# Patient Record
Sex: FEMALE | Race: WHITE | NOT HISPANIC OR LATINO | Employment: FULL TIME | URBAN - METROPOLITAN AREA
[De-identification: names, ages, dates, MRNs, and addresses within clinical notes are randomized per-mention and may not be internally consistent; named-entity substitution may affect disease eponyms.]

---

## 2019-08-01 ENCOUNTER — TELEPHONE (OUTPATIENT)
Dept: SURGICAL ONCOLOGY | Facility: CLINIC | Age: 59
End: 2019-08-01

## 2019-08-01 NOTE — TELEPHONE ENCOUNTER
New Patient Encounter      Bassem Wood  1960  [id number]      Calling Information:  Caller: self    Relationship to Patient: self    Is Caller Listed On Consent Form: yes     Diagnosis: pelvis cyst    When was the diagnosis?: 2019     Referring Provider: Sana Redding    Reason for Visit: New Diagnosis    Have you had any testing done?: yes    If yes, where was testing done: [facility]    Date of testin2019    Are records in [address] [place] [address]?: no    Was patient told to bring disk?: yes    Preferred [place]: [facility]    Requesting Specific Provider?: No    Are there any dates/time the patient cannot be seen?: No      Insurance:  Insurance checked: yes    RTE ran: yes    Insurance Accepted: yes    Miscellaneous:  NA

## 2019-08-14 ENCOUNTER — CONSULT (OUTPATIENT)
Dept: GYNECOLOGIC ONCOLOGY | Facility: HOSPITAL | Age: 59
End: 2019-08-14
Payer: COMMERCIAL

## 2019-08-14 VITALS
DIASTOLIC BLOOD PRESSURE: 72 MMHG | HEART RATE: 87 BPM | SYSTOLIC BLOOD PRESSURE: 128 MMHG | WEIGHT: 234 LBS | BODY MASS INDEX: 41.46 KG/M2 | HEIGHT: 63 IN | TEMPERATURE: 97.4 F

## 2019-08-14 DIAGNOSIS — N95.0 PMB (POSTMENOPAUSAL BLEEDING): Primary | ICD-10-CM

## 2019-08-14 DIAGNOSIS — R60.0 EDEMA OF LEFT LOWER EXTREMITY: ICD-10-CM

## 2019-08-14 DIAGNOSIS — E66.01 MORBID OBESITY WITH BMI OF 40.0-44.9, ADULT (HCC): ICD-10-CM

## 2019-08-14 DIAGNOSIS — N83.8 BILATERAL TUBO-OVARIAN MASS: ICD-10-CM

## 2019-08-14 PROCEDURE — 99245 OFF/OP CONSLTJ NEW/EST HI 55: CPT | Performed by: OBSTETRICS & GYNECOLOGY

## 2019-08-14 RX ORDER — ESCITALOPRAM OXALATE 20 MG/1
20 TABLET ORAL DAILY
Refills: 0 | COMMUNITY
Start: 2019-07-11

## 2019-08-14 RX ORDER — BUPROPION HYDROCHLORIDE 150 MG/1
TABLET, EXTENDED RELEASE ORAL EVERY EVENING
Refills: 0 | COMMUNITY
Start: 2019-05-23

## 2019-08-14 RX ORDER — SODIUM CHLORIDE, SODIUM LACTATE, POTASSIUM CHLORIDE, CALCIUM CHLORIDE 600; 310; 30; 20 MG/100ML; MG/100ML; MG/100ML; MG/100ML
125 INJECTION, SOLUTION INTRAVENOUS CONTINUOUS
Status: CANCELLED | OUTPATIENT
Start: 2019-08-14

## 2019-08-14 RX ORDER — HEPARIN SODIUM 5000 [USP'U]/ML
5000 INJECTION, SOLUTION INTRAVENOUS; SUBCUTANEOUS
Status: CANCELLED | OUTPATIENT
Start: 2019-08-15 | End: 2019-08-16

## 2019-08-14 RX ORDER — CEFAZOLIN SODIUM 2 G/50ML
2000 SOLUTION INTRAVENOUS ONCE
Status: CANCELLED | OUTPATIENT
Start: 2019-08-14 | End: 2019-08-14

## 2019-08-14 RX ORDER — AMLODIPINE BESYLATE 5 MG/1
5 TABLET ORAL DAILY
Refills: 4 | COMMUNITY
Start: 2019-07-12

## 2019-08-14 RX ORDER — VALSARTAN AND HYDROCHLOROTHIAZIDE 320; 25 MG/1; MG/1
TABLET, FILM COATED ORAL DAILY
Refills: 0 | COMMUNITY
Start: 2019-08-13

## 2019-08-14 RX ORDER — CHOLECALCIFEROL (VITAMIN D3) 25 MCG
CAPSULE ORAL DAILY
COMMUNITY

## 2019-08-14 RX ORDER — SIMVASTATIN 20 MG
TABLET ORAL
Refills: 5 | COMMUNITY
Start: 2019-07-11

## 2019-08-14 NOTE — PATIENT INSTRUCTIONS
1  Nothing to eat or drink after midnight prior to the operation  You are allowed clear liquids until 3 hours prior to the scheduled start time of surgery  No milk products or solid food for 8 hours prior to surgery  2   Please avoid ibuprofen, aspirin, fish oil for 7 days prior to surgery  3  Take your amlodipine the morning of surgery with a sip of water  Do not take any of your other medications the morning of surgery  4  Your last dose of metformin should be 24 hours prior to surgery

## 2019-08-14 NOTE — LETTER
August 14, 2019     [facility], [address] [place] [address] Drive  [address] [place] [id number]    Patient: David Marie   YOB: 1960   Date of Visit: 8/14/2019       Dear Dr Ricky Morales and Jerrod: Thank you for referring David Marie to me for evaluation  Below are the relevant portions of my H&P  If you have questions, please do not hesitate to call me  I look forward to following [facility] along with you  Sincerely,        Indy Mandel MD        CC: MD Indy Warner MD  8/14/2019  3:27 PM  Signed  Assessment/Plan:    Problem List Items Addressed This Visit        Other    PMB (postmenopausal bleeding) - Primary     55-year-old with morbid obesity, BMI 41 kilograms/meter squared, postmenopausal bleeding, complex bilateral adnexal masses measuring 17 cm on the right, 7 5 cm on the left, unable to perform office endometrial biopsy  Performance status is 0   1  I discussed the risks and benefits of diagnostic laparoscopy with conversion to exploratory laparotomy, total abdominal hysterectomy, bilateral salpingo-oophorectomy with all other indicated procedures including lymphadenectomy and staging as needed based on intraoperative pathologic assessment  She understands the risks and benefits of the operation agrees to proceed as outlined  Consent was obtained by me in the office  2   preoperatively  3  CT of abdomen pelvis, chest x-ray to assess for extra pelvic disease  Thank you for the courtesy of this consultation  All questions were answered by the end of the visit           Relevant Orders    CT abdomen pelvis w contrast        Case request operating room: HYSTERECTOMY TOTAL ABDOMINAL (TWAN), LAPAROSCOPY DIAGNOSTIC (Completed)    Protime-INR    Morbid obesity with BMI of 40 0-44 9, adult (HCC)    Bilateral tubo-ovarian mass    Relevant Orders    CT abdomen pelvis w contrast        Case request operating room: HYSTERECTOMY TOTAL ABDOMINAL (TWAN), LAPAROSCOPY DIAGNOSTIC (Completed)    Comprehensive metabolic panel    CBC and differential    Protime-INR    HEMOGLOBIN A1C W/ EAG ESTIMATION    Type and screen    EKG 12 lead    XR chest pa & lateral    Edema of left lower extremity    Relevant Orders    VAS lower limb venous duplex study, complete bilateral              CHIEF COMPLAINT:  Postmenopausal bleeding, complex bilateral adnexal masses        Patient ID: Haydee Jain is a 62 y o  female  49-year-old referred as a consultation from Dr Marco Lantigua due to imaging findings consistent with a 17 cm solid right adnexal mass and a 7 5 cm complex solid and cystic left ovarian mass  The patient has had postmenopausal bleeding for approximately 1 month  The endometrium appears thickened by ultrasound  I reviewed the images  She feels bloated, especially after eating  She does not have pelvic pain  Endometrial biopsy was attempted in the office but was not successful secondary to cervical stenosis  Review of Systems   Respiratory: Positive for apnea  Gastrointestinal: Positive for abdominal distention  Genitourinary: Positive for vaginal bleeding  Neurological: Positive for dizziness  Psychiatric/Behavioral: The patient is nervous/anxious  Current Outpatient Medications   Medication Sig Dispense Refill    Cholecalciferol (VITAMIN D-3) 1000 units CAPS Take by mouth      Omega-3 Fatty Acids (FISH OIL ADULT GUMMIES PO) Take by mouth      SUPER B COMPLEX/C PO Take by mouth      amLODIPine (NORVASC) 5 mg tablet TK 1 T PO QD  4    buPROPion (WELLBUTRIN SR) 150 mg 12 hr tablet TK 1 T PO QAM  0    escitalopram (LEXAPRO) 20 mg tablet TK 1 T PO QD  0    metFORMIN (GLUCOPHAGE) 500 mg tablet TK 1 T PO  QD WITH MEALS  0    simvastatin (ZOCOR) 20 mg tablet TK 1 T PO QD IN THE IKMBERLYN  5    valsartan-hydrochlorothiazide (DIOVAN-HCT) 320-25 MG per tablet   0     No current facility-administered medications for this visit  Allergies   Allergen Reactions    Sulfate Other (See Comments)     Redness and hotness       Past Medical History:   Diagnosis Date    Anxiety     Diabetes mellitus ([address] )     High cholesterol     Hypertension        Past Surgical History:   Procedure Laterality Date    CHOLECYSTECTOMY         OB History        1    Para   1    Term                AB        Living           SAB        TAB        Ectopic        Multiple        Live Births                     No family history on file  The following portions of the patient's history were reviewed and updated as appropriate: allergies, current medications, past family history, past medical history, past social history, past surgical history and problem list       Objective:    Blood pressure 128/72, pulse 87, temperature (!) 97 4 °F (36 3 °C), temperature source Oral, height 5' 3" (1 6 m), weight 106 kg (234 lb)  Body mass index is 41 45 kg/m²  Physical Exam   Constitutional: She is oriented to person, place, and time  She appears well-developed and well-nourished  No distress  HENT:   Head: Normocephalic and atraumatic  Neck: Normal range of motion  Neck supple  No thyromegaly present  Cardiovascular: Normal rate, regular rhythm and normal heart sounds  Pulmonary/Chest: Effort normal and breath sounds normal    Abdominal: Soft  She exhibits distension  She exhibits no mass  There is no tenderness  There is no rebound and no guarding  No hernia  Genitourinary:   Genitourinary Comments: The external female genitalia is normal  The bartholin's, uretheral and skenes glands are normal  The urethral meatus is normal (midline with no lesions)  Anus without fissure or lesion  Speculum exam reveals vagina without lesion or discharge  Cervix is normal appearing without visible lesions  There is a small amount of dark blood eminating from the cervical os  No significant cystocele or rectocele noted   Bimanual exam notes an enlarged, anteverted uterus  It is somewhat mobile  No tenderness  Adnexa with a ballotable mass  Bladder is without fullness, mass or tenderness  Musculoskeletal: She exhibits no edema  Lymphadenopathy:     She has no cervical adenopathy  Neurological: She is alert and oriented to person, place, and time  Skin: Skin is warm and dry  She is not diaphoretic  Psychiatric: She has a normal mood and affect   Her behavior is normal  Judgment and thought content normal

## 2019-08-14 NOTE — ASSESSMENT & PLAN NOTE
24-year-old with morbid obesity, BMI 41 kilograms/meter squared, postmenopausal bleeding, complex bilateral adnexal masses measuring 17 cm on the right, 7 5 cm on the left, unable to perform office endometrial biopsy  Performance status is 0   1  I discussed the risks and benefits of diagnostic laparoscopy with conversion to exploratory laparotomy, total abdominal hysterectomy, bilateral salpingo-oophorectomy with all other indicated procedures including lymphadenectomy and staging as needed based on intraoperative pathologic assessment  She understands the risks and benefits of the operation agrees to proceed as outlined  Consent was obtained by me in the office  2   preoperatively  3  CT of abdomen pelvis, chest x-ray to assess for extra pelvic disease  Thank you for the courtesy of this consultation  All questions were answered by the end of the visit

## 2019-08-14 NOTE — H&P
Assessment/Plan:    Problem List Items Addressed This Visit        Other    PMB (postmenopausal bleeding) - Primary     22-year-old with morbid obesity, BMI 41 kilograms/meter squared, postmenopausal bleeding, complex bilateral adnexal masses measuring 17 cm on the right, 7 5 cm on the left, unable to perform office endometrial biopsy  Performance status is 0   1  I discussed the risks and benefits of diagnostic laparoscopy with conversion to exploratory laparotomy, total abdominal hysterectomy, bilateral salpingo-oophorectomy with all other indicated procedures including lymphadenectomy and staging as needed based on intraoperative pathologic assessment  She understands the risks and benefits of the operation agrees to proceed as outlined  Consent was obtained by me in the office  2   preoperatively  3  CT of abdomen pelvis, chest x-ray to assess for extra pelvic disease  Thank you for the courtesy of this consultation  All questions were answered by the end of the visit           Relevant Orders    CT abdomen pelvis w contrast        Case request operating room: HYSTERECTOMY TOTAL ABDOMINAL (TWAN), LAPAROSCOPY DIAGNOSTIC (Completed)    Protime-INR    Morbid obesity with BMI of 40 0-44 9, adult (HCC)    Bilateral tubo-ovarian mass    Relevant Orders    CT abdomen pelvis w contrast        Case request operating room: HYSTERECTOMY TOTAL ABDOMINAL (TWAN), LAPAROSCOPY DIAGNOSTIC (Completed)    Comprehensive metabolic panel    CBC and differential    Protime-INR    HEMOGLOBIN A1C W/ EAG ESTIMATION    Type and screen    EKG 12 lead    XR chest pa & lateral    Edema of left lower extremity    Relevant Orders    VAS lower limb venous duplex study, complete bilateral              CHIEF COMPLAINT:  Postmenopausal bleeding, complex bilateral adnexal masses        Patient ID: Mae Redmond is a 62 y o  female  22-year-old referred as a consultation from Dr Dylon Lindsey due to imaging findings consistent with a 17 cm solid right adnexal mass and a 7 5 cm complex solid and cystic left ovarian mass  The patient has had postmenopausal bleeding for approximately 1 month  The endometrium appears thickened by ultrasound  I reviewed the images  She feels bloated, especially after eating  She does not have pelvic pain  Endometrial biopsy was attempted in the office but was not successful secondary to cervical stenosis  Review of Systems   Respiratory: Positive for apnea  Gastrointestinal: Positive for abdominal distention  Genitourinary: Positive for vaginal bleeding  Neurological: Positive for dizziness  Psychiatric/Behavioral: The patient is nervous/anxious  Current Outpatient Medications   Medication Sig Dispense Refill    Cholecalciferol (VITAMIN D-3) 1000 units CAPS Take by mouth      Omega-3 Fatty Acids (FISH OIL ADULT GUMMIES PO) Take by mouth      SUPER B COMPLEX/C PO Take by mouth      amLODIPine (NORVASC) 5 mg tablet TK 1 T PO QD  4    buPROPion (WELLBUTRIN SR) 150 mg 12 hr tablet TK 1 T PO QAM  0    escitalopram (LEXAPRO) 20 mg tablet TK 1 T PO QD  0    metFORMIN (GLUCOPHAGE) 500 mg tablet TK 1 T PO  QD WITH MEALS  0    simvastatin (ZOCOR) 20 mg tablet TK 1 T PO QD IN THE KIMBERLYN  5    valsartan-hydrochlorothiazide (DIOVAN-HCT) 320-25 MG per tablet   0     No current facility-administered medications for this visit  Allergies   Allergen Reactions    Sulfate Other (See Comments)     Redness and hotness       Past Medical History:   Diagnosis Date    Anxiety     Diabetes mellitus ([address] )     High cholesterol     Hypertension        Past Surgical History:   Procedure Laterality Date    CHOLECYSTECTOMY         OB History        1    Para   1    Term                AB        Living           SAB        TAB        Ectopic        Multiple        Live Births                     No family history on file      The following portions of the patient's history were reviewed and updated as appropriate: allergies, current medications, past family history, past medical history, past social history, past surgical history and problem list       Objective:    Blood pressure 128/72, pulse 87, temperature (!) 97 4 °F (36 3 °C), temperature source Oral, height 5' 3" (1 6 m), weight 106 kg (234 lb)  Body mass index is 41 45 kg/m²  Physical Exam   Constitutional: She is oriented to person, place, and time  She appears well-developed and well-nourished  No distress  HENT:   Head: Normocephalic and atraumatic  Neck: Normal range of motion  Neck supple  No thyromegaly present  Cardiovascular: Normal rate, regular rhythm and normal heart sounds  Pulmonary/Chest: Effort normal and breath sounds normal    Abdominal: Soft  She exhibits distension  She exhibits no mass  There is no tenderness  There is no rebound and no guarding  No hernia  Genitourinary:   Genitourinary Comments: The external female genitalia is normal  The bartholin's, uretheral and skenes glands are normal  The urethral meatus is normal (midline with no lesions)  Anus without fissure or lesion  Speculum exam reveals vagina without lesion or discharge  Cervix is normal appearing without visible lesions  There is a small amount of dark blood eminating from the cervical os  No significant cystocele or rectocele noted  Bimanual exam notes an enlarged, anteverted uterus  It is somewhat mobile  No tenderness  Adnexa with a ballotable mass  Bladder is without fullness, mass or tenderness  Musculoskeletal: She exhibits no edema  Lymphadenopathy:     She has no cervical adenopathy  Neurological: She is alert and oriented to person, place, and time  Skin: Skin is warm and dry  She is not diaphoretic  Psychiatric: She has a normal mood and affect   Her behavior is normal  Judgment and thought content normal

## 2019-08-14 NOTE — H&P (VIEW-ONLY)
Assessment/Plan:    Problem List Items Addressed This Visit        Other    PMB (postmenopausal bleeding) - Primary     47-year-old with morbid obesity, BMI 41 kilograms/meter squared, postmenopausal bleeding, complex bilateral adnexal masses measuring 17 cm on the right, 7 5 cm on the left, unable to perform office endometrial biopsy  Performance status is 0   1  I discussed the risks and benefits of diagnostic laparoscopy with conversion to exploratory laparotomy, total abdominal hysterectomy, bilateral salpingo-oophorectomy with all other indicated procedures including lymphadenectomy and staging as needed based on intraoperative pathologic assessment  She understands the risks and benefits of the operation agrees to proceed as outlined  Consent was obtained by me in the office  2   preoperatively  3  CT of abdomen pelvis, chest x-ray to assess for extra pelvic disease  Thank you for the courtesy of this consultation  All questions were answered by the end of the visit           Relevant Orders    CT abdomen pelvis w contrast        Case request operating room: HYSTERECTOMY TOTAL ABDOMINAL (TWAN), LAPAROSCOPY DIAGNOSTIC (Completed)    Protime-INR    Morbid obesity with BMI of 40 0-44 9, adult (HCC)    Bilateral tubo-ovarian mass    Relevant Orders    CT abdomen pelvis w contrast        Case request operating room: HYSTERECTOMY TOTAL ABDOMINAL (TWAN), LAPAROSCOPY DIAGNOSTIC (Completed)    Comprehensive metabolic panel    CBC and differential    Protime-INR    HEMOGLOBIN A1C W/ EAG ESTIMATION    Type and screen    EKG 12 lead    XR chest pa & lateral    Edema of left lower extremity    Relevant Orders    VAS lower limb venous duplex study, complete bilateral              CHIEF COMPLAINT:  Postmenopausal bleeding, complex bilateral adnexal masses        Patient ID: Mae Redmond is a 62 y o  female  47-year-old referred as a consultation from Dr Dylon Lindsey due to imaging findings consistent with a 17 cm solid right adnexal mass and a 7 5 cm complex solid and cystic left ovarian mass  The patient has had postmenopausal bleeding for approximately 1 month  The endometrium appears thickened by ultrasound  I reviewed the images  She feels bloated, especially after eating  She does not have pelvic pain  Endometrial biopsy was attempted in the office but was not successful secondary to cervical stenosis  Review of Systems   Respiratory: Positive for apnea  Gastrointestinal: Positive for abdominal distention  Genitourinary: Positive for vaginal bleeding  Neurological: Positive for dizziness  Psychiatric/Behavioral: The patient is nervous/anxious  Current Outpatient Medications   Medication Sig Dispense Refill    Cholecalciferol (VITAMIN D-3) 1000 units CAPS Take by mouth      Omega-3 Fatty Acids (FISH OIL ADULT GUMMIES PO) Take by mouth      SUPER B COMPLEX/C PO Take by mouth      amLODIPine (NORVASC) 5 mg tablet TK 1 T PO QD  4    buPROPion (WELLBUTRIN SR) 150 mg 12 hr tablet TK 1 T PO QAM  0    escitalopram (LEXAPRO) 20 mg tablet TK 1 T PO QD  0    metFORMIN (GLUCOPHAGE) 500 mg tablet TK 1 T PO  QD WITH MEALS  0    simvastatin (ZOCOR) 20 mg tablet TK 1 T PO QD IN THE KIMBERLYN  5    valsartan-hydrochlorothiazide (DIOVAN-HCT) 320-25 MG per tablet   0     No current facility-administered medications for this visit  Allergies   Allergen Reactions    Sulfate Other (See Comments)     Redness and hotness       Past Medical History:   Diagnosis Date    Anxiety     Diabetes mellitus ([address] )     High cholesterol     Hypertension        Past Surgical History:   Procedure Laterality Date    CHOLECYSTECTOMY         OB History        1    Para   1    Term                AB        Living           SAB        TAB        Ectopic        Multiple        Live Births                     No family history on file      The following portions of the patient's history were reviewed and updated as appropriate: allergies, current medications, past family history, past medical history, past social history, past surgical history and problem list       Objective:    Blood pressure 128/72, pulse 87, temperature (!) 97 4 °F (36 3 °C), temperature source Oral, height 5' 3" (1 6 m), weight 106 kg (234 lb)  Body mass index is 41 45 kg/m²  Physical Exam   Constitutional: She is oriented to person, place, and time  She appears well-developed and well-nourished  No distress  HENT:   Head: Normocephalic and atraumatic  Neck: Normal range of motion  Neck supple  No thyromegaly present  Cardiovascular: Normal rate, regular rhythm and normal heart sounds  Pulmonary/Chest: Effort normal and breath sounds normal    Abdominal: Soft  She exhibits distension  She exhibits no mass  There is no tenderness  There is no rebound and no guarding  No hernia  Genitourinary:   Genitourinary Comments: The external female genitalia is normal  The bartholin's, uretheral and skenes glands are normal  The urethral meatus is normal (midline with no lesions)  Anus without fissure or lesion  Speculum exam reveals vagina without lesion or discharge  Cervix is normal appearing without visible lesions  There is a small amount of dark blood eminating from the cervical os  No significant cystocele or rectocele noted  Bimanual exam notes an enlarged, anteverted uterus  It is somewhat mobile  No tenderness  Adnexa with a ballotable mass  Bladder is without fullness, mass or tenderness  Musculoskeletal: She exhibits no edema  Lymphadenopathy:     She has no cervical adenopathy  Neurological: She is alert and oriented to person, place, and time  Skin: Skin is warm and dry  She is not diaphoretic  Psychiatric: She has a normal mood and affect   Her behavior is normal  Judgment and thought content normal

## 2019-08-15 ENCOUNTER — TELEPHONE (OUTPATIENT)
Dept: GYNECOLOGIC ONCOLOGY | Facility: CLINIC | Age: 59
End: 2019-08-15

## 2019-08-15 NOTE — TELEPHONE ENCOUNTER
Called patient and left message in regards to scheduling her surgery, asked her to call me back at earliest convenience

## 2019-08-16 ENCOUNTER — TRANSCRIBE ORDERS (OUTPATIENT)
Dept: ADMINISTRATIVE | Facility: HOSPITAL | Age: 59
End: 2019-08-16

## 2019-08-16 ENCOUNTER — HOSPITAL ENCOUNTER (OUTPATIENT)
Dept: NON INVASIVE DIAGNOSTICS | Facility: HOSPITAL | Age: 59
Discharge: HOME | End: 2019-08-16
Attending: OBSTETRICS & GYNECOLOGY
Payer: COMMERCIAL

## 2019-08-16 ENCOUNTER — APPOINTMENT (OUTPATIENT)
Dept: LAB | Facility: HOSPITAL | Age: 59
End: 2019-08-16
Attending: OBSTETRICS & GYNECOLOGY
Payer: COMMERCIAL

## 2019-08-16 ENCOUNTER — HOSPITAL ENCOUNTER (OUTPATIENT)
Dept: RADIOLOGY | Facility: HOSPITAL | Age: 59
Discharge: HOME | End: 2019-08-16
Attending: OBSTETRICS & GYNECOLOGY
Payer: COMMERCIAL

## 2019-08-16 DIAGNOSIS — N83.8 BILATERAL TUBO-OVARIAN MASS: ICD-10-CM

## 2019-08-16 DIAGNOSIS — N95.0 PMB (POSTMENOPAUSAL BLEEDING): ICD-10-CM

## 2019-08-16 DIAGNOSIS — Z01.818 ENCOUNTER FOR PREADMISSION TESTING: Primary | ICD-10-CM

## 2019-08-16 DIAGNOSIS — Z01.818 ENCOUNTER FOR PREADMISSION TESTING: ICD-10-CM

## 2019-08-16 LAB
ABO GROUP BLD: NORMAL
ALBUMIN SERPL BCP-MCNC: 3.7 G/DL (ref 3.5–5)
ALP SERPL-CCNC: 69 U/L (ref 46–116)
ALT SERPL W P-5'-P-CCNC: 21 U/L (ref 12–78)
ANION GAP SERPL CALCULATED.3IONS-SCNC: 10 MMOL/L (ref 4–13)
AST SERPL W P-5'-P-CCNC: 17 U/L (ref 5–45)
ATRIAL RATE: 75 BPM
BASOPHILS # BLD AUTO: 0.08 THOUSANDS/ΜL (ref 0–0.1)
BASOPHILS NFR BLD AUTO: 1 % (ref 0–1)
BILIRUB SERPL-MCNC: 0.5 MG/DL (ref 0.2–1)
BLD GP AB SCN SERPL QL: NEGATIVE
BUN SERPL-MCNC: 14 MG/DL (ref 5–25)
CALCIUM SERPL-MCNC: 9.5 MG/DL (ref 8.3–10.1)
CANCER AG125 SERPL-ACNC: 42.7 U/ML (ref 0–30)
CHLORIDE SERPL-SCNC: 100 MMOL/L (ref 100–108)
CO2 SERPL-SCNC: 29 MMOL/L (ref 21–32)
CREAT SERPL-MCNC: 0.74 MG/DL (ref 0.6–1.3)
EOSINOPHIL # BLD AUTO: 0.12 THOUSAND/ΜL (ref 0–0.61)
EOSINOPHIL NFR BLD AUTO: 1 % (ref 0–6)
ERYTHROCYTE [DISTWIDTH] IN BLOOD BY AUTOMATED COUNT: 12.2 % (ref 11.6–15.1)
EST. AVERAGE GLUCOSE BLD GHB EST-MCNC: 140 MG/DL
GFR SERPL CREATININE-BSD FRML MDRD: 88 ML/MIN/1.73SQ M
GLUCOSE SERPL-MCNC: 155 MG/DL (ref 65–140)
HBA1C MFR BLD: 6.5 % (ref 4.2–6.3)
HCT VFR BLD AUTO: 39.4 % (ref 34.8–46.1)
HGB BLD-MCNC: 13.5 G/DL (ref 11.5–15.4)
IMM GRANULOCYTES # BLD AUTO: 0.04 THOUSAND/UL (ref 0–0.2)
IMM GRANULOCYTES NFR BLD AUTO: 1 % (ref 0–2)
INR PPP: 1.04 (ref 0.84–1.19)
LYMPHOCYTES # BLD AUTO: 2 THOUSANDS/ΜL (ref 0.6–4.47)
LYMPHOCYTES NFR BLD AUTO: 23 % (ref 14–44)
MCH RBC QN AUTO: 28.6 PG (ref 26.8–34.3)
MCHC RBC AUTO-ENTMCNC: 34.3 G/DL (ref 31.4–37.4)
MCV RBC AUTO: 84 FL (ref 82–98)
MONOCYTES # BLD AUTO: 0.55 THOUSAND/ΜL (ref 0.17–1.22)
MONOCYTES NFR BLD AUTO: 6 % (ref 4–12)
NEUTROPHILS # BLD AUTO: 5.77 THOUSANDS/ΜL (ref 1.85–7.62)
NEUTS SEG NFR BLD AUTO: 68 % (ref 43–75)
NRBC BLD AUTO-RTO: 0 /100 WBCS
P AXIS: 61 DEGREES
PLATELET # BLD AUTO: 347 THOUSANDS/UL (ref 149–390)
PMV BLD AUTO: 9.3 FL (ref 8.9–12.7)
POTASSIUM SERPL-SCNC: 3.4 MMOL/L (ref 3.5–5.3)
PR INTERVAL: 164 MS
PROT SERPL-MCNC: 7.1 G/DL (ref 6.4–8.2)
PROTHROMBIN TIME: 13.3 SECONDS (ref 11.6–14.5)
QRS AXIS: 28 DEGREES
QRSD INTERVAL: 78 MS
QT INTERVAL: 386 MS
QTC INTERVAL: 431 MS
RBC # BLD AUTO: 4.72 MILLION/UL (ref 3.81–5.12)
RH BLD: POSITIVE
SODIUM SERPL-SCNC: 139 MMOL/L (ref 136–145)
SPECIMEN EXPIRATION DATE: NORMAL
T WAVE AXIS: 31 DEGREES
VENTRICULAR RATE: 75 BPM
WBC # BLD AUTO: 8.56 THOUSAND/UL (ref 4.31–10.16)

## 2019-08-16 PROCEDURE — 36415 COLL VENOUS BLD VENIPUNCTURE: CPT

## 2019-08-16 PROCEDURE — 86850 RBC ANTIBODY SCREEN: CPT

## 2019-08-16 PROCEDURE — 85610 PROTHROMBIN TIME: CPT

## 2019-08-16 PROCEDURE — 80053 COMPREHEN METABOLIC PANEL: CPT

## 2019-08-16 PROCEDURE — 86304 IMMUNOASSAY TUMOR CA 125: CPT

## 2019-08-16 PROCEDURE — 85025 COMPLETE CBC W/AUTO DIFF WBC: CPT

## 2019-08-16 PROCEDURE — 86900 BLOOD TYPING SEROLOGIC ABO: CPT

## 2019-08-16 PROCEDURE — 93010 ELECTROCARDIOGRAM REPORT: CPT | Performed by: INTERNAL MEDICINE

## 2019-08-16 PROCEDURE — 93005 ELECTROCARDIOGRAM TRACING: CPT

## 2019-08-16 PROCEDURE — 71046 X-RAY EXAM CHEST 2 VIEWS: CPT

## 2019-08-16 PROCEDURE — 86901 BLOOD TYPING SEROLOGIC RH(D): CPT

## 2019-08-16 PROCEDURE — 83036 HEMOGLOBIN GLYCOSYLATED A1C: CPT

## 2019-08-21 RX ORDER — CALCIUM CARBONATE/VITAMIN D3 600 MG-10
TABLET ORAL DAILY
COMMUNITY

## 2019-08-21 NOTE — PRE-PROCEDURE INSTRUCTIONS
Pre-Surgery Instructions:   Medication Instructions    amLODIPine (NORVASC) 5 mg tablet Instructed patient per Anesthesia Guidelines   buPROPion (WELLBUTRIN SR) 150 mg 12 hr tablet Instructed patient per Anesthesia Guidelines   Cholecalciferol (VITAMIN D-3) 1000 units CAPS Instructed patient per Anesthesia Guidelines   escitalopram (LEXAPRO) 20 mg tablet Instructed patient per Anesthesia Guidelines   metFORMIN (GLUCOPHAGE) 500 mg tablet Instructed patient per Anesthesia Guidelines   Omega 3 1200 MG CAPS Instructed patient per Anesthesia Guidelines   simvastatin (ZOCOR) 20 mg tablet Instructed patient per Anesthesia Guidelines   SUPER B COMPLEX/C PO Instructed patient per Anesthesia Guidelines   valsartan-hydrochlorothiazide (DIOVAN-HCT) 320-25 MG per tablet Instructed patient per Anesthesia Guidelines  Continue this medication up to the evening before surgery/procedure, but do not take the morning of the day of surgery  Antidepressant Med Class     Continue to take this medication on your normal schedule  If this is an oral medication and you take it in the morning, then you may take this medicine with a sip of water  Calcium Channel Blocker Med Class     Continue to take this heart medication on your normal schedule  If this is an oral medication and you take it in the morning, then you may take this medicine with a sip of water  Insulin Med Class     Pre-Surgery/Procedure Instructions for Adult Patients who Take Medicine for Diabetes or to Control their Blood Sugar     Day Before Surgery/Procedure  Use the directions based on the type of medicine you take for your diabetes  1  If you are having a procedure that does not require a bowel prep:  ? Pre-Mixed Insulin (Intermediate Acting: Humalog 75/25, Humulin 70/30  Novolog 70/30, Regular Insulin)  § Take ½ your regular dose the evening before your procedure    ? Rapid/Fast Acting Insulin/Long Acting Insulin (Humalog U200, NovoLog, Apidra, Lantus, Levemir, Isaias Laity, Micanopy)  § Take your FULL regular dose the day before procedure  ? Oral Diabetic Medicines including Glipizide/Glimepiride/Glucotrol (sulfonylurea)  § Take your regular dose with dinner the evening before your procedure  2  If you are having a procedure (e g  Colonoscopy) that requires a bowel prep and you are allowed to have at least a clear liquid diet:  ? Pre-Mixed Insulin (Intermediate Acting: Humalog 75/25, Humulin 70/30, Novolog 70/30, Regular Insulin)  § Take ½ your regular dose the evening before your procedure  ? Rapid/Fast Acting Insulin (Humalog U200, NovoLog, Apidra, Fiasp)  § Take ½ your regular dose the evening before your procedure  ? Long Acting Insulin (Lantus, Levemir, Isaias Laity)  § Take your FULL regular dose the day before procedure  ? Oral Glipizide/Glimepiride/Glucotrol (sulfonylurea)  § Take ½ your regular dose the evening before your procedure  ? Oral Diabetic Medicines that are NOT Glipizide/Glimepiride/Glucotrol  § Take your regular dose with dinner in the evening before your procedure      Day of Surgery/Procedure  · Long Acting Insulin (Lantus, Levemir, Isaias Laity)  ? If you usually take your Long-Acting Insulin in the morning, take the full dose as scheduled  · With the exception of the morning Long-Acting Insulin noted above, DO NOT take ANY diabetic medicine on the day of your procedure unless you were instructed by the doctor who manages your diabetic medicines  · Continue to check your blood sugars  · If you have an insulin pump then consult with your Endocrinologist for instructions  · If you cannot see your Endocrinologist, on the day of the procedure set your insulin pump to your basal rate only   Please bring your insulin pump supplies to the hospital      This Educational material has been approved by the Patient Education Advisory Committee  Date prepared: 1/17/2018          Expiration date: 1/17/2019        Approval Number:                     Statin Med Class     Continue to take this medication on your normal schedule  If this is an oral medication and you take it in the morning, then you may take this medicine with a sip of water

## 2019-08-21 NOTE — PRE-PROCEDURE INSTRUCTIONS
Pre-Surgery Instructions:   Medication Instructions    amLODIPine (NORVASC) 5 mg tablet Instructed patient per Anesthesia Guidelines   buPROPion (WELLBUTRIN SR) 150 mg 12 hr tablet Instructed patient per Anesthesia Guidelines   Cholecalciferol (VITAMIN D-3) 1000 units CAPS Instructed patient per Anesthesia Guidelines   escitalopram (LEXAPRO) 20 mg tablet Instructed patient per Anesthesia Guidelines   metFORMIN (GLUCOPHAGE) 500 mg tablet Instructed patient per Anesthesia Guidelines   Omega 3 1200 MG CAPS Instructed patient per Anesthesia Guidelines   simvastatin (ZOCOR) 20 mg tablet Instructed patient per Anesthesia Guidelines   SUPER B COMPLEX/C PO Instructed patient per Anesthesia Guidelines   valsartan-hydrochlorothiazide (DIOVAN-HCT) 320-25 MG per tablet Instructed patient per Anesthesia Guidelines

## 2019-08-22 ENCOUNTER — HOSPITAL ENCOUNTER (OUTPATIENT)
Dept: CT IMAGING | Facility: HOSPITAL | Age: 59
Discharge: HOME | End: 2019-08-22
Attending: OBSTETRICS & GYNECOLOGY
Payer: COMMERCIAL

## 2019-08-22 ENCOUNTER — HOSPITAL ENCOUNTER (OUTPATIENT)
Dept: NON INVASIVE DIAGNOSTICS | Facility: HOSPITAL | Age: 59
Discharge: HOME | End: 2019-08-22
Attending: OBSTETRICS & GYNECOLOGY
Payer: COMMERCIAL

## 2019-08-22 DIAGNOSIS — R60.0 EDEMA OF LEFT LOWER EXTREMITY: ICD-10-CM

## 2019-08-22 DIAGNOSIS — N95.0 PMB (POSTMENOPAUSAL BLEEDING): ICD-10-CM

## 2019-08-22 DIAGNOSIS — N83.8 BILATERAL TUBO-OVARIAN MASS: ICD-10-CM

## 2019-08-22 PROCEDURE — 93970 EXTREMITY STUDY: CPT | Performed by: INTERNAL MEDICINE

## 2019-08-22 PROCEDURE — 74177 CT ABD & PELVIS W/CONTRAST: CPT

## 2019-08-22 PROCEDURE — 93970 EXTREMITY STUDY: CPT

## 2019-08-22 RX ADMIN — IOHEXOL 100 ML: 350 INJECTION, SOLUTION INTRAVENOUS at 11:06

## 2019-08-22 RX ADMIN — IOHEXOL 50 ML: 240 INJECTION, SOLUTION INTRATHECAL; INTRAVASCULAR; INTRAVENOUS; ORAL at 11:06

## 2019-08-25 ENCOUNTER — ANESTHESIA EVENT (OUTPATIENT)
Dept: PERIOP | Facility: HOSPITAL | Age: 59
DRG: 737 | End: 2019-08-25
Payer: COMMERCIAL

## 2019-08-26 ENCOUNTER — HOSPITAL ENCOUNTER (INPATIENT)
Facility: HOSPITAL | Age: 59
LOS: 4 days | Discharge: HOME HEALTH | DRG: 737 | End: 2019-08-30
Attending: OBSTETRICS & GYNECOLOGY | Admitting: OBSTETRICS & GYNECOLOGY
Payer: COMMERCIAL

## 2019-08-26 ENCOUNTER — ANESTHESIA (OUTPATIENT)
Dept: PERIOP | Facility: HOSPITAL | Age: 59
DRG: 737 | End: 2019-08-26
Payer: COMMERCIAL

## 2019-08-26 ENCOUNTER — APPOINTMENT (INPATIENT)
Dept: RADIOLOGY | Facility: HOSPITAL | Age: 59
DRG: 737 | End: 2019-08-26
Payer: COMMERCIAL

## 2019-08-26 ENCOUNTER — TELEPHONE (OUTPATIENT)
Dept: UROLOGY | Facility: CLINIC | Age: 59
End: 2019-08-26

## 2019-08-26 DIAGNOSIS — Z98.890 S/P BLADDER REPAIR: Primary | ICD-10-CM

## 2019-08-26 DIAGNOSIS — Z90.710 S/P ABDOMINAL HYSTERECTOMY: ICD-10-CM

## 2019-08-26 DIAGNOSIS — N83.8 BILATERAL TUBO-OVARIAN MASS: ICD-10-CM

## 2019-08-26 DIAGNOSIS — N95.0 PMB (POSTMENOPAUSAL BLEEDING): ICD-10-CM

## 2019-08-26 PROBLEM — D49.59 OVARIAN NEOPLASM: Status: ACTIVE | Noted: 2019-08-26

## 2019-08-26 LAB
BASE EXCESS BLDA CALC-SCNC: -1 MMOL/L (ref -2–3)
BASE EXCESS BLDA CALC-SCNC: -5 MMOL/L (ref -2–3)
CA-I BLD-SCNC: 1.15 MMOL/L (ref 1.12–1.32)
CA-I BLD-SCNC: 1.17 MMOL/L (ref 1.12–1.32)
GLUCOSE SERPL-MCNC: 170 MG/DL (ref 65–140)
GLUCOSE SERPL-MCNC: 188 MG/DL (ref 65–140)
GLUCOSE SERPL-MCNC: 221 MG/DL (ref 65–140)
GLUCOSE SERPL-MCNC: 221 MG/DL (ref 65–140)
GLUCOSE SERPL-MCNC: 275 MG/DL (ref 65–140)
HCO3 BLDA-SCNC: 22.6 MMOL/L (ref 24–30)
HCO3 BLDA-SCNC: 24.8 MMOL/L (ref 24–30)
HCT VFR BLD CALC: 25 % (ref 34.8–46.1)
HCT VFR BLD CALC: 31 % (ref 34.8–46.1)
HGB BLDA-MCNC: 10.5 G/DL (ref 11.5–15.4)
HGB BLDA-MCNC: 8.5 G/DL (ref 11.5–15.4)
PCO2 BLD: 24 MMOL/L (ref 21–32)
PCO2 BLD: 26 MMOL/L (ref 21–32)
PCO2 BLD: 45.7 MM HG (ref 42–50)
PCO2 BLD: 54.2 MM HG (ref 42–50)
PH BLD: 7.23 [PH] (ref 7.3–7.4)
PH BLD: 7.34 [PH] (ref 7.3–7.4)
PO2 BLD: 37 MM HG (ref 35–45)
PO2 BLD: 50 MM HG (ref 35–45)
POTASSIUM BLD-SCNC: 2.9 MMOL/L (ref 3.5–5.3)
POTASSIUM BLD-SCNC: 3.5 MMOL/L (ref 3.5–5.3)
SAO2 % BLD FROM PO2: 59 % (ref 95–98)
SAO2 % BLD FROM PO2: 82 % (ref 95–98)
SODIUM BLD-SCNC: 139 MMOL/L (ref 136–145)
SODIUM BLD-SCNC: 140 MMOL/L (ref 136–145)
SPECIMEN SOURCE: ABNORMAL
SPECIMEN SOURCE: ABNORMAL

## 2019-08-26 PROCEDURE — 0TQB0ZZ REPAIR BLADDER, OPEN APPROACH: ICD-10-PCS | Performed by: UROLOGY

## 2019-08-26 PROCEDURE — 88307 TISSUE EXAM BY PATHOLOGIST: CPT | Performed by: PATHOLOGY

## 2019-08-26 PROCEDURE — 88305 TISSUE EXAM BY PATHOLOGIST: CPT | Performed by: PATHOLOGY

## 2019-08-26 PROCEDURE — C1769 GUIDE WIRE: HCPCS | Performed by: OBSTETRICS & GYNECOLOGY

## 2019-08-26 PROCEDURE — C1758 CATHETER, URETERAL: HCPCS | Performed by: OBSTETRICS & GYNECOLOGY

## 2019-08-26 PROCEDURE — C2617 STENT, NON-COR, TEM W/O DEL: HCPCS | Performed by: OBSTETRICS & GYNECOLOGY

## 2019-08-26 PROCEDURE — 51860 REPAIR OF BLADDER WOUND: CPT | Performed by: UROLOGY

## 2019-08-26 PROCEDURE — 88112 CYTOPATH CELL ENHANCE TECH: CPT | Performed by: PATHOLOGY

## 2019-08-26 PROCEDURE — 0UT90ZZ RESECTION OF UTERUS, OPEN APPROACH: ICD-10-PCS | Performed by: OBSTETRICS & GYNECOLOGY

## 2019-08-26 PROCEDURE — 88342 IMHCHEM/IMCYTCHM 1ST ANTB: CPT | Performed by: PATHOLOGY

## 2019-08-26 PROCEDURE — 88304 TISSUE EXAM BY PATHOLOGIST: CPT | Performed by: PATHOLOGY

## 2019-08-26 PROCEDURE — 82947 ASSAY GLUCOSE BLOOD QUANT: CPT

## 2019-08-26 PROCEDURE — 74018 RADEX ABDOMEN 1 VIEW: CPT

## 2019-08-26 PROCEDURE — 0UBF0ZX EXCISION OF CUL-DE-SAC, OPEN APPROACH, DIAGNOSTIC: ICD-10-PCS | Performed by: OBSTETRICS & GYNECOLOGY

## 2019-08-26 PROCEDURE — 88331 PATH CONSLTJ SURG 1 BLK 1SPC: CPT | Performed by: OBSTETRICS & GYNECOLOGY

## 2019-08-26 PROCEDURE — 88302 TISSUE EXAM BY PATHOLOGIST: CPT | Performed by: PATHOLOGY

## 2019-08-26 PROCEDURE — 0DTJ0ZZ RESECTION OF APPENDIX, OPEN APPROACH: ICD-10-PCS | Performed by: OBSTETRICS & GYNECOLOGY

## 2019-08-26 PROCEDURE — 0TBB0ZX EXCISION OF BLADDER, OPEN APPROACH, DIAGNOSTIC: ICD-10-PCS | Performed by: OBSTETRICS & GYNECOLOGY

## 2019-08-26 PROCEDURE — 0UT70ZZ RESECTION OF BILATERAL FALLOPIAN TUBES, OPEN APPROACH: ICD-10-PCS | Performed by: OBSTETRICS & GYNECOLOGY

## 2019-08-26 PROCEDURE — 88342 IMHCHEM/IMCYTCHM 1ST ANTB: CPT

## 2019-08-26 PROCEDURE — 88341 IMHCHEM/IMCYTCHM EA ADD ANTB: CPT

## 2019-08-26 PROCEDURE — 58951 RESECT OVARIAN MALIGNANCY: CPT | Performed by: OBSTETRICS & GYNECOLOGY

## 2019-08-26 PROCEDURE — 30233N1 TRANSFUSION OF NONAUTOLOGOUS RED BLOOD CELLS INTO PERIPHERAL VEIN, PERCUTANEOUS APPROACH: ICD-10-PCS | Performed by: OBSTETRICS & GYNECOLOGY

## 2019-08-26 PROCEDURE — 82803 BLOOD GASES ANY COMBINATION: CPT

## 2019-08-26 PROCEDURE — 0TQB8ZZ REPAIR BLADDER, VIA NATURAL OR ARTIFICIAL OPENING ENDOSCOPIC: ICD-10-PCS | Performed by: OBSTETRICS & GYNECOLOGY

## 2019-08-26 PROCEDURE — 0DNW0ZZ RELEASE PERITONEUM, OPEN APPROACH: ICD-10-PCS | Performed by: OBSTETRICS & GYNECOLOGY

## 2019-08-26 PROCEDURE — 85014 HEMATOCRIT: CPT

## 2019-08-26 PROCEDURE — 07BC0ZX EXCISION OF PELVIS LYMPHATIC, OPEN APPROACH, DIAGNOSTIC: ICD-10-PCS | Performed by: OBSTETRICS & GYNECOLOGY

## 2019-08-26 PROCEDURE — 82948 REAGENT STRIP/BLOOD GLUCOSE: CPT

## 2019-08-26 PROCEDURE — 84295 ASSAY OF SERUM SODIUM: CPT

## 2019-08-26 PROCEDURE — 0T770DZ DILATION OF LEFT URETER WITH INTRALUMINAL DEVICE, OPEN APPROACH: ICD-10-PCS | Performed by: UROLOGY

## 2019-08-26 PROCEDURE — 0DTU0ZZ RESECTION OF OMENTUM, OPEN APPROACH: ICD-10-PCS | Performed by: OBSTETRICS & GYNECOLOGY

## 2019-08-26 PROCEDURE — 0TS70ZZ REPOSITION LEFT URETER, OPEN APPROACH: ICD-10-PCS | Performed by: UROLOGY

## 2019-08-26 PROCEDURE — 50780 REIMPLANT URETER IN BLADDER: CPT | Performed by: UROLOGY

## 2019-08-26 PROCEDURE — P9016 RBC LEUKOCYTES REDUCED: HCPCS

## 2019-08-26 PROCEDURE — 0WBH0ZX EXCISION OF RETROPERITONEUM, OPEN APPROACH, DIAGNOSTIC: ICD-10-PCS | Performed by: OBSTETRICS & GYNECOLOGY

## 2019-08-26 PROCEDURE — 86920 COMPATIBILITY TEST SPIN: CPT

## 2019-08-26 PROCEDURE — 88331 PATH CONSLTJ SURG 1 BLK 1SPC: CPT | Performed by: PATHOLOGY

## 2019-08-26 PROCEDURE — 0DBW0ZZ EXCISION OF PERITONEUM, OPEN APPROACH: ICD-10-PCS | Performed by: OBSTETRICS & GYNECOLOGY

## 2019-08-26 PROCEDURE — 82330 ASSAY OF CALCIUM: CPT

## 2019-08-26 PROCEDURE — 84132 ASSAY OF SERUM POTASSIUM: CPT

## 2019-08-26 PROCEDURE — 0UT20ZZ RESECTION OF BILATERAL OVARIES, OPEN APPROACH: ICD-10-PCS | Performed by: OBSTETRICS & GYNECOLOGY

## 2019-08-26 DEVICE — STENT URETERAL 6FR 24CML INLAY OPTIMA: Type: IMPLANTABLE DEVICE | Site: URETER | Status: NON-FUNCTIONAL

## 2019-08-26 RX ORDER — DEXTROSE, SODIUM CHLORIDE, AND POTASSIUM CHLORIDE 5; .45; .15 G/100ML; G/100ML; G/100ML
125 INJECTION INTRAVENOUS CONTINUOUS
Status: DISCONTINUED | OUTPATIENT
Start: 2019-08-26 | End: 2019-08-29

## 2019-08-26 RX ORDER — MIDAZOLAM HYDROCHLORIDE 1 MG/ML
INJECTION INTRAMUSCULAR; INTRAVENOUS AS NEEDED
Status: DISCONTINUED | OUTPATIENT
Start: 2019-08-26 | End: 2019-08-26 | Stop reason: SURG

## 2019-08-26 RX ORDER — ONDANSETRON 2 MG/ML
4 INJECTION INTRAMUSCULAR; INTRAVENOUS ONCE AS NEEDED
Status: DISCONTINUED | OUTPATIENT
Start: 2019-08-26 | End: 2019-08-26 | Stop reason: HOSPADM

## 2019-08-26 RX ORDER — GLYCOPYRROLATE 0.2 MG/ML
INJECTION INTRAMUSCULAR; INTRAVENOUS AS NEEDED
Status: DISCONTINUED | OUTPATIENT
Start: 2019-08-26 | End: 2019-08-26 | Stop reason: SURG

## 2019-08-26 RX ORDER — LIDOCAINE HYDROCHLORIDE 10 MG/ML
INJECTION, SOLUTION INFILTRATION; PERINEURAL AS NEEDED
Status: DISCONTINUED | OUTPATIENT
Start: 2019-08-26 | End: 2019-08-26 | Stop reason: SURG

## 2019-08-26 RX ORDER — POTASSIUM CHLORIDE 14.9 MG/ML
INJECTION INTRAVENOUS CONTINUOUS PRN
Status: DISCONTINUED | OUTPATIENT
Start: 2019-08-26 | End: 2019-08-26 | Stop reason: SURG

## 2019-08-26 RX ORDER — METOCLOPRAMIDE HYDROCHLORIDE 5 MG/ML
10 INJECTION INTRAMUSCULAR; INTRAVENOUS ONCE AS NEEDED
Status: DISCONTINUED | OUTPATIENT
Start: 2019-08-26 | End: 2019-08-26 | Stop reason: HOSPADM

## 2019-08-26 RX ORDER — HYDROMORPHONE HCL/PF 1 MG/ML
0.2 SYRINGE (ML) INJECTION
Status: DISCONTINUED | OUTPATIENT
Start: 2019-08-26 | End: 2019-08-26 | Stop reason: HOSPADM

## 2019-08-26 RX ORDER — PROPOFOL 10 MG/ML
INJECTION, EMULSION INTRAVENOUS CONTINUOUS PRN
Status: DISCONTINUED | OUTPATIENT
Start: 2019-08-26 | End: 2019-08-26 | Stop reason: SURG

## 2019-08-26 RX ORDER — MEPERIDINE HYDROCHLORIDE 25 MG/ML
12.5 INJECTION INTRAMUSCULAR; INTRAVENOUS; SUBCUTANEOUS
Status: DISCONTINUED | OUTPATIENT
Start: 2019-08-26 | End: 2019-08-26 | Stop reason: HOSPADM

## 2019-08-26 RX ORDER — ROCURONIUM BROMIDE 10 MG/ML
INJECTION, SOLUTION INTRAVENOUS AS NEEDED
Status: DISCONTINUED | OUTPATIENT
Start: 2019-08-26 | End: 2019-08-26 | Stop reason: SURG

## 2019-08-26 RX ORDER — CEFAZOLIN SODIUM 2 G/50ML
2000 SOLUTION INTRAVENOUS ONCE
Status: COMPLETED | OUTPATIENT
Start: 2019-08-26 | End: 2019-08-26

## 2019-08-26 RX ORDER — FENTANYL CITRATE 50 UG/ML
INJECTION, SOLUTION INTRAMUSCULAR; INTRAVENOUS AS NEEDED
Status: DISCONTINUED | OUTPATIENT
Start: 2019-08-26 | End: 2019-08-26 | Stop reason: SURG

## 2019-08-26 RX ORDER — ALBUMIN, HUMAN INJ 5% 5 %
12.5 SOLUTION INTRAVENOUS ONCE
Status: COMPLETED | OUTPATIENT
Start: 2019-08-26 | End: 2019-08-26

## 2019-08-26 RX ORDER — DIPHENHYDRAMINE HYDROCHLORIDE 50 MG/ML
12.5 INJECTION INTRAMUSCULAR; INTRAVENOUS ONCE AS NEEDED
Status: DISCONTINUED | OUTPATIENT
Start: 2019-08-26 | End: 2019-08-26 | Stop reason: HOSPADM

## 2019-08-26 RX ORDER — BUPROPION HYDROCHLORIDE 150 MG/1
150 TABLET, EXTENDED RELEASE ORAL 2 TIMES DAILY
Status: DISCONTINUED | OUTPATIENT
Start: 2019-08-26 | End: 2019-08-30 | Stop reason: HOSPADM

## 2019-08-26 RX ORDER — ROPIVACAINE HYDROCHLORIDE 2 MG/ML
INJECTION, SOLUTION EPIDURAL; INFILTRATION; PERINEURAL CONTINUOUS PRN
Status: DISCONTINUED | OUTPATIENT
Start: 2019-08-26 | End: 2019-08-26

## 2019-08-26 RX ORDER — DEXAMETHASONE SODIUM PHOSPHATE 10 MG/ML
INJECTION, SOLUTION INTRAMUSCULAR; INTRAVENOUS AS NEEDED
Status: DISCONTINUED | OUTPATIENT
Start: 2019-08-26 | End: 2019-08-26 | Stop reason: SURG

## 2019-08-26 RX ORDER — DEXAMETHASONE SODIUM PHOSPHATE 4 MG/ML
10 INJECTION, SOLUTION INTRA-ARTICULAR; INTRALESIONAL; INTRAMUSCULAR; INTRAVENOUS; SOFT TISSUE ONCE
Status: COMPLETED | OUTPATIENT
Start: 2019-08-26 | End: 2019-08-26

## 2019-08-26 RX ORDER — HYDROMORPHONE HCL/PF 1 MG/ML
1 SYRINGE (ML) INJECTION EVERY 6 HOURS PRN
Status: DISCONTINUED | OUTPATIENT
Start: 2019-08-26 | End: 2019-08-26 | Stop reason: SURG

## 2019-08-26 RX ORDER — PROPOFOL 10 MG/ML
INJECTION, EMULSION INTRAVENOUS AS NEEDED
Status: DISCONTINUED | OUTPATIENT
Start: 2019-08-26 | End: 2019-08-26 | Stop reason: SURG

## 2019-08-26 RX ORDER — NEOSTIGMINE METHYLSULFATE 1 MG/ML
INJECTION INTRAVENOUS AS NEEDED
Status: DISCONTINUED | OUTPATIENT
Start: 2019-08-26 | End: 2019-08-26 | Stop reason: SURG

## 2019-08-26 RX ORDER — FENTANYL CITRATE/PF 50 MCG/ML
50 SYRINGE (ML) INJECTION
Status: DISCONTINUED | OUTPATIENT
Start: 2019-08-26 | End: 2019-08-26 | Stop reason: HOSPADM

## 2019-08-26 RX ORDER — ROPIVACAINE HYDROCHLORIDE 2 MG/ML
INJECTION, SOLUTION EPIDURAL; INFILTRATION; PERINEURAL AS NEEDED
Status: DISCONTINUED | OUTPATIENT
Start: 2019-08-26 | End: 2019-08-26 | Stop reason: SURG

## 2019-08-26 RX ORDER — ONDANSETRON 2 MG/ML
INJECTION INTRAMUSCULAR; INTRAVENOUS AS NEEDED
Status: DISCONTINUED | OUTPATIENT
Start: 2019-08-26 | End: 2019-08-26 | Stop reason: SURG

## 2019-08-26 RX ORDER — LIDOCAINE HYDROCHLORIDE AND EPINEPHRINE 15; 5 MG/ML; UG/ML
INJECTION, SOLUTION EPIDURAL
Status: COMPLETED | OUTPATIENT
Start: 2019-08-26 | End: 2019-08-26

## 2019-08-26 RX ORDER — ALBUMIN, HUMAN INJ 5% 5 %
SOLUTION INTRAVENOUS CONTINUOUS PRN
Status: DISCONTINUED | OUTPATIENT
Start: 2019-08-26 | End: 2019-08-26 | Stop reason: SURG

## 2019-08-26 RX ORDER — GABAPENTIN 300 MG/1
600 CAPSULE ORAL ONCE
Status: COMPLETED | OUTPATIENT
Start: 2019-08-26 | End: 2019-08-26

## 2019-08-26 RX ORDER — SUCCINYLCHOLINE/SOD CL,ISO/PF 100 MG/5ML
SYRINGE (ML) INTRAVENOUS AS NEEDED
Status: DISCONTINUED | OUTPATIENT
Start: 2019-08-26 | End: 2019-08-26 | Stop reason: SURG

## 2019-08-26 RX ORDER — METOCLOPRAMIDE HYDROCHLORIDE 5 MG/ML
INJECTION INTRAMUSCULAR; INTRAVENOUS AS NEEDED
Status: DISCONTINUED | OUTPATIENT
Start: 2019-08-26 | End: 2019-08-26 | Stop reason: SURG

## 2019-08-26 RX ORDER — LIDOCAINE HYDROCHLORIDE 10 MG/ML
INJECTION, SOLUTION INFILTRATION; PERINEURAL
Status: COMPLETED | OUTPATIENT
Start: 2019-08-26 | End: 2019-08-26

## 2019-08-26 RX ORDER — HYDROMORPHONE HCL/PF 1 MG/ML
0.5 SYRINGE (ML) INJECTION EVERY 2 HOUR PRN
Status: DISCONTINUED | OUTPATIENT
Start: 2019-08-26 | End: 2019-08-29

## 2019-08-26 RX ORDER — ACETAMINOPHEN 325 MG/1
975 TABLET ORAL ONCE
Status: COMPLETED | OUTPATIENT
Start: 2019-08-26 | End: 2019-08-26

## 2019-08-26 RX ORDER — ACETAMINOPHEN 325 MG/1
650 TABLET ORAL EVERY 6 HOURS
Status: DISCONTINUED | OUTPATIENT
Start: 2019-08-26 | End: 2019-08-30 | Stop reason: HOSPADM

## 2019-08-26 RX ORDER — SODIUM CHLORIDE 9 MG/ML
INJECTION, SOLUTION INTRAVENOUS CONTINUOUS PRN
Status: DISCONTINUED | OUTPATIENT
Start: 2019-08-26 | End: 2019-08-26 | Stop reason: SURG

## 2019-08-26 RX ORDER — SODIUM CHLORIDE, SODIUM LACTATE, POTASSIUM CHLORIDE, CALCIUM CHLORIDE 600; 310; 30; 20 MG/100ML; MG/100ML; MG/100ML; MG/100ML
125 INJECTION, SOLUTION INTRAVENOUS CONTINUOUS
Status: DISCONTINUED | OUTPATIENT
Start: 2019-08-26 | End: 2019-08-29

## 2019-08-26 RX ORDER — HYDROMORPHONE HCL/PF 1 MG/ML
SYRINGE (ML) INJECTION AS NEEDED
Status: DISCONTINUED | OUTPATIENT
Start: 2019-08-26 | End: 2019-08-26 | Stop reason: SURG

## 2019-08-26 RX ORDER — MAGNESIUM HYDROXIDE 1200 MG/15ML
LIQUID ORAL AS NEEDED
Status: DISCONTINUED | OUTPATIENT
Start: 2019-08-26 | End: 2019-08-26 | Stop reason: HOSPADM

## 2019-08-26 RX ORDER — SCOLOPAMINE TRANSDERMAL SYSTEM 1 MG/1
1 PATCH, EXTENDED RELEASE TRANSDERMAL
Status: DISCONTINUED | OUTPATIENT
Start: 2019-08-26 | End: 2019-08-26

## 2019-08-26 RX ORDER — HYDROMORPHONE HCL/PF 1 MG/ML
0.5 SYRINGE (ML) INJECTION
Status: DISCONTINUED | OUTPATIENT
Start: 2019-08-26 | End: 2019-08-26 | Stop reason: HOSPADM

## 2019-08-26 RX ORDER — ONDANSETRON 2 MG/ML
4 INJECTION INTRAMUSCULAR; INTRAVENOUS EVERY 6 HOURS PRN
Status: DISCONTINUED | OUTPATIENT
Start: 2019-08-26 | End: 2019-08-30 | Stop reason: HOSPADM

## 2019-08-26 RX ORDER — HEPARIN SODIUM 5000 [USP'U]/ML
5000 INJECTION, SOLUTION INTRAVENOUS; SUBCUTANEOUS
Status: COMPLETED | OUTPATIENT
Start: 2019-08-26 | End: 2019-08-26

## 2019-08-26 RX ORDER — SCOLOPAMINE TRANSDERMAL SYSTEM 1 MG/1
1 PATCH, EXTENDED RELEASE TRANSDERMAL
Status: DISCONTINUED | OUTPATIENT
Start: 2019-08-26 | End: 2019-08-29 | Stop reason: SURG

## 2019-08-26 RX ADMIN — POTASSIUM CHLORIDE: 200 INJECTION, SOLUTION INTRAVENOUS at 09:52

## 2019-08-26 RX ADMIN — PHENYLEPHRINE HYDROCHLORIDE 200 MCG: 10 INJECTION INTRAVENOUS at 09:30

## 2019-08-26 RX ADMIN — GABAPENTIN 600 MG: 300 CAPSULE ORAL at 06:24

## 2019-08-26 RX ADMIN — PHENYLEPHRINE HYDROCHLORIDE 100 MCG: 10 INJECTION INTRAVENOUS at 08:02

## 2019-08-26 RX ADMIN — INSULIN HUMAN 5 UNITS: 100 INJECTION, SOLUTION PARENTERAL at 15:52

## 2019-08-26 RX ADMIN — ROCURONIUM BROMIDE 20 MG: 10 INJECTION INTRAVENOUS at 08:46

## 2019-08-26 RX ADMIN — PHENYLEPHRINE HYDROCHLORIDE 200 MCG: 10 INJECTION INTRAVENOUS at 15:02

## 2019-08-26 RX ADMIN — CEFAZOLIN SODIUM 2000 MG: 2 SOLUTION INTRAVENOUS at 11:58

## 2019-08-26 RX ADMIN — METOCLOPRAMIDE 10 MG: 5 INJECTION, SOLUTION INTRAMUSCULAR; INTRAVENOUS at 07:41

## 2019-08-26 RX ADMIN — SODIUM CHLORIDE, SODIUM LACTATE, POTASSIUM CHLORIDE, AND CALCIUM CHLORIDE: .6; .31; .03; .02 INJECTION, SOLUTION INTRAVENOUS at 11:41

## 2019-08-26 RX ADMIN — PHENYLEPHRINE HYDROCHLORIDE 100 MCG: 10 INJECTION INTRAVENOUS at 07:54

## 2019-08-26 RX ADMIN — ACETAMINOPHEN 975 MG: 325 TABLET ORAL at 06:24

## 2019-08-26 RX ADMIN — LIDOCAINE HYDROCHLORIDE AND EPINEPHRINE 3 ML: 15; 5 INJECTION, SOLUTION EPIDURAL at 08:11

## 2019-08-26 RX ADMIN — PHENYLEPHRINE HYDROCHLORIDE 200 MCG: 10 INJECTION INTRAVENOUS at 14:49

## 2019-08-26 RX ADMIN — ROPIVACAINE HYDROCHLORIDE 3 ML: 2 INJECTION, SOLUTION EPIDURAL; INFILTRATION at 13:20

## 2019-08-26 RX ADMIN — SCOPALAMINE 1 PATCH: 1 PATCH, EXTENDED RELEASE TRANSDERMAL at 07:19

## 2019-08-26 RX ADMIN — ALBUMIN (HUMAN): 12.5 SOLUTION INTRAVENOUS at 09:48

## 2019-08-26 RX ADMIN — DEXMEDETOMIDINE HYDROCHLORIDE 0.2 MCG/KG/HR: 100 INJECTION, SOLUTION INTRAVENOUS at 07:51

## 2019-08-26 RX ADMIN — MIDAZOLAM 2 MG: 1 INJECTION INTRAMUSCULAR; INTRAVENOUS at 08:11

## 2019-08-26 RX ADMIN — ROPIVACAINE HYDROCHLORIDE: 5 INJECTION, SOLUTION EPIDURAL; INFILTRATION; PERINEURAL at 16:40

## 2019-08-26 RX ADMIN — DEXAMETHASONE SODIUM PHOSPHATE 4 MG: 10 INJECTION, SOLUTION INTRAMUSCULAR; INTRAVENOUS at 08:18

## 2019-08-26 RX ADMIN — DEXTROSE, SODIUM CHLORIDE, AND POTASSIUM CHLORIDE 125 ML/HR: 5; .45; .15 INJECTION INTRAVENOUS at 17:34

## 2019-08-26 RX ADMIN — HEPARIN SODIUM 5000 UNITS: 5000 INJECTION INTRAVENOUS; SUBCUTANEOUS at 09:16

## 2019-08-26 RX ADMIN — PHENYLEPHRINE HYDROCHLORIDE 200 MCG: 10 INJECTION INTRAVENOUS at 14:47

## 2019-08-26 RX ADMIN — HYDROMORPHONE HYDROCHLORIDE 0.25 MG: 1 INJECTION, SOLUTION INTRAMUSCULAR; INTRAVENOUS; SUBCUTANEOUS at 11:41

## 2019-08-26 RX ADMIN — PHENYLEPHRINE HYDROCHLORIDE 200 MCG: 10 INJECTION INTRAVENOUS at 09:25

## 2019-08-26 RX ADMIN — PROPOFOL 30 MG: 10 INJECTION, EMULSION INTRAVENOUS at 08:12

## 2019-08-26 RX ADMIN — METHYLENE BLUE 10 ML: 5 INJECTION INTRAVENOUS at 12:02

## 2019-08-26 RX ADMIN — ALBUMIN (HUMAN): 12.5 SOLUTION INTRAVENOUS at 10:27

## 2019-08-26 RX ADMIN — PHENYLEPHRINE HYDROCHLORIDE 100 MCG: 10 INJECTION INTRAVENOUS at 08:33

## 2019-08-26 RX ADMIN — LIDOCAINE HYDROCHLORIDE 60 MG: 10 INJECTION, SOLUTION INFILTRATION; PERINEURAL at 07:48

## 2019-08-26 RX ADMIN — ALBUMIN (HUMAN) 12.5 G: 12.5 SOLUTION INTRAVENOUS at 16:40

## 2019-08-26 RX ADMIN — SODIUM CHLORIDE, SODIUM LACTATE, POTASSIUM CHLORIDE, AND CALCIUM CHLORIDE: .6; .31; .03; .02 INJECTION, SOLUTION INTRAVENOUS at 07:10

## 2019-08-26 RX ADMIN — GLYCOPYRROLATE 0.2 MG: 0.2 INJECTION, SOLUTION INTRAMUSCULAR; INTRAVENOUS at 14:15

## 2019-08-26 RX ADMIN — NEOSTIGMINE METHYLSULFATE 1 MG: 1 INJECTION, SOLUTION INTRAVENOUS at 14:28

## 2019-08-26 RX ADMIN — PHENYLEPHRINE HYDROCHLORIDE 200 MCG: 10 INJECTION INTRAVENOUS at 14:50

## 2019-08-26 RX ADMIN — FENTANYL CITRATE 50 MCG: 50 INJECTION, SOLUTION INTRAMUSCULAR; INTRAVENOUS at 07:15

## 2019-08-26 RX ADMIN — PHENYLEPHRINE HYDROCHLORIDE 200 MCG: 10 INJECTION INTRAVENOUS at 14:28

## 2019-08-26 RX ADMIN — PROPOFOL 120 MCG/KG/MIN: 10 INJECTION, EMULSION INTRAVENOUS at 07:51

## 2019-08-26 RX ADMIN — ROPIVACAINE HYDROCHLORIDE 3 ML: 2 INJECTION, SOLUTION EPIDURAL; INFILTRATION at 11:42

## 2019-08-26 RX ADMIN — GLYCOPYRROLATE 0.2 MG: 0.2 INJECTION, SOLUTION INTRAMUSCULAR; INTRAVENOUS at 09:16

## 2019-08-26 RX ADMIN — ALBUMIN (HUMAN): 12.5 SOLUTION INTRAVENOUS at 09:27

## 2019-08-26 RX ADMIN — PHENYLEPHRINE HYDROCHLORIDE 200 MCG: 10 INJECTION INTRAVENOUS at 09:50

## 2019-08-26 RX ADMIN — PHENYLEPHRINE HYDROCHLORIDE 100 MCG: 10 INJECTION INTRAVENOUS at 08:45

## 2019-08-26 RX ADMIN — ONDANSETRON 4 MG: 2 INJECTION INTRAMUSCULAR; INTRAVENOUS at 07:41

## 2019-08-26 RX ADMIN — PHENYLEPHRINE HYDROCHLORIDE 200 MCG: 10 INJECTION INTRAVENOUS at 09:47

## 2019-08-26 RX ADMIN — HYDROMORPHONE HYDROCHLORIDE 0.25 MG: 1 INJECTION, SOLUTION INTRAMUSCULAR; INTRAVENOUS; SUBCUTANEOUS at 11:39

## 2019-08-26 RX ADMIN — PHENYLEPHRINE HYDROCHLORIDE 50 MCG: 10 INJECTION INTRAVENOUS at 09:07

## 2019-08-26 RX ADMIN — PHENYLEPHRINE HYDROCHLORIDE 50 MCG: 10 INJECTION INTRAVENOUS at 08:55

## 2019-08-26 RX ADMIN — PHENYLEPHRINE HYDROCHLORIDE 50 MCG: 10 INJECTION INTRAVENOUS at 09:44

## 2019-08-26 RX ADMIN — NEOSTIGMINE METHYLSULFATE 2 MG: 1 INJECTION, SOLUTION INTRAVENOUS at 14:30

## 2019-08-26 RX ADMIN — ROCURONIUM BROMIDE 20 MG: 10 INJECTION INTRAVENOUS at 10:19

## 2019-08-26 RX ADMIN — PHENYLEPHRINE HYDROCHLORIDE 100 MCG: 10 INJECTION INTRAVENOUS at 08:40

## 2019-08-26 RX ADMIN — PHENYLEPHRINE HYDROCHLORIDE 100 MCG: 10 INJECTION INTRAVENOUS at 08:27

## 2019-08-26 RX ADMIN — PHENYLEPHRINE HYDROCHLORIDE 100 MCG: 10 INJECTION INTRAVENOUS at 07:56

## 2019-08-26 RX ADMIN — ALBUMIN (HUMAN): 12.5 SOLUTION INTRAVENOUS at 11:00

## 2019-08-26 RX ADMIN — ROPIVACAINE HYDROCHLORIDE: 5 INJECTION, SOLUTION EPIDURAL; INFILTRATION; PERINEURAL at 15:42

## 2019-08-26 RX ADMIN — ROCURONIUM BROMIDE 40 MG: 10 INJECTION INTRAVENOUS at 07:58

## 2019-08-26 RX ADMIN — DEXAMETHASONE SODIUM PHOSPHATE 10 MG: 4 INJECTION, SOLUTION INTRAMUSCULAR; INTRAVENOUS at 15:52

## 2019-08-26 RX ADMIN — PHENYLEPHRINE HYDROCHLORIDE 100 MCG: 10 INJECTION INTRAVENOUS at 14:06

## 2019-08-26 RX ADMIN — SODIUM CHLORIDE: 0.9 INJECTION, SOLUTION INTRAVENOUS at 08:40

## 2019-08-26 RX ADMIN — PHENYLEPHRINE HYDROCHLORIDE 200 MCG: 10 INJECTION INTRAVENOUS at 09:28

## 2019-08-26 RX ADMIN — MIDAZOLAM 2 MG: 1 INJECTION INTRAMUSCULAR; INTRAVENOUS at 07:15

## 2019-08-26 RX ADMIN — PHENYLEPHRINE HYDROCHLORIDE 50 MCG: 10 INJECTION INTRAVENOUS at 09:04

## 2019-08-26 RX ADMIN — ROCURONIUM BROMIDE 20 MG: 10 INJECTION INTRAVENOUS at 12:34

## 2019-08-26 RX ADMIN — ONDANSETRON 4 MG: 2 INJECTION INTRAMUSCULAR; INTRAVENOUS at 14:14

## 2019-08-26 RX ADMIN — ROPIVACAINE HYDROCHLORIDE 3 ML: 2 INJECTION, SOLUTION EPIDURAL; INFILTRATION at 11:51

## 2019-08-26 RX ADMIN — PHENYLEPHRINE HYDROCHLORIDE 100 MCG: 10 INJECTION INTRAVENOUS at 08:06

## 2019-08-26 RX ADMIN — ROCURONIUM BROMIDE 10 MG: 10 INJECTION INTRAVENOUS at 07:50

## 2019-08-26 RX ADMIN — Medication 100 MG: at 07:50

## 2019-08-26 RX ADMIN — LIDOCAINE HYDROCHLORIDE 5 ML: 10 INJECTION, SOLUTION INFILTRATION; PERINEURAL at 08:11

## 2019-08-26 RX ADMIN — PROPOFOL 170 MG: 10 INJECTION, EMULSION INTRAVENOUS at 07:48

## 2019-08-26 RX ADMIN — ROPIVACAINE HYDROCHLORIDE 3 ML: 2 INJECTION, SOLUTION EPIDURAL; INFILTRATION at 13:13

## 2019-08-26 RX ADMIN — SODIUM CHLORIDE: 0.9 INJECTION, SOLUTION INTRAVENOUS at 07:53

## 2019-08-26 RX ADMIN — CEFAZOLIN SODIUM 2000 MG: 2 SOLUTION INTRAVENOUS at 07:45

## 2019-08-26 RX ADMIN — ROPIVACAINE HYDROCHLORIDE 3 ML: 2 INJECTION, SOLUTION EPIDURAL; INFILTRATION at 12:21

## 2019-08-26 RX ADMIN — NEOSTIGMINE METHYLSULFATE 1 MG: 1 INJECTION, SOLUTION INTRAVENOUS at 14:15

## 2019-08-26 RX ADMIN — ROPIVACAINE HYDROCHLORIDE 3 ML: 2 INJECTION, SOLUTION EPIDURAL; INFILTRATION at 12:46

## 2019-08-26 RX ADMIN — METRONIDAZOLE 500 MG: 500 INJECTION, SOLUTION INTRAVENOUS at 13:36

## 2019-08-26 RX ADMIN — GLYCOPYRROLATE 0.2 MG: 0.2 INJECTION, SOLUTION INTRAMUSCULAR; INTRAVENOUS at 14:28

## 2019-08-26 RX ADMIN — GLYCOPYRROLATE 0.2 MG: 0.2 INJECTION, SOLUTION INTRAMUSCULAR; INTRAVENOUS at 14:23

## 2019-08-26 RX ADMIN — PROPOFOL 40 MG: 10 INJECTION, EMULSION INTRAVENOUS at 09:37

## 2019-08-26 RX ADMIN — METRONIDAZOLE 500 MG: 500 INJECTION, SOLUTION INTRAVENOUS at 07:41

## 2019-08-26 RX ADMIN — ROPIVACAINE HYDROCHLORIDE 2 ML: 2 INJECTION, SOLUTION EPIDURAL; INFILTRATION at 13:26

## 2019-08-26 RX ADMIN — PROPOFOL 40 MG: 10 INJECTION, EMULSION INTRAVENOUS at 11:20

## 2019-08-26 RX ADMIN — GLYCOPYRROLATE 0.4 MG: 0.2 INJECTION, SOLUTION INTRAMUSCULAR; INTRAVENOUS at 14:30

## 2019-08-26 RX ADMIN — ROCURONIUM BROMIDE 10 MG: 10 INJECTION INTRAVENOUS at 09:34

## 2019-08-26 RX ADMIN — PHENYLEPHRINE HYDROCHLORIDE 10 MCG/MIN: 10 INJECTION INTRAVENOUS at 09:40

## 2019-08-26 RX ADMIN — NEOSTIGMINE METHYLSULFATE 1 MG: 1 INJECTION, SOLUTION INTRAVENOUS at 14:23

## 2019-08-26 RX ADMIN — SODIUM CHLORIDE: 0.9 INJECTION, SOLUTION INTRAVENOUS at 10:23

## 2019-08-26 NOTE — OP NOTE
OPERATIVE REPORT  PATIENT NAME: Alexandro Freed    :  1960  MRN: [id number]  Pt Location: [facility] OR ROOM 03    SURGERY DATE: 2019    Surgeon(s) and Role:     * Paola Delgado MD - Primary     * Courtney Hoyt MD - Assisting     * Annabelle Hobson MD - Assisting     * Joie Dockery MD - Assisting     * Cain Shukla DO - Assisting    Preop Diagnosis:  PMB (postmenopausal bleeding) [N95 0]  Bilateral tubo-ovarian mass [N83 8]    Post-Op Diagnosis Codes:     * PMB (postmenopausal bleeding) [N95 0]     * Bilateral tubo-ovarian mass [N83 8]    Procedure(s) (LRB):  TOTAL ABDOMINAL HYSTERECTOMY, BILATERAL SALPINGO-OOPHORECTOMY, Radical omentectomy, pelvic lymph node sampling, staging biopsy, repair of cystotomy, appendectomy (N/A)  REPAIR BLADDER; uretero yared cystotomy (N/A)    Specimen(s):  ID Type Source Tests Collected by Time Destination   1 :  Washing Pelvic Washing NON-GYNECOLOGIC CYTOLOGY Paola Delgado MD 2019 0825    2 : Left ovary and left fallopian tube  Tissue Ovary, Left TISSUE EXAM Paola Delgado MD 2019 0845    3 : Contains uterus, cervix, right fallopian tube, and right ovary  Tissue Uterus TISSUE EXAM Paola Delgado MD 2019 2297    4 : left pelvic side wall  Tissue Pelvic TISSUE EXAM Paola Delgado MD 2019 0957    5 :  Tissue Omentum TISSUE EXAM Paola Delgado MD 2019 1042    6 : left pelvic lymph node  Tissue Lymph Node TISSUE EXAM Paola Delgado MD 2019 1007    7 : sigmoid colon adhesion  Tissue Large Intestine, Sigmoid Colon TISSUE EXAM Paola Delgado MD 2019 1008    8 : bladder peritoneum Tissue Urinary Bladder TISSUE EXAM Paola Delgado MD 2019 1010    9 : right pelvic side wall Tissue Pelvic TISSUE EXAM Paola Delgado MD 2019 1014    10 : Right pelvic lymph node  Tissue Lymph Node TISSUE EXAM Paola Delgado MD 2019 1020    11 :  Tissue Cul-de-sac TISSUE EXAM Paola Delgado MD 2019 1022    12 :  Tissue Appendix TISSUE EXAM Juan Newman MD 8/26/2019 1045    13 : Right gutter Tissue Abdominal TISSUE EXAM Juan Newman MD 8/26/2019 1052    14 : cell block Washing Pelvic Washing NON-GYNECOLOGIC CYTOLOGY Juan Newman MD 8/26/2019 1056    15 : Left gutter Tissue Abdominal TISSUE EXAM Juan Newman MD 8/26/2019 1114        Estimated Blood Loss:   1200 mL    Drains:  Closed/Suction Drain Left LLQ Bulb (Active)   Site Description Unable to view 8/26/2019  3:28 PM   Dressing Status Old drainage 8/26/2019  3:28 PM   Drainage Appearance Serosanguineous 8/26/2019  3:28 PM   Status To bulb suction 8/26/2019  3:28 PM   Output (mL) 120 mL 8/26/2019  4:15 PM   Number of days: 0       Urethral Catheter Non-latex 18 Fr  (Active)   Site Assessment Clean;Skin intact 8/26/2019  3:28 PM   Collection Container Standard drainage bag 8/26/2019  3:28 PM   Securement Method Securing device (Describe) 8/26/2019  3:28 PM   Number of days: 0       [REMOVED] NG/OG/Enteral Tube Orogastric 18 Fr Right mouth (Removed)   Number of days: 0       [REMOVED] Urethral Catheter Non-latex 16 Fr  (Removed)   Number of days: 0       Anesthesia Type:   General w/ Epidural    Operative Indications:  PMB (postmenopausal bleeding) [N95 0]  Bilateral tubo-ovarian mass [N83 8]      Operative Findings:  As CT scan findings indicated no evidence of carcinomatosis it was elected to proceed directly with exploratory laparotomy  When the abdomen was open approximately 100 cc of straw-colored ascites were noted  The omentum liver edge diaphragm upper abdomen were all unremarkable  Within the pelvis a large left adnexal mass measuring approximately 16 x 10 cm was densely adherent all pelvic structures including the sigmoid colon, left pelvic sidewall, and posterior uterus  The remainder of the uterus the right tube and ovary were all unremarkable  The remainder of the peritoneal surfaces were unremarkable      Frozen section diagnosis was performed indicating malignancy of uncertain subtype  It was elected to proceed with full staging procedure which was performed with random biopsies throughout  An appendectomy was performed in the possibility that this was a mucinous subtype  It should be noted that all malignancy was removed during the procedure however the mass was ruptured due to dissection of its dense adhesions within the pelvis  Upon completion of the procedure cystoscopy was performed indicating a 2 cm cystotomy  This had no suture material surrounding it or cautery  It was felt to be likely secondary to a retractor injury and was likely inherent to the procedure given the patient morbid obesity deep pelvis and dense adhesions  This was repaired in 2 layers however the left ureter was then unable to be found on cystoscopy  The right ureteral orifice was functioning normally  A Urology consultation was obtained  It was elected to remove the suture however still no ureteral function was identified  It was elected to repeat implant the left ureter and repair of the bladder  This was done by the Urology service without difficulty  Upon completion of the procedure the bladder was noted to be intact and both ureteral orifices were functioning  A left ureteral stent and Hitchcock catheter were were left in place  Complications:   Cystotomy and repair    Procedure and Technique:  After informed consent was obtained, the patient was taken to the operating room where general endotracheal anesthesia was then administered without incident  A full time out procedure was performed  The patient was prepped and draped in normal sterile fashion in the low dorsolithotomy position  A Hitchcock catheter was inserted  A midline vertical incision was created with a knife and carried through to the fascia with a Bovie electrocautery device  This was taken down to the underlying layer of fascia with cautery  The fascia was opened the midline   The fascial incision extended superiorly and anteriorly  The peritoneum was identified and entered  The peritoneal incision extended superiorly and inferiorly as well  A Bookwalter self retaining retractor was placed  Washings were taken from the pelvis  Extensive dissection of the large left adnexal mass was performed  This resulted in some rupture and bruising in the raw surfaces of the pelvis  The bowel was packed with moist lap pads  The cornua of the uterus was grasped with 2 Nargis clamps  The left pelvic sidewall was opened with Bovie electrocautery device  All dense adhesions to the mass were taken down with the Bovie electrocautery device  The left ureter and IP ligament were identified  A hole was placed in an avascular plane between the 2  The IP ligament was back clamped with a Nargis clamp clamped itself with to her supplement clamp Jack with the scissors free time with 0 Vicryl suture and suture ligated with 0 Vicryl suture  The mass was then removed from its adhesions to these spent story ligament of the uterus with the curve supplement clamp CT with heavy scissors and free tied with 0 Vicryl suture  In this way the mass was removed from the field and sent for frozen section  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 undetermined subtype  The retro-peritoneal space on the right side was opened using cautery  The round ligament was transected  The ureter was identified coursing normally within the medial leaf of the broad ligament  The infundibulum pelvic ligament on the right side was skeletonized, clamped with MD Yanez clamps transected and secured with a free ties of 0 Vicryl suture and transfixed with 0 Vicryl  Hemostasis was assured  TThe retro-peritoneal space on the left side was opened using cautery  The round ligament was suture ligated and  transected  The ureter was identified coursing normally within the medial leaf of the broad ligament     The vesicovaginal space was then opened using cautery  The bladder was taken down below the level of the external os of the cervix  The remainder of the dissection was challenging due to the patient's morbid obesity  The uterine vessels were skeletonized bilaterally  They were clamped with Zeppelin clamps transected and secured with 0 Vicryl suture  The cardinal ligaments were taken down in a  similar fashion clamped with Zeppelin clamps transected and secured with 0 Vicryl suture until the level of the external os of the cervix was reached  Right angle Zeppelin clamps were used to come across the upper portion of the vagina  The specimen was then removed  The vaginal apex was then secured using interrupted figure-of-eight 0 Vicryl suture with excellent hemostasis noted  The bulk wall per self retaining retractor was then used to open the left pelvic sidewall  All lymph nodes in the left external iliac artery and vein internal iliac artery and vein and obturator space were removed and sent as left pelvic lymph nodes  The same thing was performed on the patient's right-hand side  Random biopsies performed of the bilateral pelvic sidewalls bladder flap and cul-de-sac were performed  The pelvis was irrigated  The bowel was then packed  There is no evidence of bleeding noted  A radical omentectomy was performed by  the omentum from the transverse colon and the greater curvature of the stomach with the Enseal   The upper abdomen was evaluated in no further disease was noted  The bowel was run without difficulty no disease was noted  As the mass itself had no defining characteristics but there was some mucinous type tissue was elected to perform an appendectomy  The mesoappendix was taken down with the end seal   The base of the appendix was doubly tied with 0 silk suture and the appendix was removed with a knife  The appendiceal stump was cauterized with Bovie electrocautery device      As minimal urine was noted in the bladder was elected to do a cystoscopy  Is 30° cystoscope was inserted into the urethra and a 2 cm cystotomy was noted between the level of the trigone and the fundus of the bladder  This was doubly repaired with 2 0 Vicryl suture  The bladder was refilled and no leak was noted however the left ureteral jet was not identified  The right ureteral jet was unremarkable  Urology was consulted for ureteral yared cystotomy and bladder repair  This was performed without difficulty  Please see their note  The abdomen was then irrigated with copious quantities normal normal saline solution a 7 mm flat ROBE drain was brought to the left lower quadrant without difficulty and stitched into place with nylon suture  The closure tray was brought to the field  The staff was regloved and regowned  The fascia was closed using #1 looped PDS in a running Smead-Kim fashion  The subcutaneous tissue was closed with interruprted 2 0 plain suture  The skin was closed with stratafix and histocryl  A sterile dressing was applied  The patient was then awakened and transferred to the recovery room in stable condition  All instrument and instrument counts were correct X 2 for the procedure  No complications were noted  I was present for the entire procedure     I was present for the entire procedure    Patient Disposition:  PACU     SIGNATURE: Parisa Howard MD  DATE: August 26, 2019  TIME: 4:38 PM

## 2019-08-26 NOTE — OP NOTE
OPERATIVE REPORT  PATIENT NAME: Haydee Jain    :  1960  MRN: [id number]  Pt Location: [facility] 03    SURGERY DATE: 2019    Surgeon(s) and Role:     * Radha Hunt MD - Primary     * Jade Gomez MD - Assisting, primarily performed cystorrhaphy     * Isabela Landrum MD - Assisting     * Brooks Martinez MD - Assisting, primarily performed left ureteral reimplantation     * Tai Barton DO - Assisting    NOTE:  There were no qualified teaching residents to assist with this case    Preop Diagnosis:  PMB (postmenopausal bleeding) [N95 0]  Bilateral tubo-ovarian mass [N83 8]    Post-Op Diagnosis Codes:     * PMB (postmenopausal bleeding) [N95 0]     * Bilateral tubo-ovarian mass [N83 8]    Procedure(s) (LRB):  TOTAL ABDOMINAL HYSTERECTOMY, BILATERAL SALPINGO-OOPHORECTOMY, Radical omentectomy, pelvic lymph node sampling, staging biopsy, repair of cystotomy, appendectomy (N/A)  REPAIR BLADDER; uretero yared cystotomy (N/A)    Specimen(s):  ID Type Source Tests Collected by Time Destination   1 :  Washing Pelvic Washing NON-GYNECOLOGIC CYTOLOGY Radha Hunt MD 2019 0825    2 : Left ovary and left fallopian tube  Tissue Ovary, Left TISSUE EXAM Radha Hunt MD 2019 0845    3 : Contains uterus, cervix, right fallopian tube, and right ovary  Tissue Uterus TISSUE EXAM Radha Hunt MD 2019 9893    4 : left pelvic side wall  Tissue Pelvic TISSUE EXAM Radha Hunt MD 2019 0957    5 :  Tissue Omentum TISSUE EXAM Radha Hunt MD 2019 1042    6 : left pelvic lymph node  Tissue Lymph Node TISSUE EXAM Radha Hunt MD 2019 1007    7 : sigmoid colon adhesion  Tissue Large Intestine, Sigmoid Colon TISSUE EXAM Radha Hunt MD 2019 1008    8 : bladder peritoneum Tissue Urinary Bladder TISSUE EXAM Radha Hunt MD 2019 1010    9 : right pelvic side wall Tissue Pelvic TISSUE EXAM Radha Hunt MD 2019 1014    10 : Right pelvic lymph node  Tissue Lymph Node TISSUE EXAM Rekha Osorio MD 8/26/2019 1020    11 :  Tissue Cul-de-sac TISSUE EXAM Rekha Osorio MD 8/26/2019 1022    12 : Tissue Appendix TISSUE EXAM Rekha Osorio MD 8/26/2019 1045    13 : Right gutter Tissue Abdominal TISSUE EXAM Rekha Osorio MD 8/26/2019 1052    14 : cell block Washing Pelvic Washing NON-GYNECOLOGIC CYTOLOGY Rekha Osorio MD 8/26/2019 1056    15 : Left gutter Tissue Abdominal TISSUE EXAM Rekha Osorio MD 8/26/2019 1114        Estimated Blood Loss:   1200 mL    Drains:  Closed/Suction Drain Left LLQ Bulb (Active)   Site Description Unable to view 8/26/2019  3:28 PM   Dressing Status Old drainage 8/26/2019  3:28 PM   Drainage Appearance Serosanguineous 8/26/2019  3:28 PM   Status To bulb suction 8/26/2019  3:28 PM   Output (mL) 120 mL 8/26/2019  4:15 PM   Number of days: 0       Urethral Catheter Non-latex 18 Fr  (Active)   Site Assessment Clean;Skin intact 8/26/2019  3:28 PM   Collection Container Standard drainage bag 8/26/2019  3:28 PM   Securement Method Securing device (Describe) 8/26/2019  3:28 PM   Number of days: 0       [REMOVED] NG/OG/Enteral Tube Orogastric 18 Fr Right mouth (Removed)   Number of days: 0       [REMOVED] Urethral Catheter Non-latex 16 Fr  (Removed)   Number of days: 0       Anesthesia Type:   General w/ Epidural    Operative Indications:  PMB (postmenopausal bleeding) [N95 0]  Bilateral tubo-ovarian mass [N83 8]  Bladder laceration  Inability to identify left ureteral orifice    Operative Findings:  Low posterior/trigonal bladder laceration due to difficult hysterectomy  Inability to identify left ureteral orifice  Successful ureteral reimplantation and cystorrhaphy  Complications:   None    Procedure and Technique:  Urology was called to assist with evaluation and repair of bladder laceration and inability to identify left ureteral orifice    Dr Wilder Lockett and I were both available and came to assist   Cystoscopy was performed which reveals running suture repair of bladder laceration  This was at the location of the posterior portion of the trigone  The right ureteral orifice was identifiable with efflux of urine  The left orifice was not identifiable  After numerous maneuvers and attempts to catheterize the left side, decision was made to open the bladder anteriorly through the abdominal incision  This was performed first by mobilizing the bladder completely, placing Vicryl stay sutures, and opening the bladder vertically  Once again we attempted to identify the left ureteral orifice but this was unsuccessful  The sutures in the posterior part of the bladder were then removed  Once again, it was impossible to identify, indicating the likelihood that the left ureteral orifice was lacerated  Therefore we decided to perform left ureteral reimplantation  The left ureter was dissected from the retroperitoneum  At the level of the bladder hiatus, it was clamped and excised  Hydronephrotic drip was immediately identified  Silk tie was placed at the distal end level of the bladder  At this point, I closed the bladder posteriorly with a running Vicryl stitch  Great care was taken to avoid injury to the right ureteral orifice  Good efflux was noted throughout the procedure  Once this was completed, a separate stab incision was made on the left side of the bladder dome  The freed and of the left ureter was then passed through this incision and into the bladder  Ureter was speculated anteriorly  3-0 Monocryl suture was then used to place interrupted anastomotic stitches at the apex and laterally  A wire was then placed into the ureter up to the left kidney, followed by placement of a 24 cm x 6 Slovenian double-J stent without string  Anastomosis was completed with interrupted sutures  The anterior cystotomy was then closed with 2 layers of running 2 0 Vicryl suture    A new 18 Slovenian catheter was placed into the bladder and the bladder was filled to 240 cc without any leak  The patient was then turned over to the primary service for completion of surgery, including pelvic drain placement  The plan is to maintain Hitchcock catheter for 2 weeks and perform cystogram prior to removal   Ureteral stent will remain in place for 6 weeks and will be removed in the Urology Office        SIGNATURE: Chuy Sevilla MD  DATE: August 26, 2019  TIME: 4:20 PM

## 2019-08-26 NOTE — OR NURSING
Called out to waiting room and provided sister, Meredith Frederick, with an update on the progression of the procedure

## 2019-08-26 NOTE — ANESTHESIA POSTPROCEDURE EVALUATION
Post-Op Assessment Note    CV Status:  Stable  Pain Score: 0    Pain management: adequate     Mental Status:  Sleepy and somnolent   Hydration Status:  Euvolemic and stable   PONV Controlled:  None   Airway Patency:  Patent  Airway: intubated   Post Op Vitals Reviewed: Yes      Staff: CRNA   Comments: pt remains intubated d/t no airleak and sluggish to wake on pressure support    Post-op block assessment: no complications        BP 23/88 (08/26/19 1533)    Temp (!) 97 2 °F (36 2 °C) (08/26/19 1533)    Pulse 73 (08/26/19 1533)   Resp 19 (08/26/19 1533)    SpO2 97 % (08/26/19 1533)

## 2019-08-26 NOTE — ANESTHESIA PROCEDURE NOTES
Epidural Block    Patient location during procedure: pre-op  Start time: 8/26/2019 7:27 AM  Reason for block: procedure for pain and at surgeon's request  Staffing  Anesthesiologist: Jordyn Fontenot DO  Performed: anesthesiologist   Preanesthetic Checklist  Completed: patient identified, site marked, surgical consent, pre-op evaluation, timeout performed, IV checked, risks and benefits discussed and monitors and equipment checked  Epidural  Patient position: sitting  Prep: ChloraPrep  Patient monitoring: cardiac monitor and frequent blood pressure checks  Approach: midline  Location: thoracic (1-12) (T9-10)  Injection technique: YIFAN air  Needle  Needle type: Tuohy   Needle gauge: 18 G  Catheter type: side hole  Catheter size: 20 G  Test dose: negativelidocaine 1 5% with epinephrine 1:200,000 test dose, 3 mL  lidocaine (XYLOCAINE) 1 % infiltration, 5 mL  Assessment  Sensory level: [id number] aspiration for CSF, negative aspiration for heme and no paresthesia on injection  patient tolerated the procedure well with no immediate complications  Additional Notes  Procedure time [id number]

## 2019-08-26 NOTE — INTERVAL H&P NOTE
H&P reviewed  After examining the patient I find no changes in the patients condition since the H&P had been written      Vitals:    08/26/19 1615   BP: 95/53   Pulse: 68   Resp: 17   Temp:    SpO2: 96%

## 2019-08-26 NOTE — TELEPHONE ENCOUNTER
PATIENT HAS KANG CATHETER FOR 2 WEEKS  WILL HAVE CYSTOGRAM PRIOR TO REMOVAL  THIS WILL BE ARRANGED BY GYNECOLOGIC ONCOLOGY  SHE ALSO HAS A LEFT URETERAL STENT  WE WILL REMOVE THIS IN OUR OFFICE IN 6 WEEKS

## 2019-08-26 NOTE — DISCHARGE INSTRUCTIONS
Abdominal Hysterectomy   WHAT YOU SHOULD KNOW:   An abdominal hysterectomy (AH) is surgery to remove your uterus  Your uterus will be removed through an incision in your abdomen  You may need an AH if you have a tumor in your uterus or other reproductive organs  You may also need an AH if you have an infection, pain, or bleeding  AFTER YOU LEAVE:   Medicines:   · Pain medicine: You may be given medicine to take away or decrease pain  Do not wait until the pain is severe before you take your medicine  · Antinausea medicine: This medicine may be given to calm your stomach and prevent vomiting  · Blood thinners  help prevent blood clots  Blood thinners may be given before, during, and after a surgery or procedure  Blood thinners make it more likely for you to bleed or bruise  · Take your medicine as directed  Call your healthcare provider if you think your medicine is not helping or if you have side effects  Tell him if you are allergic to any medicine  Keep a list of the medicines, vitamins, and herbs you take  Include the amounts, and when and why you take them  Bring the list or the pill bottles to follow-up visits  Carry your medicine list with you in case of an emergency  Follow up with your primary healthcare provider or gynecologist as directed:  Ask how to care for your wound  You may need blood tests, x-rays, or ultrasounds at your follow-up visits  Write down your questions so you remember to ask them during your visits  Limit activity until you have fully recovered from surgery:   · Ask when it is safe for you to drive, walk up stairs, lift heavy objects, and have sex  · Ask when it is okay to exercise, and what types of exercise to do  Start slowly and do more as you get stronger  Contact your primary healthcare provider or gynecologist if:   · You have heavy vaginal bleeding that fills 1 or more sanitary pads in 1 hour  · Your wound opens      · You have a fever, and your wound is red and swollen  · You have yellow, green, or bad-smelling discharge coming from your vagina  · You feel new pain or fullness in your vagina  · You have questions or concerns about your surgery, medicine, or care  Seek care immediately or call 911 if:   · You have new or more blood from your vagina or your wound  · Your arm or leg feels warm, tender, and painful  It may look swollen and red  · You suddenly feel lightheaded and have trouble breathing  · You have chest pain  You may have more pain when you take a deep breath or cough  You may cough up blood  © 2014 [address] is for End User's use only and may not be sold, redistributed or otherwise used for commercial purposes  All illustrations and images included in CareNotes® are the copyrighted property of [employer]  or [employer]  The above information is an  only  It is not intended as medical advice for individual conditions or treatments  Talk to your doctor, nurse or pharmacist before following any medical regimen to see if it is safe and effective for you

## 2019-08-27 LAB
ABO GROUP BLD BPU: NORMAL
ABO GROUP BLD BPU: NORMAL
ABO GROUP BLD: NORMAL
ANION GAP SERPL CALCULATED.3IONS-SCNC: 6 MMOL/L (ref 4–13)
BASOPHILS # BLD AUTO: 0.01 THOUSANDS/ΜL (ref 0–0.1)
BASOPHILS # BLD AUTO: 0.02 THOUSANDS/ΜL (ref 0–0.1)
BASOPHILS NFR BLD AUTO: 0 % (ref 0–1)
BASOPHILS NFR BLD AUTO: 0 % (ref 0–1)
BLD GP AB SCN SERPL QL: NEGATIVE
BPU ID: NORMAL
BPU ID: NORMAL
BUN SERPL-MCNC: 11 MG/DL (ref 5–25)
CALCIUM SERPL-MCNC: 7.6 MG/DL (ref 8.3–10.1)
CHLORIDE SERPL-SCNC: 105 MMOL/L (ref 100–108)
CO2 SERPL-SCNC: 24 MMOL/L (ref 21–32)
CREAT SERPL-MCNC: 0.96 MG/DL (ref 0.6–1.3)
CROSSMATCH: NORMAL
CROSSMATCH: NORMAL
EOSINOPHIL # BLD AUTO: 0 THOUSAND/ΜL (ref 0–0.61)
EOSINOPHIL # BLD AUTO: 0 THOUSAND/ΜL (ref 0–0.61)
EOSINOPHIL NFR BLD AUTO: 0 % (ref 0–6)
EOSINOPHIL NFR BLD AUTO: 0 % (ref 0–6)
ERYTHROCYTE [DISTWIDTH] IN BLOOD BY AUTOMATED COUNT: 13.1 % (ref 11.6–15.1)
ERYTHROCYTE [DISTWIDTH] IN BLOOD BY AUTOMATED COUNT: 13.1 % (ref 11.6–15.1)
GFR SERPL CREATININE-BSD FRML MDRD: 64 ML/MIN/1.73SQ M
GLUCOSE SERPL-MCNC: 150 MG/DL (ref 65–140)
GLUCOSE SERPL-MCNC: 169 MG/DL (ref 65–140)
GLUCOSE SERPL-MCNC: 185 MG/DL (ref 65–140)
GLUCOSE SERPL-MCNC: 207 MG/DL (ref 65–140)
GLUCOSE SERPL-MCNC: 231 MG/DL (ref 65–140)
HCT VFR BLD AUTO: 32.5 % (ref 34.8–46.1)
HCT VFR BLD AUTO: 34.2 % (ref 34.8–46.1)
HGB BLD-MCNC: 11.3 G/DL (ref 11.5–15.4)
HGB BLD-MCNC: 11.6 G/DL (ref 11.5–15.4)
IMM GRANULOCYTES # BLD AUTO: 0.03 THOUSAND/UL (ref 0–0.2)
IMM GRANULOCYTES # BLD AUTO: 0.08 THOUSAND/UL (ref 0–0.2)
IMM GRANULOCYTES NFR BLD AUTO: 0 % (ref 0–2)
IMM GRANULOCYTES NFR BLD AUTO: 1 % (ref 0–2)
LYMPHOCYTES # BLD AUTO: 0.61 THOUSANDS/ΜL (ref 0.6–4.47)
LYMPHOCYTES # BLD AUTO: 0.76 THOUSANDS/ΜL (ref 0.6–4.47)
LYMPHOCYTES NFR BLD AUTO: 4 % (ref 14–44)
LYMPHOCYTES NFR BLD AUTO: 5 % (ref 14–44)
MCH RBC QN AUTO: 28.6 PG (ref 26.8–34.3)
MCH RBC QN AUTO: 29.4 PG (ref 26.8–34.3)
MCHC RBC AUTO-ENTMCNC: 33.9 G/DL (ref 31.4–37.4)
MCHC RBC AUTO-ENTMCNC: 34.8 G/DL (ref 31.4–37.4)
MCV RBC AUTO: 84 FL (ref 82–98)
MCV RBC AUTO: 85 FL (ref 82–98)
MONOCYTES # BLD AUTO: 0.55 THOUSAND/ΜL (ref 0.17–1.22)
MONOCYTES # BLD AUTO: 0.69 THOUSAND/ΜL (ref 0.17–1.22)
MONOCYTES NFR BLD AUTO: 4 % (ref 4–12)
MONOCYTES NFR BLD AUTO: 4 % (ref 4–12)
NEUTROPHILS # BLD AUTO: 13.03 THOUSANDS/ΜL (ref 1.85–7.62)
NEUTROPHILS # BLD AUTO: 14.12 THOUSANDS/ΜL (ref 1.85–7.62)
NEUTS SEG NFR BLD AUTO: 90 % (ref 43–75)
NEUTS SEG NFR BLD AUTO: 92 % (ref 43–75)
NRBC BLD AUTO-RTO: 0 /100 WBCS
NRBC BLD AUTO-RTO: 0 /100 WBCS
PLATELET # BLD AUTO: 252 THOUSANDS/UL (ref 149–390)
PLATELET # BLD AUTO: 282 THOUSANDS/UL (ref 149–390)
PMV BLD AUTO: 9.4 FL (ref 8.9–12.7)
PMV BLD AUTO: 9.9 FL (ref 8.9–12.7)
POTASSIUM SERPL-SCNC: 4.1 MMOL/L (ref 3.5–5.3)
RBC # BLD AUTO: 3.84 MILLION/UL (ref 3.81–5.12)
RBC # BLD AUTO: 4.05 MILLION/UL (ref 3.81–5.12)
RH BLD: POSITIVE
SODIUM SERPL-SCNC: 135 MMOL/L (ref 136–145)
SPECIMEN EXPIRATION DATE: NORMAL
UNIT DISPENSE STATUS: NORMAL
UNIT DISPENSE STATUS: NORMAL
UNIT PRODUCT CODE: NORMAL
UNIT PRODUCT CODE: NORMAL
UNIT RH: NORMAL
UNIT RH: NORMAL
WBC # BLD AUTO: 14.23 THOUSAND/UL (ref 4.31–10.16)
WBC # BLD AUTO: 15.67 THOUSAND/UL (ref 4.31–10.16)

## 2019-08-27 PROCEDURE — 86850 RBC ANTIBODY SCREEN: CPT | Performed by: OBSTETRICS & GYNECOLOGY

## 2019-08-27 PROCEDURE — 94760 N-INVAS EAR/PLS OXIMETRY 1: CPT

## 2019-08-27 PROCEDURE — NC001 PR NO CHARGE: Performed by: ANESTHESIOLOGY

## 2019-08-27 PROCEDURE — 85025 COMPLETE CBC W/AUTO DIFF WBC: CPT | Performed by: OBSTETRICS & GYNECOLOGY

## 2019-08-27 PROCEDURE — 80048 BASIC METABOLIC PNL TOTAL CA: CPT | Performed by: OBSTETRICS & GYNECOLOGY

## 2019-08-27 PROCEDURE — 82948 REAGENT STRIP/BLOOD GLUCOSE: CPT

## 2019-08-27 PROCEDURE — 86901 BLOOD TYPING SEROLOGIC RH(D): CPT | Performed by: OBSTETRICS & GYNECOLOGY

## 2019-08-27 PROCEDURE — 99024 POSTOP FOLLOW-UP VISIT: CPT | Performed by: OBSTETRICS & GYNECOLOGY

## 2019-08-27 PROCEDURE — 86900 BLOOD TYPING SEROLOGIC ABO: CPT | Performed by: OBSTETRICS & GYNECOLOGY

## 2019-08-27 RX ORDER — HEPARIN SODIUM 5000 [USP'U]/ML
5000 INJECTION, SOLUTION INTRAVENOUS; SUBCUTANEOUS EVERY 8 HOURS SCHEDULED
Status: DISCONTINUED | OUTPATIENT
Start: 2019-08-27 | End: 2019-08-28

## 2019-08-27 RX ORDER — FUROSEMIDE 10 MG/ML
20 INJECTION INTRAMUSCULAR; INTRAVENOUS ONCE
Status: COMPLETED | OUTPATIENT
Start: 2019-08-27 | End: 2019-08-27

## 2019-08-27 RX ADMIN — HEPARIN SODIUM 5000 UNITS: 5000 INJECTION INTRAVENOUS; SUBCUTANEOUS at 13:56

## 2019-08-27 RX ADMIN — ROPIVACAINE HYDROCHLORIDE: 5 INJECTION, SOLUTION EPIDURAL; INFILTRATION; PERINEURAL at 15:08

## 2019-08-27 RX ADMIN — BUPROPION HYDROCHLORIDE 150 MG: 150 TABLET, EXTENDED RELEASE ORAL at 08:44

## 2019-08-27 RX ADMIN — ACETAMINOPHEN 650 MG: 325 TABLET ORAL at 17:04

## 2019-08-27 RX ADMIN — HEPARIN SODIUM 5000 UNITS: 5000 INJECTION INTRAVENOUS; SUBCUTANEOUS at 07:14

## 2019-08-27 RX ADMIN — BUPROPION HYDROCHLORIDE 150 MG: 150 TABLET, EXTENDED RELEASE ORAL at 17:04

## 2019-08-27 RX ADMIN — HEPARIN SODIUM 5000 UNITS: 5000 INJECTION INTRAVENOUS; SUBCUTANEOUS at 23:00

## 2019-08-27 RX ADMIN — DEXTROSE, SODIUM CHLORIDE, AND POTASSIUM CHLORIDE 125 ML/HR: 5; .45; .15 INJECTION INTRAVENOUS at 08:45

## 2019-08-27 RX ADMIN — METFORMIN HYDROCHLORIDE 500 MG: 500 TABLET ORAL at 17:04

## 2019-08-27 RX ADMIN — DEXTROSE, SODIUM CHLORIDE, AND POTASSIUM CHLORIDE 125 ML/HR: 5; .45; .15 INJECTION INTRAVENOUS at 17:03

## 2019-08-27 RX ADMIN — INSULIN LISPRO 8 UNITS: 100 INJECTION, SOLUTION INTRAVENOUS; SUBCUTANEOUS at 08:45

## 2019-08-27 RX ADMIN — SODIUM CHLORIDE, SODIUM LACTATE, POTASSIUM CHLORIDE, AND CALCIUM CHLORIDE 1000 ML: .6; .31; .03; .02 INJECTION, SOLUTION INTRAVENOUS at 02:04

## 2019-08-27 RX ADMIN — INSULIN LISPRO 4 UNITS: 100 INJECTION, SOLUTION INTRAVENOUS; SUBCUTANEOUS at 18:51

## 2019-08-27 RX ADMIN — FUROSEMIDE 20 MG: 10 INJECTION, SOLUTION INTRAMUSCULAR; INTRAVENOUS at 03:15

## 2019-08-27 NOTE — PROGRESS NOTES
Called by nurse about patient soaking bed karen and possible clot in tate catheter  Pt had received 20mg IV Lasix x1 at 0315 and IVF bolus without any urinary output  Previously had 0 3cc/kg/hr over 8hrs of first notification  Upon evaluation at bedside, the blue karen is significantly saturated with methylene blue urine  Clotting noted in the tate catheter tubing  Nurse flushed catheter at bedside and bloody urine was noted to be draining  600cc drained from bag  Pt reported feeling much more relief afterwards  Most of her discomfort was in the suprapubic area  She denied any chest pain, SOB, abdominal or back pain  Pt has been very sleepy and per nursing, her epidural was decreased by anesthesia yesterday  She had also received albumin in PACU as her Bps were soft post-op  ROBE drain also putting out a significant amount  210cc of serosanguinous fluid out within 10 minutes while I was present in room  Vitals:    08/27/19 0255   BP: 119/72   Pulse: 92   Resp: 16   Temp: 99 °F (37 2 °C)   SpO2: 94%     Physical Exam   Constitutional: She is oriented to person, place, and time  She appears well-developed  No distress  HENT:   Head: Normocephalic and atraumatic  Pulmonary/Chest: Effort normal  No respiratory distress  Nasal canula in place, O2 sat 95%   Abdominal: Soft  She exhibits no distension  There is tenderness (mild tenderness to palpation in suprapubic area)  There is no rebound and no guarding  Incision: C/D/I with histoacryl  No signs of infection   ROBE drain on RLQ draining serosanguinous fluid, possible leaking around tubing   Musculoskeletal: Normal range of motion  She exhibits no tenderness or deformity  SCDs on and on   Neurological: She is alert and oriented to person, place, and time  Skin: She is not diaphoretic  Psychiatric: She has a normal mood and affect  Her behavior is normal  Judgment and thought content normal    Vitals reviewed      GYN ONC team aware and en route to [facility]    Dr Lamonte Chakraborty notified    Stefani Hernandez MD  OBGYN, PGY-3  8/27/2019 5:36 AM

## 2019-08-27 NOTE — TELEPHONE ENCOUNTER
Patient is still in house    OV in Epic for cysto/stent removal   Please confirm with patient when she is discharged

## 2019-08-27 NOTE — ANESTHESIA POSTPROCEDURE EVALUATION
Post-Op Assessment Note      Multiple reassessment in the PACU  Delayed emergence suspected due to case duration, intravenous anesthetic and multi Modal analgesia/postoperative nausea vomiting strategies  Patient was liberated from mechanical ventilation around 1700  Sleepy but satisfactory pain control  Epidural reduced due to hypotension  Late entry

## 2019-08-27 NOTE — UTILIZATION REVIEW
Initial Clinical Review    Elective   INPT  surgical procedure  Age/Sex: 62 y o  female  Surgery Date: 8/25    Procedure: total abdominal hysterectomy and bilateral  SALPINGO-OOPHORECTOMY, Radical omentectomy, pelvic lymph node sampling, staging biopsy, repair of cystotomy, appendectomy   Low posterior/trigonal bladder laceration due to difficult hysterectomy  Inability to identify left ureteral orifice  Successful ureteral reimplantation and cystorrhaphy    Anesthesia: general w/epidural   Operative Findings:   approximately 100 cc of straw-colored ascites noted  Within the pelvis a large left adnexal mass measuring approximately 16 x 10 cm was densely adherent all pelvic structures including the sigmoid colon, left pelvic sidewall, and posterior uterus  Frozen section diagnosis was performed indicating malignancy of uncertain subtype  It was elected to proceed with full staging procedure which was performed with random biopsies throughout  An appendectomy was performed in the possibility that this was a mucinous subtype  It should be noted that all malignancy was removed during the procedure however the mass was ruptured due to dissection of its dense adhesions within the pelvis      Upon completion of the procedure,  cystoscopy was performed indicating a 2 cm cystotomy  This had no suture material surrounding it or cautery  It was felt to be likely secondary to a retractor injury  This was repaired in 2 layers however the left ureter was then unable to be found on cystoscopy  The right ureteral orifice was functioning normally  A Urology consultation was obtained  It was elected to remove the suture however still no ureteral function was identified  It was elected to repeat implant the left ureter and repair of the bladder  This was done by the Urology service without difficulty  A left ureteral stent and Hitchcock catheter were were left in place    Admission Orders: Date/Time/Statement: Inpatient Admission Orders (From admission, onward)     Ordered        08/26/19 1442  Inpatient Admission  Once                   Orders Placed This Encounter   Procedures    Inpatient Admission     Standing Status:   Standing     Number of Occurrences:   1     Order Specific Question:   Admitting Physician     Answer:   Angela Nunez [[id number]]     Order Specific Question:   Level of Care     Answer:   Med Surg [[id number]]     Order Specific Question:   Estimated length of stay     Answer:   More than 2 Midnights     Order Specific Question:   Certification     Answer:   I certify that inpatient services are medically necessary for this patient for a duration of greater than two midnights  See H&P and MD Progress Notes for additional information about the patient's course of treatment  Vital Signs: /62   Pulse 73   Temp 98 1 °F (36 7 °C) (Oral)   Resp 18   SpO2 93%   Diet:  Advance as tolerated; Lo carb   Mobility: ambulate tid  DVT Prophylaxis: SCD's   Medications/Pain Control:   Continuous epidural infusion w/continuous pulse oximetry + tylenol around the clock     Current Facility-Administered Medications:  8/26  IV Lasix x1 today     acetaminophen 650 mg Oral Q6H   buPROPion 150 mg Oral BID   dextrose 5 % and sodium chloride 0 45 % with KCl 20 mEq/L 125 mL/hr Intravenous Continuous   heparin 5000 units in sodium chloride 0 9% 500 mL irrigation  Irrigation Once   heparin (porcine) 5,000 Units Subcutaneous Q8H [facility]   HYDROmorphone 0 5 mg Intravenous Q2H PRN   insulin lispro 4-20 Units Subcutaneous 4 times day   lactated ringers 125 mL/hr Intravenous Continuous   metFORMIN 500 mg Oral QPM   ondansetron 4 mg Intravenous Q6H PRN   ropivacaine 0 1% and fentaNYL 2 mcg/mL  Epidural Continuous   scopolamine 1 patch Transdermal Q72H       Network Utilization Review Department  Phone: [phone]; Fax [phone]  [email or web address]  ATTENTION: Please call with any questions or concerns to [phone]  and carefully listen to the prompts so that you are directed to the right person  Send all requests for admission clinical reviews, approved or denied determinations and any other requests to fax [phone]   All voicemails are confidential

## 2019-08-27 NOTE — PROGRESS NOTES
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, POD #1 s/p TWAN, BSO, Radical Omentectomy, Pelvic Lymph Node Staging, Staging Biopsies, Appendectomy, Cystotomy repair, L uretero yared cystotomy    PLAN:    1  Ovarian Malignancy: follow-up final pathology    2  S/p Cystotomy, L uretero yared cystotomy: saturated urine chucks pad throughout the evening ~ 400 cc,  urine output improved following IV lasix and catheter flushing, continue to monitor, consider replacing tate catheter in clots persist in current tate  Continue tate catheter x 2 weeks, stent 6-8 weeks with follow-up with urology for removal     3  Acute Blood Loss Anemia: preoperative Hgb 13 5-->EBL 1200 ccs-->postop istat hgb 8 5--> 2 units PRBCs-->11 3-->11 6 this AM    4  HTN: home meds held secondary to postoperative hypotension, consider restarting    5   T2DM: home metformin, SSI when tolerating regular diet, consider endocrinology consult    Dispo: inpatient    PPx: SCDs, consider lovenox today if epidural being continued  FEN: regular diet, replete lytes prn, D5 1/2NS + 20KCL  Pain mgmnt: epidural-plan per APS  Invasive lines: tate, ROBE drain-420 cc output overnight  Code status: full  ____________________    SUBJECTIVE  History of Present Illness:  Overnight: sleepy this morning, reports suprapubic discomfort much improved following tate being drained   Tolerating Oral Intake: taking some sips of water  Voiding: as noted above  Flatus: no  Bowel Movement: no  Ambulating: epidural in place  Vaginal Bleeding: no  Pelvic Pain: no  Chest Pain: no  Shortness of Breath: no  Leg Pain/Discomfort: no    OBJECTIVE  Vitals  Temp:  [97 °F (36 1 °C)-99 3 °F (37 4 °C)] 99 °F (37 2 °C)  HR:  [62-94] 92  Resp:  [13-22] 16  BP: ()/(47-73) 119/72       Intake/Output Summary (Last 24 hours) at 8/27/2019 0631  Last data filed at 8/27/2019 0546  Gross per 24 hour Intake 5900 ml   Output 3908 ml   Net 1992 ml       Physical Exam:   Physical Exam   Constitutional: She is oriented to person, place, and time  She appears well-developed and well-nourished  No distress  Cardiovascular: Normal rate, regular rhythm, normal heart sounds and intact distal pulses  Pulmonary/Chest: Effort normal and breath sounds normal  No stridor  No respiratory distress  Abdominal: Soft  Bowel sounds are normal  She exhibits no distension  There is no tenderness  Clark drain with continued serosanguinous drainage     Midline incision clean, dry, and intact    Neurological: She is alert and oriented to person, place, and time  Skin: Skin is warm and dry  She is not diaphoretic  Psychiatric: She has a normal mood and affect  Her behavior is normal          Labs:   Recent Results (from the past 72 hour(s))   Fingerstick Glucose (POCT)    Collection Time: 08/26/19  6:06 AM   Result Value Ref Range    POC Glucose 275 (H) 65 - 140 mg/dl   Tissue Exam    Collection Time: 08/26/19  8:45 AM   Result Value Ref Range    Case Report       Surgical Pathology Report                         Case: [id number]                                   Authorizing Provider:  Kennedi Estrella MD       Collected:           08/26/2019 0845              Ordering Location:     [address] [place] [address]      Received:            08/26/2019 [address] Operating Room                                                      Pathologist:           Huy Berumen MD                                                                Specimen:    Ovary, Left, Left ovary and left fallopian tube                                            Final Diagnosis                 Additional Information       All controls performed with the immunohistochemical stains reported above reacted appropriately    These tests were developed and their performance characteristics determined by [facility] or [facility]  They may not be cleared or approved by the [place]  Food and Drug Administration  The FDA has determined that such clearance or approval is not necessary  These tests are used for clinical purposes  They should not be regarded as investigational or for research  This laboratory has been approved by [place] 88, designated as a high-complexity laboratory and is qualified to perform these tests  Intraoperative Consultation       FSDxA (2 representative sections): Highly cellular neoplasm with basaloid features and mitotic figures, favor malignant      - Dr Yong Medrano spoke with Dr Paris Gutierrez at 9:25 am on 08/26/2019  Case review: LX       POCT Blood Gas (CG8+)    Collection Time: 08/26/19  9:47 AM   Result Value Ref Range    ph, Jorge ISTAT 7 343 7 300 - 7 400    pCO2, Jorge i-STAT 45 7 42 0 - 50 0 mm HG    pO2, Jorge i-STAT 50 0 (H) 35 0 - 45 0 mm HG    BE, i-STAT -1 -2 - 3 mmol/L    HCO3, Jorge i-STAT 24 8 24 0 - 30 0 mmol/L    CO2, i-STAT 26 21 - 32 mmol/L    O2 Sat, i-STAT 82 (L) 95 - 98 %    SODIUM, I-STAT 140 136 - 145 mmol/l    Potassium, i-STAT 2 9 (L) 3 5 - 5 3 mmol/L    Calcium, Ionized i-STAT 1 17 1 12 - 1 32 mmol/L    Hct, i-STAT 31 (L) 34 8 - 46 1 %    Hgb, i-STAT 10 5 (L) 11 5 - 15 4 g/dl    Glucose, i-STAT 170 (H) 65 - 140 mg/dl    Specimen Type VENOUS    Fingerstick Glucose (POCT)    Collection Time: 08/26/19  3:35 PM   Result Value Ref Range    POC Glucose 221 (H) 65 - 140 mg/dl   POCT Blood Gas (CG8+)    Collection Time: 08/26/19  5:02 PM   Result Value Ref Range    ph, Jorge ISTAT 7 228 (LL) 7 300 - 7 400    pCO2, Jorge i-STAT 54 2 (H) 42 0 - 50 0 mm HG    pO2, Jorge i-STAT 37 0 35 0 - 45 0 mm HG    BE, i-STAT -5 (L) -2 - 3 mmol/L    HCO3, Jorge i-STAT 22 6 (L) 24 0 - 30 0 mmol/L    CO2, i-STAT 24 21 - 32 mmol/L    O2 Sat, i-STAT 59 (L) 95 - 98 %    SODIUM, I-STAT 139 136 - 145 mmol/l    Potassium, i-STAT 3 5 3 5 - 5 3 mmol/L    Calcium, Ionized i-STAT 1 15 1 12 - 1 32 mmol/L    Hct, i-STAT 25 (L) 34 8 - 46 1 %    Hgb, i-STAT 8 5 (L) 11 5 - 15 4 g/dl    Glucose, i-STAT 188 (H) 65 - 140 mg/dl    Specimen Type VENOUS    Fingerstick Glucose (POCT)    Collection Time: 08/26/19 10:11 PM   Result Value Ref Range    POC Glucose 221 (H) 65 - 140 mg/dl   Type and screen    Collection Time: 08/27/19  2:24 AM   Result Value Ref Range    ABO Grouping A     Rh Factor Positive     Antibody Screen Negative     Specimen Expiration Date [id number]    CBC and differential    Collection Time: 08/27/19  2:24 AM   Result Value Ref Range    WBC 14 23 (H) 4 31 - 10 16 Thousand/uL    RBC 3 84 3 81 - 5 12 Million/uL    Hemoglobin 11 3 (L) 11 5 - 15 4 g/dL    Hematocrit 32 5 (L) 34 8 - 46 1 %    MCV 85 82 - 98 fL    MCH 29 4 26 8 - 34 3 pg    MCHC 34 8 31 4 - 37 4 g/dL    RDW 13 1 11 6 - 15 1 %    MPV 9 4 8 9 - 12 7 fL    Platelets 417 089 - 649 Thousands/uL    nRBC 0 /100 WBCs    Neutrophils Relative 92 (H) 43 - 75 %    Immat GRANS % 0 0 - 2 %    Lymphocytes Relative 4 (L) 14 - 44 %    Monocytes Relative 4 4 - 12 %    Eosinophils Relative 0 0 - 6 %    Basophils Relative 0 0 - 1 %    Neutrophils Absolute 13 03 (H) 1 85 - 7 62 Thousands/µL    Immature Grans Absolute 0 03 0 00 - 0 20 Thousand/uL    Lymphocytes Absolute 0 61 0 60 - 4 47 Thousands/µL    Monocytes Absolute 0 55 0 17 - 1 22 Thousand/µL    Eosinophils Absolute 0 00 0 00 - 0 61 Thousand/µL    Basophils Absolute 0 01 0 00 - 0 10 Thousands/µL   CBC and differential    Collection Time: 08/27/19  4:42 AM   Result Value Ref Range    WBC 15 67 (H) 4 31 - 10 16 Thousand/uL    RBC 4 05 3 81 - 5 12 Million/uL    Hemoglobin 11 6 11 5 - 15 4 g/dL    Hematocrit 34 2 (L) 34 8 - 46 1 %    MCV 84 82 - 98 fL    MCH 28 6 26 8 - 34 3 pg    MCHC 33 9 31 4 - 37 4 g/dL    RDW 13 1 11 6 - 15 1 %    MPV 9 9 8 9 - 12 7 fL    Platelets 241 462 - 803 Thousands/uL   Basic metabolic panel    Collection Time: 08/27/19  5:01 AM   Result Value Ref Range    Sodium 135 (L) 136 - 145 mmol/L    Potassium 4 1 3 5 - 5 3 mmol/L    Chloride 105 100 - 108 mmol/L    CO2 24 21 - 32 mmol/L    ANION GAP 6 4 - 13 mmol/L    BUN 11 5 - 25 mg/dL    Creatinine 0 96 0 60 - 1 30 mg/dL    Glucose 207 (H) 65 - 140 mg/dL    Calcium 7 6 (L) 8 3 - 10 1 mg/dL    eGFR 64 ml/min/1 73sq m   Prepare RBC:Has consent been obtained? Yes; Date of Surgery: 8/26/2019; Where is the Surgery Scheduled? [facility], 2 Units    Collection Time: 08/27/19  5:55 AM   Result Value Ref Range    Unit Product Code X4261G57     Unit Number [id number]     Unit ABO A     Unit [facility] POS     Crossmatch Compatible     Unit Dispense Status Presumed Trans     Unit Product Code L1827Q45     Unit Number [id number]     Unit ABO A     Unit RH POS     Crossmatch Compatible     Unit Dispense Status Presumed Trans        Meds:  Scheduled Meds:  Current Facility-Administered Medications:  acetaminophen 650 mg Oral Q6H Lester Tesoriero, DO    buPROPion 150 mg Oral BID [place] F Zabo, DO    dextrose 5 % and sodium chloride 0 45 % with KCl 20 mEq/L 125 mL/hr Intravenous Continuous Luis F Zabo, DO Last Rate: 125 mL/hr (08/26/19 1734)   heparin 5000 units in sodium chloride 0 9% 500 mL irrigation  Irrigation Once Ellyn Rodriguez MD    HYDROmorphone 0 5 mg Intravenous Q2H PRN Catie Kiang, DO    insulin lispro 4-20 Units Subcutaneous 4 times day [place] F Zabo, DO    lactated ringers 125 mL/hr Intravenous Continuous [place] F Zabo, DO Last Rate: Stopped (08/26/19 1734)   metFORMIN 500 mg Oral QPM [place] F Zabo, DO    ondansetron 4 mg Intravenous Q6H PRN [place] F Zabo, DO    ropivacaine 0 1% and fentaNYL 2 mcg/mL  Epidural Continuous Catie Kiang, DO    scopolamine 1 patch Transdermal Q72H [place] F Zabo, DO      Continuous Infusions:  dextrose 5 % and sodium chloride 0 45 % with KCl 20 mEq/L 125 mL/hr Last Rate: 125 mL/hr (08/26/19 1734)   lactated ringers 125 mL/hr Last Rate: Stopped (08/26/19 1734)   ropivacaine 0 1% and fentaNYL 2 mcg/mL       PRN Meds:  HYDROmorphone   ondansetron    Imaging Studies: I have personally reviewed pertinent reports  EKG, Pathology, Microbiology, and Other Studies: I have personally reviewed pertinent reports        __________________    Signature / Title: Lilly Romero DO, Ob/Gyn, PGY-3  Date: 8/27/2019  Time: 6:31 AM

## 2019-08-27 NOTE — PROGRESS NOTES
Called nurse to follow-up labs post-transfusion  Pt recently finished 2nd unit of PRBCs and therefore labs had not been drawn yet (originally ordered for midnight)  Of note, pt oliguric with 0 3cc/kg/hr the last 8hrs with bloody urine (pt had cystotomy repair and ureteral stenting intra-op)  Nursing made sure tate catheter is draining appropriately  Pt very sleepy still, no complaints  Will give 1L IVF bolus and await results of CBC  Vitals stable  Dr Rhina Guy updated      Duglas Bartlett MD  OBGYN, PGY-3  8/27/2019 2:02 AM

## 2019-08-27 NOTE — CONSULTS
Consultation - Inpatient Pain Management   Elizabeth Dalton 62 y o  female MRN: [id number]  Unit/Bed#: [facility] 916-01 Encounter: [id number]               Assessment/Plan     Assessment:     Principal Problem:    S/P abdominal hysterectomy  Active Problems:    PMB (postmenopausal bleeding)    Bilateral tubo-ovarian mass    Ovarian neoplasm      Plan/Recommendations:   Continue Epidural at current settings with R0 1 F2 4/5/10/4  Encouraged Demand use for movement/ambulation  Consider the addition of scheduled APAP  If sedation remains problematic, consider the removal of Transdermal Scopolamine  TID SQH Noted  History of Present Illness    Admit Date:  8/26/2019  Hospital Day:  1 day  Primary Service:  GYN Oncology  Attending Provider:  Boris Arvizu MD  Physician Requesting Consult: Boris Arvizu MD  Reason for Consult / Principal Problem: Acute post surgical abdominal pain  HPI: Elizabeth Dalton is a 62y o  year old female who presents with for surgical management of postmenopausal bleeding and tube ovarian mass  Patient underwent total abdominal hysterectomy and bilateral supping nephrectomy yesterday with radical omentectomy and repair of bladder laceration and reimplantation of left ureter  Patient reports satisfactory analgesia overnight  The patient feels groggy this morning  Review of Systems:  Negative except as described below       Pain History:  Pain History: Acute post surgical abdominal pain  Current pain location(s): Abdominal  Pain Scale:   0-3  Quality: Sharp  Aggravating and alleviating factors: PCA  Pain Relief Goal:  2    Historical Information   Past Medical History:   Diagnosis Date    Anxiety     Diabetes mellitus ([address] )     High cholesterol     Hypertension     Morbid obesity ([address] )     PONV (postoperative nausea and vomiting)     Post-menopausal bleeding     Sleep apnea      Past Surgical History:   Procedure Laterality Date    CHOLECYSTECTOMY       Social History   Social History     Substance and Sexual Activity   Alcohol Use Never    Frequency: Never     Social History     Substance and Sexual Activity   Drug Use Never     Social History     Tobacco Use   Smoking Status Former Smoker   Smokeless Tobacco Never Used   Tobacco Comment    quit in her 27's     Family History: non-contributory    Meds/Allergies   Prior to Admission Medications  Medications Prior to Admission   Medication    amLODIPine (NORVASC) 5 mg tablet    buPROPion (WELLBUTRIN SR) 150 mg 12 hr tablet    Cholecalciferol (VITAMIN D-3) 1000 units CAPS    escitalopram (LEXAPRO) 20 mg tablet    metFORMIN (GLUCOPHAGE) 500 mg tablet    Omega 3 1200 MG CAPS    simvastatin (ZOCOR) 20 mg tablet    SUPER B COMPLEX/C PO    valsartan-hydrochlorothiazide (DIOVAN-HCT) 320-25 MG per tablet     Hospital Medications  Current Facility-Administered Medications   Medication Dose Route Frequency    acetaminophen (TYLENOL) tablet 650 mg  650 mg Oral Q6H    buPROPion (WELLBUTRIN SR) 12 hr tablet 150 mg  150 mg Oral BID    dextrose 5 % and sodium chloride 0 45 % with KCl 20 mEq/L infusion  125 mL/hr Intravenous Continuous    heparin (porcine) 5,000 Units in sodium chloride 0 9 % 500 mL irrigation   Irrigation Once    heparin (porcine) subcutaneous injection 5,000 Units  5,000 Units Subcutaneous Q8H [facility]    HYDROmorphone (DILAUDID) injection 0 5 mg  0 5 mg Intravenous Q2H PRN    insulin lispro (HumaLOG) 100 units/mL subcutaneous injection 4-20 Units  4-20 Units Subcutaneous 4 times day    lactated ringers infusion  125 mL/hr Intravenous Continuous    metFORMIN (GLUCOPHAGE) tablet 500 mg  500 mg Oral QPM    ondansetron (ZOFRAN) injection 4 mg  4 mg Intravenous Q6H PRN    ropivacaine 0 1% and fentaNYL 2 mcg/mL PCEA   Epidural Continuous    scopolamine (TRANSDERM-SCOP) 1 5 mg/3 days TD 72 hr patch 1 patch  1 patch Transdermal Q72H       Allergies   Allergen Reactions    Sulfites Other (See Comments) Causes flushing       Objective   Temp:  [97 °F (36 1 °C)-99 3 °F (37 4 °C)] 99 °F (37 2 °C)  HR:  [62-94] 92  Resp:  [13-22] 16  BP: ()/(47-73) 119/72    Intake/Output Summary (Last 24 hours) at 8/27/2019 0905  Last data filed at 8/27/2019 0844  Gross per 24 hour   Intake 6859 53 ml   Output 4398 ml   Net 2461 53 ml       Physical Exam:  General Appearance:    Alert, cooperative, no distress, appears stated age   Neurological:   Oriented to person, place, and time, normal affect    Head:    Normocephalic, without obvious abnormality, atraumatic   Eyes:    EOM's intact   Back:     Symmetric, no curvature, ROM normal   Lungs:     Symmetric chest expansion, respirations unlabored   Chest Wall:    No tenderness or deformity  No tenderness over epidural site  Dressing CDI   Abdomen:        Soft, mild distention    Heart:    Regular rate and rhythm   Extremities:   Extremities normal, atraumatic, no cyanosis or edema     Normal strength, intact sensation to light touch   Pulses:   2+ and symmetric all extremities   Skin:   Skin color and texture normal, no rashes or lesions     Lab Results:   Results from last 7 days   Lab Units 08/27/19  0706   WBC Thousand/uL 15 67*   HEMOGLOBIN g/dL 11 6   HEMATOCRIT % 34 2*   PLATELETS Thousands/uL 252      Results from last 7 days   Lab Units 08/27/19  0501 08/26/19  1702   POTASSIUM mmol/L 4 1  --    CHLORIDE mmol/L 105  --    CO2 mmol/L 24  --    CO2, I-STAT mmol/L  --  24   BUN mg/dL 11  --    CREATININE mg/dL 0 96  --    CALCIUM mg/dL 7 6*  --    GLUCOSE, ISTAT mg/dl  --  188*       Imaging Studies: I have personally reviewed pertinent reports  EKG, Pathology, and Other Studies: I have personally reviewed pertinent reports  Counseling / Coordination of Care  Total floor / unit time spent today 15 minutes  Greater than 50% of total time was spent with the patient and / or family counseling and / or coordination of care   A description of the counseling / coordination of care: discussion of Epidural management with primary service and analgesic expectations with the patient

## 2019-08-28 LAB
ANION GAP SERPL CALCULATED.3IONS-SCNC: 6 MMOL/L (ref 4–13)
BASOPHILS # BLD AUTO: 0.09 THOUSANDS/ΜL (ref 0–0.1)
BASOPHILS NFR BLD AUTO: 1 % (ref 0–1)
BUN SERPL-MCNC: 7 MG/DL (ref 5–25)
CALCIUM SERPL-MCNC: 7.7 MG/DL (ref 8.3–10.1)
CHLORIDE SERPL-SCNC: 108 MMOL/L (ref 100–108)
CO2 SERPL-SCNC: 28 MMOL/L (ref 21–32)
CREAT SERPL-MCNC: 0.55 MG/DL (ref 0.6–1.3)
EOSINOPHIL # BLD AUTO: 0.14 THOUSAND/ΜL (ref 0–0.61)
EOSINOPHIL NFR BLD AUTO: 1 % (ref 0–6)
ERYTHROCYTE [DISTWIDTH] IN BLOOD BY AUTOMATED COUNT: 13.4 % (ref 11.6–15.1)
GFR SERPL CREATININE-BSD FRML MDRD: 102 ML/MIN/1.73SQ M
GLUCOSE SERPL-MCNC: 157 MG/DL (ref 65–140)
GLUCOSE SERPL-MCNC: 164 MG/DL (ref 65–140)
GLUCOSE SERPL-MCNC: 170 MG/DL (ref 65–140)
GLUCOSE SERPL-MCNC: 176 MG/DL (ref 65–140)
GLUCOSE SERPL-MCNC: 205 MG/DL (ref 65–140)
HCT VFR BLD AUTO: 30.8 % (ref 34.8–46.1)
HGB BLD-MCNC: 10.4 G/DL (ref 11.5–15.4)
IMM GRANULOCYTES # BLD AUTO: 0.08 THOUSAND/UL (ref 0–0.2)
IMM GRANULOCYTES NFR BLD AUTO: 1 % (ref 0–2)
LYMPHOCYTES # BLD AUTO: 2.22 THOUSANDS/ΜL (ref 0.6–4.47)
LYMPHOCYTES NFR BLD AUTO: 20 % (ref 14–44)
MCH RBC QN AUTO: 29.5 PG (ref 26.8–34.3)
MCHC RBC AUTO-ENTMCNC: 33.8 G/DL (ref 31.4–37.4)
MCV RBC AUTO: 87 FL (ref 82–98)
MONOCYTES # BLD AUTO: 0.84 THOUSAND/ΜL (ref 0.17–1.22)
MONOCYTES NFR BLD AUTO: 8 % (ref 4–12)
NEUTROPHILS # BLD AUTO: 7.8 THOUSANDS/ΜL (ref 1.85–7.62)
NEUTS SEG NFR BLD AUTO: 69 % (ref 43–75)
NRBC BLD AUTO-RTO: 0 /100 WBCS
PLATELET # BLD AUTO: 245 THOUSANDS/UL (ref 149–390)
PMV BLD AUTO: 9.7 FL (ref 8.9–12.7)
POTASSIUM SERPL-SCNC: 3.4 MMOL/L (ref 3.5–5.3)
RBC # BLD AUTO: 3.53 MILLION/UL (ref 3.81–5.12)
SODIUM SERPL-SCNC: 142 MMOL/L (ref 136–145)
WBC # BLD AUTO: 11.17 THOUSAND/UL (ref 4.31–10.16)

## 2019-08-28 PROCEDURE — 99024 POSTOP FOLLOW-UP VISIT: CPT | Performed by: OBSTETRICS & GYNECOLOGY

## 2019-08-28 PROCEDURE — 82948 REAGENT STRIP/BLOOD GLUCOSE: CPT

## 2019-08-28 PROCEDURE — 85025 COMPLETE CBC W/AUTO DIFF WBC: CPT | Performed by: OBSTETRICS & GYNECOLOGY

## 2019-08-28 PROCEDURE — 94760 N-INVAS EAR/PLS OXIMETRY 1: CPT

## 2019-08-28 PROCEDURE — 80048 BASIC METABOLIC PNL TOTAL CA: CPT | Performed by: OBSTETRICS & GYNECOLOGY

## 2019-08-28 RX ORDER — DOCUSATE SODIUM 100 MG/1
100 CAPSULE, LIQUID FILLED ORAL 2 TIMES DAILY
Status: DISCONTINUED | OUTPATIENT
Start: 2019-08-28 | End: 2019-08-30 | Stop reason: HOSPADM

## 2019-08-28 RX ORDER — SENNOSIDES 8.6 MG
1 TABLET ORAL
Status: DISCONTINUED | OUTPATIENT
Start: 2019-08-28 | End: 2019-08-29 | Stop reason: SDUPTHER

## 2019-08-28 RX ADMIN — DOCUSATE SODIUM 100 MG: 100 CAPSULE, LIQUID FILLED ORAL at 17:12

## 2019-08-28 RX ADMIN — DEXTROSE, SODIUM CHLORIDE, AND POTASSIUM CHLORIDE 125 ML/HR: 5; .45; .15 INJECTION INTRAVENOUS at 17:12

## 2019-08-28 RX ADMIN — ROPIVACAINE HYDROCHLORIDE: 5 INJECTION, SOLUTION EPIDURAL; INFILTRATION; PERINEURAL at 15:40

## 2019-08-28 RX ADMIN — BUPROPION HYDROCHLORIDE 150 MG: 150 TABLET, EXTENDED RELEASE ORAL at 17:13

## 2019-08-28 RX ADMIN — DOCUSATE SODIUM 100 MG: 100 CAPSULE, LIQUID FILLED ORAL at 08:50

## 2019-08-28 RX ADMIN — SENNOSIDES 8.6 MG: 8.6 TABLET, FILM COATED ORAL at 21:21

## 2019-08-28 RX ADMIN — INSULIN LISPRO 4 UNITS: 100 INJECTION, SOLUTION INTRAVENOUS; SUBCUTANEOUS at 11:56

## 2019-08-28 RX ADMIN — DEXTROSE, SODIUM CHLORIDE, AND POTASSIUM CHLORIDE 125 ML/HR: 5; .45; .15 INJECTION INTRAVENOUS at 09:13

## 2019-08-28 RX ADMIN — ONDANSETRON 4 MG: 2 INJECTION INTRAMUSCULAR; INTRAVENOUS at 09:36

## 2019-08-28 RX ADMIN — ACETAMINOPHEN 650 MG: 325 TABLET ORAL at 14:30

## 2019-08-28 RX ADMIN — DEXTROSE, SODIUM CHLORIDE, AND POTASSIUM CHLORIDE 125 ML/HR: 5; .45; .15 INJECTION INTRAVENOUS at 01:25

## 2019-08-28 RX ADMIN — BUPROPION HYDROCHLORIDE 150 MG: 150 TABLET, EXTENDED RELEASE ORAL at 08:50

## 2019-08-28 RX ADMIN — ACETAMINOPHEN 650 MG: 325 TABLET ORAL at 21:21

## 2019-08-28 NOTE — RESTORATIVE TECHNICIAN NOTE
Restorative Specialist Mobility Note       Activity: Ambulate in will, Ambulate in room, Bathroom privileges, Chair, Dangle, Stand at bedside(Educated/encouraged pt to ambulate with assistance 3-4 x's/day   Pt callbell, PCA button, phone/tray within reach )          Maggi TABARES, Restorative Technician, [employer]

## 2019-08-28 NOTE — TELEPHONE ENCOUNTER
Patient is still in house  Cysto/stent removal is scheduled for 10/07/2019 at 10:00 in [place] with Dr Norma Mendoza  Please confirm with patient when she is discharged  Thank you!

## 2019-08-28 NOTE — PROGRESS NOTES
Noted no urine output in tate bag  Pt incont  Large amt of urine  Called, spoke to Dr Steve Hernandez, she eval pt and tate irrigated with 50cc NSS with immediate return of 50cc dark red/blue urine, tate drained out 600cc  ROBE drain noted to be leaking at the site and draining large amt of serosanguinous fluid, Dr Steve Hernandez noted

## 2019-08-28 NOTE — PROGRESS NOTES
GYN-ONC Progress Note  Sherron Leary  [id number]  [facility] 928/[facility] 928-01 8/28/2019  6:15 AM    ASSESS: 62year old with left ovarian malignancy, POD #2 s/p TWAN, BSO, Radical Omentectomy, Pelvic Lymph Node Dissection, Staging Biopsies, Appendectomy, Cystotomy repair, L uretero yared cystotomy    PLAN:    1  Ovarian Malignancy: follow-up final pathology    2  S/P cystotomy, L uretero yared cystotomy: 0 85 cc/kg/hr overnight though urine remains bloody and more concentrated, 135 cc output overnight-serosang, continue tate catheter, urology follow-up outpatient    3  ABLA: preoperative Hgb 13 5-->EBL 1200 ccs-->postop istat hgb 8 5--> 2 units PRBCs-->11 3-->11 6-->follow-up AM labs    4  HTN: BP q 24h 100-130/60-70s, hold home emds    5  T2DM: home metformin, SSI, PP 150s-180s    Dispo: inpatient    PPx: SCDs, holding herpain for possible epidural removal  FEN: regular diet, replete lytes prn, D5 1/2NS + 20KCL  Pain mgmnt: continue epidural, consider discontinuing when tolerating PO   Invasive lines: tate, ROBE drain  Code status: full  ____________________    SUBJECTIVE  History of Present Illness:  Overnight: no acute events   Pain: 2/10 discomfort at incision site, also endorsing lower abdominal pressure  Tolerating Oral Intake: yes   Voiding: tate output as stated above, more bloody and concentrated  Flatus: no  Bowel Movement: no  Ambulating: moving out of bed to chair  Vaginal Bleeding: no  Pelvic Pain: no  Chest Pain: no  Shortness of Breath: no-wearing home CPAP at night  Leg Pain/Discomfort: no    OBJECTIVE  Vitals  Temp:  [98 1 °F (36 7 °C)-99 1 °F (37 3 °C)] 98 1 °F (36 7 °C)  HR:  [73-84] 81  Resp:  [18] 18  BP: (104-138)/(62-75) 119/66       Intake/Output Summary (Last 24 hours) at 8/28/2019 0615  Last data filed at 8/28/2019 0301  Gross per 24 hour   Intake 3119 13 ml   Output 3185 ml   Net -65 87 ml       Physical Exam:   Physical Exam   Constitutional: She is oriented to person, place, and time   Vital signs are normal  She is active  Cardiovascular: Normal rate, regular rhythm, normal heart sounds and intact distal pulses  Pulmonary/Chest: Effort normal and breath sounds normal  No stridor  No respiratory distress  Abdominal: Soft  Bowel sounds are normal  She exhibits no distension  There is tenderness  Mild tenderness at incision site-clean, dry, and intact  ROBE drain with serosang output    Neurological: She is alert and oriented to person, place, and time  Skin: Skin is warm and dry  Psychiatric: She has a normal mood and affect  Her behavior is normal          Labs:   Recent Results (from the past 72 hour(s))   Fingerstick Glucose (POCT)    Collection Time: 08/26/19  6:06 AM   Result Value Ref Range    POC Glucose 275 (H) 65 - 140 mg/dl   Tissue Exam    Collection Time: 08/26/19  8:45 AM   Result Value Ref Range    Case Report       Surgical Pathology Report                         Case: [id number]                                   Authorizing Provider:  Deedee Campos MD       Collected:           08/26/2019 0845              Ordering Location:     [address] [place] [address]      Received:            08/26/2019 [facility] Operating Room                                                      Pathologist:           Oj Sosa MD                                                                Specimen:    Ovary, Left, Left ovary and left fallopian tube                                            Final Diagnosis                 Additional Information       All controls performed with the immunohistochemical stains reported above reacted appropriately  These tests were developed and their performance characteristics determined by [facility] or [facility]  They may not be cleared or approved by the [place]  Food and Drug Administration  The FDA has determined that such clearance or approval is not necessary   These tests are used for clinical purposes  They should not be regarded as investigational or for research  This laboratory has been approved by CLIA 88, designated as a high-complexity laboratory and is qualified to perform these tests  Intraoperative Consultation       FSDxA (2 representative sections): Highly cellular neoplasm with basaloid features and mitotic figures, favor malignant      - Dr Agata Oliver spoke with Dr Diana Cowan at 9:25 am on 08/26/2019  Case review: LX       POCT Blood Gas (CG8+)    Collection Time: 08/26/19  9:47 AM   Result Value Ref Range    ph, Jorge ISTAT 7 343 7 300 - 7 400    pCO2, Jorge i-STAT 45 7 42 0 - 50 0 mm HG    pO2, Jorge i-STAT 50 0 (H) 35 0 - 45 0 mm HG    BE, i-STAT -1 -2 - 3 mmol/L    HCO3, Jorge i-STAT 24 8 24 0 - 30 0 mmol/L    CO2, i-STAT 26 21 - 32 mmol/L    O2 Sat, i-STAT 82 (L) 95 - 98 %    SODIUM, I-STAT 140 136 - 145 mmol/l    Potassium, i-STAT 2 9 (L) 3 5 - 5 3 mmol/L    Calcium, Ionized i-STAT 1 17 1 12 - 1 32 mmol/L    Hct, i-STAT 31 (L) 34 8 - 46 1 %    Hgb, i-STAT 10 5 (L) 11 5 - 15 4 g/dl    Glucose, i-STAT 170 (H) 65 - 140 mg/dl    Specimen Type VENOUS    Fingerstick Glucose (POCT)    Collection Time: 08/26/19  3:35 PM   Result Value Ref Range    POC Glucose 221 (H) 65 - 140 mg/dl   POCT Blood Gas (CG8+)    Collection Time: 08/26/19  5:02 PM   Result Value Ref Range    ph, Jorge ISTAT 7 228 (LL) 7 300 - 7 400    pCO2, Jorge i-STAT 54 2 (H) 42 0 - 50 0 mm HG    pO2, Jorge i-STAT 37 0 35 0 - 45 0 mm HG    BE, i-STAT -5 (L) -2 - 3 mmol/L    HCO3, Jorge i-STAT 22 6 (L) 24 0 - 30 0 mmol/L    CO2, i-STAT 24 21 - 32 mmol/L    O2 Sat, i-STAT 59 (L) 95 - 98 %    SODIUM, I-STAT 139 136 - 145 mmol/l    Potassium, i-STAT 3 5 3 5 - 5 3 mmol/L    Calcium, Ionized i-STAT 1 15 1 12 - 1 32 mmol/L    Hct, i-STAT 25 (L) 34 8 - 46 1 %    Hgb, i-STAT 8 5 (L) 11 5 - 15 4 g/dl    Glucose, i-STAT 188 (H) 65 - 140 mg/dl    Specimen Type VENOUS    Fingerstick Glucose (POCT)    Collection Time: 08/26/19 10:11 PM   Result Value Ref Range    POC Glucose 221 (H) 65 - 140 mg/dl   Type and screen    Collection Time: 08/27/19  2:24 AM   Result Value Ref Range    ABO Grouping A     Rh Factor Positive     Antibody Screen Negative     Specimen Expiration Date [id number]    CBC and differential    Collection Time: 08/27/19  2:24 AM   Result Value Ref Range    WBC 14 23 (H) 4 31 - 10 16 Thousand/uL    RBC 3 84 3 81 - 5 12 Million/uL    Hemoglobin 11 3 (L) 11 5 - 15 4 g/dL    Hematocrit 32 5 (L) 34 8 - 46 1 %    MCV 85 82 - 98 fL    MCH 29 4 26 8 - 34 3 pg    MCHC 34 8 31 4 - 37 4 g/dL    RDW 13 1 11 6 - 15 1 %    MPV 9 4 8 9 - 12 7 fL    Platelets 213 091 - 479 Thousands/uL    nRBC 0 /100 WBCs    Neutrophils Relative 92 (H) 43 - 75 %    Immat GRANS % 0 0 - 2 %    Lymphocytes Relative 4 (L) 14 - 44 %    Monocytes Relative 4 4 - 12 %    Eosinophils Relative 0 0 - 6 %    Basophils Relative 0 0 - 1 %    Neutrophils Absolute 13 03 (H) 1 85 - 7 62 Thousands/µL    Immature Grans Absolute 0 03 0 00 - 0 20 Thousand/uL    Lymphocytes Absolute 0 61 0 60 - 4 47 Thousands/µL    Monocytes Absolute 0 55 0 17 - 1 22 Thousand/µL    Eosinophils Absolute 0 00 0 00 - 0 61 Thousand/µL    Basophils Absolute 0 01 0 00 - 0 10 Thousands/µL   Basic metabolic panel    Collection Time: 08/27/19  5:01 AM   Result Value Ref Range    Sodium 135 (L) 136 - 145 mmol/L    Potassium 4 1 3 5 - 5 3 mmol/L    Chloride 105 100 - 108 mmol/L    CO2 24 21 - 32 mmol/L    ANION GAP 6 4 - 13 mmol/L    BUN 11 5 - 25 mg/dL    Creatinine 0 96 0 60 - 1 30 mg/dL    Glucose 207 (H) 65 - 140 mg/dL    Calcium 7 6 (L) 8 3 - 10 1 mg/dL    eGFR 64 ml/min/1 73sq m   Prepare RBC:Has consent been obtained? Yes; Date of Surgery: 8/26/2019; Where is the Surgery Scheduled?  [facility], 2 Units    Collection Time: 08/27/19  5:55 AM   Result Value Ref Range    Unit Product Code Q7442V65     Unit Number [id number]     Unit ABO A     Unit [facility] POS     Crossmatch Compatible     Unit Dispense Status Presumed Trans     Unit Product Code S6425B07     Unit Number [id number]     Unit ABO A     Unit [facility] POS     Crossmatch Compatible     Unit Dispense Status Presumed Trans    CBC and differential    Collection Time: 08/27/19  7:06 AM   Result Value Ref Range    WBC 15 67 (H) 4 31 - 10 16 Thousand/uL    RBC 4 05 3 81 - 5 12 Million/uL    Hemoglobin 11 6 11 5 - 15 4 g/dL    Hematocrit 34 2 (L) 34 8 - 46 1 %    MCV 84 82 - 98 fL    MCH 28 6 26 8 - 34 3 pg    MCHC 33 9 31 4 - 37 4 g/dL    RDW 13 1 11 6 - 15 1 %    MPV 9 9 8 9 - 12 7 fL    Platelets 032 449 - 628 Thousands/uL    nRBC 0 /100 WBCs    Neutrophils Relative 90 (H) 43 - 75 %    Immat GRANS % 1 0 - 2 %    Lymphocytes Relative 5 (L) 14 - 44 %    Monocytes Relative 4 4 - 12 %    Eosinophils Relative 0 0 - 6 %    Basophils Relative 0 0 - 1 %    Neutrophils Absolute 14 12 (H) 1 85 - 7 62 Thousands/µL    Immature Grans Absolute 0 08 0 00 - 0 20 Thousand/uL    Lymphocytes Absolute 0 76 0 60 - 4 47 Thousands/µL    Monocytes Absolute 0 69 0 17 - 1 22 Thousand/µL    Eosinophils Absolute 0 00 0 00 - 0 61 Thousand/µL    Basophils Absolute 0 02 0 00 - 0 10 Thousands/µL   Fingerstick Glucose (POCT)    Collection Time: 08/27/19  8:04 AM   Result Value Ref Range    POC Glucose 231 (H) 65 - 140 mg/dl   Fingerstick Glucose (POCT)    Collection Time: 08/27/19  1:05 PM   Result Value Ref Range    POC Glucose 150 (H) 65 - 140 mg/dl   Fingerstick Glucose (POCT)    Collection Time: 08/27/19  6:48 PM   Result Value Ref Range    POC Glucose 185 (H) 65 - 140 mg/dl   Fingerstick Glucose (POCT)    Collection Time: 08/27/19  8:59 PM   Result Value Ref Range    POC Glucose 169 (H) 65 - 140 mg/dl       Meds:  Scheduled Meds:  Current Facility-Administered Medications:  acetaminophen 650 mg Oral Q6H Lester Tesoriero, DO    buPROPion 150 mg Oral BID [place] F Zabo, DO    dextrose 5 % and sodium chloride 0 45 % with KCl 20 mEq/L 125 mL/hr Intravenous Continuous [place] F Zabo, DO Last Rate: 125 mL/hr (08/28/19 0125)   heparin 5000 units in sodium chloride 0 9% 500 mL irrigation  Irrigation Once Archie Carlos MD    HYDROmorphone 0 5 mg Intravenous Q2H PRN Elizabeth Van,     insulin lispro 4-20 Units Subcutaneous 4 times day [place] F Kaylenebo, DO    lactated ringers 125 mL/hr Intravenous Continuous [facility], DO Last Rate: Stopped (08/26/19 1734)   metFORMIN 500 mg Oral QPM [place] F Kalpana, DO    ondansetron 4 mg Intravenous Q6H PRN [facility], DO    ropivacaine 0 1% and fentaNYL 2 mcg/mL  Epidural Continuous Elizabeth Van, DO    scopolamine 1 patch Transdermal Q72H [place]  Kaylenebo, DO      Continuous Infusions:  dextrose 5 % and sodium chloride 0 45 % with KCl 20 mEq/L 125 mL/hr Last Rate: 125 mL/hr (08/28/19 0125)   lactated ringers 125 mL/hr Last Rate: Stopped (08/26/19 1734)   ropivacaine 0 1% and fentaNYL 2 mcg/mL       PRN Meds:  HYDROmorphone    ondansetron    Imaging Studies: I have personally reviewed pertinent reports  EKG, Pathology, Microbiology, and Other Studies: I have personally reviewed pertinent reports        __________________    Signature / Title: Yeimy Meek DO, Ob/Gyn, PGY-3  Date: 8/28/2019  Time: 6:15 AM

## 2019-08-28 NOTE — PROGRESS NOTES
Anesthesia Progress Note - [facility] Pain Management    Hamzah Perry MRN: [id number]  Unit/Bed#: [place] 928-01 Encounter: [id number]        Epidural is runnin 1% Ropivacaine plus 2 ug/cc fentanyl at 4cc/hr, bolus of 5 cc every 10 minutes prn  Patient happy with her pain control  Plan:   Continue same      D/C Epidural when taking PO rupa per surgical service      Assessment:   62 y o  female status post TWAN/BSO POD# 2  Epidural Site:C/D/I  Neurological assessment : Patient neurologically intact    Subjective:  Current pain location(s): abdomen  Pain Scale:   2-5/10      Meds/Allergies   current meds:   Current Facility-Administered Medications   Medication Dose Route Frequency    acetaminophen (TYLENOL) tablet 650 mg  650 mg Oral Q6H    buPROPion (WELLBUTRIN SR) 12 hr tablet 150 mg  150 mg Oral BID    dextrose 5 % and sodium chloride 0 45 % with KCl 20 mEq/L infusion  125 mL/hr Intravenous Continuous    docusate sodium (COLACE) capsule 100 mg  100 mg Oral BID    heparin (porcine) 5,000 Units in sodium chloride 0 9 % 500 mL irrigation   Irrigation Once    HYDROmorphone (DILAUDID) injection 0 5 mg  0 5 mg Intravenous Q2H PRN    insulin lispro (HumaLOG) 100 units/mL subcutaneous injection 4-20 Units  4-20 Units Subcutaneous 4 times day    lactated ringers infusion  125 mL/hr Intravenous Continuous    metFORMIN (GLUCOPHAGE) tablet 500 mg  500 mg Oral QPM    ondansetron (ZOFRAN) injection 4 mg  4 mg Intravenous Q6H PRN    ropivacaine 0 1% and fentaNYL 2 mcg/mL PCEA   Epidural Continuous    scopolamine (TRANSDERM-SCOP) 1 5 mg/3 days TD 72 hr patch 1 patch  1 patch Transdermal Q72H    senna (SENOKOT) tablet 8 6 mg  1 tablet Oral HS       Allergies   Allergen Reactions    Sulfites Other (See Comments)     Causes flushing       Objective     Temp:  [98 1 °F (36 7 °C)-98 8 °F (37 1 °C)] 98 8 °F (37 1 °C)  HR:  [73-83] 81  Resp:  [16-18] 18  BP: (119-138)/(62-75) 125/62    SIGNATURE: Pinkey Counter, MD  DATE: August 28, 2019  TIME: 1:23 PM    Please call  ( [facility] [phone]) with any questions

## 2019-08-29 LAB
ANION GAP SERPL CALCULATED.3IONS-SCNC: 6 MMOL/L (ref 4–13)
BASOPHILS # BLD AUTO: 0.05 THOUSANDS/ΜL (ref 0–0.1)
BASOPHILS NFR BLD AUTO: 1 % (ref 0–1)
BUN SERPL-MCNC: 8 MG/DL (ref 5–25)
CALCIUM SERPL-MCNC: 8.2 MG/DL (ref 8.3–10.1)
CHLORIDE SERPL-SCNC: 110 MMOL/L (ref 100–108)
CO2 SERPL-SCNC: 26 MMOL/L (ref 21–32)
CREAT FLD-MCNC: 0.57 MG/DL
CREAT SERPL-MCNC: 0.56 MG/DL (ref 0.6–1.3)
EOSINOPHIL # BLD AUTO: 0.31 THOUSAND/ΜL (ref 0–0.61)
EOSINOPHIL NFR BLD AUTO: 4 % (ref 0–6)
ERYTHROCYTE [DISTWIDTH] IN BLOOD BY AUTOMATED COUNT: 13.5 % (ref 11.6–15.1)
GFR SERPL CREATININE-BSD FRML MDRD: 101 ML/MIN/1.73SQ M
GLUCOSE SERPL-MCNC: 146 MG/DL (ref 65–140)
GLUCOSE SERPL-MCNC: 166 MG/DL (ref 65–140)
GLUCOSE SERPL-MCNC: 180 MG/DL (ref 65–140)
GLUCOSE SERPL-MCNC: 192 MG/DL (ref 65–140)
GLUCOSE SERPL-MCNC: 205 MG/DL (ref 65–140)
HCT VFR BLD AUTO: 30.9 % (ref 34.8–46.1)
HGB BLD-MCNC: 10.2 G/DL (ref 11.5–15.4)
IMM GRANULOCYTES # BLD AUTO: 0.07 THOUSAND/UL (ref 0–0.2)
IMM GRANULOCYTES NFR BLD AUTO: 1 % (ref 0–2)
LYMPHOCYTES # BLD AUTO: 1.89 THOUSANDS/ΜL (ref 0.6–4.47)
LYMPHOCYTES NFR BLD AUTO: 23 % (ref 14–44)
MCH RBC QN AUTO: 29.1 PG (ref 26.8–34.3)
MCHC RBC AUTO-ENTMCNC: 33 G/DL (ref 31.4–37.4)
MCV RBC AUTO: 88 FL (ref 82–98)
MONOCYTES # BLD AUTO: 0.58 THOUSAND/ΜL (ref 0.17–1.22)
MONOCYTES NFR BLD AUTO: 7 % (ref 4–12)
NEUTROPHILS # BLD AUTO: 5.51 THOUSANDS/ΜL (ref 1.85–7.62)
NEUTS SEG NFR BLD AUTO: 64 % (ref 43–75)
NRBC BLD AUTO-RTO: 0 /100 WBCS
PLATELET # BLD AUTO: 249 THOUSANDS/UL (ref 149–390)
PMV BLD AUTO: 9.5 FL (ref 8.9–12.7)
POTASSIUM SERPL-SCNC: 3.9 MMOL/L (ref 3.5–5.3)
RBC # BLD AUTO: 3.5 MILLION/UL (ref 3.81–5.12)
SODIUM SERPL-SCNC: 142 MMOL/L (ref 136–145)
WBC # BLD AUTO: 8.41 THOUSAND/UL (ref 4.31–10.16)

## 2019-08-29 PROCEDURE — 80048 BASIC METABOLIC PNL TOTAL CA: CPT | Performed by: OBSTETRICS & GYNECOLOGY

## 2019-08-29 PROCEDURE — NC001 PR NO CHARGE: Performed by: ANESTHESIOLOGY

## 2019-08-29 PROCEDURE — 82948 REAGENT STRIP/BLOOD GLUCOSE: CPT

## 2019-08-29 PROCEDURE — 85025 COMPLETE CBC W/AUTO DIFF WBC: CPT | Performed by: OBSTETRICS & GYNECOLOGY

## 2019-08-29 PROCEDURE — 82570 ASSAY OF URINE CREATININE: CPT | Performed by: OBSTETRICS & GYNECOLOGY

## 2019-08-29 PROCEDURE — 99024 POSTOP FOLLOW-UP VISIT: CPT | Performed by: OBSTETRICS & GYNECOLOGY

## 2019-08-29 RX ORDER — OXYCODONE HYDROCHLORIDE 10 MG/1
10 TABLET ORAL EVERY 4 HOURS PRN
Status: DISCONTINUED | OUTPATIENT
Start: 2019-08-29 | End: 2019-08-30 | Stop reason: HOSPADM

## 2019-08-29 RX ORDER — AMOXICILLIN 250 MG
1 CAPSULE ORAL
Status: DISCONTINUED | OUTPATIENT
Start: 2019-08-29 | End: 2019-08-30 | Stop reason: HOSPADM

## 2019-08-29 RX ORDER — SODIUM CHLORIDE 9 MG/ML
125 INJECTION, SOLUTION INTRAVENOUS CONTINUOUS
Status: DISCONTINUED | OUTPATIENT
Start: 2019-08-29 | End: 2019-08-30 | Stop reason: HOSPADM

## 2019-08-29 RX ORDER — OXYCODONE HYDROCHLORIDE 5 MG/1
5 TABLET ORAL EVERY 4 HOURS PRN
Qty: 20 TABLET | Refills: 0 | Status: SHIPPED | OUTPATIENT
Start: 2019-08-29 | End: 2019-09-08

## 2019-08-29 RX ORDER — HYDROMORPHONE HCL/PF 1 MG/ML
0.5 SYRINGE (ML) INJECTION EVERY 4 HOURS PRN
Status: DISCONTINUED | OUTPATIENT
Start: 2019-08-29 | End: 2019-08-30 | Stop reason: HOSPADM

## 2019-08-29 RX ORDER — OXYCODONE HYDROCHLORIDE 5 MG/1
5 TABLET ORAL EVERY 4 HOURS PRN
Status: DISCONTINUED | OUTPATIENT
Start: 2019-08-29 | End: 2019-08-30 | Stop reason: HOSPADM

## 2019-08-29 RX ADMIN — METFORMIN HYDROCHLORIDE 500 MG: 500 TABLET ORAL at 18:15

## 2019-08-29 RX ADMIN — OXYCODONE HYDROCHLORIDE 10 MG: 10 TABLET ORAL at 18:14

## 2019-08-29 RX ADMIN — BUPROPION HYDROCHLORIDE 150 MG: 150 TABLET, EXTENDED RELEASE ORAL at 18:17

## 2019-08-29 RX ADMIN — ACETAMINOPHEN 650 MG: 325 TABLET ORAL at 06:50

## 2019-08-29 RX ADMIN — SODIUM CHLORIDE 125 ML/HR: 0.9 INJECTION, SOLUTION INTRAVENOUS at 17:26

## 2019-08-29 RX ADMIN — DOCUSATE SODIUM 100 MG: 100 CAPSULE, LIQUID FILLED ORAL at 18:15

## 2019-08-29 RX ADMIN — SCOPALAMINE 1 PATCH: 1 PATCH, EXTENDED RELEASE TRANSDERMAL at 08:42

## 2019-08-29 RX ADMIN — DEXTROSE, SODIUM CHLORIDE, AND POTASSIUM CHLORIDE 125 ML/HR: 5; .45; .15 INJECTION INTRAVENOUS at 09:22

## 2019-08-29 RX ADMIN — ACETAMINOPHEN 650 MG: 325 TABLET ORAL at 18:15

## 2019-08-29 RX ADMIN — BUPROPION HYDROCHLORIDE 150 MG: 150 TABLET, EXTENDED RELEASE ORAL at 08:44

## 2019-08-29 RX ADMIN — ACETAMINOPHEN 650 MG: 325 TABLET ORAL at 13:04

## 2019-08-29 RX ADMIN — SENNOSIDES AND DOCUSATE SODIUM 1 TABLET: 8.6; 5 TABLET ORAL at 22:39

## 2019-08-29 RX ADMIN — DEXTROSE, SODIUM CHLORIDE, AND POTASSIUM CHLORIDE 125 ML/HR: 5; .45; .15 INJECTION INTRAVENOUS at 01:18

## 2019-08-29 RX ADMIN — DOCUSATE SODIUM 100 MG: 100 CAPSULE, LIQUID FILLED ORAL at 08:44

## 2019-08-29 NOTE — DISCHARGE SUMMARY
Discharge Summary   Hamzah Perry MRN: [id number]  Unit/Bed#: [facility] 928-01 Encounter: [id number]      Admission Date: 8/26/2019     Discharge Date: 08/30/19    Attending: Ma Collet, MD    Principal Diagnosis: Postmenopausal bleeding, ovarian neoplasm    Procedures: TWAN, BSO, radical omentectomy, pelvic lymph node dissection, staging biopsies, appendectomy, cystotomy repair, L uretero yared cystotomy    Hospital course: Patient presented for total abdominal hysterectomy and bilateral salpingoophorectomy, and all other indicated procedures in the setting of ovarian neoplasm and post menopausal bleeding  In the setting of bladder injury during surgery, she underwent cystotomy repair and left uretero yared cystotomy  She tolerated both procedures well  She underwent routine postoperative care    She was discharged on POD#4 with plan for outpatient follow-up in 2 weeks for voiding cystourethrogram and tate catheter removal, followed by 6 week follow-up with urology for stent removal      Lab Results:   Lab Results   Component Value Date    WBC 8 41 08/29/2019    HGB 10 2 (L) 08/29/2019    HCT 30 9 (L) 08/29/2019    MCV 88 08/29/2019     08/29/2019     Lab Results   Component Value Date    GLUCOSE 188 (H) 08/26/2019    CALCIUM 8 2 (L) 08/29/2019    K 3 9 08/29/2019    CO2 26 08/29/2019     (H) 08/29/2019    BUN 8 08/29/2019    CREATININE 0 56 (L) 08/29/2019     Lab Results   Component Value Date/Time    POCGLU 180 (H) 08/29/2019 04:36 PM    POCGLU 205 (H) 08/29/2019 11:40 AM    POCGLU 192 (H) 08/29/2019 07:07 AM    POCGLU 157 (H) 08/28/2019 08:57 PM    POCGLU 164 (H) 08/28/2019 04:52 PM     No results found for: PTT  Lab Results   Component Value Date    INR 1 04 08/16/2019    PROTIME 13 3 24/24/9658       Complications: none apparent    Condition at discharge: stable     Discharge instructions/Information to patient and family:   See After Visit Summary for information provided to patient and family  Provisions for Follow-Up Care:  See After Visit Summary for information related to follow-up care and any pertinent home health orders  Disposition: See After Visit Summary for discharge disposition information  Planned Readmission: No    Discharge Medications: For a complete list of the patient's medications, please refer to her med rec      Yvonne Miller MD  OB/GYN PGY-1  8/30/2019  10:35 AM

## 2019-08-29 NOTE — PROGRESS NOTES
GYN-ONC Progress Note  Sally Mei  [id number]  [facility] 928/[facility] 928-01 8/29/2019  6:45 AM    ASSESS: 62year old with left ovarian malignancy, POD #3 s/p TWAN, BSO, radical omentectomy, pelvic lymph node dissection, staging biopsies, appendectomy, cystotomy repair, L uretero yared cystotomy    PLAN:    1  Ovarian malignancy: follow-up final pathology    2  S/P cystotomy and left uretero yared cystotomy: 1 07 cc/kg/hr output overnight, 275 cc output in drain [id number], will cordinate cystourethrogram in 2 weeks prior to tate removal, urology follow-up outpatient for stent removal    3  ABLA: preoperative Hgb 13 5-->EBL 1200 ccs-->postop istat hgb 8 5--> 2 units PRBCs-->11 3-->11 6-->10 4-->10 2    4  HTN: Bp q 24 h 120-140s/60-70s, holding home meds    5  T2DM: home metformin     Dispo: inpatient    PPx: SCDs, initiate lovenox 4 hours following epidural removal  FEN: regular diet, replete lytes prn, D5 1/2NS + 20KCL  Pain mgmnt: plan for epidural removal today-->transition to toradol ATC and oral PRNs  Invasive lines: tate, drain  Code status: full  ____________________    SUBJECTIVE  History of Present Illness:  Overnight: no acute events  Pain: no  Tolerating Oral Intake: yes-able to tolerate intake for all 3 meals yesterday  Voiding: as mentioned above  Flatus: no  Bowel Movement: no  Ambulating: from bed to chair  Vaginal Bleeding: no  Pelvic Pain: no  Chest Pain: no  Shortness of Breath: no  Leg Pain/Discomfort: no    OBJECTIVE  Vitals  Temp:  [98 2 °F (36 8 °C)-100 1 °F (37 8 °C)] 98 2 °F (36 8 °C)  HR:  [73-88] 73  Resp:  [14-18] 14  BP: (121-141)/(62-73) 122/69       Intake/Output Summary (Last 24 hours) at 8/29/2019 0645  Last data filed at 8/29/2019 0324  Gross per 24 hour   Intake 5165 69 ml   Output 3610 ml   Net 1555 69 ml       Physical Exam:   Physical Exam   Constitutional: She is oriented to person, place, and time  She appears well-developed and well-nourished  No distress     Cardiovascular: Normal rate, regular rhythm, normal heart sounds and intact distal pulses  Pulmonary/Chest: Breath sounds normal  No stridor  No respiratory distress  Abdominal: Soft  Bowel sounds are normal  She exhibits no distension  There is no tenderness  Incision clean dry and intact    Drain site clean dry and intact    Neurological: She is alert and oriented to person, place, and time  Skin: Skin is warm and dry  She is not diaphoretic  Psychiatric: She has a normal mood and affect  Her behavior is normal          Labs:   Recent Results (from the past 72 hour(s))   Tissue Exam    Collection Time: 08/26/19  8:45 AM   Result Value Ref Range    Case Report       Surgical Pathology Report                         Case: [id number]                                   Authorizing Provider:  Codie Mckeon MD       Collected:           08/26/2019 0845              Ordering Location:     [address] [place] [address]      Received:            08/26/2019 [place] [address] Operating Room                                                      Pathologist:           Kamar Nicolas MD                                                                Specimen:    Ovary, Left, Left ovary and left fallopian tube                                            Final Diagnosis                 Additional Information       All controls performed with the immunohistochemical stains reported above reacted appropriately  These tests were developed and their performance characteristics determined by [facility] or [facility]  They may not be cleared or approved by the [place]  Food and Drug Administration  The FDA has determined that such clearance or approval is not necessary  These tests are used for clinical purposes  They should not be regarded as investigational or for research   This laboratory has been approved by CLIA 88, designated as a high-complexity laboratory and is qualified to perform these tests  Intraoperative Consultation       FSDxA (2 representative sections): Highly cellular neoplasm with basaloid features and mitotic figures, favor malignant      - Dr Chase Argue spoke with Dr Rollene Bloch at 9:25 am on 08/26/2019  Case review: LX       POCT Blood Gas (CG8+)    Collection Time: 08/26/19  9:47 AM   Result Value Ref Range    ph, Jorge ISTAT 7 343 7 300 - 7 400    pCO2, Jorge i-STAT 45 7 42 0 - 50 0 mm HG    pO2, Jorge i-STAT 50 0 (H) 35 0 - 45 0 mm HG    BE, i-STAT -1 -2 - 3 mmol/L    HCO3, Jorge i-STAT 24 8 24 0 - 30 0 mmol/L    CO2, i-STAT 26 21 - 32 mmol/L    O2 Sat, i-STAT 82 (L) 95 - 98 %    SODIUM, I-STAT 140 136 - 145 mmol/l    Potassium, i-STAT 2 9 (L) 3 5 - 5 3 mmol/L    Calcium, Ionized i-STAT 1 17 1 12 - 1 32 mmol/L    Hct, i-STAT 31 (L) 34 8 - 46 1 %    Hgb, i-STAT 10 5 (L) 11 5 - 15 4 g/dl    Glucose, i-STAT 170 (H) 65 - 140 mg/dl    Specimen Type VENOUS    Fingerstick Glucose (POCT)    Collection Time: 08/26/19  3:35 PM   Result Value Ref Range    POC Glucose 221 (H) 65 - 140 mg/dl   POCT Blood Gas (CG8+)    Collection Time: 08/26/19  5:02 PM   Result Value Ref Range    ph, Jorge ISTAT 7 228 (LL) 7 300 - 7 400    pCO2, Jorge i-STAT 54 2 (H) 42 0 - 50 0 mm HG    pO2, Jorge i-STAT 37 0 35 0 - 45 0 mm HG    BE, i-STAT -5 (L) -2 - 3 mmol/L    HCO3, Jorge i-STAT 22 6 (L) 24 0 - 30 0 mmol/L    CO2, i-STAT 24 21 - 32 mmol/L    O2 Sat, i-STAT 59 (L) 95 - 98 %    SODIUM, I-STAT 139 136 - 145 mmol/l    Potassium, i-STAT 3 5 3 5 - 5 3 mmol/L    Calcium, Ionized i-STAT 1 15 1 12 - 1 32 mmol/L    Hct, i-STAT 25 (L) 34 8 - 46 1 %    Hgb, i-STAT 8 5 (L) 11 5 - 15 4 g/dl    Glucose, i-STAT 188 (H) 65 - 140 mg/dl    Specimen Type VENOUS    Fingerstick Glucose (POCT)    Collection Time: 08/26/19 10:11 PM   Result Value Ref Range    POC Glucose 221 (H) 65 - 140 mg/dl   Type and screen    Collection Time: 08/27/19  2:24 AM   Result Value Ref Range    ABO Grouping A     Rh Factor Positive     Antibody Screen Negative Specimen Expiration Date [id number]    CBC and differential    Collection Time: 08/27/19  2:24 AM   Result Value Ref Range    WBC 14 23 (H) 4 31 - 10 16 Thousand/uL    RBC 3 84 3 81 - 5 12 Million/uL    Hemoglobin 11 3 (L) 11 5 - 15 4 g/dL    Hematocrit 32 5 (L) 34 8 - 46 1 %    MCV 85 82 - 98 fL    MCH 29 4 26 8 - 34 3 pg    MCHC 34 8 31 4 - 37 4 g/dL    RDW 13 1 11 6 - 15 1 %    MPV 9 4 8 9 - 12 7 fL    Platelets 908 380 - 370 Thousands/uL    nRBC 0 /100 WBCs    Neutrophils Relative 92 (H) 43 - 75 %    Immat GRANS % 0 0 - 2 %    Lymphocytes Relative 4 (L) 14 - 44 %    Monocytes Relative 4 4 - 12 %    Eosinophils Relative 0 0 - 6 %    Basophils Relative 0 0 - 1 %    Neutrophils Absolute 13 03 (H) 1 85 - 7 62 Thousands/µL    Immature Grans Absolute 0 03 0 00 - 0 20 Thousand/uL    Lymphocytes Absolute 0 61 0 60 - 4 47 Thousands/µL    Monocytes Absolute 0 55 0 17 - 1 22 Thousand/µL    Eosinophils Absolute 0 00 0 00 - 0 61 Thousand/µL    Basophils Absolute 0 01 0 00 - 0 10 Thousands/µL   Basic metabolic panel    Collection Time: 08/27/19  5:01 AM   Result Value Ref Range    Sodium 135 (L) 136 - 145 mmol/L    Potassium 4 1 3 5 - 5 3 mmol/L    Chloride 105 100 - 108 mmol/L    CO2 24 21 - 32 mmol/L    ANION GAP 6 4 - 13 mmol/L    BUN 11 5 - 25 mg/dL    Creatinine 0 96 0 60 - 1 30 mg/dL    Glucose 207 (H) 65 - 140 mg/dL    Calcium 7 6 (L) 8 3 - 10 1 mg/dL    eGFR 64 ml/min/1 73sq m   Prepare RBC:Has consent been obtained? Yes; Date of Surgery: 8/26/2019; Where is the Surgery Scheduled?  [facility], 2 Units    Collection Time: 08/27/19  5:55 AM   Result Value Ref Range    Unit Product Code K6153U92     Unit Number [id number]     Unit ABO A     Unit [facility] POS     Crossmatch Compatible     Unit Dispense Status Presumed Trans     Unit Product Code B7642X92     Unit Number [id number]     Unit ABO A     Unit RH POS     Crossmatch Compatible     Unit Dispense Status Presumed Trans    CBC and differential    Collection Time: 08/27/19  7:06 AM   Result Value Ref Range    WBC 15 67 (H) 4 31 - 10 16 Thousand/uL    RBC 4 05 3 81 - 5 12 Million/uL    Hemoglobin 11 6 11 5 - 15 4 g/dL    Hematocrit 34 2 (L) 34 8 - 46 1 %    MCV 84 82 - 98 fL    MCH 28 6 26 8 - 34 3 pg    MCHC 33 9 31 4 - 37 4 g/dL    RDW 13 1 11 6 - 15 1 %    MPV 9 9 8 9 - 12 7 fL    Platelets 361 730 - 403 Thousands/uL    nRBC 0 /100 WBCs    Neutrophils Relative 90 (H) 43 - 75 %    Immat GRANS % 1 0 - 2 %    Lymphocytes Relative 5 (L) 14 - 44 %    Monocytes Relative 4 4 - 12 %    Eosinophils Relative 0 0 - 6 %    Basophils Relative 0 0 - 1 %    Neutrophils Absolute 14 12 (H) 1 85 - 7 62 Thousands/µL    Immature Grans Absolute 0 08 0 00 - 0 20 Thousand/uL    Lymphocytes Absolute 0 76 0 60 - 4 47 Thousands/µL    Monocytes Absolute 0 69 0 17 - 1 22 Thousand/µL    Eosinophils Absolute 0 00 0 00 - 0 61 Thousand/µL    Basophils Absolute 0 02 0 00 - 0 10 Thousands/µL   Fingerstick Glucose (POCT)    Collection Time: 08/27/19  8:04 AM   Result Value Ref Range    POC Glucose 231 (H) 65 - 140 mg/dl   Fingerstick Glucose (POCT)    Collection Time: 08/27/19  1:05 PM   Result Value Ref Range    POC Glucose 150 (H) 65 - 140 mg/dl   Fingerstick Glucose (POCT)    Collection Time: 08/27/19  6:48 PM   Result Value Ref Range    POC Glucose 185 (H) 65 - 140 mg/dl   Fingerstick Glucose (POCT)    Collection Time: 08/27/19  8:59 PM   Result Value Ref Range    POC Glucose 169 (H) 65 - 140 mg/dl   CBC and differential    Collection Time: 08/28/19  5:15 AM   Result Value Ref Range    WBC 11 17 (H) 4 31 - 10 16 Thousand/uL    RBC 3 53 (L) 3 81 - 5 12 Million/uL    Hemoglobin 10 4 (L) 11 5 - 15 4 g/dL    Hematocrit 30 8 (L) 34 8 - 46 1 %    MCV 87 82 - 98 fL    MCH 29 5 26 8 - 34 3 pg    MCHC 33 8 31 4 - 37 4 g/dL    RDW 13 4 11 6 - 15 1 %    MPV 9 7 8 9 - 12 7 fL    Platelets 291 842 - 219 Thousands/uL    nRBC 0 /100 WBCs    Neutrophils Relative 69 43 - 75 %    Immat GRANS % 1 0 - 2 % Lymphocytes Relative 20 14 - 44 %    Monocytes Relative 8 4 - 12 %    Eosinophils Relative 1 0 - 6 %    Basophils Relative 1 0 - 1 %    Neutrophils Absolute 7 80 (H) 1 85 - 7 62 Thousands/µL    Immature Grans Absolute 0 08 0 00 - 0 20 Thousand/uL    Lymphocytes Absolute 2 22 0 60 - 4 47 Thousands/µL    Monocytes Absolute 0 84 0 17 - 1 22 Thousand/µL    Eosinophils Absolute 0 14 0 00 - 0 61 Thousand/µL    Basophils Absolute 0 09 0 00 - 0 10 Thousands/µL   Basic metabolic panel    Collection Time: 08/28/19  5:15 AM   Result Value Ref Range    Sodium 142 136 - 145 mmol/L    Potassium 3 4 (L) 3 5 - 5 3 mmol/L    Chloride 108 100 - 108 mmol/L    CO2 28 21 - 32 mmol/L    ANION GAP 6 4 - 13 mmol/L    BUN 7 5 - 25 mg/dL    Creatinine 0 55 (L) 0 60 - 1 30 mg/dL    Glucose 170 (H) 65 - 140 mg/dL    Calcium 7 7 (L) 8 3 - 10 1 mg/dL    eGFR 102 ml/min/1 73sq m   Fingerstick Glucose (POCT)    Collection Time: 08/28/19  6:32 AM   Result Value Ref Range    POC Glucose 176 (H) 65 - 140 mg/dl   Fingerstick Glucose (POCT)    Collection Time: 08/28/19 10:48 AM   Result Value Ref Range    POC Glucose 205 (H) 65 - 140 mg/dl   Fingerstick Glucose (POCT)    Collection Time: 08/28/19  4:52 PM   Result Value Ref Range    POC Glucose 164 (H) 65 - 140 mg/dl   Fingerstick Glucose (POCT)    Collection Time: 08/28/19  8:57 PM   Result Value Ref Range    POC Glucose 157 (H) 65 - 140 mg/dl   CBC and differential    Collection Time: 08/29/19  6:24 AM   Result Value Ref Range    WBC 8 41 4 31 - 10 16 Thousand/uL    RBC 3 50 (L) 3 81 - 5 12 Million/uL    Hemoglobin 10 2 (L) 11 5 - 15 4 g/dL    Hematocrit 30 9 (L) 34 8 - 46 1 %    MCV 88 82 - 98 fL    MCH 29 1 26 8 - 34 3 pg    MCHC 33 0 31 4 - 37 4 g/dL    RDW 13 5 11 6 - 15 1 %    MPV 9 5 8 9 - 12 7 fL    Platelets 539 856 - 044 Thousands/uL    nRBC 0 /100 WBCs    Neutrophils Relative 64 43 - 75 %    Immat GRANS % 1 0 - 2 %    Lymphocytes Relative 23 14 - 44 %    Monocytes Relative 7 4 - 12 % Eosinophils Relative 4 0 - 6 %    Basophils Relative 1 0 - 1 %    Neutrophils Absolute 5 51 1 85 - 7 62 Thousands/µL    Immature Grans Absolute 0 07 0 00 - 0 20 Thousand/uL    Lymphocytes Absolute 1 89 0 60 - 4 47 Thousands/µL    Monocytes Absolute 0 58 0 17 - 1 22 Thousand/µL    Eosinophils Absolute 0 31 0 00 - 0 61 Thousand/µL    Basophils Absolute 0 05 0 00 - 0 10 Thousands/µL       Meds:  Scheduled Meds:  Current Facility-Administered Medications:  acetaminophen 650 mg Oral Q6H Lester Tesoriero, DO    buPROPion 150 mg Oral BID [facility] [place] F Zabo, DO    dextrose 5 % and sodium chloride 0 45 % with KCl 20 mEq/L 125 mL/hr Intravenous Continuous [facility] [place] F Zabo, DO Last Rate: 125 mL/hr (08/29/19 0501)   docusate sodium 100 mg Oral BID [facility] [place] F Zabo, DO    heparin 5000 units in sodium chloride 0 9% 500 mL irrigation  Irrigation Once Rosalinda Boucher MD    HYDROmorphone 0 5 mg Intravenous Q2H PRN Ceil Luria, DO    lactated ringers 125 mL/hr Intravenous Continuous [facility] [place] F Zabo, DO Last Rate: Stopped (08/26/19 1734)   metFORMIN 500 mg Oral QPM [facility] F Zabo, DO    ondansetron 4 mg Intravenous Q6H PRN [facility] [place] F Zabo, DO    ropivacaine 0 1% and fentaNYL 2 mcg/mL  Epidural Continuous Ceil Luria, DO    scopolamine 1 patch Transdermal Q72H [place] F Zabo, DO    senna 1 tablet Oral HS [place] F Zabo, DO      Continuous Infusions:  dextrose 5 % and sodium chloride 0 45 % with KCl 20 mEq/L 125 mL/hr Last Rate: 125 mL/hr (08/29/19 0501)   lactated ringers 125 mL/hr Last Rate: Stopped (08/26/19 1734)   ropivacaine 0 1% and fentaNYL 2 mcg/mL       PRN Meds:  HYDROmorphone    ondansetron    Imaging Studies: I have personally reviewed pertinent reports  EKG, Pathology, Microbiology, and Other Studies: I have personally reviewed pertinent reports        __________________    Signature / Title: Rojelio Godinez DO, Ob/Gyn, PGY-3  Date: 8/29/2019  Time: 6:45 AM

## 2019-08-29 NOTE — PLAN OF CARE
Problem: INFECTION - ADULT  Goal: Absence or prevention of progression during hospitalization  Description  INTERVENTIONS:  - Assess and monitor for signs and symptoms of infection  - Monitor lab/diagnostic results  - Monitor all insertion sites, i e  indwelling lines, tubes, and drains  - Monitor endotracheal if appropriate and nasal secretions for changes in amount and color  - Cut Off appropriate cooling/warming therapies per order  - Administer medications as ordered  - Instruct and encourage patient and family to use good hand hygiene technique  - Identify and instruct in appropriate isolation precautions for identified infection/condition  Outcome: Progressing     Problem: SAFETY ADULT  Goal: Patient will remain free of falls  Description  INTERVENTIONS:  - Assess patient frequently for physical needs  -  Identify cognitive and physical deficits and behaviors that affect risk of falls    -  Cut Off fall precautions as indicated by assessment   - Educate patient/family on patient safety including physical limitations  - Instruct patient to call for assistance with activity based on assessment  - Modify environment to reduce risk of injury  - Consider OT/PT consult to assist with strengthening/mobility  Outcome: Progressing     Problem: DISCHARGE PLANNING  Goal: Discharge to home or other facility with appropriate resources  Description  INTERVENTIONS:  - Identify barriers to discharge w/patient and caregiver  - Arrange for needed discharge resources and transportation as appropriate  - Identify discharge learning needs (meds, wound care, etc )  - Arrange for interpretive services to assist at discharge as needed  - Refer to Case Management Department for coordinating discharge planning if the patient needs post-hospital services based on physician/advanced practitioner order or complex needs related to functional status, cognitive ability, or social support system  Outcome: Progressing     Problem: Knowledge Deficit  Goal: Patient/family/caregiver demonstrates understanding of disease process, treatment plan, medications, and discharge instructions  Description  Complete learning assessment and assess knowledge base  Interventions:  - Provide teaching at level of understanding  - Provide teaching via preferred learning methods  Outcome: Progressing     Problem: METABOLIC, FLUID AND ELECTROLYTES - ADULT  Goal: Fluid balance maintained  Description  INTERVENTIONS:  - Monitor labs   - Monitor I/O and WT  - Instruct patient on fluid and nutrition as appropriate  - Assess for signs & symptoms of volume excess or deficit  Outcome: Progressing     Problem: HEMATOLOGIC - ADULT  Goal: Maintains hematologic stability  Description  INTERVENTIONS  - Assess for signs and symptoms of bleeding or hemorrhage  - Monitor labs  - Administer supportive blood products/factors as ordered and appropriate  Outcome: Progressing     Problem: Prexisting or High Potential for Compromised Skin Integrity  Goal: Skin integrity is maintained or improved  Description  INTERVENTIONS:  - Identify patients at risk for skin breakdown  - Assess and monitor skin integrity  - Assess and monitor nutrition and hydration status  - Monitor labs   - Assess for incontinence   - Turn and reposition patient  - Assist with mobility/ambulation  - Relieve pressure over bony prominences  - Avoid friction and shearing  - Provide appropriate hygiene as needed including keeping skin clean and dry  - Evaluate need for skin moisturizer/barrier cream  - Collaborate with interdisciplinary team   - Patient/family teaching  - Consider wound care consult   Outcome: Progressing     Problem: Potential for Falls  Goal: Patient will remain free of falls  Description  INTERVENTIONS:  - Assess patient frequently for physical needs  -  Identify cognitive and physical deficits and behaviors that affect risk of falls  -  Hasty fall precautions as indicated by assessment    - Educate patient/family on patient safety including physical limitations  - Instruct patient to call for assistance with activity based on assessment  - Modify environment to reduce risk of injury  - Consider OT/PT consult to assist with strengthening/mobility  Outcome: Progressing     Problem: Nutrition/Hydration-ADULT  Goal: Nutrient/Hydration intake appropriate for improving, restoring or maintaining nutritional needs  Description  Monitor and assess patient's nutrition/hydration status for malnutrition  Collaborate with interdisciplinary team and initiate plan and interventions as ordered  Monitor patient's weight and dietary intake as ordered or per policy  Utilize nutrition screening tool and intervene as necessary  Determine patient's food preferences and provide high-protein, high-caloric foods as appropriate       INTERVENTIONS:  - Monitor oral intake, urinary output, labs, and treatment plans  - Assess nutrition and hydration status and recommend course of action  - Evaluate amount of meals eaten  - Assist patient with eating if necessary   - Allow adequate time for meals  - Recommend/ encourage appropriate diets, oral nutritional supplements, and vitamin/mineral supplements  - Order, calculate, and assess calorie counts as needed  - Recommend, monitor, and adjust tube feedings and TPN/PPN based on assessed needs  - Assess need for intravenous fluids  - Provide specific nutrition/hydration education as appropriate  - Include patient/family/caregiver in decisions related to nutrition  Outcome: Progressing

## 2019-08-29 NOTE — PLAN OF CARE
Problem: PAIN - ADULT  Goal: Verbalizes/displays adequate comfort level or baseline comfort level  Description  Interventions:  - Encourage patient to monitor pain and request assistance  - Assess pain using appropriate pain scale  - Administer analgesics based on type and severity of pain and evaluate response  - Implement non-pharmacological measures as appropriate and evaluate response  - Consider cultural and social influences on pain and pain management  - Notify physician/advanced practitioner if interventions unsuccessful or patient reports new pain  Outcome: Progressing     Problem: INFECTION - ADULT  Goal: Absence or prevention of progression during hospitalization  Description  INTERVENTIONS:  - Assess and monitor for signs and symptoms of infection  - Monitor lab/diagnostic results  - Monitor all insertion sites, i e  indwelling lines, tubes, and drains  - Monitor endotracheal if appropriate and nasal secretions for changes in amount and color  - [place] appropriate cooling/warming therapies per order  - Administer medications as ordered  - Instruct and encourage patient and family to use good hand hygiene technique  - Identify and instruct in appropriate isolation precautions for identified infection/condition  Outcome: Progressing     Problem: SAFETY ADULT  Goal: Patient will remain free of falls  Description  INTERVENTIONS:  - Assess patient frequently for physical needs  -  Identify cognitive and physical deficits and behaviors that affect risk of falls    -  [place] fall precautions as indicated by assessment   - Educate patient/family on patient safety including physical limitations  - Instruct patient to call for assistance with activity based on assessment  - Modify environment to reduce risk of injury  - Consider OT/PT consult to assist with strengthening/mobility  Outcome: Progressing     Problem: METABOLIC, FLUID AND ELECTROLYTES - ADULT  Goal: Fluid balance maintained  Description  INTERVENTIONS:  - Monitor labs   - Monitor I/O and WT  - Instruct patient on fluid and nutrition as appropriate  - Assess for signs & symptoms of volume excess or deficit  Outcome: Progressing     Problem: HEMATOLOGIC - ADULT  Goal: Maintains hematologic stability  Description  INTERVENTIONS  - Assess for signs and symptoms of bleeding or hemorrhage  - Monitor labs  - Administer supportive blood products/factors as ordered and appropriate  Outcome: Progressing     Problem: Prexisting or High Potential for Compromised Skin Integrity  Goal: Skin integrity is maintained or improved  Description  INTERVENTIONS:  - Identify patients at risk for skin breakdown  - Assess and monitor skin integrity  - Assess and monitor nutrition and hydration status  - Monitor labs   - Assess for incontinence   - Turn and reposition patient  - Assist with mobility/ambulation  - Relieve pressure over bony prominences  - Avoid friction and shearing  - Provide appropriate hygiene as needed including keeping skin clean and dry  - Evaluate need for skin moisturizer/barrier cream  - Collaborate with interdisciplinary team   - Patient/family teaching  - Consider wound care consult   Outcome: Progressing     Problem: Potential for Falls  Goal: Patient will remain free of falls  Description  INTERVENTIONS:  - Assess patient frequently for physical needs  -  Identify cognitive and physical deficits and behaviors that affect risk of falls    -  [place] fall precautions as indicated by assessment   - Educate patient/family on patient safety including physical limitations  - Instruct patient to call for assistance with activity based on assessment  - Modify environment to reduce risk of injury  - Consider OT/PT consult to assist with strengthening/mobility  Outcome: Progressing     Problem: Nutrition/Hydration-ADULT  Goal: Nutrient/Hydration intake appropriate for improving, restoring or maintaining nutritional needs  Description  Monitor and assess patient's nutrition/hydration status for malnutrition  Collaborate with interdisciplinary team and initiate plan and interventions as ordered  Monitor patient's weight and dietary intake as ordered or per policy  Utilize nutrition screening tool and intervene as necessary  Determine patient's food preferences and provide high-protein, high-caloric foods as appropriate       INTERVENTIONS:  - Monitor oral intake, urinary output, labs, and treatment plans  - Assess nutrition and hydration status and recommend course of action  - Evaluate amount of meals eaten  - Assist patient with eating if necessary   - Allow adequate time for meals  - Recommend/ encourage appropriate diets, oral nutritional supplements, and vitamin/mineral supplements  - Order, calculate, and assess calorie counts as needed  - Recommend, monitor, and adjust tube feedings and TPN/PPN based on assessed needs  - Assess need for intravenous fluids  - Provide specific nutrition/hydration education as appropriate  - Include patient/family/caregiver in decisions related to nutrition  Outcome: Progressing

## 2019-08-29 NOTE — RESTORATIVE TECHNICIAN NOTE
Restorative Specialist Mobility Note       Activity: Ambulate in will, Ambulate in room, Bathroom privileges, Chair, Dangle, Stand at bedside(Educated/encouraged pt to ambulate with assistance 3-4 x's/day   Pt callbell, phone/tray within reach )     Assistive Device: Front wheel walker       Ervin TABARES, Restorative Technician, [employer] [facility]

## 2019-08-29 NOTE — PROGRESS NOTES
Progress Note - Inpatient Pain Management    Stacey Barba 62 y o  female MRN: [id number]  Unit/Bed#: [facility] 928-01 Encounter: [id number]      Assessment:   Principal Problem:    S/P abdominal hysterectomy  Active Problems:    PMB (postmenopausal bleeding)    Bilateral tubo-ovarian mass    Ovarian neoplasm      Plan/Recommendations:   Discontinue epidural on POD #3 Procedure(s):  TOTAL ABDOMINAL HYSTERECTOMY, BILATERAL SALPINGO-OOPHORECTOMY, Radical omentectomy, pelvic lymph node sampling, staging biopsy, repair of cystotomy, appendectomy  REPAIR BLADDER; uretero yared cystotomy    Continue scheduled APAP, PO PRN Oxy 5-10  As needed IV hydromorphone  Start bowel regimen  APS to sign off  Epidural removed with tip intact at 1055 AM   May start Enoxaparin 4 hours after removal       Pain History  Current pain location(s): abdomen   Pain Scale:  2-4  Quality: achy  24 hour history: Tolerating diet  Satisfactory analgesia  No complaints  Meds/Allergies   all current active meds have been reviewed    Allergies   Allergen Reactions    Sulfites Other (See Comments)     Causes flushing       Objective     Temp:  [98 2 °F (36 8 °C)-100 1 °F (37 8 °C)] 98 3 °F (36 8 °C)  HR:  [72-88] 72  Resp:  [14-18] 16  BP: (121-141)/(68-71) 122/70      Intake/Output Summary (Last 24 hours) at 8/29/2019 1109  Last data filed at 8/29/2019 8067  Gross per 24 hour   Intake 3510 63 ml   Output 3275 ml   Net 235 63 ml       Physical Exam: /70   Pulse 72   Temp 98 3 °F (36 8 °C)   Resp 16   Ht 5' 3" (1 6 m)   Wt 107 kg (235 lb 10 8 oz) Comment: per 8/28 flowsheet   SpO2 96%   BMI 41 75 kg/m²     General Appearance:    Alert, cooperative, no distress, appears stated age   Neurological:   Oriented to person, place, and time, normal affect    Head:    Normocephalic, without obvious abnormality, atraumatic   Eyes:    EOM's intact   Back:     Symmetric, no curvature, ROM normal  Epidural dressing C/D/I   Nontender and no erythema  Lungs:     Clear to auscultation bilaterally, respirations unlabored   Chest Wall:    No tenderness or deformity   Abdomen:        Soft, no distention or tenderness, bowel sounds present    Heart:    Regular rate and rhythm, S1 and S2 normal   Extremities:   Extremities normal, atraumatic, no cyanosis or edema     Normal strength, intact sensation to light touch   Pulses:   2+ and symmetric all extremities   Skin:   Skin color and texture normal, no rashes or lesions     Lab Results:   Results from last 7 days   Lab Units 08/29/19  0624   WBC Thousand/uL 8 41   HEMOGLOBIN g/dL 10 2*   HEMATOCRIT % 30 9*   PLATELETS Thousands/uL 249      Results from last 7 days   Lab Units 08/29/19  0624  08/26/19  1702   POTASSIUM mmol/L 3 9   < >  --    CHLORIDE mmol/L 110*   < >  --    CO2 mmol/L 26   < >  --    CO2, I-STAT mmol/L  --   --  24   BUN mg/dL 8   < >  --    CREATININE mg/dL 0 56*   < >  --    CALCIUM mg/dL 8 2*   < >  --    GLUCOSE, ISTAT mg/dl  --   --  188*    < > = values in this interval not displayed  Counseling / Coordination of Care  Total floor / unit time spent today 15 minutes  Greater than 50% of total time was spent with the patient and / or family counseling and / or coordination of care   A description of the counseling / coordination of care: Epidural analgesia

## 2019-08-30 VITALS
SYSTOLIC BLOOD PRESSURE: 116 MMHG | BODY MASS INDEX: 41.76 KG/M2 | HEART RATE: 71 BPM | HEIGHT: 63 IN | DIASTOLIC BLOOD PRESSURE: 67 MMHG | WEIGHT: 235.67 LBS | TEMPERATURE: 98.1 F | OXYGEN SATURATION: 96 % | RESPIRATION RATE: 18 BRPM

## 2019-08-30 LAB
GLUCOSE SERPL-MCNC: 147 MG/DL (ref 65–140)
GLUCOSE SERPL-MCNC: 177 MG/DL (ref 65–140)

## 2019-08-30 PROCEDURE — 99024 POSTOP FOLLOW-UP VISIT: CPT | Performed by: OBSTETRICS & GYNECOLOGY

## 2019-08-30 PROCEDURE — 82948 REAGENT STRIP/BLOOD GLUCOSE: CPT

## 2019-08-30 RX ORDER — SCOLOPAMINE TRANSDERMAL SYSTEM 1 MG/1
1 PATCH, EXTENDED RELEASE TRANSDERMAL
Status: DISCONTINUED | OUTPATIENT
Start: 2019-08-30 | End: 2019-08-30 | Stop reason: HOSPADM

## 2019-08-30 RX ORDER — SCOLOPAMINE TRANSDERMAL SYSTEM 1 MG/1
1 PATCH, EXTENDED RELEASE TRANSDERMAL
Refills: 0 | Status: SHIPPED | OUTPATIENT
Start: 2019-08-30 | End: 2019-09-04 | Stop reason: SDUPTHER

## 2019-08-30 RX ORDER — FUROSEMIDE 20 MG/1
20 TABLET ORAL ONCE
Status: COMPLETED | OUTPATIENT
Start: 2019-08-30 | End: 2019-08-30

## 2019-08-30 RX ADMIN — SCOPALAMINE 1 PATCH: 1 PATCH, EXTENDED RELEASE TRANSDERMAL at 12:22

## 2019-08-30 RX ADMIN — SODIUM CHLORIDE 125 ML/HR: 0.9 INJECTION, SOLUTION INTRAVENOUS at 01:05

## 2019-08-30 RX ADMIN — BUPROPION HYDROCHLORIDE 150 MG: 150 TABLET, EXTENDED RELEASE ORAL at 08:04

## 2019-08-30 RX ADMIN — FUROSEMIDE 20 MG: 20 TABLET ORAL at 10:19

## 2019-08-30 RX ADMIN — ACETAMINOPHEN 650 MG: 325 TABLET ORAL at 06:31

## 2019-08-30 RX ADMIN — ENOXAPARIN SODIUM 40 MG: 40 INJECTION SUBCUTANEOUS at 08:04

## 2019-08-30 RX ADMIN — ACETAMINOPHEN 650 MG: 325 TABLET ORAL at 12:29

## 2019-08-30 RX ADMIN — ACETAMINOPHEN 650 MG: 325 TABLET ORAL at 00:43

## 2019-08-30 RX ADMIN — DOCUSATE SODIUM 100 MG: 100 CAPSULE, LIQUID FILLED ORAL at 08:04

## 2019-08-30 NOTE — QUICK NOTE
ROBE drain removed at 0845  100 cc of serosanguinous fluid in drain at time of removal  Steri strips placed at drain site  Counseled patient on wound care for drain site upon discharge       Cayetano Shultz MD    PGY-1 OB-GYN

## 2019-08-30 NOTE — PROGRESS NOTES
Tate teaching, including how to cap the tate for showers, as well as switching between large drainage tate bag and small leg drainage bag were completed with the patient and her 2 sisters who were present at bedside  A few extra supplies were provided for home, as well as alcohol wipes to sterilize the line as needed, 2 small leg bags, and 1 additional 2500mL drainage bag  All questions by patient and family answered, discharge instructions also reviewed

## 2019-08-30 NOTE — PROGRESS NOTES
GYN-ONC Progress Note  Janessa Tinoco  [id number]  [facility] 928/[facility] 928-01 8/30/2019  6:29 AM    ASSESS: 62year old with left ovarian malignancy, POD #4 s/p TWAN, BSO, radical omentectomy, pelvic lymph node dissection, staging biopsies, appendectomy, cystotomy repair, L uretero yared cystotomy    PLAN:    1  Ovarian malignancy: follow-up final pathology    2  Bladder repair, ureteral reimplantation:  Urine output adequate, Cr from drain same as serum, consider drain removal today, continue tate x 2 weeks, coordinate voiding cystourethrogram     3  ABLA: preoperative Hgb 13 5-->EBL 1200 ccs-->postop istat hgb 8 5--> 2 units PRBCs-->11 3-->11 6-->10 4-->10 2    4  HTN: BP q24h 130s/70s, home meds held    5  T2DM: home metformin    Dispo: possible discharge later today    PPx: SCDs, lovenox  FEN: regular diet  Pain mgmnt: motrin, oxycodone, dilaudid  Invasive lines: tate, LLQ drain  Code status: full  ____________________    SUBJECTIVE  History of Present Illness:  Overnight: no acute events, reports some left arm swelling at site of IV  Pain: mild incisional discomfort-taking tylenol and oxycodone  Tolerating Oral Intake: yes   Voiding: adequate output in tate  Flatus: yes  Bowel Movement: no  Ambulating: yes  Vaginal Bleeding: no  Pelvic Pain: no  Chest Pain: no  Shortness of Breath: no  Leg Pain/Discomfort: no    OBJECTIVE  Vitals  Temp:  [98 °F (36 7 °C)-99 5 °F (37 5 °C)] 98 °F (36 7 °C)  HR:  [72-82] 76  Resp:  [16-20] 19  BP: (122-135)/(70-78) 131/78       Intake/Output Summary (Last 24 hours) at 8/30/2019 0629  Last data filed at 8/30/2019 3093  Gross per 24 hour   Intake 4666 25 ml   Output 3720 ml   Net 946 25 ml       Physical Exam:   Physical Exam   Constitutional: She is oriented to person, place, and time  She appears well-developed and well-nourished  No distress  Cardiovascular: Normal rate, regular rhythm, normal heart sounds and intact distal pulses     Pulmonary/Chest: Effort normal and breath sounds normal  No stridor  No respiratory distress  Abdominal: Soft  Bowel sounds are normal  She exhibits no distension  There is no tenderness  Drain site CDI  Incision site CDI   Neurological: She is alert and oriented to person, place, and time  Skin: Skin is warm and dry  She is not diaphoretic  Psychiatric: She has a normal mood and affect   Her behavior is normal          Labs:   Recent Results (from the past 72 hour(s))   CBC and differential    Collection Time: 08/27/19  7:06 AM   Result Value Ref Range    WBC 15 67 (H) 4 31 - 10 16 Thousand/uL    RBC 4 05 3 81 - 5 12 Million/uL    Hemoglobin 11 6 11 5 - 15 4 g/dL    Hematocrit 34 2 (L) 34 8 - 46 1 %    MCV 84 82 - 98 fL    MCH 28 6 26 8 - 34 3 pg    MCHC 33 9 31 4 - 37 4 g/dL    RDW 13 1 11 6 - 15 1 %    MPV 9 9 8 9 - 12 7 fL    Platelets 540 192 - 034 Thousands/uL    nRBC 0 /100 WBCs    Neutrophils Relative 90 (H) 43 - 75 %    Immat GRANS % 1 0 - 2 %    Lymphocytes Relative 5 (L) 14 - 44 %    Monocytes Relative 4 4 - 12 %    Eosinophils Relative 0 0 - 6 %    Basophils Relative 0 0 - 1 %    Neutrophils Absolute 14 12 (H) 1 85 - 7 62 Thousands/µL    Immature Grans Absolute 0 08 0 00 - 0 20 Thousand/uL    Lymphocytes Absolute 0 76 0 60 - 4 47 Thousands/µL    Monocytes Absolute 0 69 0 17 - 1 22 Thousand/µL    Eosinophils Absolute 0 00 0 00 - 0 61 Thousand/µL    Basophils Absolute 0 02 0 00 - 0 10 Thousands/µL   Fingerstick Glucose (POCT)    Collection Time: 08/27/19  8:04 AM   Result Value Ref Range    POC Glucose 231 (H) 65 - 140 mg/dl   Fingerstick Glucose (POCT)    Collection Time: 08/27/19  1:05 PM   Result Value Ref Range    POC Glucose 150 (H) 65 - 140 mg/dl   Fingerstick Glucose (POCT)    Collection Time: 08/27/19  6:48 PM   Result Value Ref Range    POC Glucose 185 (H) 65 - 140 mg/dl   Fingerstick Glucose (POCT)    Collection Time: 08/27/19  8:59 PM   Result Value Ref Range    POC Glucose 169 (H) 65 - 140 mg/dl   CBC and differential Collection Time: 08/28/19  5:15 AM   Result Value Ref Range    WBC 11 17 (H) 4 31 - 10 16 Thousand/uL    RBC 3 53 (L) 3 81 - 5 12 Million/uL    Hemoglobin 10 4 (L) 11 5 - 15 4 g/dL    Hematocrit 30 8 (L) 34 8 - 46 1 %    MCV 87 82 - 98 fL    MCH 29 5 26 8 - 34 3 pg    MCHC 33 8 31 4 - 37 4 g/dL    RDW 13 4 11 6 - 15 1 %    MPV 9 7 8 9 - 12 7 fL    Platelets 897 029 - 966 Thousands/uL    nRBC 0 /100 WBCs    Neutrophils Relative 69 43 - 75 %    Immat GRANS % 1 0 - 2 %    Lymphocytes Relative 20 14 - 44 %    Monocytes Relative 8 4 - 12 %    Eosinophils Relative 1 0 - 6 %    Basophils Relative 1 0 - 1 %    Neutrophils Absolute 7 80 (H) 1 85 - 7 62 Thousands/µL    Immature Grans Absolute 0 08 0 00 - 0 20 Thousand/uL    Lymphocytes Absolute 2 22 0 60 - 4 47 Thousands/µL    Monocytes Absolute 0 84 0 17 - 1 22 Thousand/µL    Eosinophils Absolute 0 14 0 00 - 0 61 Thousand/µL    Basophils Absolute 0 09 0 00 - 0 10 Thousands/µL   Basic metabolic panel    Collection Time: 08/28/19  5:15 AM   Result Value Ref Range    Sodium 142 136 - 145 mmol/L    Potassium 3 4 (L) 3 5 - 5 3 mmol/L    Chloride 108 100 - 108 mmol/L    CO2 28 21 - 32 mmol/L    ANION GAP 6 4 - 13 mmol/L    BUN 7 5 - 25 mg/dL    Creatinine 0 55 (L) 0 60 - 1 30 mg/dL    Glucose 170 (H) 65 - 140 mg/dL    Calcium 7 7 (L) 8 3 - 10 1 mg/dL    eGFR 102 ml/min/1 73sq m   Fingerstick Glucose (POCT)    Collection Time: 08/28/19  6:32 AM   Result Value Ref Range    POC Glucose 176 (H) 65 - 140 mg/dl   Fingerstick Glucose (POCT)    Collection Time: 08/28/19 10:48 AM   Result Value Ref Range    POC Glucose 205 (H) 65 - 140 mg/dl   Fingerstick Glucose (POCT)    Collection Time: 08/28/19  4:52 PM   Result Value Ref Range    POC Glucose 164 (H) 65 - 140 mg/dl   Fingerstick Glucose (POCT)    Collection Time: 08/28/19  8:57 PM   Result Value Ref Range    POC Glucose 157 (H) 65 - 140 mg/dl   CBC and differential    Collection Time: 08/29/19  6:24 AM   Result Value Ref Range    WBC 8  41 4 31 - 10 16 Thousand/uL    RBC 3 50 (L) 3 81 - 5 12 Million/uL    Hemoglobin 10 2 (L) 11 5 - 15 4 g/dL    Hematocrit 30 9 (L) 34 8 - 46 1 %    MCV 88 82 - 98 fL    MCH 29 1 26 8 - 34 3 pg    MCHC 33 0 31 4 - 37 4 g/dL    RDW 13 5 11 6 - 15 1 %    MPV 9 5 8 9 - 12 7 fL    Platelets 554 106 - 691 Thousands/uL    nRBC 0 /100 WBCs    Neutrophils Relative 64 43 - 75 %    Immat GRANS % 1 0 - 2 %    Lymphocytes Relative 23 14 - 44 %    Monocytes Relative 7 4 - 12 %    Eosinophils Relative 4 0 - 6 %    Basophils Relative 1 0 - 1 %    Neutrophils Absolute 5 51 1 85 - 7 62 Thousands/µL    Immature Grans Absolute 0 07 0 00 - 0 20 Thousand/uL    Lymphocytes Absolute 1 89 0 60 - 4 47 Thousands/µL    Monocytes Absolute 0 58 0 17 - 1 22 Thousand/µL    Eosinophils Absolute 0 31 0 00 - 0 61 Thousand/µL    Basophils Absolute 0 05 0 00 - 0 10 Thousands/µL   Basic metabolic panel    Collection Time: 08/29/19  6:24 AM   Result Value Ref Range    Sodium 142 136 - 145 mmol/L    Potassium 3 9 3 5 - 5 3 mmol/L    Chloride 110 (H) 100 - 108 mmol/L    CO2 26 21 - 32 mmol/L    ANION GAP 6 4 - 13 mmol/L    BUN 8 5 - 25 mg/dL    Creatinine 0 56 (L) 0 60 - 1 30 mg/dL    Glucose 166 (H) 65 - 140 mg/dL    Calcium 8 2 (L) 8 3 - 10 1 mg/dL    eGFR 101 ml/min/1 73sq m   Fingerstick Glucose (POCT)    Collection Time: 08/29/19  7:07 AM   Result Value Ref Range    POC Glucose 192 (H) 65 - 140 mg/dl   Fingerstick Glucose (POCT)    Collection Time: 08/29/19 11:40 AM   Result Value Ref Range    POC Glucose 205 (H) 65 - 140 mg/dl   Creatinine, body fluid    Collection Time: 08/29/19 11:48 AM   Result Value Ref Range    Creat, Fluid 0 57 mg/dL   Fingerstick Glucose (POCT)    Collection Time: 08/29/19  4:36 PM   Result Value Ref Range    POC Glucose 180 (H) 65 - 140 mg/dl   Fingerstick Glucose (POCT)    Collection Time: 08/29/19  8:43 PM   Result Value Ref Range    POC Glucose 146 (H) 65 - 140 mg/dl       Meds:  Scheduled Meds:  Current Facility-Administered Medications:  acetaminophen 650 mg Oral Q6H Lester Tesoriero, DO    buPROPion 150 mg Oral BID [place] F Zabo, DO    docusate sodium 100 [facility]    enoxaparin 40 mg Subcutaneous Q24H [address] [place] F Zabo, DO    heparin 5000 units in sodium chloride 0 9% 500 mL irrigation  Irrigation Once Stanley Mccain MD    HYDROmorphone 0 5 mg Intravenous Q4H PRN Ирина Desanctis, DO    metFORMIN 500 mg Oral QPM [facility], [facility]    ondansetron 4 mg Intravenous Q6H PRN [facility], [facility]    oxyCODONE 5 mg Oral Q4H PRN Ирина Cervantestis, DO    Or        oxyCODONE 10 mg Oral Q4H PRN [place] Desanctis, DO    senna-docusate sodium 1 tablet Oral HS [place] F Zabo, DO    sodium chloride 125 mL/hr Intravenous Continuous [facility], [facility] Last Rate: 125 mL/hr (08/30/19 0105)     Continuous Infusions:  sodium chloride 125 mL/hr Last Rate: 125 mL/hr (08/30/19 0105)     PRN Meds:  HYDROmorphone    ondansetron    oxyCODONE **OR** oxyCODONE    Imaging Studies: I have personally reviewed pertinent reports  EKG, Pathology, Microbiology, and Other Studies: I have personally reviewed pertinent reports        __________________    Signature / Title: Pam Deng DO, Ob/Gyn, PGY-3  Date: 8/30/2019  Time: 6:29 AM

## 2019-08-30 NOTE — RESTORATIVE TECHNICIAN NOTE
Restorative Specialist Mobility Note       Activity: Other (Comment)(Educated/encouraged pt to ambulate with assistance 3-4 x's/day, pt refused 2* currently being discharged   Pt callbell, phone/tray within reach )     Assistive Device: Front wheel walker       [employer] BS, Restorative Technician, [employer]

## 2019-08-30 NOTE — SOCIAL WORK
CM informed pt will need Lovenox upon d/c  Pt agreeable with script sent to  [facility] for cost-assessment  Script sent to same  CM offered VNA services for tate care for pt  Pt declines list of [facility] agencies and is requesting referral to [facility]  Referral made via [facility] [place] 
Initial intake attempt:     CM attempted dc needs assessment at 1025 hrs patient requested to defer interview for one hour, 1130 hrs patient was sleeping and 1400 hrs patient was sound asleep  Per RN Donovan Pfeiffer, pt is still on epidural for pain management  MSW CHARISSE Diggs advised of need to open when more awake 
Lovenox will be no charge for pt 
Met with pt and discussed role of CM  Pt lives alone in a 2-story home with 2 steps at entrance  Pt is independent in ADLs  No DME or prior HHC  Preference for pharmacy is [facility] in [place], [place]  Pt has hx depression--no inpt psych admissions  No D&A tx hx  No POA  Main contact: Marylou Avendano ([phone])  CM reviewed d/c planning process including the following: identifying help at home, patient preference for d/c planning needs, Discharge Lounge, [facility] Meds to Bed program, availability of treatment team to discuss questions or concerns patient and/or family may have regarding understanding medications and recognizing signs and symptoms once discharged  CM also encouraged patient to follow up with all recommended appointments after discharge  Patient advised of importance for patient and family to participate in managing patients medical well being  Patient/caregiver received discharge checklist  Content reviewed  Patient/caregiver encouraged to participate in discharge plan of care prior to discharge home 
[facility] does not service pt's area  Pt agreeable with referrals to [facility] via [facility] [address] 
Pt to be d/c to home today with [facility]  Lovenox to be delivered to pt's room prior to d/c 
no

## 2019-08-30 NOTE — NURSING NOTE
Patients left hand swollen near IV site  Fluids were discontinued in that site on the previous shift  Fluids now running in patients right IV site  Patient states she is having some discomfort when she closes her hand, no pitting noted  Patient also has tate with bloody output  Treatment team notified regarding both problems  Team stated the tate will remain in place for 4 weeks and the morning team will assess the patient in the morning for third spacing  Treatment plan will follow accordingly to findings

## 2019-09-01 NOTE — UTILIZATION REVIEW
Notification of Discharge  This is a Notification of Discharge from our facility [address]  Please be advised that this patient has been discharge from our facility  Below you will find the admission and discharge date and time including the patients disposition  PRESENTATION DATE: 8/26/2019  5:19 AM  OBS ADMISSION DATE:   IP ADMISSION DATE: 8/26/19 1442   DISCHARGE DATE: 8/30/2019  3:15 PM  DISPOSITION: Home with [facility] with [facility]   Admission Orders listed below:  Admission Orders (From admission, onward)     Ordered        08/26/19 1442  Inpatient Admission  Once                   Please contact the UR Department if additional information is required to close this patient's authorization/case  [address]  Utilization Review Department  Phone: [phone]; Fax [phone]  [email or web address]  ATTENTION: Please call with any questions or concerns to [phone]  and carefully listen to the prompts so that you are directed to the right person  Send all requests for admission clinical reviews, approved or denied determinations and any other requests to fax [phone]   All voicemails are confidential

## 2019-09-03 ENCOUNTER — TELEPHONE (OUTPATIENT)
Dept: GYNECOLOGIC ONCOLOGY | Facility: CLINIC | Age: 59
End: 2019-09-03

## 2019-09-03 NOTE — TELEPHONE ENCOUNTER
Per Dr Inga Haji, he can see patient in [facility]    We need to find a procedure slot

## 2019-09-03 NOTE — TELEPHONE ENCOUNTER
I called patient to inform her of ov and she stated that she get motion sickness and lives closer to [facility]    I informed her that I would discuss this with Dr Anel Roberts

## 2019-09-04 DIAGNOSIS — Z98.890 S/P BLADDER REPAIR: ICD-10-CM

## 2019-09-04 RX ORDER — SCOLOPAMINE TRANSDERMAL SYSTEM 1 MG/1
1 PATCH, EXTENDED RELEASE TRANSDERMAL
Qty: 2 PATCH | Refills: 1 | Status: SHIPPED | OUTPATIENT
Start: 2019-09-04 | End: 2019-09-11 | Stop reason: SDUPTHER

## 2019-09-09 ENCOUNTER — HOSPITAL ENCOUNTER (OUTPATIENT)
Dept: RADIOLOGY | Facility: HOSPITAL | Age: 59
Discharge: HOME | End: 2019-09-09
Payer: COMMERCIAL

## 2019-09-09 DIAGNOSIS — Z98.890 S/P BLADDER REPAIR: ICD-10-CM

## 2019-09-09 PROCEDURE — 74450 X-RAY URETHRA/BLADDER: CPT

## 2019-09-09 RX ADMIN — IOTHALAMATE MEGLUMINE 200 ML: 172 INJECTION URETERAL at 11:25

## 2019-09-11 ENCOUNTER — OFFICE VISIT (OUTPATIENT)
Dept: GYNECOLOGIC ONCOLOGY | Facility: HOSPITAL | Age: 59
End: 2019-09-11
Payer: COMMERCIAL

## 2019-09-11 VITALS
HEIGHT: 63 IN | WEIGHT: 225 LBS | TEMPERATURE: 97.5 F | BODY MASS INDEX: 39.87 KG/M2 | DIASTOLIC BLOOD PRESSURE: 58 MMHG | SYSTOLIC BLOOD PRESSURE: 114 MMHG | HEART RATE: 84 BPM

## 2019-09-11 DIAGNOSIS — T75.3XXS MOTION SICKNESS, SEQUELA: ICD-10-CM

## 2019-09-11 DIAGNOSIS — E27.8 ADRENAL MASS, LEFT (HCC): ICD-10-CM

## 2019-09-11 DIAGNOSIS — C56.2 MALIGNANT NEOPLASM OF LEFT OVARY (HCC): Primary | ICD-10-CM

## 2019-09-11 DIAGNOSIS — Z98.890 S/P BLADDER REPAIR: ICD-10-CM

## 2019-09-11 PROBLEM — D49.59 OVARIAN NEOPLASM: Status: RESOLVED | Noted: 2019-08-26 | Resolved: 2019-09-11

## 2019-09-11 PROBLEM — T75.3XXA MOTION SICKNESS: Status: ACTIVE | Noted: 2019-09-11

## 2019-09-11 PROBLEM — N83.8 BILATERAL TUBO-OVARIAN MASS: Status: RESOLVED | Noted: 2019-08-14 | Resolved: 2019-09-11

## 2019-09-11 PROCEDURE — 99215 OFFICE O/P EST HI 40 MIN: CPT | Performed by: OBSTETRICS & GYNECOLOGY

## 2019-09-11 RX ORDER — SCOLOPAMINE TRANSDERMAL SYSTEM 1 MG/1
1 PATCH, EXTENDED RELEASE TRANSDERMAL
Qty: 2 PATCH | Refills: 1 | Status: SHIPPED | OUTPATIENT
Start: 2019-09-11

## 2019-09-11 NOTE — LETTER
September 11, 2019     Tamela Pedroza, [address] [place] [address] Drive  [address] [facility] [address]    Patient: Rakesh Mike   YOB: 1960   Date of Visit: 9/11/2019       Dear Dr Giordano Se: Thank you for referring Rakesh Mike to me for evaluation  Below are my notes for this consultation  If you have questions, please do not hesitate to call me  I look forward to following your patient along with you  Sincerely,        Elicia Ng MD        CC: MD Elicia Jane MD  9/11/2019 12:52 PM  Sign at close encounter  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  She is recovering well from total abdominal hysterectomy, bilateral salpingo-oophorectomy, pelvic lymph node dissection, peritoneal and omental biopsies, left ureteroneocystostomy  Performance status is 0   1  Hitchcock catheter was removed today as cystogram was normal   2  I discussed the risks and benefits of chemotherapy with carboplatin at AUC 6 and Taxol 175 milligrams/meter squared to be given every 21 days for at least 4 cycles with a goal of achieving 6 cycles of treatment  I discussed the alternative which is treatment with bleomycin, etoposide, and cisplatin  I discussed the difference between the 2 regimens  I discussed that there both category 2 B as per NCCN guidelines  She understands the risks and benefits of both chemotherapy regimens and will proceed with carboplatin and Taxol  She was given written information regarding side effects  Consent was obtained by me in the office  3  CT of chest  4  Inhibin B   I spent 40 minutes with the patient and her friends    More than half the time was spent in counseling and coordination of care regarding treatment of her ovarian cancer  Only a brief time was spent on the postoperative history and physical examination  Relevant Orders    Inhibin B    CT chest wo contrast    Infusion Calculated Appointment Request    Infusion Calculated Appointment Request    Infusion Calculated Appointment Request    Infusion Calculated Appointment Request    Infusion Calculated Appointment Request    Infusion Calculated Appointment Request    IR port Placement       Other    Motion sickness     Scopolamine patches reordered         Adrenal mass, left (HCC)     Left adrenal mass identified on preoperative CT imaging  Plan for adrenal protocol CT scan of the abdomen         Relevant Orders    CT abdomen w wo contrast      Other Visit Diagnoses     S/P bladder repair        Relevant Medications    scopolamine (TRANSDERM-SCOP) 1 5 mg/3 days TD 72 hr patch            CHIEF COMPLAINT:  Treatment discussion, postoperative evaluation       Problem:  Cancer Staging  Malignant neoplasm of left ovary (HCC)  Staging form: Ovary, Fallopian Tube, Primary Peritoneal, AJCC 8th Edition  - Clinical stage from 8/26/2019: FIGO Stage IC1, calculated as Stage IA (cT1a, cN0, cM0) - Signed by Guilherme Howard MD on 9/11/2019        Previous therapy:     Malignant neoplasm of left ovary ([facility] [address] )    8/26/2019 Initial Diagnosis     Malignant neoplasm of left ovary ([facility] [address] )      8/26/2019 -  Cancer Staged     Staging form: Ovary, Fallopian Tube, Primary Peritoneal, AJCC 8th Edition  - Clinical stage from 8/26/2019: FIGO Stage IC1, calculated as Stage IA (cT1a, cN0, cM0) - Signed by Guilherme Howard MD on 9/11/2019 9/17/2019 -  Chemotherapy     CARBOplatin (PARAPLATIN) in sodium chloride 0 9 % 250 mL IVPB, , Intravenous, Once, 0 of 6 cycles  PACLitaxel (TAXOL) 355 2 mg in sodium chloride 0 9 % 500 mL chemo IVPB, 175 mg/m2, Intravenous, Once, 0 of 6 cycles           Patient ID: Antionette Ford is a 62 y o  female  24-year-old returns for treatment discussion    She is recovering well from her staging surgery  She underwent a total abdominal hysterectomy, bilateral salpingo-oophorectomy, left ureteroneocystostomy, omentectomy, peritoneal biopsies, appendectomy  Final pathology was consistent with a mixed stromal some tumor of the ovary with poorly differentiated sertoli-lydig cell components  The remainder of the pathology was benign  She has been recovering well from surgery  She recently underwent a cystogram on 9/9/2019 which did not reveal any evidence of bladder leak  Her bowels are moving  Her pain is controlled without narcotic therapy  She has been afebrile  No other interval change in her medications or medical history since her last visit        The following portions of the patient's history were reviewed and updated as appropriate: allergies, current medications, past family history, past medical history, past social history, past surgical history and problem list     Review of Systems      Current Outpatient Medications:     amLODIPine (NORVASC) 5 mg tablet, Take 5 mg by mouth daily , Disp: , Rfl: 4    buPROPion (WELLBUTRIN SR) 150 mg 12 hr tablet, every evening , Disp: , Rfl: 0    Cholecalciferol (VITAMIN D-3) 1000 units CAPS, Take by mouth daily , Disp: , Rfl:     enoxaparin (LOVENOX) 40 mg/0 4 mL, Inject 0 4 mL (40 mg total) under the skin daily for 42 days, Disp: 16 8 mL, Rfl: 0    escitalopram (LEXAPRO) 20 mg tablet, TK 1 T PO QD, Disp: , Rfl: 0    metFORMIN (GLUCOPHAGE) 500 mg tablet, every evening , Disp: , Rfl: 0    Omega 3 1200 MG CAPS, Take by mouth daily, Disp: , Rfl:     scopolamine (TRANSDERM-SCOP) 1 5 mg/3 days TD 72 hr patch, Place 1 patch on the skin every third day, Disp: 2 patch, Rfl: 1    simvastatin (ZOCOR) 20 mg tablet, TK 1 T PO QD IN THE KIMBERLYN, Disp: , Rfl: 5    SUPER B COMPLEX/C PO, Take by mouth daily , Disp: , Rfl:     valsartan-hydrochlorothiazide (DIOVAN-HCT) 320-25 MG per tablet, daily , Disp: , Rfl: 0    Objective:    Blood pressure 114/58, pulse 84, temperature 97 5 °F (36 4 °C), temperature source Oral, height 5' 3" (1 6 m), weight 102 kg (225 lb)  Body mass index is 39 86 kg/m²  Body surface area is 2 03 meters squared  Physical Exam   Constitutional: She appears well-developed and well-nourished  Abdominal: Soft  She exhibits no distension  There is no tenderness  There is no rebound and no guarding     Genitourinary:   Genitourinary Comments: Hitchcock catheter removed   Skin:   Incisions are clean, dry, and intact

## 2019-09-11 NOTE — PROGRESS NOTES
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  She is recovering well from total abdominal hysterectomy, bilateral salpingo-oophorectomy, pelvic lymph node dissection, peritoneal and omental biopsies, left ureteroneocystostomy  Performance status is 0   1  Hitchcock catheter was removed today as cystogram was normal   2  I discussed the risks and benefits of chemotherapy with carboplatin at AUC 6 and Taxol 175 milligrams/meter squared to be given every 21 days for at least 4 cycles with a goal of achieving 6 cycles of treatment  I discussed the alternative which is treatment with bleomycin, etoposide, and cisplatin  I discussed the difference between the 2 regimens  I discussed that there both category 2 B as per NCCN guidelines  She understands the risks and benefits of both chemotherapy regimens and will proceed with carboplatin and Taxol  She was given written information regarding side effects  Consent was obtained by me in the office  3  CT of chest  4  Inhibin B   I spent 40 minutes with the patient and her friends  More than half the time was spent in counseling and coordination of care regarding treatment of her ovarian cancer  Only a brief time was spent on the postoperative history and physical examination           Relevant Orders    Inhibin B    CT chest wo contrast    Infusion Calculated Appointment Request    Infusion Calculated Appointment Request    Infusion Calculated Appointment Request    Infusion Calculated Appointment Request    Infusion Calculated Appointment Request    Infusion Calculated Appointment Request    IR port Placement       Other    Motion sickness     Scopolamine patches reordered         Adrenal mass, left (HCC)     Left adrenal mass identified on preoperative CT imaging  Plan for adrenal protocol CT scan of the abdomen         Relevant Orders    CT abdomen w wo contrast      Other Visit Diagnoses     S/P bladder repair        Relevant Medications    scopolamine (TRANSDERM-SCOP) 1 5 mg/3 days TD 72 hr patch            CHIEF COMPLAINT:  Treatment discussion, postoperative evaluation       Problem:  Cancer Staging  Malignant neoplasm of left ovary (HCC)  Staging form: Ovary, Fallopian Tube, Primary Peritoneal, AJCC 8th Edition  - Clinical stage from 8/26/2019: FIGO Stage IC1, calculated as Stage IA (cT1a, cN0, cM0) - Signed by Rebecca Bro MD on 9/11/2019        Previous therapy:     Malignant neoplasm of left ovary ([facility] [address] )    8/26/2019 Initial Diagnosis     Malignant neoplasm of left ovary ([facility] [address] )      8/26/2019 -  Cancer Staged     Staging form: Ovary, Fallopian Tube, Primary Peritoneal, AJCC 8th Edition  - Clinical stage from 8/26/2019: FIGO Stage IC1, calculated as Stage IA (cT1a, cN0, cM0) - Signed by Rebecca Bro MD on 9/11/2019 9/17/2019 -  Chemotherapy     CARBOplatin (PARAPLATIN) in sodium chloride 0 9 % 250 mL IVPB, , Intravenous, Once, 0 of 6 cycles  PACLitaxel (TAXOL) 355 2 mg in sodium chloride 0 9 % 500 mL chemo IVPB, 175 mg/m2, Intravenous, Once, 0 of 6 cycles           Patient ID: Artem Smiley is a 62 y o  female  40-year-old returns for treatment discussion  She is recovering well from her staging surgery  She underwent a total abdominal hysterectomy, bilateral salpingo-oophorectomy, left ureteroneocystostomy, omentectomy, peritoneal biopsies, appendectomy  Final pathology was consistent with a mixed stromal some tumor of the ovary with poorly differentiated sertoli-lydig cell components  The remainder of the pathology was benign  She has been recovering well from surgery  She recently underwent a cystogram on 9/9/2019 which did not reveal any evidence of bladder leak  Her bowels are moving    Her pain is controlled without narcotic therapy  She has been afebrile  No other interval change in her medications or medical history since her last visit  The following portions of the patient's history were reviewed and updated as appropriate: allergies, current medications, past family history, past medical history, past social history, past surgical history and problem list     Review of Systems      Current Outpatient Medications:     amLODIPine (NORVASC) 5 mg tablet, Take 5 mg by mouth daily , Disp: , Rfl: 4    buPROPion (WELLBUTRIN SR) 150 mg 12 hr tablet, every evening , Disp: , Rfl: 0    Cholecalciferol (VITAMIN D-3) 1000 units CAPS, Take by mouth daily , Disp: , Rfl:     enoxaparin (LOVENOX) 40 mg/0 4 mL, Inject 0 4 mL (40 mg total) under the skin daily for 42 days, Disp: 16 8 mL, Rfl: 0    escitalopram (LEXAPRO) 20 mg tablet, TK 1 T PO QD, Disp: , Rfl: 0    metFORMIN (GLUCOPHAGE) 500 mg tablet, every evening , Disp: , Rfl: 0    Omega 3 1200 MG CAPS, Take by mouth daily, Disp: , Rfl:     scopolamine (TRANSDERM-SCOP) 1 5 mg/3 days TD 72 hr patch, Place 1 patch on the skin every third day, Disp: 2 patch, Rfl: 1    simvastatin (ZOCOR) 20 mg tablet, TK 1 T PO QD IN THE KIMBERLYN, Disp: , Rfl: 5    SUPER B COMPLEX/C PO, Take by mouth daily , Disp: , Rfl:     valsartan-hydrochlorothiazide (DIOVAN-HCT) 320-25 MG per tablet, daily , Disp: , Rfl: 0    Objective:    Blood pressure 114/58, pulse 84, temperature 97 5 °F (36 4 °C), temperature source Oral, height 5' 3" (1 6 m), weight 102 kg (225 lb)  Body mass index is 39 86 kg/m²  Body surface area is 2 03 meters squared  Physical Exam   Constitutional: She appears well-developed and well-nourished  Abdominal: Soft  She exhibits no distension  There is no tenderness  There is no rebound and no guarding     Genitourinary:   Genitourinary Comments: Hitchcock catheter removed   Skin:   Incisions are clean, dry, and intact

## 2019-09-13 DIAGNOSIS — C56.2 MALIGNANT NEOPLASM OF LEFT OVARY (HCC): Primary | ICD-10-CM

## 2019-09-13 RX ORDER — LORAZEPAM 1 MG/1
1 TABLET ORAL EVERY 8 HOURS PRN
Qty: 20 TABLET | Refills: 1 | Status: SHIPPED | OUTPATIENT
Start: 2019-09-13 | End: 2021-06-07

## 2019-09-13 RX ORDER — ONDANSETRON HYDROCHLORIDE 8 MG/1
8 TABLET, FILM COATED ORAL EVERY 8 HOURS PRN
Qty: 20 TABLET | Refills: 1 | Status: SHIPPED | OUTPATIENT
Start: 2019-09-13 | End: 2021-06-07

## 2019-09-17 ENCOUNTER — HOSPITAL ENCOUNTER (OUTPATIENT)
Dept: CT IMAGING | Facility: HOSPITAL | Age: 59
Discharge: HOME | End: 2019-09-17
Attending: OBSTETRICS & GYNECOLOGY
Payer: COMMERCIAL

## 2019-09-17 DIAGNOSIS — E27.8 ADRENAL MASS, LEFT (HCC): ICD-10-CM

## 2019-09-17 DIAGNOSIS — C56.2 MALIGNANT NEOPLASM OF LEFT OVARY (HCC): ICD-10-CM

## 2019-09-17 PROCEDURE — 74170 CT ABD WO CNTRST FLWD CNTRST: CPT

## 2019-09-17 PROCEDURE — 71250 CT THORAX DX C-: CPT

## 2019-09-17 RX ADMIN — IOHEXOL 50 ML: 240 INJECTION, SOLUTION INTRATHECAL; INTRAVASCULAR; INTRAVENOUS; ORAL at 16:54

## 2019-09-17 RX ADMIN — IOHEXOL 100 ML: 350 INJECTION, SOLUTION INTRAVENOUS at 16:52

## 2019-09-18 ENCOUNTER — TELEPHONE (OUTPATIENT)
Dept: RADIOLOGY | Facility: HOSPITAL | Age: 59
End: 2019-09-18

## 2019-09-18 RX ORDER — SODIUM CHLORIDE 9 MG/ML
75 INJECTION, SOLUTION INTRAVENOUS CONTINUOUS
Status: CANCELLED | OUTPATIENT
Start: 2019-09-18

## 2019-09-20 DIAGNOSIS — Z90.710 S/P ABDOMINAL HYSTERECTOMY: Primary | ICD-10-CM

## 2019-09-26 DIAGNOSIS — B37.2 CANDIDAL INTERTRIGO: Primary | ICD-10-CM

## 2019-09-26 RX ORDER — NYSTATIN 100000 [USP'U]/G
POWDER TOPICAL 4 TIMES DAILY
Qty: 45 G | Refills: 0 | Status: SHIPPED | OUTPATIENT
Start: 2019-09-26 | End: 2021-06-14

## 2019-09-27 ENCOUNTER — HOSPITAL ENCOUNTER (OUTPATIENT)
Dept: RADIOLOGY | Facility: HOSPITAL | Age: 59
Discharge: HOME | End: 2019-09-27
Attending: OBSTETRICS & GYNECOLOGY | Admitting: OBSTETRICS & GYNECOLOGY
Payer: COMMERCIAL

## 2019-09-27 ENCOUNTER — APPOINTMENT (OUTPATIENT)
Dept: RADIOLOGY | Facility: HOSPITAL | Age: 59
End: 2019-09-27
Attending: OBSTETRICS & GYNECOLOGY
Payer: COMMERCIAL

## 2019-09-27 VITALS — OXYGEN SATURATION: 96 % | SYSTOLIC BLOOD PRESSURE: 126 MMHG | HEART RATE: 84 BPM | DIASTOLIC BLOOD PRESSURE: 63 MMHG

## 2019-09-27 DIAGNOSIS — C56.2 MALIGNANT NEOPLASM OF LEFT OVARY (HCC): ICD-10-CM

## 2019-09-27 DIAGNOSIS — Z90.710 S/P ABDOMINAL HYSTERECTOMY: ICD-10-CM

## 2019-09-27 LAB
CREAT FLD-MCNC: 0.68 MG/DL
LYMPHOCYTES NFR BLD AUTO: 96 %
MONOCYTES NFR BLD AUTO: 4 %
TOTAL CELLS COUNTED SPEC: 100
WBC # FLD MANUAL: 3489 /UL

## 2019-09-27 PROCEDURE — 10160 PNXR ASPIR ABSC HMTMA BULLA: CPT | Performed by: RADIOLOGY

## 2019-09-27 PROCEDURE — 99152 MOD SED SAME PHYS/QHP 5/>YRS: CPT

## 2019-09-27 PROCEDURE — 36561 INSERT TUNNELED CV CATH: CPT | Performed by: RADIOLOGY

## 2019-09-27 PROCEDURE — NC001 PR NO CHARGE: Performed by: RADIOLOGY

## 2019-09-27 PROCEDURE — 82570 ASSAY OF URINE CREATININE: CPT | Performed by: OBSTETRICS & GYNECOLOGY

## 2019-09-27 PROCEDURE — 99153 MOD SED SAME PHYS/QHP EA: CPT

## 2019-09-27 PROCEDURE — 89051 BODY FLUID CELL COUNT: CPT | Performed by: OBSTETRICS & GYNECOLOGY

## 2019-09-27 PROCEDURE — 87205 SMEAR GRAM STAIN: CPT | Performed by: OBSTETRICS & GYNECOLOGY

## 2019-09-27 PROCEDURE — 77001 FLUOROGUIDE FOR VEIN DEVICE: CPT

## 2019-09-27 PROCEDURE — 99152 MOD SED SAME PHYS/QHP 5/>YRS: CPT | Performed by: RADIOLOGY

## 2019-09-27 PROCEDURE — 99024 POSTOP FOLLOW-UP VISIT: CPT | Performed by: RADIOLOGY

## 2019-09-27 PROCEDURE — C1788 PORT, INDWELLING, IMP: HCPCS

## 2019-09-27 PROCEDURE — 87070 CULTURE OTHR SPECIMN AEROBIC: CPT | Performed by: OBSTETRICS & GYNECOLOGY

## 2019-09-27 PROCEDURE — 76942 ECHO GUIDE FOR BIOPSY: CPT | Performed by: RADIOLOGY

## 2019-09-27 PROCEDURE — 77001 FLUOROGUIDE FOR VEIN DEVICE: CPT | Performed by: RADIOLOGY

## 2019-09-27 PROCEDURE — 76937 US GUIDE VASCULAR ACCESS: CPT

## 2019-09-27 PROCEDURE — C1769 GUIDE WIRE: HCPCS

## 2019-09-27 PROCEDURE — 76937 US GUIDE VASCULAR ACCESS: CPT | Performed by: RADIOLOGY

## 2019-09-27 PROCEDURE — 36561 INSERT TUNNELED CV CATH: CPT

## 2019-09-27 PROCEDURE — 10030 IMG GID FLU COLL DRG SFT TIS: CPT

## 2019-09-27 RX ORDER — DIPHENHYDRAMINE HYDROCHLORIDE 50 MG/ML
INJECTION INTRAMUSCULAR; INTRAVENOUS CODE/TRAUMA/SEDATION MEDICATION
Status: COMPLETED | OUTPATIENT
Start: 2019-09-27 | End: 2019-09-27

## 2019-09-27 RX ORDER — MIDAZOLAM HYDROCHLORIDE 1 MG/ML
INJECTION INTRAMUSCULAR; INTRAVENOUS CODE/TRAUMA/SEDATION MEDICATION
Status: COMPLETED | OUTPATIENT
Start: 2019-09-27 | End: 2019-09-27

## 2019-09-27 RX ORDER — CEFAZOLIN SODIUM 2 G/50ML
SOLUTION INTRAVENOUS
Status: COMPLETED | OUTPATIENT
Start: 2019-09-27 | End: 2019-09-27

## 2019-09-27 RX ORDER — FENTANYL CITRATE 50 UG/ML
INJECTION, SOLUTION INTRAMUSCULAR; INTRAVENOUS CODE/TRAUMA/SEDATION MEDICATION
Status: COMPLETED | OUTPATIENT
Start: 2019-09-27 | End: 2019-09-27

## 2019-09-27 RX ORDER — SODIUM CHLORIDE 9 MG/ML
75 INJECTION, SOLUTION INTRAVENOUS CONTINUOUS
Status: DISCONTINUED | OUTPATIENT
Start: 2019-09-27 | End: 2019-09-27 | Stop reason: HOSPADM

## 2019-09-27 RX ADMIN — MIDAZOLAM 1 MG: 1 INJECTION INTRAMUSCULAR; INTRAVENOUS at 14:37

## 2019-09-27 RX ADMIN — MIDAZOLAM 1 MG: 1 INJECTION INTRAMUSCULAR; INTRAVENOUS at 14:44

## 2019-09-27 RX ADMIN — FENTANYL CITRATE 50 MCG: 50 INJECTION, SOLUTION INTRAMUSCULAR; INTRAVENOUS at 14:37

## 2019-09-27 RX ADMIN — CEFAZOLIN SODIUM 2000 MG: 2 SOLUTION INTRAVENOUS at 15:26

## 2019-09-27 RX ADMIN — MIDAZOLAM 1 MG: 1 INJECTION INTRAMUSCULAR; INTRAVENOUS at 15:05

## 2019-09-27 RX ADMIN — DIPHENHYDRAMINE HYDROCHLORIDE 25 MG: 50 INJECTION, SOLUTION INTRAMUSCULAR; INTRAVENOUS at 15:32

## 2019-09-27 RX ADMIN — FENTANYL CITRATE 50 MCG: 50 INJECTION, SOLUTION INTRAMUSCULAR; INTRAVENOUS at 14:44

## 2019-09-27 RX ADMIN — FENTANYL CITRATE 50 MCG: 50 INJECTION, SOLUTION INTRAMUSCULAR; INTRAVENOUS at 15:05

## 2019-09-27 RX ADMIN — FENTANYL CITRATE 50 MCG: 50 INJECTION, SOLUTION INTRAMUSCULAR; INTRAVENOUS at 15:21

## 2019-09-27 RX ADMIN — MIDAZOLAM 1 MG: 1 INJECTION INTRAMUSCULAR; INTRAVENOUS at 15:21

## 2019-09-27 NOTE — PROGRESS NOTES
78-year-old female with ovarian cancer, status post hysterectomy  Patient has a left flank fluid collection which is suspected to represent a urinoma since she had a bladder injury during her operation  There are no signs of infection at this point  Patient is referred for aspiration of the fluid collection with laboratory testing of the fluid, and placement of a port for chemotherapy

## 2019-09-27 NOTE — BRIEF OP NOTE (RAD/CATH)
INTERVENTIONAL RADIOLOGY PROCEDURE NOTE    Date: 9/27/2019    Preoperative diagnosis:  Left flank collection, possible urinoma    Postoperative diagnosis: Same    Procedure:  Ultrasound-guided aspiration of left flank collection    Surgeon: Sonu Ring MD     Assistant: None  No qualified resident was available  Blood loss:  Trace    Specimens:  Sent to lab as requested    Findings:  Under ultrasound, the collection is smaller than previously seen  Approximately 100 mL of serosanguineous fluid was aspirated  Labs were sent as requested      Complications:  None immediate    Anesthesia: local

## 2019-09-27 NOTE — H&P
The history and physical were reviewed, along with progress notes, and the patient was examined  There are no changes since it was written      Vitals:    09/27/19 1503   BP: 113/64   Pulse: 77   SpO2: 98%

## 2019-09-27 NOTE — DISCHARGE INSTRUCTIONS
Implanted Venous Access Port     WHAT YOU NEED TO KNOW:   An implanted venous access port is a device used to give treatments and take blood  It may also be called a central venous access device (CVAD)  The port is a small container that is placed under your skin, usually in your upper chest  The port is attached to a catheter that enters a large vein  DISCHARGE INSTRUCTIONS:   Resume your normal diet  Small sips of flat soda will help with mild nausea  Prevent an infection:   · Wash your hands often  Use soap and water  Clean your hands before and after you care for your port  Remind everyone who cares for your port to wash their hands  · Check your skin for infection every day  Look for redness, swelling, or fluid oozing from the port site  Care for your port:   1  You may shower beginning 48 hours after procedure  2  Change dressing if it becomes wet  3  Remove dressing after 24 hours  Leave glue in place  4  It is normal for some bruising to occur  5  Use Tylenol for pain  6  Limit use of arm on the side that your port was placed  Lift nothing heavier than 5 pounds for 1 week, and then gradually increase activity as tolerated  7  DO NOT apply ointment, lotion or cream to port site until incision is healed  Allow glue to fall off  DO NOT attempt to peel glue from skin even it it begins to flake  8, After the port incision is healed you may swim, bathe  Notify the Interventional Radiologist if you have any of the followin  Fever above 101 F    2  Increased redness or swelling after 1st day  3  Increased pain after 1st day  4  Any sign of infection (drainage from port site, skin separation, hot to touch)  5  Persistent nausea or vomiting  Contact Interventional Radiology at [phone] [facility] PATIENTS: Contact Interventional Radiology at [phone]) ([facility]: Contact Interventional Radiology at [phone])

## 2019-09-27 NOTE — BRIEF OP NOTE (RAD/CATH)
INTERVENTIONAL RADIOLOGY PROCEDURE NOTE    Date: 9/27/2019    Preoperative diagnosis:  Ovarian cancer    Postoperative diagnosis: Same    Procedure:  Chest port placement    Surgeon: Floresita Mitchell MD     Assistant: None  No qualified resident was available  Blood loss:  Four mL    Specimens:  None    Findings:  Successful right chest port placement  Tip in RA, okay to use      Complications:  None immediate    Anesthesia: concious sedation

## 2019-09-30 LAB
BACTERIA SPEC BFLD CULT: NO GROWTH
GRAM STN SPEC: NORMAL
GRAM STN SPEC: NORMAL

## 2019-10-01 RX ORDER — SODIUM CHLORIDE 9 MG/ML
20 INJECTION, SOLUTION INTRAVENOUS ONCE
Status: CANCELLED | OUTPATIENT
Start: 2019-10-02

## 2019-10-01 RX ORDER — PALONOSETRON 0.05 MG/ML
0.25 INJECTION, SOLUTION INTRAVENOUS ONCE
Status: CANCELLED | OUTPATIENT
Start: 2019-10-02

## 2019-10-02 ENCOUNTER — HOSPITAL ENCOUNTER (OUTPATIENT)
Dept: INFUSION CENTER | Facility: HOSPITAL | Age: 59
Discharge: HOME | End: 2019-10-02
Attending: OBSTETRICS & GYNECOLOGY
Payer: COMMERCIAL

## 2019-10-02 VITALS
WEIGHT: 219.36 LBS | SYSTOLIC BLOOD PRESSURE: 120 MMHG | HEIGHT: 63 IN | OXYGEN SATURATION: 97 % | TEMPERATURE: 97.5 F | HEART RATE: 81 BPM | RESPIRATION RATE: 16 BRPM | BODY MASS INDEX: 38.87 KG/M2 | DIASTOLIC BLOOD PRESSURE: 69 MMHG

## 2019-10-02 DIAGNOSIS — C56.2 MALIGNANT NEOPLASM OF LEFT OVARY (HCC): Primary | ICD-10-CM

## 2019-10-02 PROCEDURE — 96415 CHEMO IV INFUSION ADDL HR: CPT

## 2019-10-02 PROCEDURE — 96417 CHEMO IV INFUS EACH ADDL SEQ: CPT

## 2019-10-02 PROCEDURE — 96375 TX/PRO/DX INJ NEW DRUG ADDON: CPT

## 2019-10-02 PROCEDURE — 96367 TX/PROPH/DG ADDL SEQ IV INF: CPT

## 2019-10-02 PROCEDURE — 96413 CHEMO IV INFUSION 1 HR: CPT

## 2019-10-02 RX ORDER — SODIUM CHLORIDE 9 MG/ML
20 INJECTION, SOLUTION INTRAVENOUS ONCE
Status: COMPLETED | OUTPATIENT
Start: 2019-10-02 | End: 2019-10-02

## 2019-10-02 RX ORDER — PALONOSETRON 0.05 MG/ML
0.25 INJECTION, SOLUTION INTRAVENOUS ONCE
Status: COMPLETED | OUTPATIENT
Start: 2019-10-02 | End: 2019-10-02

## 2019-10-02 RX ADMIN — DEXAMETHASONE SODIUM PHOSPHATE 20 MG: 10 INJECTION, SOLUTION INTRAMUSCULAR; INTRAVENOUS at 09:39

## 2019-10-02 RX ADMIN — CARBOPLATIN 727.2 MG: 10 INJECTION, SOLUTION INTRAVENOUS at 14:45

## 2019-10-02 RX ADMIN — SODIUM CHLORIDE 20 ML/HR: 0.9 INJECTION, SOLUTION INTRAVENOUS at 09:40

## 2019-10-02 RX ADMIN — FAMOTIDINE 20 MG: 10 INJECTION INTRAVENOUS at 10:21

## 2019-10-02 RX ADMIN — PACLITAXEL 355.2 MG: 6 INJECTION, SOLUTION INTRAVENOUS at 11:23

## 2019-10-02 RX ADMIN — PALONOSETRON HYDROCHLORIDE 0.25 MG: 0.25 INJECTION, SOLUTION INTRAVENOUS at 10:45

## 2019-10-02 RX ADMIN — DIPHENHYDRAMINE HYDROCHLORIDE 25 MG: 50 INJECTION, SOLUTION INTRAMUSCULAR; INTRAVENOUS at 10:02

## 2019-10-02 RX ADMIN — SODIUM CHLORIDE 150 MG: 0.9 INJECTION, SOLUTION INTRAVENOUS at 10:48

## 2019-10-02 NOTE — PROGRESS NOTES
Rec'd pt today in good spirits but " feeling a little anxious about my first treatment today"   VSS, labs within parameters  Port accessed per protocol w/o problems  First Taxol infusion over 3 hours tolerated well, no adverse reactions noted  Carboplatin now infusing

## 2019-10-02 NOTE — SOCIAL WORK
MSW received pt's distress thermometer, scoring 4/10  No practical, family or spiritual problems reported  Pt selected sleep concerns, nervousness and worry  MSW met with pt during treatment in the infusion center  Pt reported that her eyelids were starting to feel heavy as she was feeling tired from the medicine  Pt was very interested in the support group as it is close to her home  MSW provided contact info & support group info to pt and will be following up as needed

## 2019-10-02 NOTE — PROGRESS NOTES
Carboplatin infused w/o adverse reaction  Pt appeared comfortable throughout entire treatment today  Port deaccessed per protocol  Pt then d/c'd to home with steady gait accompanied by sister

## 2019-10-03 LAB
ALBUMIN SERPL-MCNC: 4.5 G/DL (ref 3.6–5.1)
ALBUMIN/GLOB SERPL: 1.9 (CALC) (ref 1–2.5)
ALP SERPL-CCNC: 63 U/L (ref 33–130)
ALT SERPL-CCNC: 12 U/L (ref 6–29)
AST SERPL-CCNC: 14 U/L (ref 10–35)
BASOPHILS # BLD AUTO: 62 CELLS/UL (ref 0–200)
BASOPHILS NFR BLD AUTO: 0.8 %
BILIRUB SERPL-MCNC: 0.4 MG/DL (ref 0.2–1.2)
BUN SERPL-MCNC: 11 MG/DL (ref 7–25)
BUN/CREAT SERPL: ABNORMAL (CALC) (ref 6–22)
CALCIUM SERPL-MCNC: 10 MG/DL (ref 8.6–10.4)
CHLORIDE SERPL-SCNC: 101 MMOL/L (ref 98–110)
CO2 SERPL-SCNC: 34 MMOL/L (ref 20–32)
CREAT SERPL-MCNC: 0.66 MG/DL (ref 0.5–0.99)
EOSINOPHIL # BLD AUTO: 398 CELLS/UL (ref 15–500)
EOSINOPHIL NFR BLD AUTO: 5.1 %
ERYTHROCYTE [DISTWIDTH] IN BLOOD BY AUTOMATED COUNT: 12.2 % (ref 11–15)
GLOBULIN SER CALC-MCNC: 2.4 G/DL (CALC) (ref 1.9–3.7)
GLUCOSE SERPL-MCNC: 122 MG/DL (ref 65–139)
HCT VFR BLD AUTO: 37.7 % (ref 35–45)
HGB BLD-MCNC: 12.7 G/DL (ref 11.7–15.5)
INHIBIN B SERPL-MCNC: <10 PG/ML
LYMPHOCYTES # BLD AUTO: 1693 CELLS/UL (ref 850–3900)
LYMPHOCYTES NFR BLD AUTO: 21.7 %
MAGNESIUM SERPL-MCNC: 2 MG/DL (ref 1.5–2.5)
MCH RBC QN AUTO: 28.7 PG (ref 27–33)
MCHC RBC AUTO-ENTMCNC: 33.7 G/DL (ref 32–36)
MCV RBC AUTO: 85.3 FL (ref 80–100)
MONOCYTES # BLD AUTO: 491 CELLS/UL (ref 200–950)
MONOCYTES NFR BLD AUTO: 6.3 %
NEUTROPHILS # BLD AUTO: 5156 CELLS/UL (ref 1500–7800)
NEUTROPHILS NFR BLD AUTO: 66.1 %
PLATELET # BLD AUTO: 345 THOUSAND/UL (ref 140–400)
PMV BLD REES-ECKER: 9.4 FL (ref 7.5–12.5)
POTASSIUM SERPL-SCNC: 4 MMOL/L (ref 3.5–5.3)
PROT SERPL-MCNC: 6.9 G/DL (ref 6.1–8.1)
RBC # BLD AUTO: 4.42 MILLION/UL (ref 3.8–5.1)
SL AMB EGFR AFRICAN AMERICAN: 110 ML/MIN/1.73M2
SL AMB EGFR NON AFRICAN AMERICAN: 95 ML/MIN/1.73M2
SODIUM SERPL-SCNC: 142 MMOL/L (ref 135–146)
WBC # BLD AUTO: 7.8 THOUSAND/UL (ref 3.8–10.8)

## 2019-10-07 NOTE — PROGRESS NOTES
Patient called back regarding follow up treatment phone call  Patient states that her only side effect from the treatment was constipation  She called the on call   On Sunday and also spoke to [employer] today    Patient states she is beginning to feel better and her bowls have started moving after taking their reccomendations  Pt with no other concerns at this time and knows to contact the MD office with concerns

## 2019-10-16 ENCOUNTER — OFFICE VISIT (OUTPATIENT)
Dept: GYNECOLOGIC ONCOLOGY | Facility: HOSPITAL | Age: 59
End: 2019-10-16
Payer: COMMERCIAL

## 2019-10-16 VITALS
SYSTOLIC BLOOD PRESSURE: 124 MMHG | HEART RATE: 109 BPM | HEIGHT: 63 IN | TEMPERATURE: 97.8 F | WEIGHT: 219.8 LBS | BODY MASS INDEX: 38.95 KG/M2 | DIASTOLIC BLOOD PRESSURE: 76 MMHG

## 2019-10-16 DIAGNOSIS — Z90.710 S/P ABDOMINAL HYSTERECTOMY: ICD-10-CM

## 2019-10-16 DIAGNOSIS — C56.2 MALIGNANT NEOPLASM OF LEFT OVARY (HCC): Primary | ICD-10-CM

## 2019-10-16 PROCEDURE — 99214 OFFICE O/P EST MOD 30 MIN: CPT | Performed by: PHYSICIAN ASSISTANT

## 2019-10-16 NOTE — H&P (VIEW-ONLY)
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  She is s/p exploratory laparotomy, total abdominal hysterectomy, bilateral salpingo-oophorectomy, pelvic lymph node dissection, peritoneal and omental biopsies as well as left ureteroneocystostomy  She has recovered well from surgery  Her vaginal cuff is well healed  She is currently receiving adjuvant taxol 175 mg/m2 and carboplatin AUC 6 every 21 days  She tolerated cycle 1 of treatment well, with the exception of constipation  Continue with cycle 2 of treatment as long as her metabolic and hematologic parameters are adequate  We discussed initiation of stool softener TID and miralax BID starting the day of treatment  Return to the office as per her chemotherapy calendar  Other    S/P abdominal hysterectomy      Greater than 50% of the visit time was spent in counseling and coordination of care regarding chemotherapy treatment  Less than half the visit time was spent in the post-operative course  CHIEF COMPLAINT:   Pre-chemotherapy and post-operative evaluation       Problem:  Cancer Staging  Malignant neoplasm of left ovary (HCC)  Staging form: Ovary, Fallopian Tube, Primary Peritoneal, AJCC 8th Edition  - Clinical stage from 8/26/2019: FIGO Stage IC1, calculated as Stage IA (cT1a, cN0, cM0) - Signed by Benita Salamanca MD on 9/11/2019        Previous therapy:     Malignant neoplasm of left ovary ([facility] [address] )    8/26/2019 Initial Diagnosis     [facility]      8/26/2019 -  Cancer Staged     Staging form: Ovary, Fallopian Tube, Primary Peritoneal, AJCC 8th Edition  - Clinical stage from 8/26/2019: FIGO Stage IC1, calculated as Stage IA (cT1a, cN0, cM0) - Signed by Benita Salamanca MD on 9/11/2019         Chemotherapy     Taxol 175 mg/m2 and carboplatin AUC 6 every 21 days  She received 1 cycle and is scheduled for cycle 2  Patient ID: Amanda Rose is a 62 y o  female  who presents to the office for pre-chemotherapy and post-operative evaluation  She is recovering well from surgery  The patient has been afebrile  Overall, she tolerated her first cycle of treatment well, with the exception of constipation  The patient denies n/v  She has occasional left lower quadrant discomfort, which is likely related to her ureteral stent  The patient notes intermittent numbness of her toes, which does not affect her ADLs  The following portions of the patient's history were reviewed and updated as appropriate: allergies, current medications, past medical history, past surgical history and problem list     Review of Systems   Constitutional: Negative  HENT: Negative  Eyes: Negative  Respiratory: Negative  Cardiovascular: Negative  Gastrointestinal: Negative  Genitourinary: Negative  Musculoskeletal: Negative  Skin: Negative  Neurological: Negative  Psychiatric/Behavioral: Negative          Current Outpatient Medications   Medication Sig Dispense Refill    amLODIPine (NORVASC) 5 mg tablet Take 5 mg by mouth daily   4    buPROPion (WELLBUTRIN SR) 150 mg 12 hr tablet every evening   0    Cholecalciferol (VITAMIN D-3) 1000 units CAPS Take by mouth daily       enoxaparin (LOVENOX) 40 mg/0 4 mL Inject 0 4 mL (40 mg total) under the skin daily for 42 days 16 8 mL 0    escitalopram (LEXAPRO) 20 mg tablet TK 1 T PO QD  0    LORazepam (ATIVAN) 1 mg tablet Take 1 tablet (1 mg total) by mouth every 8 (eight) hours as needed (nausea or anxiety) 20 tablet 1    metFORMIN (GLUCOPHAGE) 500 mg tablet every evening   0    nystatin (MYCOSTATIN) powder Apply topically 4 (four) times a day 45 g 0    Omega 3 1200 MG CAPS Take by mouth daily      ondansetron (ZOFRAN) 8 mg tablet Take 1 tablet (8 mg total) by mouth every 8 (eight) hours as needed for nausea or vomiting 20 tablet 1    scopolamine (TRANSDERM-SCOP) 1 5 mg/3 days TD 72 hr patch Place 1 patch on the skin every third day 2 patch 1    simvastatin (ZOCOR) 20 mg tablet TK 1 T PO QD IN THE KIMBERLYN  5    SUPER B COMPLEX/C PO Take by mouth daily       valsartan-hydrochlorothiazide (DIOVAN-HCT) 320-25 MG per tablet daily   0     No current facility-administered medications for this visit  Objective:    Blood pressure 124/76, pulse (!) 109, temperature 97 8 °F (36 6 °C), temperature source Oral, height 5' 2 84" (1 596 m), weight 99 7 kg (219 lb 12 8 oz)  Body mass index is 39 13 kg/m²  Body surface area is 2 01 meters squared  Physical Exam   Constitutional: She is oriented to person, place, and time  She appears well-developed and well-nourished  Pulmonary/Chest: Effort normal    Abdominal: Soft  She exhibits no mass  There is no tenderness  Genitourinary:   Genitourinary Comments: The external female genitalia is normal  The bartholin's, uretheral and skenes glands are normal  The urethral meatus is normal (midline with no lesions)  Anus without fissure or lesion  Speculum exam reveals a grossly normal vagina  Vagina is intact  No masses, lesions, discharge or bleeding  No significant cystocele or rectocele noted  Bimanual exam notes a surgical absent cervix, uterus and adnexal structures  No masses or fullness  Bladder is without fullness, mass or tenderness  Neurological: She is alert and oriented to person, place, and time  Skin: Skin is warm and dry  No rash noted  Surgical trocar sites are well healed  Psychiatric: She has a normal mood and affect  Her behavior is normal  Thought content normal    Performance status is zero      Lab Results   Component Value Date    K 4 0 10/14/2019     10/14/2019    CO2 32 10/14/2019    BUN 12 10/14/2019    CREATININE 0 64 10/14/2019    GLUCOSE 188 (H) 08/26/2019    CALCIUM 9 4 10/14/2019    AST 14 10/14/2019    ALT 15 10/14/2019    ALKPHOS 67 10/14/2019    EGFR 101 08/29/2019     Lab Results   Component Value Date    WBC 5 1 10/14/2019    HGB 11 4 (L) 10/14/2019    HCT 34 9 (L) 10/14/2019    MCV 84 9 10/14/2019     10/14/2019     Lab Results   Component Value Date    Dalila Zia 2,525 10/14/2019

## 2019-10-16 NOTE — PROGRESS NOTES
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  She is s/p exploratory laparotomy, total abdominal hysterectomy, bilateral salpingo-oophorectomy, pelvic lymph node dissection, peritoneal and omental biopsies as well as left ureteroneocystostomy  She has recovered well from surgery  Her vaginal cuff is well healed  She is currently receiving adjuvant taxol 175 mg/m2 and carboplatin AUC 6 every 21 days  She tolerated cycle 1 of treatment well, with the exception of constipation  Continue with cycle 2 of treatment as long as her metabolic and hematologic parameters are adequate  We discussed initiation of stool softener TID and miralax BID starting the day of treatment  Return to the office as per her chemotherapy calendar  Other    S/P abdominal hysterectomy      Greater than 50% of the visit time was spent in counseling and coordination of care regarding chemotherapy treatment  Less than half the visit time was spent in the post-operative course  CHIEF COMPLAINT:   Pre-chemotherapy and post-operative evaluation       Problem:  Cancer Staging  Malignant neoplasm of left ovary (HCC)  Staging form: Ovary, Fallopian Tube, Primary Peritoneal, AJCC 8th Edition  - Clinical stage from 8/26/2019: FIGO Stage IC1, calculated as Stage IA (cT1a, cN0, cM0) - Signed by Xochilt Gambino MD on 9/11/2019        Previous therapy:     Malignant neoplasm of left ovary ([facility] [address] )    8/26/2019 Initial Diagnosis     Malignant neoplasm of left ovary ([facility] [address] )      8/26/2019 -  Cancer Staged     Staging form: Ovary, Fallopian Tube, Primary Peritoneal, AJCC 8th Edition  - Clinical stage from 8/26/2019: FIGO Stage IC1, calculated as Stage IA (cT1a, cN0, cM0) - Signed by Xochilt Gambino MD on 9/11/2019         Chemotherapy     Taxol 175 mg/m2 and carboplatin AUC 6 every 21 days  She received 1 cycle and is scheduled for cycle 2  Patient ID: Christy Villegas is a 62 y o  female  who presents to the office for pre-chemotherapy and post-operative evaluation  She is recovering well from surgery  The patient has been afebrile  Overall, she tolerated her first cycle of treatment well, with the exception of constipation  The patient denies n/v  She has occasional left lower quadrant discomfort, which is likely related to her ureteral stent  The patient notes intermittent numbness of her toes, which does not affect her ADLs  The following portions of the patient's history were reviewed and updated as appropriate: allergies, current medications, past medical history, past surgical history and problem list     Review of Systems   Constitutional: Negative  HENT: Negative  Eyes: Negative  Respiratory: Negative  Cardiovascular: Negative  Gastrointestinal: Negative  Genitourinary: Negative  Musculoskeletal: Negative  Skin: Negative  Neurological: Negative  Psychiatric/Behavioral: Negative          Current Outpatient Medications   Medication Sig Dispense Refill    amLODIPine (NORVASC) 5 mg tablet Take 5 mg by mouth daily   4    buPROPion (WELLBUTRIN SR) 150 mg 12 hr tablet every evening   0    Cholecalciferol (VITAMIN D-3) 1000 units CAPS Take by mouth daily       enoxaparin (LOVENOX) 40 mg/0 4 mL Inject 0 4 mL (40 mg total) under the skin daily for 42 days 16 8 mL 0    escitalopram (LEXAPRO) 20 mg tablet TK 1 T PO QD  0    LORazepam (ATIVAN) 1 mg tablet Take 1 tablet (1 mg total) by mouth every 8 (eight) hours as needed (nausea or anxiety) 20 tablet 1    metFORMIN (GLUCOPHAGE) 500 mg tablet every evening   0    nystatin (MYCOSTATIN) powder Apply topically 4 (four) times a day 45 g 0    Omega 3 1200 MG CAPS Take by mouth daily      ondansetron (ZOFRAN) 8 mg tablet Take 1 tablet (8 mg total) by mouth every 8 (eight) hours as needed for nausea or vomiting 20 tablet 1    scopolamine (TRANSDERM-SCOP) 1 5 mg/3 days TD 72 hr patch Place 1 patch on the skin every third day 2 patch 1    simvastatin (ZOCOR) 20 mg tablet TK 1 T PO QD IN THE KIMBERLYN  5    SUPER B COMPLEX/C PO Take by mouth daily       valsartan-hydrochlorothiazide (DIOVAN-HCT) 320-25 MG per tablet daily   0     No current facility-administered medications for this visit  Objective:    Blood pressure 124/76, pulse (!) 109, temperature 97 8 °F (36 6 °C), temperature source Oral, height 5' 2 84" (1 596 m), weight 99 7 kg (219 lb 12 8 oz)  Body mass index is 39 13 kg/m²  Body surface area is 2 01 meters squared  Physical Exam   Constitutional: She is oriented to person, place, and time  She appears well-developed and well-nourished  Pulmonary/Chest: Effort normal    Abdominal: Soft  She exhibits no mass  There is no tenderness  Genitourinary:   Genitourinary Comments: The external female genitalia is normal  The bartholin's, uretheral and skenes glands are normal  The urethral meatus is normal (midline with no lesions)  Anus without fissure or lesion  Speculum exam reveals a grossly normal vagina  Vagina is intact  No masses, lesions, discharge or bleeding  No significant cystocele or rectocele noted  Bimanual exam notes a surgical absent cervix, uterus and adnexal structures  No masses or fullness  Bladder is without fullness, mass or tenderness  Neurological: She is alert and oriented to person, place, and time  Skin: Skin is warm and dry  No rash noted  Surgical trocar sites are well healed  Psychiatric: She has a normal mood and affect  Her behavior is normal  Thought content normal    Performance status is zero      Lab Results   Component Value Date    K 4 0 10/14/2019     10/14/2019    CO2 32 10/14/2019    BUN 12 10/14/2019    CREATININE 0 64 10/14/2019    GLUCOSE 188 (H) 08/26/2019    CALCIUM 9 4 10/14/2019    AST 14 10/14/2019    ALT 15 10/14/2019    ALKPHOS 67 10/14/2019    EGFR 101 08/29/2019     Lab Results   Component Value Date    WBC 5 1 10/14/2019    HGB 11 4 (L) 10/14/2019    HCT 34 9 (L) 10/14/2019    MCV 84 9 10/14/2019     10/14/2019     Lab Results   Component Value Date    Ethel Beard 2,525 10/14/2019

## 2019-10-16 NOTE — ASSESSMENT & PLAN NOTE
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  She is s/p exploratory laparotomy, total abdominal hysterectomy, bilateral salpingo-oophorectomy, pelvic lymph node dissection, peritoneal and omental biopsies as well as left ureteroneocystostomy  She has recovered well from surgery  Her vaginal cuff is well healed  She is currently receiving adjuvant taxol 175 mg/m2 and carboplatin AUC 6 every 21 days  She tolerated cycle 1 of treatment well, with the exception of constipation  Continue with cycle 2 of treatment as long as her metabolic and hematologic parameters are adequate  We discussed initiation of stool softener TID and miralax BID starting the day of treatment  Return to the office as per her chemotherapy calendar

## 2019-10-22 RX ORDER — SODIUM CHLORIDE 9 MG/ML
20 INJECTION, SOLUTION INTRAVENOUS ONCE
Status: CANCELLED | OUTPATIENT
Start: 2019-10-30

## 2019-10-22 RX ORDER — PALONOSETRON 0.05 MG/ML
0.25 INJECTION, SOLUTION INTRAVENOUS ONCE
Status: CANCELLED | OUTPATIENT
Start: 2019-10-30

## 2019-10-23 ENCOUNTER — HOSPITAL ENCOUNTER (OUTPATIENT)
Dept: RADIOLOGY | Facility: HOSPITAL | Age: 59
Discharge: HOME | End: 2019-10-23

## 2019-10-23 ENCOUNTER — HOSPITAL ENCOUNTER (OUTPATIENT)
Dept: INFUSION CENTER | Facility: HOSPITAL | Age: 59
Discharge: HOME | End: 2019-10-23
Attending: OBSTETRICS & GYNECOLOGY

## 2019-10-23 VITALS
TEMPERATURE: 97.7 F | HEART RATE: 89 BPM | SYSTOLIC BLOOD PRESSURE: 133 MMHG | HEIGHT: 63 IN | WEIGHT: 218.48 LBS | DIASTOLIC BLOOD PRESSURE: 69 MMHG | OXYGEN SATURATION: 97 % | RESPIRATION RATE: 16 BRPM | BODY MASS INDEX: 38.71 KG/M2

## 2019-10-23 DIAGNOSIS — C56.2 MALIGNANT NEOPLASM OF LEFT OVARY (HCC): Primary | ICD-10-CM

## 2019-10-23 DIAGNOSIS — C56.2 MALIGNANT NEOPLASM OF LEFT OVARY (HCC): ICD-10-CM

## 2019-10-23 DIAGNOSIS — L08.9: Primary | ICD-10-CM

## 2019-10-23 RX ORDER — CEPHALEXIN 500 MG/1
500 CAPSULE ORAL EVERY 12 HOURS SCHEDULED
Qty: 20 CAPSULE | Refills: 0 | Status: SHIPPED | OUTPATIENT
Start: 2019-10-23 | End: 2019-11-02

## 2019-10-23 NOTE — SEDATION DOCUMENTATION
Per Dr Pineda Public, patient to follow up Monday for a repeat site check  Patient is also going to get antibiotics sent to her pharmacy to take

## 2019-10-23 NOTE — PLAN OF CARE
Problem: Potential for Falls  Goal: Patient will remain free of falls  Description  INTERVENTIONS:  - Assess patient frequently for physical needs  -  Identify cognitive and physical deficits and behaviors that affect risk of falls    -  [place] fall precautions as indicated by assessment   - Educate patient/family on patient safety including physical limitations  - Instruct patient to call for assistance with activity based on assessment  - Modify environment to reduce risk of injury  - Consider OT/PT consult to assist with strengthening/mobility  Outcome: Progressing

## 2019-10-23 NOTE — PROGRESS NOTES
Patient presented today for evaluation of port site  Patient went for infusion of chemotherapy, and there was concern about redness and possible discharge of the neck incision  Upon evaluation, there is some erythema around the neck incision  There is a very tiny amount of purulent appearing discharge in the incision  An attempt to express additional fluid did not yield significant drainage  The area was cleaned with alcohol, and dressed with a gauze  The patient was given a prescription for Keflex, and instructed to return either Friday or Monday for re-evaluation of response  Patient was made aware that the port may need to be removed if we cannot clear the infection

## 2019-10-23 NOTE — PROGRESS NOTES
Pt arrived on unit today for Chemotherapy  Pt states that her incision site where the wire was placed is red and had a very small amount of drainage, "just like a small pimple" but it has not had any thing since  The site is warm to touch and is red  Notified Ro Hurst text provided as per Luisana's request of the area  As per Balta Mccarthy, pt's treatment will be held today and she will need to have IR evaluate  Pt's apt was rescheduled for tomorrow  Pt notified of apt  Pt then left unit ambulatory with a steady gait

## 2019-10-24 ENCOUNTER — HOSPITAL ENCOUNTER (OUTPATIENT)
Dept: INFUSION CENTER | Facility: HOSPITAL | Age: 59
Discharge: HOME | End: 2019-10-24
Attending: OBSTETRICS & GYNECOLOGY

## 2019-10-24 DIAGNOSIS — C56.2 MALIGNANT NEOPLASM OF LEFT OVARY (HCC): ICD-10-CM

## 2019-10-25 ENCOUNTER — APPOINTMENT (OUTPATIENT)
Dept: LAB | Facility: HOSPITAL | Age: 59
End: 2019-10-25
Payer: COMMERCIAL

## 2019-10-25 ENCOUNTER — TRANSCRIBE ORDERS (OUTPATIENT)
Dept: ADMINISTRATIVE | Facility: HOSPITAL | Age: 59
End: 2019-10-25

## 2019-10-25 ENCOUNTER — PROCEDURE VISIT (OUTPATIENT)
Dept: UROLOGY | Facility: HOSPITAL | Age: 59
End: 2019-10-25
Payer: COMMERCIAL

## 2019-10-25 VITALS
WEIGHT: 221 LBS | BODY MASS INDEX: 40.67 KG/M2 | DIASTOLIC BLOOD PRESSURE: 70 MMHG | HEART RATE: 60 BPM | HEIGHT: 62 IN | SYSTOLIC BLOOD PRESSURE: 130 MMHG

## 2019-10-25 DIAGNOSIS — S37.10XD URETER INJURY, SUBSEQUENT ENCOUNTER: Primary | ICD-10-CM

## 2019-10-25 DIAGNOSIS — C56.2 MALIGNANT NEOPLASM OF LEFT OVARY (HCC): Primary | ICD-10-CM

## 2019-10-25 DIAGNOSIS — C56.2 MALIGNANT NEOPLASM OF LEFT OVARY (HCC): ICD-10-CM

## 2019-10-25 DIAGNOSIS — C56.9 MALIGNANT NEOPLASM OF OVARY, UNSPECIFIED LATERALITY (HCC): ICD-10-CM

## 2019-10-25 LAB
ALBUMIN SERPL BCP-MCNC: 3.4 G/DL (ref 3.5–5)
ALP SERPL-CCNC: 72 U/L (ref 46–116)
ALT SERPL W P-5'-P-CCNC: 20 U/L (ref 12–78)
ANION GAP SERPL CALCULATED.3IONS-SCNC: 10 MMOL/L (ref 4–13)
AST SERPL W P-5'-P-CCNC: 15 U/L (ref 5–45)
BASOPHILS # BLD AUTO: 0.08 THOUSANDS/ΜL (ref 0–0.1)
BASOPHILS NFR BLD AUTO: 1 % (ref 0–1)
BILIRUB SERPL-MCNC: 0.3 MG/DL (ref 0.2–1)
BUN SERPL-MCNC: 15 MG/DL (ref 5–25)
CALCIUM SERPL-MCNC: 9 MG/DL (ref 8.3–10.1)
CHLORIDE SERPL-SCNC: 102 MMOL/L (ref 100–108)
CO2 SERPL-SCNC: 28 MMOL/L (ref 21–32)
CREAT SERPL-MCNC: 0.79 MG/DL (ref 0.6–1.3)
EOSINOPHIL # BLD AUTO: 0.13 THOUSAND/ΜL (ref 0–0.61)
EOSINOPHIL NFR BLD AUTO: 2 % (ref 0–6)
ERYTHROCYTE [DISTWIDTH] IN BLOOD BY AUTOMATED COUNT: 13.1 % (ref 11.6–15.1)
GFR SERPL CREATININE-BSD FRML MDRD: 81 ML/MIN/1.73SQ M
GLUCOSE SERPL-MCNC: 132 MG/DL (ref 65–140)
HCT VFR BLD AUTO: 33.8 % (ref 34.8–46.1)
HGB BLD-MCNC: 11.4 G/DL (ref 11.5–15.4)
IMM GRANULOCYTES # BLD AUTO: 0.08 THOUSAND/UL (ref 0–0.2)
IMM GRANULOCYTES NFR BLD AUTO: 1 % (ref 0–2)
LYMPHOCYTES # BLD AUTO: 1.54 THOUSANDS/ΜL (ref 0.6–4.47)
LYMPHOCYTES NFR BLD AUTO: 25 % (ref 14–44)
MAGNESIUM SERPL-MCNC: 1.7 MG/DL (ref 1.6–2.6)
MCH RBC QN AUTO: 28.6 PG (ref 26.8–34.3)
MCHC RBC AUTO-ENTMCNC: 33.7 G/DL (ref 31.4–37.4)
MCV RBC AUTO: 85 FL (ref 82–98)
MONOCYTES # BLD AUTO: 0.56 THOUSAND/ΜL (ref 0.17–1.22)
MONOCYTES NFR BLD AUTO: 9 % (ref 4–12)
NEUTROPHILS # BLD AUTO: 3.79 THOUSANDS/ΜL (ref 1.85–7.62)
NEUTS SEG NFR BLD AUTO: 62 % (ref 43–75)
NRBC BLD AUTO-RTO: 0 /100 WBCS
PLATELET # BLD AUTO: 270 THOUSANDS/UL (ref 149–390)
PMV BLD AUTO: 8.9 FL (ref 8.9–12.7)
POTASSIUM SERPL-SCNC: 3.6 MMOL/L (ref 3.5–5.3)
PROT SERPL-MCNC: 7.1 G/DL (ref 6.4–8.2)
RBC # BLD AUTO: 3.99 MILLION/UL (ref 3.81–5.12)
SL AMB  POCT GLUCOSE, UA: NORMAL
SL AMB LEUKOCYTE ESTERASE,UA: NORMAL
SL AMB POCT BILIRUBIN,UA: NORMAL
SL AMB POCT BLOOD,UA: NORMAL
SL AMB POCT CLARITY,UA: CLEAR
SL AMB POCT COLOR,UA: YELLOW
SL AMB POCT KETONES,UA: NORMAL
SL AMB POCT NITRITE,UA: NORMAL
SL AMB POCT PH,UA: 6
SL AMB POCT SPECIFIC GRAVITY,UA: 1.02
SL AMB POCT URINE PROTEIN: NORMAL
SL AMB POCT UROBILINOGEN: 0.2
SODIUM SERPL-SCNC: 140 MMOL/L (ref 136–145)
WBC # BLD AUTO: 6.18 THOUSAND/UL (ref 4.31–10.16)

## 2019-10-25 PROCEDURE — 80053 COMPREHEN METABOLIC PANEL: CPT

## 2019-10-25 PROCEDURE — 36415 COLL VENOUS BLD VENIPUNCTURE: CPT

## 2019-10-25 PROCEDURE — 85025 COMPLETE CBC W/AUTO DIFF WBC: CPT

## 2019-10-25 PROCEDURE — 81002 URINALYSIS NONAUTO W/O SCOPE: CPT | Performed by: UROLOGY

## 2019-10-25 PROCEDURE — 83520 IMMUNOASSAY QUANT NOS NONAB: CPT

## 2019-10-25 PROCEDURE — 52310 CYSTOSCOPY AND TREATMENT: CPT | Performed by: UROLOGY

## 2019-10-25 PROCEDURE — 83735 ASSAY OF MAGNESIUM: CPT

## 2019-10-25 NOTE — PROGRESS NOTES
Cystoscopy  Date/Time: 10/25/2019 10:25 AM  Performed by: Trevin Lee MD  Authorized by: Trevin Lee MD     Procedure details: simple removal of a foreign body, stone, or stent          Written Consent Obtained  Indications for Procedure:  History of left ureteral reimplantation during hysterectomy bilateral salpingo-oophorectomy  Physical Exam    Constitutional   General appearance: No acute distress, well appearing and well nourished  Pulmonary   Respiratory effort: No increased work of breathing or signs of respiratory distress  Cardiovascular   Examination of extremities for edema and/or varicosities: Normal     Abdomen   Abdomen: Non-tender, no masses  Liver and spleen: No hepatomegaly or splenomegaly  Genitourinary   External genitalia and vagina: Normal, no lesions appreciated  Urethra: Normal, no discharge  Bladder: Not distended, no tenderness  Musculoskeletal   Gait and station: Normal     Skin   Skin and subcutaneous tissue: Normal without rashes or lesions  Lymphatic   Palpation of lymph nodes in groin: No lymphadenopathy  Additional Exam: Neuro exam nonfocal           The flexible cystoscope was introduced into the urethra and advanced into the bladder  The left ureteral stent was seen emanating from the dome of the bladder  It was grasped with a grasper and removed  It was found to be completely intact  Patient tolerated the procedure well  Plan:  Left ureteral stent removed without difficulty  She will follow up in 6 weeks with a renal ultrasound to make sure the kidney is draining well

## 2019-10-28 ENCOUNTER — TELEPHONE (OUTPATIENT)
Dept: RADIOLOGY | Facility: HOSPITAL | Age: 59
End: 2019-10-28

## 2019-10-28 ENCOUNTER — TELEPHONE (OUTPATIENT)
Dept: INPATIENT UNIT | Facility: HOSPITAL | Age: 59
End: 2019-10-28

## 2019-10-28 ENCOUNTER — HOSPITAL ENCOUNTER (OUTPATIENT)
Dept: RADIOLOGY | Facility: HOSPITAL | Age: 59
Discharge: HOME | End: 2019-10-28
Payer: COMMERCIAL

## 2019-10-28 DIAGNOSIS — C56.2 MALIGNANT NEOPLASM OF LEFT OVARY (HCC): ICD-10-CM

## 2019-10-28 PROCEDURE — 87186 SC STD MICRODIL/AGAR DIL: CPT | Performed by: RADIOLOGY

## 2019-10-28 PROCEDURE — 87147 CULTURE TYPE IMMUNOLOGIC: CPT | Performed by: RADIOLOGY

## 2019-10-28 PROCEDURE — 87205 SMEAR GRAM STAIN: CPT | Performed by: RADIOLOGY

## 2019-10-28 PROCEDURE — 87070 CULTURE OTHR SPECIMN AEROBIC: CPT | Performed by: RADIOLOGY

## 2019-10-28 RX ORDER — SODIUM CHLORIDE 9 MG/ML
75 INJECTION, SOLUTION INTRAVENOUS CONTINUOUS
Status: CANCELLED | OUTPATIENT
Start: 2019-10-28

## 2019-10-28 NOTE — DISCHARGE INSTRUCTIONS
Implanted Venous Access Port Removal    WHAT YOU NEED TO KNOW:   An implanted venous access port is a device used to give treatments and take blood  It may also be called a central venous access device (CVAD)  The port is a small container that is placed under your skin, usually in your upper chest  A port can also be placed in your arm or abdomen  The port is attached to a catheter that enters a large vein  DISCHARGE INSTRUCTIONS:   Resume your normal diet  Small sips of flat soda will help with mild nausea  Prevent an infection:     Wash your hands often  Use soap and water  Clean your hands before and  after you care for your incision  Check your skin for infection every day  Look for redness, swelling, or fluid oozing from the incision site  Dressing may come off in 24 hours  Medical glue will peel off on its own in 5 to 10 days  You may shower 24 hours after procedure  Follow up with your healthcare provider as directed  Write down your questions so you remember to ask them during your visits  Activity:  You may return to your daily activities when the area heals  Contact Interventional Radiology at [phone] [facility] PATIENTS: Contact Interventional Radiology at [phone]) Christine Flank PATIENTS: Contact Interventional Radiology at [phone]) if:     You have a fever  You have persistent nausea  Your inciscion site is red, swollen, or draining pus  You have questions or concerns about your condition or care  Seek care immediately or call 911 if:  Blood soaks through your bandage  The skin over or around your incision breaks open  Your heart is jumping or fluttering  You have a headache, blurred vision, and feel confused  You have pain in your arm, neck, shoulder, or chest     You have trouble breathing that is getting worse over time

## 2019-10-28 NOTE — SEDATION DOCUMENTATION
Here for right chest and right ij vein port site check  The right ij vein puncture site is not approximated x  05 cm  Surrounding tissue is erythemic and draining scant amount purulent drainage  Culture sent as per Dr Erika Martinez  Arranged for port removal tomorrow 10/29/19 at 1300 for right chest Port removal   Plan is to place new port in approximately one week after removal  Patient taking Keflex oral dose as per Dr Erika Martinez

## 2019-10-28 NOTE — TELEPHONE ENCOUNTER
Pre-instructions given to patient today 10/28/19 at bedside  Here for right chest/right ij vein port site check due to infection at right ij vein puncture site  Will remove right chest port as per Dr Stacie Da Silva  Appointment arranged for Tuesday 10/29/19 at 1300  NPO after midnight, will need  home, arrival time to Pre-surgery area will be about 1130 am   Pre surgery area will call tonight with arrival time and location  Hold metformin in am  Marlyn Luis to take other meds with sip of water  Verbalizes understanding of pre-instructions

## 2019-10-29 ENCOUNTER — HOSPITAL ENCOUNTER (OUTPATIENT)
Dept: RADIOLOGY | Facility: HOSPITAL | Age: 59
Discharge: HOME | End: 2019-10-29
Admitting: PHYSICIAN ASSISTANT
Payer: COMMERCIAL

## 2019-10-29 VITALS
BODY MASS INDEX: 37.92 KG/M2 | TEMPERATURE: 98.1 F | DIASTOLIC BLOOD PRESSURE: 80 MMHG | RESPIRATION RATE: 16 BRPM | HEIGHT: 63 IN | OXYGEN SATURATION: 96 % | HEART RATE: 82 BPM | SYSTOLIC BLOOD PRESSURE: 135 MMHG | WEIGHT: 214 LBS

## 2019-10-29 DIAGNOSIS — C56.2 MALIGNANT NEOPLASM OF LEFT OVARY (HCC): ICD-10-CM

## 2019-10-29 LAB
GLUCOSE SERPL-MCNC: 127 MG/DL (ref 65–140)
INHIBIN B SERPL-MCNC: <7 PG/ML (ref 0–16.9)

## 2019-10-29 PROCEDURE — 82948 REAGENT STRIP/BLOOD GLUCOSE: CPT

## 2019-10-29 PROCEDURE — 36590 REMOVAL TUNNELED CV CATH: CPT | Performed by: RADIOLOGY

## 2019-10-29 PROCEDURE — 99152 MOD SED SAME PHYS/QHP 5/>YRS: CPT | Performed by: RADIOLOGY

## 2019-10-29 PROCEDURE — 87070 CULTURE OTHR SPECIMN AEROBIC: CPT | Performed by: RADIOLOGY

## 2019-10-29 PROCEDURE — 99152 MOD SED SAME PHYS/QHP 5/>YRS: CPT

## 2019-10-29 PROCEDURE — 36590 REMOVAL TUNNELED CV CATH: CPT

## 2019-10-29 RX ORDER — FENTANYL CITRATE 50 UG/ML
INJECTION, SOLUTION INTRAMUSCULAR; INTRAVENOUS CODE/TRAUMA/SEDATION MEDICATION
Status: COMPLETED | OUTPATIENT
Start: 2019-10-29 | End: 2019-10-29

## 2019-10-29 RX ORDER — MIDAZOLAM HYDROCHLORIDE 1 MG/ML
INJECTION INTRAMUSCULAR; INTRAVENOUS CODE/TRAUMA/SEDATION MEDICATION
Status: COMPLETED | OUTPATIENT
Start: 2019-10-29 | End: 2019-10-29

## 2019-10-29 RX ADMIN — FENTANYL CITRATE 50 MCG: 50 INJECTION, SOLUTION INTRAMUSCULAR; INTRAVENOUS at 13:38

## 2019-10-29 RX ADMIN — MIDAZOLAM 1 MG: 1 INJECTION INTRAMUSCULAR; INTRAVENOUS at 13:38

## 2019-10-29 RX ADMIN — MIDAZOLAM 1 MG: 1 INJECTION INTRAMUSCULAR; INTRAVENOUS at 13:41

## 2019-10-29 RX ADMIN — FENTANYL CITRATE 50 MCG: 50 INJECTION, SOLUTION INTRAMUSCULAR; INTRAVENOUS at 13:41

## 2019-10-29 NOTE — INTERVAL H&P NOTE
The history and physical were reviewed, along with progress notes, and the patient was examined  The patient has developed evidence of infection at the neck counter incision for her right chest port    The patient presents today for port removal     /84   Pulse 77   Temp 97 7 °F (36 5 °C) (Tympanic)   Resp 18   Ht 5' 3" (1 6 m)   Wt 97 1 kg (214 lb)   SpO2 97%   BMI 37 91 kg/m²        Vaibhav Anand MD/October 29, 2019/1:31 PM

## 2019-10-29 NOTE — DISCHARGE INSTRUCTIONS
Implanted Venous Access Port Removal    WHAT YOU NEED TO KNOW:   An implanted venous access port is a device used to give treatments and take blood  It may also be called a central venous access device (CVAD)  The port is a small container that is placed under your skin, usually in your upper chest  A port can also be placed in your arm or abdomen  The port is attached to a catheter that enters a large vein  DISCHARGE INSTRUCTIONS:   Resume your normal diet  Small sips of flat soda will help with mild nausea  Prevent an infection:     Wash your hands often  Use soap and water  Clean your hands before and  after you care for your incision  Check your skin for infection every day  Look for redness, swelling, or fluid oozing from the incision site  Dressing may come off in 24 hours  Medical glue will peel off on its own in 5 to 10 days  You may shower 24 hours after procedure  Follow up with your healthcare provider as directed  Write down your questions so you remember to ask them during your visits  Activity:  You may return to your daily activities when the area heals  Contact Interventional Radiology at [phone] [facility] PATIENTS: Contact Interventional Radiology at [phone]) Madelaine Amador PATIENTS: Contact Interventional Radiology at [phone]) if:     You have a fever  You have persistent nausea  Your inciscion site is red, swollen, or draining pus  You have questions or concerns about your condition or care  Seek care immediately or call 911 if:  Blood soaks through your bandage  The skin over or around your incision breaks open  Your heart is jumping or fluttering  You have a headache, blurred vision, and feel confused  You have pain in your arm, neck, shoulder, or chest     You have trouble breathing that is getting worse over time  Implanted Venous Access Port Removal    WHAT YOU NEED TO KNOW:   An implanted venous access port is a device used to give treatments and take blood  It may also be called a central venous access device (CVAD)  The port is a small container that is placed under your skin, usually in your upper chest  A port can also be placed in your arm or abdomen  The port is attached to a catheter that enters a large vein  DISCHARGE INSTRUCTIONS:   Resume your normal diet  Small sips of flat soda will help with mild nausea  Prevent an infection:     Wash your hands often  Use soap and water  Clean your hands before and  after you care for your incision  Check your skin for infection every day  Look for redness, swelling, or fluid oozing from the incision site  Dressing may come off in 24 hours  Medical glue will peel off on its own in 5 to 10 days  You may shower 24 hours after procedure  Follow up with your healthcare provider as directed  Write down your questions so you remember to ask them during your visits  Activity:  You may return to your daily activities when the area heals  Contact Interventional Radiology at [phone] [facility] PATIENTS: Contact Interventional Radiology at [phone] Chen PATIENTS: Contact Interventional Radiology at [phone]) if:     You have a fever  You have persistent nausea  Your inciscion site is red, swollen, or draining pus  You have questions or concerns about your condition or care  Seek care immediately or call 911 if:  Blood soaks through your bandage  The skin over or around your incision breaks open  Your heart is jumping or fluttering  You have a headache, blurred vision, and feel confused  You have pain in your arm, neck, shoulder, or chest     You have trouble breathing that is getting worse over time

## 2019-10-29 NOTE — SEDATION DOCUMENTATION
Pt tolerated port removal without complication  Tip sent for culture and report to Rn in [address]

## 2019-10-29 NOTE — BRIEF OP NOTE (RAD/CATH)
INTERVENTIONAL RADIOLOGY PROCEDURE NOTE    Date: 10/29/2019    Procedure:  Port removal    Preoperative diagnosis:  Port infection    Postoperative diagnosis: Same    Surgeon: Scott Vitale MD     Assistant: None  No qualified resident was available  Blood loss:  Minimal    Specimens:  Port sent for culture    Findings:  Port successfully removed  No gross infection present  Despite previous fluid at the neck incision, today, it was dry  Suture material was close to the skin, and this was removed      Complications: None Immediate    Anesthesia: concious sedation

## 2019-10-30 ENCOUNTER — HOSPITAL ENCOUNTER (OUTPATIENT)
Dept: INFUSION CENTER | Facility: HOSPITAL | Age: 59
Discharge: HOME | End: 2019-10-30
Attending: OBSTETRICS & GYNECOLOGY
Payer: COMMERCIAL

## 2019-10-30 VITALS
SYSTOLIC BLOOD PRESSURE: 139 MMHG | HEART RATE: 85 BPM | DIASTOLIC BLOOD PRESSURE: 86 MMHG | HEIGHT: 63 IN | WEIGHT: 221.34 LBS | BODY MASS INDEX: 39.22 KG/M2 | RESPIRATION RATE: 16 BRPM | TEMPERATURE: 97.3 F | OXYGEN SATURATION: 97 %

## 2019-10-30 DIAGNOSIS — C56.2 MALIGNANT NEOPLASM OF LEFT OVARY (HCC): Primary | ICD-10-CM

## 2019-10-30 LAB
BACTERIA WND AEROBE CULT: ABNORMAL
GRAM STN SPEC: ABNORMAL

## 2019-10-30 PROCEDURE — 96367 TX/PROPH/DG ADDL SEQ IV INF: CPT

## 2019-10-30 PROCEDURE — 96375 TX/PRO/DX INJ NEW DRUG ADDON: CPT

## 2019-10-30 PROCEDURE — 96417 CHEMO IV INFUS EACH ADDL SEQ: CPT

## 2019-10-30 PROCEDURE — 96415 CHEMO IV INFUSION ADDL HR: CPT

## 2019-10-30 PROCEDURE — 96413 CHEMO IV INFUSION 1 HR: CPT

## 2019-10-30 RX ORDER — PALONOSETRON 0.05 MG/ML
0.25 INJECTION, SOLUTION INTRAVENOUS ONCE
Status: COMPLETED | OUTPATIENT
Start: 2019-10-30 | End: 2019-10-30

## 2019-10-30 RX ORDER — SODIUM CHLORIDE 9 MG/ML
20 INJECTION, SOLUTION INTRAVENOUS ONCE
Status: COMPLETED | OUTPATIENT
Start: 2019-10-30 | End: 2019-10-30

## 2019-10-30 RX ADMIN — DEXAMETHASONE SODIUM PHOSPHATE 20 MG: 10 INJECTION, SOLUTION INTRAMUSCULAR; INTRAVENOUS at 09:37

## 2019-10-30 RX ADMIN — SODIUM CHLORIDE 150 MG: 0.9 INJECTION, SOLUTION INTRAVENOUS at 11:08

## 2019-10-30 RX ADMIN — CARBOPLATIN 653.4 MG: 10 INJECTION, SOLUTION INTRAVENOUS at 15:02

## 2019-10-30 RX ADMIN — SODIUM CHLORIDE 20 ML/HR: 0.9 INJECTION, SOLUTION INTRAVENOUS at 09:36

## 2019-10-30 RX ADMIN — PACLITAXEL 351.6 MG: 6 INJECTION, SOLUTION INTRAVENOUS at 11:53

## 2019-10-30 RX ADMIN — PALONOSETRON HYDROCHLORIDE 0.25 MG: 0.25 INJECTION, SOLUTION INTRAVENOUS at 11:45

## 2019-10-30 RX ADMIN — FAMOTIDINE 20 MG: 10 INJECTION INTRAVENOUS at 10:38

## 2019-10-30 RX ADMIN — DIPHENHYDRAMINE HYDROCHLORIDE 25 MG: 50 INJECTION, SOLUTION INTRAMUSCULAR; INTRAVENOUS at 10:06

## 2019-10-30 NOTE — PROGRESS NOTES
Pt coretta infusions w/o AR thru PIV Lt forearm  Removed intact  Dsd applied  Disch amb to home, steady gait

## 2019-10-30 NOTE — PROGRESS NOTES
Pt had port removed yesterday and sent for culture  Verified with Salome Reynaga PA-C, we are okay to treat via peripheral site

## 2019-10-31 ENCOUNTER — DOCUMENTATION (OUTPATIENT)
Dept: INFUSION CENTER | Facility: HOSPITAL | Age: 59
End: 2019-10-31

## 2019-10-31 NOTE — SOCIAL WORK
MSW met with pt during treatment in the infusion center today  Pt was resting and listening to an audiobook, which she shared is available through her [employer]  She stated that is has been helpful to be able to relax and listen while spending time in the infusion center for her treatments  Pt is coping and tolerating well at this time  MSW provided the patient with the upcoming support group flyer and encouraged the patient to reach out at any time

## 2019-11-01 LAB
BACTERIA CATH TIP CULT: NO GROWTH
BACTERIA CATH TIP CULT: NORMAL

## 2019-11-04 ENCOUNTER — TELEPHONE (OUTPATIENT)
Dept: RADIOLOGY | Facility: HOSPITAL | Age: 59
End: 2019-11-04

## 2019-11-04 RX ORDER — SODIUM CHLORIDE 9 MG/ML
75 INJECTION, SOLUTION INTRAVENOUS CONTINUOUS
Status: CANCELLED | OUTPATIENT
Start: 2019-11-04

## 2019-11-06 ENCOUNTER — TELEPHONE (OUTPATIENT)
Dept: SURGERY | Facility: HOSPITAL | Age: 59
End: 2019-11-06

## 2019-11-07 ENCOUNTER — HOSPITAL ENCOUNTER (OUTPATIENT)
Dept: RADIOLOGY | Facility: HOSPITAL | Age: 59
Discharge: HOME | End: 2019-11-07
Admitting: RADIOLOGY
Payer: COMMERCIAL

## 2019-11-07 VITALS
WEIGHT: 221 LBS | HEART RATE: 80 BPM | BODY MASS INDEX: 39.16 KG/M2 | TEMPERATURE: 96.6 F | HEIGHT: 63 IN | DIASTOLIC BLOOD PRESSURE: 68 MMHG | OXYGEN SATURATION: 92 % | RESPIRATION RATE: 16 BRPM | SYSTOLIC BLOOD PRESSURE: 108 MMHG

## 2019-11-07 DIAGNOSIS — C56.2 MALIGNANT NEOPLASM OF LEFT OVARY (HCC): ICD-10-CM

## 2019-11-07 LAB — GLUCOSE SERPL-MCNC: 176 MG/DL (ref 65–140)

## 2019-11-07 PROCEDURE — 76937 US GUIDE VASCULAR ACCESS: CPT | Performed by: RADIOLOGY

## 2019-11-07 PROCEDURE — 77001 FLUOROGUIDE FOR VEIN DEVICE: CPT

## 2019-11-07 PROCEDURE — 99153 MOD SED SAME PHYS/QHP EA: CPT

## 2019-11-07 PROCEDURE — 99152 MOD SED SAME PHYS/QHP 5/>YRS: CPT

## 2019-11-07 PROCEDURE — 77001 FLUOROGUIDE FOR VEIN DEVICE: CPT | Performed by: RADIOLOGY

## 2019-11-07 PROCEDURE — C1894 INTRO/SHEATH, NON-LASER: HCPCS

## 2019-11-07 PROCEDURE — 82948 REAGENT STRIP/BLOOD GLUCOSE: CPT

## 2019-11-07 PROCEDURE — 36561 INSERT TUNNELED CV CATH: CPT

## 2019-11-07 PROCEDURE — 36561 INSERT TUNNELED CV CATH: CPT | Performed by: RADIOLOGY

## 2019-11-07 PROCEDURE — C1788 PORT, INDWELLING, IMP: HCPCS

## 2019-11-07 PROCEDURE — 99152 MOD SED SAME PHYS/QHP 5/>YRS: CPT | Performed by: RADIOLOGY

## 2019-11-07 PROCEDURE — 76937 US GUIDE VASCULAR ACCESS: CPT

## 2019-11-07 RX ORDER — SODIUM CHLORIDE 9 MG/ML
75 INJECTION, SOLUTION INTRAVENOUS CONTINUOUS
Status: DISCONTINUED | OUTPATIENT
Start: 2019-11-07 | End: 2019-11-08 | Stop reason: HOSPADM

## 2019-11-07 RX ORDER — FENTANYL CITRATE 50 UG/ML
INJECTION, SOLUTION INTRAMUSCULAR; INTRAVENOUS CODE/TRAUMA/SEDATION MEDICATION
Status: COMPLETED | OUTPATIENT
Start: 2019-11-07 | End: 2019-11-07

## 2019-11-07 RX ORDER — DIPHENHYDRAMINE HYDROCHLORIDE 50 MG/ML
INJECTION INTRAMUSCULAR; INTRAVENOUS CODE/TRAUMA/SEDATION MEDICATION
Status: COMPLETED | OUTPATIENT
Start: 2019-11-07 | End: 2019-11-07

## 2019-11-07 RX ORDER — ONDANSETRON 2 MG/ML
INJECTION INTRAMUSCULAR; INTRAVENOUS CODE/TRAUMA/SEDATION MEDICATION
Status: COMPLETED | OUTPATIENT
Start: 2019-11-07 | End: 2019-11-07

## 2019-11-07 RX ORDER — CEFAZOLIN SODIUM 2 G/50ML
SOLUTION INTRAVENOUS
Status: COMPLETED | OUTPATIENT
Start: 2019-11-07 | End: 2019-11-07

## 2019-11-07 RX ORDER — MIDAZOLAM HYDROCHLORIDE 2 MG/2ML
INJECTION, SOLUTION INTRAMUSCULAR; INTRAVENOUS CODE/TRAUMA/SEDATION MEDICATION
Status: COMPLETED | OUTPATIENT
Start: 2019-11-07 | End: 2019-11-07

## 2019-11-07 RX ORDER — LIDOCAINE HYDROCHLORIDE 10 MG/ML
0.5 INJECTION, SOLUTION EPIDURAL; INFILTRATION; INTRACAUDAL; PERINEURAL ONCE AS NEEDED
Status: COMPLETED | OUTPATIENT
Start: 2019-11-07 | End: 2019-11-07

## 2019-11-07 RX ORDER — METOCLOPRAMIDE HYDROCHLORIDE 5 MG/ML
10 INJECTION INTRAMUSCULAR; INTRAVENOUS ONCE
Status: COMPLETED | OUTPATIENT
Start: 2019-11-07 | End: 2019-11-07

## 2019-11-07 RX ADMIN — LIDOCAINE HYDROCHLORIDE 0.5 ML: 10 INJECTION, SOLUTION EPIDURAL; INFILTRATION; INTRACAUDAL; PERINEURAL at 09:05

## 2019-11-07 RX ADMIN — METOCLOPRAMIDE 10 MG: 5 INJECTION, SOLUTION INTRAMUSCULAR; INTRAVENOUS at 12:38

## 2019-11-07 RX ADMIN — FENTANYL CITRATE 50 MCG: 50 INJECTION, SOLUTION INTRAMUSCULAR; INTRAVENOUS at 11:57

## 2019-11-07 RX ADMIN — MIDAZOLAM 1 MG: 1 INJECTION INTRAMUSCULAR; INTRAVENOUS at 11:55

## 2019-11-07 RX ADMIN — MIDAZOLAM 1 MG: 1 INJECTION INTRAMUSCULAR; INTRAVENOUS at 11:27

## 2019-11-07 RX ADMIN — CEFAZOLIN SODIUM 2000 MG: 2 SOLUTION INTRAVENOUS at 11:18

## 2019-11-07 RX ADMIN — DIPHENHYDRAMINE HYDROCHLORIDE 25 MG: 50 INJECTION, SOLUTION INTRAMUSCULAR; INTRAVENOUS at 11:40

## 2019-11-07 RX ADMIN — ONDANSETRON 4 MG: 2 INJECTION INTRAMUSCULAR; INTRAVENOUS at 11:32

## 2019-11-07 RX ADMIN — FENTANYL CITRATE 50 MCG: 50 INJECTION, SOLUTION INTRAMUSCULAR; INTRAVENOUS at 11:27

## 2019-11-07 RX ADMIN — MIDAZOLAM 0.5 MG: 1 INJECTION INTRAMUSCULAR; INTRAVENOUS at 11:58

## 2019-11-07 RX ADMIN — SODIUM CHLORIDE 75 ML/HR: 0.9 INJECTION, SOLUTION INTRAVENOUS at 09:05

## 2019-11-07 NOTE — H&P
Interventional Radiology  History and Physical 11/7/2019     History of Present Illness:  62year old female with ovarian cancer will be undergoing chemotherapy and presents for port placement      Past Medical History:   Diagnosis Date    Anxiety     Diabetes mellitus ([facility] [address] )     High cholesterol     Hypertension     Morbid obesity ([facility] [address] )     Ovarian neoplasm 8/26/2019    PONV (postoperative nausea and vomiting)     Post-menopausal bleeding     Sleep apnea         Past Surgical History:   Procedure Laterality Date    BLADDER REPAIR N/A 8/26/2019    Procedure: REPAIR BLADDER; uretero yared cystotomy;  Surgeon: Braulio Rodriguez MD;  Location: [facility];  Service: Gynecology Oncology    CHOLECYSTECTOMY      IR IMAGE GUIDED ASPIRATION / DRAINAGE  9/27/2019    IR PORT PLACEMENT  9/27/2019    IR PORT REMOVAL  10/29/2019    [place] TOTAL ABDOM HYSTERECTOMY N/A 8/26/2019    Procedure: TOTAL ABDOMINAL HYSTERECTOMY, BILATERAL SALPINGO-OOPHORECTOMY, Radical omentectomy, pelvic lymph node sampling, staging biopsy, repair of cystotomy, appendectomy;  Surgeon: Braulio Rodriguez MD;  Location: [facility];  Service: Gynecology Oncology        Social History     Tobacco Use   Smoking Status Former Smoker   Smokeless Tobacco Never Used   Tobacco Comment    quit in her 27's        Social History     Substance and Sexual Activity   Alcohol Use Not Currently    Frequency: Never        Social History     Substance and Sexual Activity   Drug Use Never        Allergies   Allergen Reactions    Sulfites Other (See Comments)     Causes flushing     Objective:   Physical Exam    /80   Pulse 83   Temp 99 °F (37 2 °C) (Tympanic)   Resp 20   Ht 5' 3" (1 6 m)   Wt 100 kg (221 lb)   SpO2 95%   BMI 39 15 kg/m²   Gen: NAD    Lab Results   Component Value Date    WBC 9 0 11/04/2019    HGB 13 4 11/04/2019    HCT 39 5 11/04/2019    MCV 83 9 11/04/2019     11/04/2019     Lab Results   Component Value Date    INR 1 04 08/16/2019    PROTIME 13 3 08/16/2019     I have personally reviewed pertinent imaging and laboratory results  Assessment/Plan:  - Port placement    This procedure has been fully reviewed with the patient and written informed consent has been obtained

## 2019-11-07 NOTE — DISCHARGE INSTRUCTIONS
Implanted Venous Access Port     WHAT YOU NEED TO KNOW:   An implanted venous access port is a device used to give treatments and take blood  It may also be called a central venous access device (CVAD)  The port is a small container that is placed under your skin, usually in your upper chest  The port is attached to a catheter that enters a large vein  DISCHARGE INSTRUCTIONS:   Resume your normal diet  Small sips of flat soda will help with mild nausea  Prevent an infection:   · Wash your hands often  Use soap and water  Clean your hands before and after you care for your port  Remind everyone who cares for your port to wash their hands  · Check your skin for infection every day  Look for redness, swelling, or fluid oozing from the port site  Care for your port:   1  You may shower beginning 48 hours after procedure  2  Change dressing if it becomes wet  3  Remove dressing after 24 hours  Leave glue in place  4  It is normal for some bruising to occur  5  Use Tylenol for pain  6  Limit use of arm on the side that your port was placed  Lift nothing heavier than 5 pounds for 1 week, and then gradually increase activity as tolerated  7  DO NOT apply ointment, lotion or cream to port site until incision is healed  Allow glue to fall off  DO NOT attempt to peel glue from skin even it it begins to flake  8, After the port incision is healed you may swim, bathe  Notify the Interventional Radiologist if you have any of the followin  Fever above 101 F    2  Increased redness or swelling after 1st day  3  Increased pain after 1st day  4  Any sign of infection (drainage from port site, skin separation, hot to touch)  5  Persistent nausea or vomiting  Contact Interventional Radiology at [phone] [facility] PATIENTS: Contact Interventional Radiology at [phone]) ([address]: Contact Interventional Radiology at [phone])

## 2019-11-07 NOTE — BRIEF OP NOTE (RAD/CATH)
IR PORT PLACEMENT  Procedure Note    PATIENT NAME: Samm Ogden  : 1960  MRN: [id number]     Pre-op Diagnosis:   1  Malignant neoplasm of left ovary (HCC)      Post-op Diagnosis:   1  Malignant neoplasm of left ovary [facility])        Surgeon:   Vicky Meraz MD    Estimated Blood Loss: 3 mL    Findings: Successful left chest port placement      Specimens: None    Complications:  None    Anesthesia: Conscious sedation and Local    Vicky Meraz MD     Date: 2019  Time: 12:53 PM

## 2019-11-07 NOTE — PROGRESS NOTES
Assumed care of  patient at 1234 hours  Is S/P IR Port Placement  Report received from Benja Leigh and Nir Davidson from [employer]  Patient in supine position with HOB elevated to 60 degrees  Noted  patient dry heaving upon ASC Post Op arrival   Medicated with Reglan 10 mg slow IVP at 1238 hours At present resting quietly  with eyes closed  In NAD

## 2019-11-13 ENCOUNTER — OFFICE VISIT (OUTPATIENT)
Dept: GYNECOLOGIC ONCOLOGY | Facility: HOSPITAL | Age: 59
End: 2019-11-13
Payer: COMMERCIAL

## 2019-11-13 VITALS
SYSTOLIC BLOOD PRESSURE: 130 MMHG | TEMPERATURE: 97.7 F | WEIGHT: 215 LBS | HEART RATE: 85 BPM | HEIGHT: 63 IN | BODY MASS INDEX: 38.09 KG/M2 | DIASTOLIC BLOOD PRESSURE: 80 MMHG

## 2019-11-13 DIAGNOSIS — C56.2 MALIGNANT NEOPLASM OF LEFT OVARY (HCC): Primary | ICD-10-CM

## 2019-11-13 PROCEDURE — 99214 OFFICE O/P EST MOD 30 MIN: CPT | Performed by: PHYSICIAN ASSISTANT

## 2019-11-13 NOTE — ASSESSMENT & PLAN NOTE
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

## 2019-11-13 NOTE — PROGRESS NOTES
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  She is currently receiving adjuvant taxol 175 mg/m2 and carboplatin AUC 6 every 21 days  She is tolerating treatment well       Continue with cycle 3 of treatment as long as her metabolic and hematologic parameters are adequate     Return to the office as per her chemotherapy calendar  CHIEF COMPLAINT:   Pre-chemotherapy evaluation    Problem:  Cancer Staging  Malignant neoplasm of left ovary (HCC)  Staging form: Ovary, Fallopian Tube, Primary Peritoneal, AJCC 8th Edition  - Clinical stage from 8/26/2019: FIGO Stage IC1, calculated as Stage IA (cT1a, cN0, cM0) - Signed by Bipin Ingram MD on 9/11/2019        Previous therapy:     Malign[facility] )    8/26/2019 Initial Diagnosis     Malignant neoplasm of left ovary ([facility] )      8/26/2019 -  Cancer Staged     Staging form: Ovary, Fallopian Tube, Primary Peritoneal, AJCC 8th Edition  - Clinical stage from 8/26/2019: FIGO Stage IC1, calculated as Stage IA (cT1a, cN0, cM0) - Signed by Bipin Ingram MD on 9/11/2019         Chemotherapy     Taxol 175 mg/m2 and carboplatin AUC 6 every 21 days  She received 2 cycles and is scheduled for cycle 3  Patient ID: Rhoda Marina is a 62 y o  female  who presents to the office for pre-chemotherapy evaluation  Overall, the patient is tolerating treatment well  She has been afebrile  In the interim, the patient underwent mediport removal for infection and replacement  She notes normal bowel/bladder function  No vomiting  Nausea is well controlled  Appetite is appropriate  Patient notes intermittent numbness in her toes, which is barely noticeable   She notes intermittent dizziness, which has improved since taking a decongestant and using a nasal spray       The following portions of the patient's history were reviewed and updated as appropriate: allergies, current medications, past medical history and problem list     Review of Systems   Constitutional: Negative  HENT: Positive for sinus pressure  Eyes: Negative  Respiratory: Negative  Cardiovascular: Negative  Gastrointestinal: Negative  Genitourinary: Negative  Musculoskeletal: Negative  Skin: Negative  Neurological: Positive for dizziness (intermittent, related to sinus pressure)  Psychiatric/Behavioral: Negative  Current Outpatient Medications   Medication Sig Dispense Refill    amLODIPine (NORVASC) 5 mg tablet Take 5 mg by mouth daily   4    buPROPion (WELLBUTRIN SR) 150 mg 12 hr tablet every evening   0    Cholecalciferol (VITAMIN D-3) 1000 units CAPS Take by mouth daily       escitalopram (LEXAPRO) 20 mg tablet 20 mg daily   0    LORazepam (ATIVAN) 1 mg tablet Take 1 tablet (1 mg total) by mouth every 8 (eight) hours as needed (nausea or anxiety) 20 tablet 1    metFORMIN (GLUCOPHAGE) 500 mg tablet 500 mg 2 (two) times a day with meals   0    nystatin (MYCOSTATIN) powder Apply topically 4 (four) times a day 45 g 0    Omega 3 1200 MG CAPS Take by mouth daily      ondansetron (ZOFRAN) 8 mg tablet Take 1 tablet (8 mg total) by mouth every 8 (eight) hours as needed for nausea or vomiting 20 tablet 1    scopolamine (TRANSDERM-SCOP) 1 5 mg/3 days TD 72 hr patch Place 1 patch on the skin every third day (Patient taking differently: Place 1 patch on the skin as needed ) 2 patch 1    simvastatin (ZOCOR) 20 mg tablet TK 1 T PO QD IN THE KIMBERLYN  5    SUPER B COMPLEX/C PO Take by mouth daily       valsartan-hydrochlorothiazide (DIOVAN-HCT) 320-25 MG per tablet daily   0     No current facility-administered medications for this visit              Objective:    Blood pressure 130/80, pulse 85, temperature 97 7 °F (36 5 °C), temperature source Tympanic, height 5' 3" (1 6 m), weight 97 5 kg (215 lb)  Body mass index is 38 09 kg/m²  Body surface area is 1 99 meters squared  Physical Exam   Constitutional: She is oriented to person, place, and time  She appears well-developed and well-nourished  Pulmonary/Chest: Effort normal    Neurological: She is alert and oriented to person, place, and time  Skin: Skin is warm and dry  No rash noted  Psychiatric: She has a normal mood and affect  Her behavior is normal    Performance status is zero      Lab Results   Component Value Date    K 3 6 11/12/2019    CL 99 11/12/2019    CO2 28 11/12/2019    BUN 13 11/12/2019    CREATININE 0 74 11/12/2019    GLUCOSE 188 (H) 08/26/2019    CALCIUM 9 9 11/12/2019    AST 16 11/12/2019    ALT 19 11/12/2019    ALKPHOS 86 11/12/2019    EGFR 81 10/25/2019     Lab Results   Component Value Date    WBC 5 4 11/12/2019    HGB 13 6 11/12/2019    HCT 39 2 11/12/2019    MCV 83 1 11/12/2019     11/12/2019     Lab Results   Component Value Date    NEUTROABS 3,559 11/12/2019

## 2019-11-19 RX ORDER — PALONOSETRON 0.05 MG/ML
0.25 INJECTION, SOLUTION INTRAVENOUS ONCE
Status: CANCELLED | OUTPATIENT
Start: 2019-11-20

## 2019-11-19 RX ORDER — SODIUM CHLORIDE 9 MG/ML
20 INJECTION, SOLUTION INTRAVENOUS ONCE
Status: CANCELLED | OUTPATIENT
Start: 2019-11-20

## 2019-11-20 ENCOUNTER — HOSPITAL ENCOUNTER (OUTPATIENT)
Dept: INFUSION CENTER | Facility: HOSPITAL | Age: 59
Discharge: HOME | End: 2019-11-20
Attending: OBSTETRICS & GYNECOLOGY
Payer: COMMERCIAL

## 2019-11-20 VITALS
OXYGEN SATURATION: 96 % | HEIGHT: 63 IN | BODY MASS INDEX: 38.98 KG/M2 | SYSTOLIC BLOOD PRESSURE: 137 MMHG | TEMPERATURE: 98 F | HEART RATE: 93 BPM | RESPIRATION RATE: 16 BRPM | DIASTOLIC BLOOD PRESSURE: 77 MMHG | WEIGHT: 220.02 LBS

## 2019-11-20 DIAGNOSIS — C56.2 MALIGNANT NEOPLASM OF LEFT OVARY (HCC): Primary | ICD-10-CM

## 2019-11-20 PROCEDURE — 96415 CHEMO IV INFUSION ADDL HR: CPT

## 2019-11-20 PROCEDURE — 96375 TX/PRO/DX INJ NEW DRUG ADDON: CPT

## 2019-11-20 PROCEDURE — 96413 CHEMO IV INFUSION 1 HR: CPT

## 2019-11-20 PROCEDURE — 96417 CHEMO IV INFUS EACH ADDL SEQ: CPT

## 2019-11-20 PROCEDURE — 96367 TX/PROPH/DG ADDL SEQ IV INF: CPT

## 2019-11-20 RX ORDER — SODIUM CHLORIDE 9 MG/ML
20 INJECTION, SOLUTION INTRAVENOUS ONCE
Status: COMPLETED | OUTPATIENT
Start: 2019-11-20 | End: 2019-11-20

## 2019-11-20 RX ORDER — PALONOSETRON 0.05 MG/ML
0.25 INJECTION, SOLUTION INTRAVENOUS ONCE
Status: COMPLETED | OUTPATIENT
Start: 2019-11-20 | End: 2019-11-20

## 2019-11-20 RX ADMIN — PALONOSETRON HYDROCHLORIDE 0.25 MG: 0.25 INJECTION, SOLUTION INTRAVENOUS at 09:53

## 2019-11-20 RX ADMIN — FAMOTIDINE 20 MG: 10 INJECTION INTRAVENOUS at 10:23

## 2019-11-20 RX ADMIN — SODIUM CHLORIDE 20 ML/HR: 0.9 INJECTION, SOLUTION INTRAVENOUS at 09:27

## 2019-11-20 RX ADMIN — PACLITAXEL 348 MG: 6 INJECTION, SOLUTION INTRAVENOUS at 11:27

## 2019-11-20 RX ADMIN — CARBOPLATIN 705 MG: 10 INJECTION, SOLUTION INTRAVENOUS at 14:40

## 2019-11-20 RX ADMIN — DEXAMETHASONE SODIUM PHOSPHATE 20 MG: 10 INJECTION, SOLUTION INTRAMUSCULAR; INTRAVENOUS at 09:26

## 2019-11-20 RX ADMIN — SODIUM CHLORIDE 150 MG: 0.9 INJECTION, SOLUTION INTRAVENOUS at 10:41

## 2019-11-20 RX ADMIN — DIPHENHYDRAMINE HYDROCHLORIDE 25 MG: 50 INJECTION, SOLUTION INTRAMUSCULAR; INTRAVENOUS at 09:48

## 2019-11-20 NOTE — PROGRESS NOTES
Patient in infusion center today for chemotherapy infusion  VSS  Labs within range   Pt offers no complaints at this time

## 2019-12-04 ENCOUNTER — OFFICE VISIT (OUTPATIENT)
Dept: GYNECOLOGIC ONCOLOGY | Facility: HOSPITAL | Age: 59
End: 2019-12-04
Payer: COMMERCIAL

## 2019-12-04 VITALS
BODY MASS INDEX: 39.16 KG/M2 | RESPIRATION RATE: 16 BRPM | HEART RATE: 103 BPM | WEIGHT: 221 LBS | DIASTOLIC BLOOD PRESSURE: 80 MMHG | SYSTOLIC BLOOD PRESSURE: 138 MMHG | TEMPERATURE: 98.6 F | HEIGHT: 63 IN

## 2019-12-04 DIAGNOSIS — C56.2 MALIGNANT NEOPLASM OF LEFT OVARY (HCC): Primary | ICD-10-CM

## 2019-12-04 PROCEDURE — 99214 OFFICE O/P EST MOD 30 MIN: CPT | Performed by: PHYSICIAN ASSISTANT

## 2019-12-04 NOTE — ASSESSMENT & PLAN NOTE
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

## 2019-12-04 NOTE — PROGRESS NOTES
Assessment/Plan:    Problem List Items Addressed This Visit        Endocrine    [facility] ) - Primary     Stage IC1 mixed stromal cell tumor of the ovary with poorly differentiated sertoli-lydig cell component  She is currently receiving adjuvant taxol 175 mg/m2 and carboplatin AUC 6 every 21 days  She is tolerating treatment well       Continue with cycle 4 of treatment as long as her metabolic and hematologic parameters are adequate       Return to the office as per her chemotherapy calendar                   CHIEF COMPLAINT:   Pre-chemotherapy evaluation    Problem:  Cancer Staging  Malignant neoplasm of left ovary ([facility] )  Staging form: Ovary, Fallopian Tube, Primary Peritoneal, AJCC 8th Edition  - Clinical stage from 8/26/2019: FIGO Stage IC1, calculated as Stage IA (cT1a, cN0, cM0) - Signed by Moose Tariq MD on 9/11/2019        Previous therapy:     Malignant neoplasm of left [facility] ([facility] )    8/26/2019 Initial Diagnosis     Malignant neoplasm of left ovary ([place] [address] )      8/26/2019 -  Cancer Staged     Staging form: Ovary, Fallopian Tube, Primary Peritoneal, AJCC 8th Edition  - Clinical stage from 8/26/2019: FIGO Stage IC1, calculated as Stage IA (cT1a, cN0, cM0) - Signed by Moose Tariq MD on 9/11/2019         Chemotherapy     Taxol 175 mg/m2 and carboplatin AUC 6 every 21 days  She received 3 cycles and is scheduled for cycle 4  Patient ID: Jordy Prado is a 62 y o  female  who presents to the office for pre-chemotherapy evaluation  Overall, she is tolerating treatment well  The patient has been afebrile  She notes minimal fatigue  She denies n/v/abdominal pain  Appetite is appropriate  Normal bladder function  Occasional constipation  The patient denies chemotherapy-induced neuropathy  Her quality of life is good         The following portions of the patient's history were reviewed and updated as appropriate: allergies, current medications, past medical history and problem list     Review of Systems   Constitutional: Positive for fatigue  Negative for fever  HENT: Negative  Eyes: Negative  Respiratory: Negative  Cardiovascular: Negative  Gastrointestinal: Negative  Genitourinary: Negative  Musculoskeletal: Negative  Skin: Negative  Neurological: Negative  Negative for numbness  Psychiatric/Behavioral: Negative  Current Outpatient Medications   Medication Sig Dispense Refill    amLODIPine (NORVASC) 5 mg tablet Take 5 mg by mouth daily   4    buPROPion (WELLBUTRIN SR) 150 mg 12 hr tablet every evening   0    Cholecalciferol (VITAMIN D-3) 1000 units CAPS Take by mouth daily       escitalopram (LEXAPRO) 20 mg tablet 20 mg daily   0    LORazepam (ATIVAN) 1 mg tablet Take 1 tablet (1 mg total) by mouth every 8 (eight) hours as needed (nausea or anxiety) 20 tablet 1    metFORMIN (GLUCOPHAGE) 500 mg tablet 500 mg 2 (two) times a day with meals   0    nystatin (MYCOSTATIN) powder Apply topically 4 (four) times a day 45 g 0    Omega 3 1200 MG CAPS Take by mouth daily      ondansetron (ZOFRAN) 8 mg tablet Take 1 tablet (8 mg total) by mouth every 8 (eight) hours as needed for nausea or vomiting 20 tablet 1    scopolamine (TRANSDERM-SCOP) 1 5 mg/3 days TD 72 hr patch Place 1 patch on the skin every third day (Patient taking differently: Place 1 patch on the skin as needed ) 2 patch 1    simvastatin (ZOCOR) 20 mg tablet TK 1 T PO QD IN THE KIMBERLYN  5    SUPER B COMPLEX/C PO Take by mouth daily       valsartan-hydrochlorothiazide (DIOVAN-HCT) 320-25 MG per tablet daily   0     No current facility-administered medications for this visit  Objective:    Blood pressure 138/80, pulse 103, temperature 98 6 °F (37 °C), temperature source Oral, resp  rate 16, height 5' 2 8" (1 595 m), weight 100 kg (221 lb)  Body mass index is 39 4 kg/m²  Body surface area is 2 01 meters squared      Physical Exam   Constitutional: She is oriented to person, place, and time  She appears well-developed and well-nourished  Pulmonary/Chest: Effort normal    Neurological: She is alert and oriented to person, place, and time  Skin: Skin is warm and dry  No rash noted  Psychiatric: She has a normal mood and affect  Her behavior is normal     Performance status is zero         Lab Results   Component Value Date    K 4 0 12/02/2019     12/02/2019    CO2 31 12/02/2019    BUN 18 12/02/2019    CREATININE 0 72 12/02/2019    GLUCOSE 188 (H) 08/26/2019    CALCIUM 9 9 12/02/2019    AST 13 12/02/2019    ALT 16 12/02/2019    ALKPHOS 79 12/02/2019    EGFR 81 10/25/2019     Lab Results   Component Value Date    WBC 4 6 12/02/2019    HGB 12 2 12/02/2019    HCT 34 7 (L) 12/02/2019    MCV 84 8 12/02/2019     12/02/2019     Lab Results   Component Value Date    Shahrzad Pruitt 2,769 12/02/2019

## 2019-12-07 ENCOUNTER — HOSPITAL ENCOUNTER (OUTPATIENT)
Dept: ULTRASOUND IMAGING | Facility: HOSPITAL | Age: 59
Discharge: HOME | End: 2019-12-07
Attending: UROLOGY
Payer: COMMERCIAL

## 2019-12-07 DIAGNOSIS — S37.10XD URETER INJURY, SUBSEQUENT ENCOUNTER: ICD-10-CM

## 2019-12-07 PROCEDURE — 76770 US EXAM ABDO BACK WALL COMP: CPT

## 2019-12-10 RX ORDER — PALONOSETRON 0.05 MG/ML
0.25 INJECTION, SOLUTION INTRAVENOUS ONCE
Status: CANCELLED | OUTPATIENT
Start: 2019-12-11

## 2019-12-10 RX ORDER — SODIUM CHLORIDE 9 MG/ML
20 INJECTION, SOLUTION INTRAVENOUS ONCE
Status: CANCELLED | OUTPATIENT
Start: 2019-12-11

## 2019-12-11 ENCOUNTER — HOSPITAL ENCOUNTER (OUTPATIENT)
Dept: INFUSION CENTER | Facility: HOSPITAL | Age: 59
Discharge: HOME | End: 2019-12-11
Attending: OBSTETRICS & GYNECOLOGY
Payer: COMMERCIAL

## 2019-12-11 VITALS
TEMPERATURE: 97.1 F | SYSTOLIC BLOOD PRESSURE: 138 MMHG | DIASTOLIC BLOOD PRESSURE: 73 MMHG | RESPIRATION RATE: 16 BRPM | WEIGHT: 220.9 LBS | OXYGEN SATURATION: 97 % | HEART RATE: 95 BPM | BODY MASS INDEX: 39.14 KG/M2 | HEIGHT: 63 IN

## 2019-12-11 DIAGNOSIS — C56.2 MALIGNANT NEOPLASM OF LEFT OVARY (HCC): Primary | ICD-10-CM

## 2019-12-11 PROCEDURE — 96415 CHEMO IV INFUSION ADDL HR: CPT

## 2019-12-11 PROCEDURE — 96413 CHEMO IV INFUSION 1 HR: CPT

## 2019-12-11 PROCEDURE — 96367 TX/PROPH/DG ADDL SEQ IV INF: CPT

## 2019-12-11 PROCEDURE — 96417 CHEMO IV INFUS EACH ADDL SEQ: CPT

## 2019-12-11 PROCEDURE — 96375 TX/PRO/DX INJ NEW DRUG ADDON: CPT

## 2019-12-11 RX ORDER — SODIUM CHLORIDE 9 MG/ML
20 INJECTION, SOLUTION INTRAVENOUS ONCE
Status: COMPLETED | OUTPATIENT
Start: 2019-12-11 | End: 2019-12-11

## 2019-12-11 RX ORDER — PALONOSETRON 0.05 MG/ML
0.25 INJECTION, SOLUTION INTRAVENOUS ONCE
Status: COMPLETED | OUTPATIENT
Start: 2019-12-11 | End: 2019-12-11

## 2019-12-11 RX ADMIN — PACLITAXEL 351.6 MG: 6 INJECTION, SOLUTION INTRAVENOUS at 11:40

## 2019-12-11 RX ADMIN — DEXAMETHASONE SODIUM PHOSPHATE 20 MG: 10 INJECTION, SOLUTION INTRAMUSCULAR; INTRAVENOUS at 09:40

## 2019-12-11 RX ADMIN — DIPHENHYDRAMINE HYDROCHLORIDE 25 MG: 50 INJECTION, SOLUTION INTRAMUSCULAR; INTRAVENOUS at 10:06

## 2019-12-11 RX ADMIN — PALONOSETRON HYDROCHLORIDE 0.25 MG: 0.25 INJECTION, SOLUTION INTRAVENOUS at 09:34

## 2019-12-11 RX ADMIN — CARBOPLATIN 718.2 MG: 10 INJECTION, SOLUTION INTRAVENOUS at 14:50

## 2019-12-11 RX ADMIN — FAMOTIDINE 20 MG: 10 INJECTION INTRAVENOUS at 10:30

## 2019-12-11 RX ADMIN — SODIUM CHLORIDE 20 ML/HR: 0.9 INJECTION, SOLUTION INTRAVENOUS at 09:29

## 2019-12-11 RX ADMIN — SODIUM CHLORIDE 150 MG: 0.9 INJECTION, SOLUTION INTRAVENOUS at 10:57

## 2019-12-12 ENCOUNTER — TELEPHONE (OUTPATIENT)
Dept: UROLOGY | Facility: MEDICAL CENTER | Age: 59
End: 2019-12-12

## 2019-12-12 NOTE — TELEPHONE ENCOUNTER
Patient of [facility]  History of left ureteral reimplantation during hysterectomy bilateral salpingo-oophorectomy  Scheduled for f/u on 12/18 with Jayesh Baker   Will route to provider for review

## 2019-12-16 NOTE — PROGRESS NOTES
12/18/2019    Glenna Be  1960  [id number]        Assessment  -Bladder injury s/p repair (8/26/19)    Discussion/Plan  Diana Latif is a 62 y o  female being managed by Dr Suzanne Canales       -We reviewed results of recent renal ultrasound which shows resolution of left sided hydronephrosis, mild residual pelviectasis noted  There was also left lower quadrant cystic lesion measuring 6 2 cm  This was noted on previous CT scan  Interpretation notes possible urinoma/seroma  Based on findings and radiology recommendation, orders for CT renal protocol involving IV contrast will be placed for reassessment  In regards to her spraying stream, we discussed scheduling cystoscopy for further evaluation of stricture  Patient wishes to continue monitoring symptoms, and call our office if it persists        -She will return in 3 months with CT scan  Will possibly schedule cystoscopy at that time, should she report continued issues with urinary stream    -All questions answered, patients agree with plan     History of Present Illness  62 y o  female with a history of bladder injury s/p repair (8/26/19) presents today for follow up  Patient initially underwent total hysterectomy for history of ovarian cancer  During surgery, patient found to have laceration low posterior/trigonal bladder  She underwent ureteral reimplantation and cystorrhaphy by Dr Lazaro Rachel and Dr Nicolasa Baker  Ureteral stent was removed via cystoscopy in office on 10/25/19 by Dr Nicolasa Baker  Patient states that since tate catheter was removed, 2 weeks after surgery, she noticed urinary stream spraying to the left  She states this occurs with each void  Patient denies any additional lower urinary tract symptoms, gross hematuria, or dysuria  Only mild urinary stress incontinence, which was present prior to surgery  She continues to closely follow under gyn/onc, and is receiving chemotherapy            Review of Systems  Review of Systems   Constitutional: Negative  HENT: Negative  Respiratory: Negative  Cardiovascular: Negative  Gastrointestinal: Negative  Genitourinary: Negative for decreased urine volume, difficulty urinating, dysuria, flank pain, frequency, hematuria and urgency  Musculoskeletal: Negative  Skin: Negative  Neurological: Negative  Psychiatric/Behavioral: Negative  Past Medical History  Past Medical History:   Diagnosis Date    Anxiety     [email or web address] )     ovarian    Diabetes mellitus ([place] [address] )     High cholesterol     Hypertension     Morbid obesity ([place] [address] )     Ovarian neoplasm 8/26/2019    PONV (postoperative nausea and vomiting)     Post-menopausal bleeding     Sleep apnea        Past Social History  Past Surgical History:   Procedure Laterality Date    BLADDER REPAIR N/A 8/26/2019    Procedure: REPAIR BLADDER; uretero yared cystotomy;  Surgeon: Stephanie Abbott MD;  Location: [facility];  Service: Gynecology Oncology    CHOLECYSTECTOMY      IR IMAGE GUIDED ASPIRATION / DRAINAGE  9/27/2019    IR PORT PLACEMENT  9/27/2019    IR PORT PLACEMENT  11/7/2019    IR PORT REMOVAL  10/29/2019    OH TOTAL ABDOM HYSTERECTOMY N/A 8/26/2019    Procedure: TOTAL ABDOMINAL HYSTERECTOMY, BILATERAL SALPINGO-OOPHORECTOMY, Radical omentectomy, pelvic lymph node sampling, staging biopsy, repair of cystotomy, appendectomy;  Surgeon: Stephanie Abbott MD;  Location: [facility];  Service: Gynecology Oncology       Past Family History  No family history on file      Past Social history  Social History     Socioeconomic History    Marital status: Single     Spouse name: Not on file    Number of children: Not on file    Years of education: Not on file    Highest education level: Not on file   Occupational History    Not on file   Social Needs    Financial resource strain: Not on file    Food insecurity:     Worry: Not on file     Inability: Not on file    Transportation needs:     Medical: Not on file     Non-medical: Not on file   Tobacco Use    Smoking status: Former Smoker    Smokeless tobacco: Never Used    Tobacco comment: quit in her 27's   Substance and Sexual Activity    Alcohol use: Not Currently     Frequency: Never    Drug use: Never    Sexual activity: Not on file     Comment: no t assessed , not alone   Lifestyle    Physical activity:     Days per week: Not on file     Minutes per session: Not on file    Stress: Not on file   Relationships    Social connections:     Talks on phone: Not on file     Gets together: Not on file     Attends [employer] service: Not on file     Active member of club or organization: Not on file     Attends meetings of clubs or organizations: Not on file     Relationship status: Not on file    Intimate partner violence:     Fear of current or ex partner: Not on file     Emotionally abused: Not on file     Physically abused: Not on file     Forced sexual activity: Not on file   Other Topics Concern    Not on file   Social History Narrative    Not on file       Current Medications  Current Outpatient Medications   Medication Sig Dispense Refill    amLODIPine (NORVASC) 5 mg tablet Take 5 mg by mouth daily   4    buPROPion (WELLBUTRIN SR) 150 mg 12 hr tablet every evening   0    Cholecalciferol (VITAMIN D-3) 1000 units CAPS Take by mouth daily       escitalopram (LEXAPRO) 20 mg tablet 20 mg daily   0    LORazepam (ATIVAN) 1 mg tablet Take 1 tablet (1 mg total) by mouth every 8 (eight) hours as needed (nausea or anxiety) 20 tablet 1    metFORMIN (GLUCOPHAGE) 500 mg tablet 500 mg 2 (two) times a day with meals   0    nystatin (MYCOSTATIN) powder Apply topically 4 (four) times a day 45 g 0    Omega 3 1200 MG CAPS Take by mouth daily      ondansetron (ZOFRAN) 8 mg tablet Take 1 tablet (8 mg total) by mouth every 8 (eight) hours as needed for nausea or vomiting 20 tablet 1    scopolamine (TRANSDERM-SCOP) 1 5 mg/3 days TD 72 hr patch Place 1 patch on the skin every third day (Patient taking differently: Place 1 patch on the skin as needed ) 2 patch 1    simvastatin (ZOCOR) 20 mg tablet TK 1 T PO QD IN THE KIMBERLYN  5    SUPER B COMPLEX/C PO Take by mouth daily       valsartan-hydrochlorothiazide (DIOVAN-HCT) 320-25 MG per tablet daily   0     No current facility-administered medications for this visit  Allergies  Allergies   Allergen Reactions    Sulfites Other (See Comments)     Causes flushing       Past medical history, social history, family history, medications and allergies were reviewed  Vitals  There were no vitals filed for this visit  Physical Exam  Physical Exam   Constitutional: She is oriented to person, place, and time  She appears well-developed and well-nourished  HENT:   Head: Normocephalic  Eyes: Pupils are equal, round, and reactive to light  Neck: Normal range of motion  Cardiovascular: Normal rate and regular rhythm  Pulmonary/Chest: Effort normal    Abdominal: Soft  Normal appearance  There is no CVA tenderness  Musculoskeletal: Normal range of motion  Neurological: She is alert and oriented to person, place, and time  Skin: Skin is warm and dry  Psychiatric: She has a normal mood and affect  Her behavior is normal  Judgment and thought content normal        Results    I have personally reviewed all pertinent lab results and reviewed with patient  Lab Results   Component Value Date    GLUCOSE 188 (H) 08/26/2019    CALCIUM 9 9 12/09/2019    K 4 1 12/09/2019    CO2 30 12/09/2019    CL 98 12/09/2019    BUN 13 12/09/2019    CREATININE 0 68 12/09/2019     Lab Results   Component Value Date    WBC 6 6 12/09/2019    HGB 12 5 12/09/2019    HCT 36 9 12/09/2019    MCV 85 8 12/09/2019     12/09/2019     No results found for this or any previous visit (from the past 1 hour(s))

## 2019-12-18 ENCOUNTER — OFFICE VISIT (OUTPATIENT)
Dept: UROLOGY | Facility: HOSPITAL | Age: 59
End: 2019-12-18
Payer: COMMERCIAL

## 2019-12-18 VITALS
SYSTOLIC BLOOD PRESSURE: 130 MMHG | HEIGHT: 63 IN | DIASTOLIC BLOOD PRESSURE: 82 MMHG | BODY MASS INDEX: 38.27 KG/M2 | WEIGHT: 216 LBS | HEART RATE: 92 BPM

## 2019-12-18 DIAGNOSIS — N28.1 RENAL CYST: Primary | ICD-10-CM

## 2019-12-18 PROCEDURE — 99213 OFFICE O/P EST LOW 20 MIN: CPT | Performed by: NURSE PRACTITIONER

## 2019-12-31 ENCOUNTER — TELEPHONE (OUTPATIENT)
Dept: GYNECOLOGIC ONCOLOGY | Facility: CLINIC | Age: 59
End: 2019-12-31

## 2019-12-31 RX ORDER — SODIUM CHLORIDE 9 MG/ML
20 INJECTION, SOLUTION INTRAVENOUS ONCE
Status: CANCELLED | OUTPATIENT
Start: 2020-01-02

## 2019-12-31 RX ORDER — PALONOSETRON 0.05 MG/ML
0.25 INJECTION, SOLUTION INTRAVENOUS ONCE
Status: CANCELLED | OUTPATIENT
Start: 2020-01-02

## 2019-12-31 NOTE — TELEPHONE ENCOUNTER
Pre-chemotherapy visit performed via telephone call  Patient is tolerating treatment well without acute complaints  No n/v/abdominal pain  Normal bowel/bladder function  Patient notes occasional neuropathy, but nothing that is persistent and affecting QOL  Labs from 12/30/19 reviewed  Ok to proceed with treatment on 1/2/20

## 2020-01-02 ENCOUNTER — HOSPITAL ENCOUNTER (OUTPATIENT)
Dept: INFUSION CENTER | Facility: HOSPITAL | Age: 60
Discharge: HOME | End: 2020-01-02
Attending: OBSTETRICS & GYNECOLOGY
Payer: COMMERCIAL

## 2020-01-02 VITALS
DIASTOLIC BLOOD PRESSURE: 68 MMHG | SYSTOLIC BLOOD PRESSURE: 119 MMHG | HEART RATE: 95 BPM | HEIGHT: 62 IN | OXYGEN SATURATION: 96 % | BODY MASS INDEX: 41.1 KG/M2 | RESPIRATION RATE: 16 BRPM | WEIGHT: 223.33 LBS | TEMPERATURE: 96.4 F

## 2020-01-02 DIAGNOSIS — C56.2 MALIGNANT NEOPLASM OF LEFT OVARY (HCC): Primary | ICD-10-CM

## 2020-01-02 PROCEDURE — 96375 TX/PRO/DX INJ NEW DRUG ADDON: CPT

## 2020-01-02 PROCEDURE — 96367 TX/PROPH/DG ADDL SEQ IV INF: CPT

## 2020-01-02 PROCEDURE — 96417 CHEMO IV INFUS EACH ADDL SEQ: CPT

## 2020-01-02 PROCEDURE — 96415 CHEMO IV INFUSION ADDL HR: CPT

## 2020-01-02 PROCEDURE — 96413 CHEMO IV INFUSION 1 HR: CPT

## 2020-01-02 RX ORDER — PALONOSETRON 0.05 MG/ML
0.25 INJECTION, SOLUTION INTRAVENOUS ONCE
Status: COMPLETED | OUTPATIENT
Start: 2020-01-02 | End: 2020-01-02

## 2020-01-02 RX ORDER — SODIUM CHLORIDE 9 MG/ML
20 INJECTION, SOLUTION INTRAVENOUS ONCE
Status: COMPLETED | OUTPATIENT
Start: 2020-01-02 | End: 2020-01-02

## 2020-01-02 RX ADMIN — CARBOPLATIN 694.8 MG: 10 INJECTION, SOLUTION INTRAVENOUS at 14:41

## 2020-01-02 RX ADMIN — SODIUM CHLORIDE 150 MG: 0.9 INJECTION, SOLUTION INTRAVENOUS at 10:49

## 2020-01-02 RX ADMIN — PALONOSETRON HYDROCHLORIDE 0.25 MG: 0.25 INJECTION, SOLUTION INTRAVENOUS at 09:31

## 2020-01-02 RX ADMIN — DEXAMETHASONE SODIUM PHOSPHATE 20 MG: 10 INJECTION, SOLUTION INTRAMUSCULAR; INTRAVENOUS at 09:33

## 2020-01-02 RX ADMIN — SODIUM CHLORIDE 20 ML/HR: 0.9 INJECTION, SOLUTION INTRAVENOUS at 09:28

## 2020-01-02 RX ADMIN — PACLITAXEL 351.6 MG: 6 INJECTION, SOLUTION INTRAVENOUS at 11:35

## 2020-01-02 RX ADMIN — DIPHENHYDRAMINE HYDROCHLORIDE 25 MG: 50 INJECTION, SOLUTION INTRAMUSCULAR; INTRAVENOUS at 10:01

## 2020-01-02 RX ADMIN — FAMOTIDINE 20 MG: 10 INJECTION INTRAVENOUS at 10:25

## 2020-01-02 NOTE — PLAN OF CARE
Problem: Knowledge Deficit  Goal: Patient/family/caregiver demonstrates understanding of disease process, treatment plan, medications, and discharge instructions  Description  Complete learning assessment and assess knowledge base    Interventions:  - Provide teaching at level of understanding  - Provide teaching via preferred learning methods  Outcome: Progressing
nadege li

## 2020-01-10 ENCOUNTER — HOSPITAL ENCOUNTER (OUTPATIENT)
Dept: CT IMAGING | Facility: HOSPITAL | Age: 60
Discharge: HOME | End: 2020-01-10
Payer: COMMERCIAL

## 2020-01-10 DIAGNOSIS — N28.1 RENAL CYST: ICD-10-CM

## 2020-01-10 PROCEDURE — 74178 CT ABD&PLV WO CNTR FLWD CNTR: CPT

## 2020-01-10 RX ADMIN — IOHEXOL 100 ML: 350 INJECTION, SOLUTION INTRAVENOUS at 09:51

## 2020-01-15 ENCOUNTER — OFFICE VISIT (OUTPATIENT)
Dept: GYNECOLOGIC ONCOLOGY | Facility: HOSPITAL | Age: 60
End: 2020-01-15
Payer: COMMERCIAL

## 2020-01-15 VITALS
WEIGHT: 228 LBS | HEIGHT: 62 IN | SYSTOLIC BLOOD PRESSURE: 146 MMHG | DIASTOLIC BLOOD PRESSURE: 84 MMHG | BODY MASS INDEX: 41.96 KG/M2 | TEMPERATURE: 98.7 F | HEART RATE: 107 BPM

## 2020-01-15 DIAGNOSIS — C56.2 MALIGNANT NEOPLASM OF LEFT OVARY (HCC): Primary | ICD-10-CM

## 2020-01-15 PROBLEM — N95.0 PMB (POSTMENOPAUSAL BLEEDING): Status: RESOLVED | Noted: 2019-08-14 | Resolved: 2020-01-15

## 2020-01-15 PROBLEM — Z90.710 S/P ABDOMINAL HYSTERECTOMY: Status: RESOLVED | Noted: 2019-08-26 | Resolved: 2020-01-15

## 2020-01-15 PROCEDURE — 99213 OFFICE O/P EST LOW 20 MIN: CPT | Performed by: OBSTETRICS & GYNECOLOGY

## 2020-01-15 NOTE — ASSESSMENT & PLAN NOTE
62year-old with stage I C1 mix stromal tumor of the ovary with poorly differentiated sertoli-lydig silk component  She has received 5 cycles of adjuvant chemotherapy and is tolerating treatment well  No neuropathy  She is without evidence of disease based on CT imaging and has a left-sided pelvic lymphocele  Her performance status is 0   1  Continue chemotherapy with carboplatin and Taxol provided her hematologic and metabolic parameters are adequate  2  Will continue to follow-up CT imaging of the abdomen and pelvis in approximately 3 months to re-evaluate to the fluid collection which is likely a lymphocele  3  Return here 3 weeks after her last chemotherapy administration for examination  I spent 15 minutes with the patient  More than half the time was spent in counseling and coordination of care regarding treatment of her ovarian cancer

## 2020-01-15 NOTE — PROGRESS NOTES
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  She has received 5 cycles of adjuvant chemotherapy and is tolerating treatment well  No neuropathy  She is without evidence of disease based on CT imaging and has a left-sided pelvic lymphocele  Her performance status is 0   1  Continue chemotherapy with carboplatin and Taxol provided her hematologic and metabolic parameters are adequate  2  Will continue to follow-up CT imaging of the abdomen and pelvis in approximately 3 months to re-evaluate to the fluid collection which is likely a lymphocele  3  Return here 3 weeks after her last chemotherapy administration for examination  I spent 15 minutes with the patient  More than half the time was spent in counseling and coordination of care regarding treatment of her ovarian cancer  CHIEF COMPLAINT:  Pre chemotherapy evaluation      Problem:  Cancer Staging  Malignant neoplasm of left ovary (HCC)  Staging form: Ovary, Fallopian Tube, Primary Peritoneal, AJCC 8th Edition  - Clinical stage from 8/26/2019: FIGO Stage IC1, calculated as Stage IA (cT1a, cN0, cM0) - Signed by Bipin Ingram MD on 9/11/2019        Previous therapy:     Malignant neoplasm of left ovary ([facility] [address] )    8/26/2019 Initial Diagnosis     Malignant neoplasm of left ovary ([facility] [address] )      8/26/2019 -  Cancer Staged     Staging form: Ovary, Fallopian Tube, Primary Peritoneal, AJCC 8th Edition  - Clinical stage from 8/26/2019: FIGO Stage IC1, calculated as Stage IA (cT1a, cN0, cM0) - Signed by Bipin Ingram MD on 9/11/2019         Chemotherapy     Taxol 175 mg/m2 and carboplatin AUC 6 every 21 days   She received 5 cycles and is scheduled for cycle 6        8/26/2019 Surgery     Total abdominal hysterectomy, bilateral salpingo-oophorectomy, radical omentectomy, pelvic lymph node sampling, repair inherent cystostomy, left ureteroneocystostomy, appendectomy  -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  -intraoperative tumor rupture           Patient ID: Joel Todd is a 62 y o  female  25-year-old returns for pre chemotherapy evaluation  She is receiving adjuvant chemotherapy with carboplatin at AUC of 6 and Taxol at 175 milligrams/meter squared for a stage IC1 mixed stromal cell tumor of the ovary with poorly differentiated sertoli-lydig cell component  She had a renal protocol CT scan on 1/10/2020  I personally reviewed the images  There is 11 2 x 4 9 cm left-sided simple fluid collection adjacent to the left external iliac vasculature consistent with a large left lymphocele  There is no other evidence of malignancy  Laboratory studies are as below  There is no other interval change in her medications or medical history since her last visit  Her quality of life is good  She has no neuropathy        The following portions of the patient's history were reviewed and updated as appropriate: allergies, current medications, past family history, past medical history, past social history, past surgical history and problem list     Review of Systems    Current Outpatient Medications   Medication Sig Dispense Refill    amLODIPine (NORVASC) 5 mg tablet Take 5 mg by mouth daily   4    buPROPion (WELLBUTRIN SR) 150 mg 12 hr tablet every evening   0    Cholecalciferol (VITAMIN D-3) 1000 units CAPS Take by mouth daily       escitalopram (LEXAPRO) 20 mg tablet 20 mg daily   0    LORazepam (ATIVAN) 1 mg tablet Take 1 tablet (1 mg total) by mouth every 8 (eight) hours as needed (nausea or anxiety) 20 tablet 1    metFORMIN (GLUCOPHAGE) 500 mg tablet 500 mg 2 (two) times a day with meals   0    nystatin (MYCOSTATIN) powder Apply topically 4 (four) times a day 45 g 0    Omega 3 1200 MG CAPS Take by mouth daily      ondansetron (ZOFRAN) 8 mg tablet Take 1 tablet (8 mg total) by mouth every 8 (eight) hours as needed for nausea or vomiting 20 tablet 1    scopolamine (TRANSDERM-SCOP) 1 5 mg/3 days TD 72 hr patch Place 1 patch on the skin every third day (Patient taking differently: Place 1 patch on the skin as needed ) 2 patch 1    simvastatin (ZOCOR) 20 mg tablet TK 1 T PO QD IN THE KIMBERLYN  5    SUPER B COMPLEX/C PO Take by mouth daily       valsartan-hydrochlorothiazide (DIOVAN-HCT) 320-25 MG per tablet daily   0     No current facility-administered medications for this visit  Objective:    Blood pressure 146/84, pulse (!) 107, temperature 98 7 °F (37 1 °C), temperature source Oral, height 5' 2 44" (1 586 m), weight 103 kg (228 lb)  Body mass index is 41 12 kg/m²  Body surface area is 2 03 meters squared  Physical Exam   Constitutional: She is oriented to person, place, and time  She appears well-developed and well-nourished  No distress  Pulmonary/Chest: Effort normal    Neurological: She is alert and oriented to person, place, and time  Skin: She is not diaphoretic  Psychiatric: She has a normal mood and affect   Her behavior is normal  Judgment and thought content normal        Lab Results   Component Value Date     42 7 (H) 08/16/2019     Lab Results   Component Value Date    K 3 0 (L) 01/13/2020    CL 99 01/13/2020    CO2 31 01/13/2020    BUN 12 01/13/2020    CREATININE 0 63 01/13/2020    GLUCOSE 188 (H) 08/26/2019    CALCIUM 9 4 01/13/2020    AST 14 01/13/2020    ALT 18 01/13/2020    ALKPHOS 67 01/13/2020    EGFR 81 10/25/2019     Lab Results   Component Value Date    WBC 4 5 01/13/2020    HGB 11 4 (L) 01/13/2020    HCT 32 9 (L) 01/13/2020    MCV 87 7 01/13/2020     01/13/2020     Lab Results   Component Value Date    Ethel Beard 2,489 01/13/2020

## 2020-01-21 RX ORDER — SODIUM CHLORIDE 9 MG/ML
20 INJECTION, SOLUTION INTRAVENOUS ONCE
Status: CANCELLED | OUTPATIENT
Start: 2020-01-22

## 2020-01-21 RX ORDER — PALONOSETRON 0.05 MG/ML
0.25 INJECTION, SOLUTION INTRAVENOUS ONCE
Status: CANCELLED | OUTPATIENT
Start: 2020-01-22

## 2020-01-22 ENCOUNTER — HOSPITAL ENCOUNTER (OUTPATIENT)
Dept: INFUSION CENTER | Facility: HOSPITAL | Age: 60
Discharge: HOME | End: 2020-01-22
Attending: OBSTETRICS & GYNECOLOGY
Payer: COMMERCIAL

## 2020-01-22 VITALS
OXYGEN SATURATION: 97 % | DIASTOLIC BLOOD PRESSURE: 86 MMHG | WEIGHT: 225.53 LBS | TEMPERATURE: 96.3 F | SYSTOLIC BLOOD PRESSURE: 136 MMHG | HEIGHT: 63 IN | RESPIRATION RATE: 18 BRPM | HEART RATE: 98 BPM | BODY MASS INDEX: 39.96 KG/M2

## 2020-01-22 DIAGNOSIS — C56.2 MALIGNANT NEOPLASM OF LEFT OVARY (HCC): Primary | ICD-10-CM

## 2020-01-22 PROCEDURE — 96413 CHEMO IV INFUSION 1 HR: CPT

## 2020-01-22 PROCEDURE — 96367 TX/PROPH/DG ADDL SEQ IV INF: CPT

## 2020-01-22 PROCEDURE — 96415 CHEMO IV INFUSION ADDL HR: CPT

## 2020-01-22 PROCEDURE — 96375 TX/PRO/DX INJ NEW DRUG ADDON: CPT

## 2020-01-22 PROCEDURE — 96417 CHEMO IV INFUS EACH ADDL SEQ: CPT

## 2020-01-22 RX ORDER — SODIUM CHLORIDE 9 MG/ML
20 INJECTION, SOLUTION INTRAVENOUS ONCE
Status: COMPLETED | OUTPATIENT
Start: 2020-01-22 | End: 2020-01-22

## 2020-01-22 RX ORDER — PALONOSETRON 0.05 MG/ML
0.25 INJECTION, SOLUTION INTRAVENOUS ONCE
Status: COMPLETED | OUTPATIENT
Start: 2020-01-22 | End: 2020-01-22

## 2020-01-22 RX ADMIN — CARBOPLATIN 724.8 MG: 10 INJECTION, SOLUTION INTRAVENOUS at 14:43

## 2020-01-22 RX ADMIN — FAMOTIDINE 20 MG: 10 INJECTION INTRAVENOUS at 10:21

## 2020-01-22 RX ADMIN — PALONOSETRON HYDROCHLORIDE 0.25 MG: 0.25 INJECTION, SOLUTION INTRAVENOUS at 09:26

## 2020-01-22 RX ADMIN — SODIUM CHLORIDE 150 MG: 0.9 INJECTION, SOLUTION INTRAVENOUS at 10:46

## 2020-01-22 RX ADMIN — DIPHENHYDRAMINE HYDROCHLORIDE 25 MG: 50 INJECTION, SOLUTION INTRAMUSCULAR; INTRAVENOUS at 09:56

## 2020-01-22 RX ADMIN — SODIUM CHLORIDE 20 ML/HR: 0.9 INJECTION, SOLUTION INTRAVENOUS at 09:26

## 2020-01-22 RX ADMIN — PACLITAXEL 355.2 MG: 6 INJECTION, SOLUTION INTRAVENOUS at 11:29

## 2020-01-22 RX ADMIN — DEXAMETHASONE SODIUM PHOSPHATE 20 MG: 10 INJECTION, SOLUTION INTRAMUSCULAR; INTRAVENOUS at 09:31

## 2020-01-22 NOTE — PLAN OF CARE
Problem: Potential for Falls  Goal: Patient will remain free of falls  Description  INTERVENTIONS:  - Assess patient frequently for physical needs  -  Identify cognitive and physical deficits and behaviors that affect risk of falls  -  [place] fall precautions as indicated by assessment   - Educate patient/family on patient safety including physical limitations  - Instruct patient to call for assistance with activity based on assessment  - Modify environment to reduce risk of injury  - Consider OT/PT consult to assist with strengthening/mobility  Outcome: Progressing     Problem: Knowledge Deficit  Goal: Patient/family/caregiver demonstrates understanding of disease process, treatment plan, medications, and discharge instructions  Description  Complete learning assessment and assess knowledge base    Interventions:  - Provide teaching at level of understanding  - Provide teaching via preferred learning methods  Outcome: Progressing

## 2020-01-22 NOTE — PROGRESS NOTES
Pt arrived on unit for infusion, pt offers no complaints, labs within parameters, vss  LCW port accessed without difficulty  Will cont to nieves

## 2020-01-23 ENCOUNTER — TELEPHONE (OUTPATIENT)
Dept: UROLOGY | Facility: MEDICAL CENTER | Age: 60
End: 2020-01-23

## 2020-01-23 NOTE — TELEPHONE ENCOUNTER
Patient of Dr Dedra Wells seen in [facility]  Patient would like to speak to someone in regards to having first episode of incontinence and would like to know if she should be concerned

## 2020-01-23 NOTE — TELEPHONE ENCOUNTER
Would not recommend any additional testing at this time however if persist we can get urine studies and move her appointment up sooner  Thank you

## 2020-01-23 NOTE — TELEPHONE ENCOUNTER
Patient of [facility] office  History of bladder injury s/p repair (8/26/19) presents today for follow up  Patient initially underwent total hysterectomy for history of ovarian cancer  During surgery, patient found to have laceration low posterior/trigonal bladder  She underwent ureteral reimplantation and cystorrhaphy by Dr Dorothy Peterson and Dr Chadd Duong  Ureteral stent was removed via cystoscopy in office on 10/25/19 by Dr Chadd Duong  Patient was last seen in office 12/18/19, at which time she complained only of mild urinary stress incontinence, which was present prior to surgery, as well as "spraying stream"  Patient is scheduled for 3 month f/u 4/1/2020 with CT ptv  Per last OV note, will possibly schedule cysto at that time, should she report continued issues with urinary stream      Call returned to patient, she states that last night she was on the couch and when she pushed against cushions to get up she had episode of incontinence "like [facility]" that she was completely unable to control  This had never happened to her before  Patient denies any other urinary symptoms  She does states that she had chemo treatment yesterday and had a lot of fluids yesterday  Patient would like to know if there is anything to do to follow up  Will route her concern to provider and call back with recommendations

## 2020-01-23 NOTE — TELEPHONE ENCOUNTER
Call placed to patient, advised of above  Patient to call office if symptoms persist  Patient verbalized understanding and agrees with plan

## 2020-02-12 ENCOUNTER — OFFICE VISIT (OUTPATIENT)
Dept: GYNECOLOGIC ONCOLOGY | Facility: HOSPITAL | Age: 60
End: 2020-02-12
Payer: COMMERCIAL

## 2020-02-12 VITALS
HEIGHT: 63 IN | DIASTOLIC BLOOD PRESSURE: 78 MMHG | TEMPERATURE: 97.6 F | SYSTOLIC BLOOD PRESSURE: 136 MMHG | HEART RATE: 102 BPM | BODY MASS INDEX: 39.55 KG/M2 | WEIGHT: 223.2 LBS

## 2020-02-12 DIAGNOSIS — C56.2 MALIGNANT NEOPLASM OF LEFT OVARY (HCC): Primary | ICD-10-CM

## 2020-02-12 PROCEDURE — 99214 OFFICE O/P EST MOD 30 MIN: CPT | Performed by: OBSTETRICS & GYNECOLOGY

## 2020-02-12 NOTE — PROGRESS NOTES
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  Most recent had been be is pending  Last imaging revealed a large cystic mass measuring 11 2 x 4 9 centimeters that was consistent with a possible seroma as well as a measurable mesenteric lymph node measuring 1 7 centimeters  Clinically, she is without evidence of disease  Her performance status is 0   1  Will follow-up most recent inhibin B level  2  Repeat CT of abdomen pelvis prior to her next visit to evaluate fluid collection and lymph node  3  Inhibin B in 3 months prior to her next visit           Relevant Orders    Inhibin B    CT abdomen pelvis w contrast    BUN    Creatinine, serum            CHIEF COMPLAINT:  Post treatment evaluation      Problem:  Cancer Staging  Malignant neoplasm of left ovary (HCC)  Staging form: Ovary, Fallopian Tube, Primary Peritoneal, AJCC 8th Edition  - Clinical stage from 8/26/2019: FIGO Stage IC1, calculated as Stage IA (cT1a, cN0, cM0) - Signed by Babar Baker MD on 9/11/2019        Previous therapy:     Malignant neoplasm of left ovary ([facility] [address] )    8/26/2019 Initial Diagnosis     Malignant neoplasm of left ovary ([facility] [address] )      8/26/2019 -  Cancer Staged     Staging form: Ovary, Fallopian Tube, Primary Peritoneal, AJCC 8th Edition  - Clinical stage from 8/26/2019: FIGO Stage IC1, calculated as Stage IA (cT1a, cN0, cM0) - Signed by Babar Baker MD on 9/11/2019         Chemotherapy     Taxol 175 mg/m2 and carboplatin AUC 6 every 21 days   She received 5 cycles and is scheduled for cycle 6        8/26/2019 Surgery     Total abdominal hysterectomy, bilateral salpingo-oophorectomy, radical omentectomy, pelvic lymph node sampling, repair inherent cystostomy, left ureteroneocystostomy, appendectomy  -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  -intraoperative tumor rupture           Patient ID: Samm Ogden is a 62 y o  female  64-year-old returns for post treatment evaluation  She completed her last cycle of chemotherapy on 1/22/2020  This was her 6th cycle of carboplatin and Taxol  She has no neuropathy  She is fatigued  Her last CT was a renal protocol CT scan on 1/10/2020 that revealed 11 2 x 4 9 centimeter collection of fluid in the left pelvis extending to the left gutter  This was likely a seroma or lymphocele based on review  There is also a lymph node in the mesentery measuring 1 7 centimeters  Her labs after treatment are within normal limits and are as below  She has hyperglycemia  No other interval change in her medications or medical history since her last visit  Quality of life is good  The following portions of the patient's history were reviewed and updated as appropriate: allergies, current medications, past family history, past medical history, past social history, past surgical history and problem list     Review of Systems   Constitutional: Positive for fatigue  Negative for activity change and unexpected weight change  HENT: Negative  Eyes: Negative  Respiratory: Negative  Cardiovascular: Negative  Gastrointestinal: Negative for abdominal distention and abdominal pain  Endocrine: Negative  Genitourinary: Negative for pelvic pain and vaginal bleeding  Musculoskeletal: Negative  Skin: Negative  Allergic/Immunologic: Negative  Neurological: Negative  Hematological: Negative  Psychiatric/Behavioral: Negative          Current Outpatient Medications   Medication Sig Dispense Refill    amLODIPine (NORVASC) 5 mg tablet Take 5 mg by mouth daily   4    buPROPion (WELLBUTRIN SR) 150 mg 12 hr tablet every evening   0    Cholecalciferol (VITAMIN D-3) 1000 units CAPS Take by mouth daily       escitalopram (LEXAPRO) 20 mg tablet 20 mg daily   0    LORazepam (ATIVAN) 1 mg tablet Take 1 tablet (1 mg total) by mouth every 8 (eight) hours as needed (nausea or anxiety) 20 tablet 1    metFORMIN (GLUCOPHAGE) 500 mg tablet 500 mg 2 (two) times a day with meals   0    nystatin (MYCOSTATIN) powder Apply topically 4 (four) times a day 45 g 0    Omega 3 1200 MG CAPS Take by mouth daily      ondansetron (ZOFRAN) 8 mg tablet Take 1 tablet (8 mg total) by mouth every 8 (eight) hours as needed for nausea or vomiting 20 tablet 1    scopolamine (TRANSDERM-SCOP) 1 5 mg/3 days TD 72 hr patch Place 1 patch on the skin every third day (Patient taking differently: Place 1 patch on the skin as needed ) 2 patch 1    simvastatin (ZOCOR) 20 mg tablet TK 1 T PO QD IN THE KIMBERLYN  5    SUPER B COMPLEX/C PO Take by mouth daily       valsartan-hydrochlorothiazide (DIOVAN-HCT) 320-25 MG per tablet daily   0     No current facility-administered medications for this visit  Objective:    Blood pressure 136/78, pulse 102, temperature 97 6 °F (36 4 °C), temperature source Oral, height 5' 2 87" (1 597 m), weight 101 kg (223 lb 3 2 oz)  Body mass index is 39 7 kg/m²  Body surface area is 2 02 meters squared  Physical Exam   Constitutional: She is oriented to person, place, and time  She appears well-developed and well-nourished  No distress  HENT:   Head: Normocephalic and atraumatic  Right Ear: External ear normal    Left Ear: External ear normal    Nose: Nose normal    Mouth/Throat: No oropharyngeal exudate  Eyes: Conjunctivae and EOM are normal  Right eye exhibits no discharge  Left eye exhibits no discharge  No scleral icterus  Neck: Normal range of motion  Neck supple  No thyromegaly present  Pulmonary/Chest: Effort normal  No stridor  No respiratory distress  She has no wheezes  She has no rales  Abdominal: Soft  She exhibits no distension and no mass  There is no tenderness  There is no rebound and no guarding     Genitourinary: Genitourinary Comments: The external female genitalia is normal  The bartholin's, uretheral and skenes glands are normal  The urethral meatus is normal (midline with no lesions)  Anus without fissure or lesion  Speculum exam reveals a grossly normal vagina  No masses, lesions,discharge or bleeding  No significant cystocele or rectocele noted  Bimanual exam notes a surgical absent cervix, uterus and adnexal structures  There is a suggestion of a possible cystic mass, difficult to ascertain based on obesity the apex is mobile  No parametrial disease    Bladder is without fullness, mass or tenderness  Musculoskeletal: She exhibits no edema  Lymphadenopathy:     She has no cervical adenopathy  Neurological: She is alert and oriented to person, place, and time  No cranial nerve deficit or sensory deficit  Coordination normal    Skin: Skin is warm and dry  No rash noted  She is not diaphoretic  No erythema  No pallor  Psychiatric: She has a normal mood and affect   Her behavior is normal  Judgment and thought content normal        Lab Results   Component Value Date     42 7 (H) 08/16/2019     Lab Results   Component Value Date    K 4 0 02/10/2020     02/10/2020    CO2 31 02/10/2020    BUN 14 02/10/2020    CREATININE 0 72 02/10/2020    GLUCOSE 188 (H) 08/26/2019    CALCIUM 9 9 02/10/2020    AST 23 02/10/2020    ALT 28 02/10/2020    ALKPHOS 86 02/10/2020    EGFR 81 10/25/2019     Lab Results   Component Value Date    WBC 6 5 02/10/2020    HGB 12 7 02/10/2020    HCT 36 8 02/10/2020    MCV 88 9 02/10/2020     02/10/2020     Lab Results   Component Value Date    NEUTROABS 4,121 02/10/2020

## 2020-02-12 NOTE — ASSESSMENT & PLAN NOTE
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

## 2020-02-20 PROBLEM — Z45.2 ENCOUNTER FOR CENTRAL LINE CARE: Status: ACTIVE | Noted: 2020-02-20

## 2020-02-28 ENCOUNTER — HOSPITAL ENCOUNTER (OUTPATIENT)
Dept: INFUSION CENTER | Facility: HOSPITAL | Age: 60
Discharge: HOME | End: 2020-02-28
Payer: COMMERCIAL

## 2020-02-28 DIAGNOSIS — Z45.2 ENCOUNTER FOR CENTRAL LINE CARE: Primary | ICD-10-CM

## 2020-02-28 DIAGNOSIS — C56.2 MALIGNANT NEOPLASM OF LEFT OVARY (HCC): ICD-10-CM

## 2020-02-28 PROCEDURE — 96523 IRRIG DRUG DELIVERY DEVICE: CPT

## 2020-02-28 NOTE — PROGRESS NOTES
Port flushed with no difficulty  Patient made next appointment, left unit ambulatory with steady gait

## 2020-03-31 ENCOUNTER — TELEPHONE (OUTPATIENT)
Dept: UROLOGY | Facility: MEDICAL CENTER | Age: 60
End: 2020-03-31

## 2020-04-10 ENCOUNTER — HOSPITAL ENCOUNTER (OUTPATIENT)
Dept: INFUSION CENTER | Facility: HOSPITAL | Age: 60
Discharge: HOME | End: 2020-04-10
Payer: COMMERCIAL

## 2020-04-10 VITALS — TEMPERATURE: 97.6 F

## 2020-04-10 DIAGNOSIS — C56.2 MALIGNANT NEOPLASM OF LEFT OVARY (HCC): ICD-10-CM

## 2020-04-10 DIAGNOSIS — Z45.2 ENCOUNTER FOR CENTRAL LINE CARE: Primary | ICD-10-CM

## 2020-04-10 PROCEDURE — 96523 IRRIG DRUG DELIVERY DEVICE: CPT

## 2020-05-12 ENCOUNTER — APPOINTMENT (OUTPATIENT)
Dept: LAB | Facility: HOSPITAL | Age: 60
End: 2020-05-12
Attending: OBSTETRICS & GYNECOLOGY
Payer: COMMERCIAL

## 2020-05-12 ENCOUNTER — HOSPITAL ENCOUNTER (OUTPATIENT)
Dept: CT IMAGING | Facility: HOSPITAL | Age: 60
Discharge: HOME | End: 2020-05-12
Attending: OBSTETRICS & GYNECOLOGY
Payer: COMMERCIAL

## 2020-05-12 DIAGNOSIS — C56.2 MALIGNANT NEOPLASM OF LEFT OVARY (HCC): ICD-10-CM

## 2020-05-12 LAB
BUN SERPL-MCNC: 15 MG/DL (ref 5–25)
CREAT SERPL-MCNC: 0.89 MG/DL (ref 0.6–1.3)
GFR SERPL CREATININE-BSD FRML MDRD: 70 ML/MIN/1.73SQ M

## 2020-05-12 PROCEDURE — 84520 ASSAY OF UREA NITROGEN: CPT

## 2020-05-12 PROCEDURE — 36415 COLL VENOUS BLD VENIPUNCTURE: CPT

## 2020-05-12 PROCEDURE — 83520 IMMUNOASSAY QUANT NOS NONAB: CPT

## 2020-05-12 PROCEDURE — 82565 ASSAY OF CREATININE: CPT

## 2020-05-12 PROCEDURE — 74177 CT ABD & PELVIS W/CONTRAST: CPT

## 2020-05-12 RX ADMIN — IOHEXOL 100 ML: 350 INJECTION, SOLUTION INTRAVENOUS at 11:57

## 2020-05-12 RX ADMIN — IOHEXOL 50 ML: 240 INJECTION, SOLUTION INTRATHECAL; INTRAVASCULAR; INTRAVENOUS; ORAL at 11:57

## 2020-05-14 LAB — INHIBIN B SERPL-MCNC: <7 PG/ML (ref 0–16.9)

## 2020-05-18 ENCOUNTER — TELEPHONE (OUTPATIENT)
Dept: GYNECOLOGIC ONCOLOGY | Facility: CLINIC | Age: 60
End: 2020-05-18

## 2020-05-20 ENCOUNTER — OFFICE VISIT (OUTPATIENT)
Dept: GYNECOLOGIC ONCOLOGY | Facility: HOSPITAL | Age: 60
End: 2020-05-20
Payer: COMMERCIAL

## 2020-05-20 VITALS
DIASTOLIC BLOOD PRESSURE: 88 MMHG | WEIGHT: 229 LBS | HEIGHT: 63 IN | SYSTOLIC BLOOD PRESSURE: 148 MMHG | HEART RATE: 80 BPM | TEMPERATURE: 98.1 F | RESPIRATION RATE: 16 BRPM | BODY MASS INDEX: 40.57 KG/M2

## 2020-05-20 DIAGNOSIS — C56.2 MALIGNANT NEOPLASM OF LEFT OVARY (HCC): Primary | ICD-10-CM

## 2020-05-20 PROCEDURE — 99213 OFFICE O/P EST LOW 20 MIN: CPT | Performed by: OBSTETRICS & GYNECOLOGY

## 2020-07-07 ENCOUNTER — HOSPITAL ENCOUNTER (OUTPATIENT)
Dept: INFUSION CENTER | Facility: HOSPITAL | Age: 60
Discharge: HOME | End: 2020-07-07
Payer: COMMERCIAL

## 2020-07-07 VITALS — TEMPERATURE: 98.7 F

## 2020-07-07 DIAGNOSIS — Z45.2 ENCOUNTER FOR CENTRAL LINE CARE: Primary | ICD-10-CM

## 2020-07-07 DIAGNOSIS — C56.2 MALIGNANT NEOPLASM OF LEFT OVARY (HCC): ICD-10-CM

## 2020-07-07 PROCEDURE — 96523 IRRIG DRUG DELIVERY DEVICE: CPT

## 2020-07-07 NOTE — PROGRESS NOTES
Port flushed with no difficulty  Next appointment made, AVS provided  Patient left unit ambulatory with steady gait

## 2020-08-26 ENCOUNTER — HOSPITAL ENCOUNTER (OUTPATIENT)
Dept: INFUSION CENTER | Facility: HOSPITAL | Age: 60
Discharge: HOME | End: 2020-08-26
Payer: COMMERCIAL

## 2020-08-26 ENCOUNTER — APPOINTMENT (OUTPATIENT)
Dept: LAB | Facility: HOSPITAL | Age: 60
End: 2020-08-26
Attending: OBSTETRICS & GYNECOLOGY
Payer: COMMERCIAL

## 2020-08-26 DIAGNOSIS — C56.2 MALIGNANT NEOPLASM OF LEFT OVARY (HCC): ICD-10-CM

## 2020-08-26 DIAGNOSIS — Z45.2 ENCOUNTER FOR CENTRAL LINE CARE: Primary | ICD-10-CM

## 2020-08-26 PROCEDURE — 96523 IRRIG DRUG DELIVERY DEVICE: CPT

## 2020-08-26 PROCEDURE — 83520 IMMUNOASSAY QUANT NOS NONAB: CPT

## 2020-08-26 NOTE — PROGRESS NOTES
Pt tolerated port flush with no adverse reaction  Pt left unit ambulatory with steady gait  AVS printed and given to pt

## 2020-09-01 LAB — INHIBIN B SERPL-MCNC: <7 PG/ML (ref 0–16.9)

## 2020-09-03 ENCOUNTER — HOSPITAL ENCOUNTER (EMERGENCY)
Facility: HOSPITAL | Age: 60
Discharge: HOME | End: 2020-09-03
Attending: EMERGENCY MEDICINE | Admitting: EMERGENCY MEDICINE
Payer: COMMERCIAL

## 2020-09-03 ENCOUNTER — OFFICE VISIT (OUTPATIENT)
Dept: GYNECOLOGIC ONCOLOGY | Facility: HOSPITAL | Age: 60
End: 2020-09-03
Payer: COMMERCIAL

## 2020-09-03 ENCOUNTER — APPOINTMENT (EMERGENCY)
Dept: CT IMAGING | Facility: HOSPITAL | Age: 60
End: 2020-09-03
Payer: COMMERCIAL

## 2020-09-03 VITALS
BODY MASS INDEX: 42.33 KG/M2 | OXYGEN SATURATION: 97 % | HEART RATE: 96 BPM | WEIGHT: 230 LBS | TEMPERATURE: 97.7 F | DIASTOLIC BLOOD PRESSURE: 82 MMHG | HEIGHT: 62 IN | SYSTOLIC BLOOD PRESSURE: 140 MMHG

## 2020-09-03 VITALS
HEIGHT: 62 IN | HEART RATE: 86 BPM | TEMPERATURE: 97.8 F | RESPIRATION RATE: 16 BRPM | SYSTOLIC BLOOD PRESSURE: 150 MMHG | BODY MASS INDEX: 42.31 KG/M2 | OXYGEN SATURATION: 99 % | DIASTOLIC BLOOD PRESSURE: 73 MMHG | WEIGHT: 229.94 LBS

## 2020-09-03 DIAGNOSIS — R11.2 NAUSEA & VOMITING: ICD-10-CM

## 2020-09-03 DIAGNOSIS — R42 VERTIGO: Primary | ICD-10-CM

## 2020-09-03 DIAGNOSIS — E87.6 HYPOKALEMIA: ICD-10-CM

## 2020-09-03 DIAGNOSIS — C56.2 MALIGNANT NEOPLASM OF LEFT OVARY (HCC): Primary | ICD-10-CM

## 2020-09-03 LAB
ALBUMIN SERPL BCP-MCNC: 4.4 G/DL (ref 3.5–5)
ALP SERPL-CCNC: 97 U/L (ref 46–116)
ALT SERPL W P-5'-P-CCNC: 38 U/L (ref 12–78)
ANION GAP SERPL CALCULATED.3IONS-SCNC: 15 MMOL/L (ref 4–13)
AST SERPL W P-5'-P-CCNC: 24 U/L (ref 5–45)
BASOPHILS # BLD AUTO: 0.09 THOUSANDS/ΜL (ref 0–0.1)
BASOPHILS NFR BLD AUTO: 1 % (ref 0–1)
BILIRUB SERPL-MCNC: 0.4 MG/DL (ref 0.2–1)
BUN SERPL-MCNC: 11 MG/DL (ref 5–25)
CALCIUM SERPL-MCNC: 10.2 MG/DL (ref 8.3–10.1)
CHLORIDE SERPL-SCNC: 99 MMOL/L (ref 100–108)
CO2 SERPL-SCNC: 26 MMOL/L (ref 21–32)
CREAT SERPL-MCNC: 0.79 MG/DL (ref 0.6–1.3)
EOSINOPHIL # BLD AUTO: 0.12 THOUSAND/ΜL (ref 0–0.61)
EOSINOPHIL NFR BLD AUTO: 1 % (ref 0–6)
ERYTHROCYTE [DISTWIDTH] IN BLOOD BY AUTOMATED COUNT: 12 % (ref 11.6–15.1)
GFR SERPL CREATININE-BSD FRML MDRD: 80 ML/MIN/1.73SQ M
GLUCOSE SERPL-MCNC: 203 MG/DL (ref 65–140)
HCT VFR BLD AUTO: 40.7 % (ref 34.8–46.1)
HGB BLD-MCNC: 14.4 G/DL (ref 11.5–15.4)
IMM GRANULOCYTES # BLD AUTO: 0.12 THOUSAND/UL (ref 0–0.2)
IMM GRANULOCYTES NFR BLD AUTO: 1 % (ref 0–2)
LIPASE SERPL-CCNC: 107 U/L (ref 73–393)
LYMPHOCYTES # BLD AUTO: 1.6 THOUSANDS/ΜL (ref 0.6–4.47)
LYMPHOCYTES NFR BLD AUTO: 12 % (ref 14–44)
MCH RBC QN AUTO: 29.3 PG (ref 26.8–34.3)
MCHC RBC AUTO-ENTMCNC: 35.4 G/DL (ref 31.4–37.4)
MCV RBC AUTO: 83 FL (ref 82–98)
MONOCYTES # BLD AUTO: 0.53 THOUSAND/ΜL (ref 0.17–1.22)
MONOCYTES NFR BLD AUTO: 4 % (ref 4–12)
NEUTROPHILS # BLD AUTO: 10.77 THOUSANDS/ΜL (ref 1.85–7.62)
NEUTS SEG NFR BLD AUTO: 81 % (ref 43–75)
NRBC BLD AUTO-RTO: 0 /100 WBCS
PLATELET # BLD AUTO: 325 THOUSANDS/UL (ref 149–390)
PMV BLD AUTO: 9.1 FL (ref 8.9–12.7)
POTASSIUM SERPL-SCNC: 3 MMOL/L (ref 3.5–5.3)
PROT SERPL-MCNC: 8.2 G/DL (ref 6.4–8.2)
RBC # BLD AUTO: 4.92 MILLION/UL (ref 3.81–5.12)
SODIUM SERPL-SCNC: 140 MMOL/L (ref 136–145)
TROPONIN I SERPL-MCNC: <0.02 NG/ML
WBC # BLD AUTO: 13.23 THOUSAND/UL (ref 4.31–10.16)

## 2020-09-03 PROCEDURE — 80053 COMPREHEN METABOLIC PANEL: CPT | Performed by: EMERGENCY MEDICINE

## 2020-09-03 PROCEDURE — 83690 ASSAY OF LIPASE: CPT | Performed by: EMERGENCY MEDICINE

## 2020-09-03 PROCEDURE — 84484 ASSAY OF TROPONIN QUANT: CPT | Performed by: EMERGENCY MEDICINE

## 2020-09-03 PROCEDURE — 99285 EMERGENCY DEPT VISIT HI MDM: CPT

## 2020-09-03 PROCEDURE — 36415 COLL VENOUS BLD VENIPUNCTURE: CPT | Performed by: EMERGENCY MEDICINE

## 2020-09-03 PROCEDURE — 96374 THER/PROPH/DIAG INJ IV PUSH: CPT

## 2020-09-03 PROCEDURE — 93005 ELECTROCARDIOGRAM TRACING: CPT

## 2020-09-03 PROCEDURE — 96375 TX/PRO/DX INJ NEW DRUG ADDON: CPT

## 2020-09-03 PROCEDURE — 74177 CT ABD & PELVIS W/CONTRAST: CPT

## 2020-09-03 PROCEDURE — 99284 EMERGENCY DEPT VISIT MOD MDM: CPT | Performed by: EMERGENCY MEDICINE

## 2020-09-03 PROCEDURE — G1004 CDSM NDSC: HCPCS

## 2020-09-03 PROCEDURE — 99213 OFFICE O/P EST LOW 20 MIN: CPT | Performed by: OBSTETRICS & GYNECOLOGY

## 2020-09-03 PROCEDURE — 85025 COMPLETE CBC W/AUTO DIFF WBC: CPT | Performed by: EMERGENCY MEDICINE

## 2020-09-03 RX ORDER — DIAZEPAM 5 MG/ML
5 INJECTION, SOLUTION INTRAMUSCULAR; INTRAVENOUS ONCE
Status: COMPLETED | OUTPATIENT
Start: 2020-09-03 | End: 2020-09-03

## 2020-09-03 RX ORDER — ONDANSETRON 2 MG/ML
1 INJECTION INTRAMUSCULAR; INTRAVENOUS ONCE
Status: COMPLETED | OUTPATIENT
Start: 2020-09-03 | End: 2020-09-03

## 2020-09-03 RX ORDER — METOCLOPRAMIDE HYDROCHLORIDE 5 MG/ML
10 INJECTION INTRAMUSCULAR; INTRAVENOUS ONCE
Status: COMPLETED | OUTPATIENT
Start: 2020-09-03 | End: 2020-09-03

## 2020-09-03 RX ORDER — POTASSIUM CHLORIDE 20MEQ/15ML
40 LIQUID (ML) ORAL ONCE
Status: COMPLETED | OUTPATIENT
Start: 2020-09-03 | End: 2020-09-03

## 2020-09-03 RX ADMIN — POTASSIUM CHLORIDE 40 MEQ: 1.5 SOLUTION ORAL at 20:35

## 2020-09-03 RX ADMIN — DIAZEPAM 5 MG: 10 INJECTION, SOLUTION INTRAMUSCULAR; INTRAVENOUS at 18:31

## 2020-09-03 RX ADMIN — IOHEXOL 100 ML: 350 INJECTION, SOLUTION INTRAVENOUS at 19:48

## 2020-09-03 RX ADMIN — METOCLOPRAMIDE HYDROCHLORIDE 10 MG: 5 INJECTION INTRAMUSCULAR; INTRAVENOUS at 18:31

## 2020-09-03 NOTE — ED PROVIDER NOTES
History  Chief Complaint   Patient presents with    Vomiting     Patient presents to ED via EMS for vomitting, dizziness, diaphoresis after her oncologist appt at [facility]  Per EMS patient was in her car c/o of dizziness, nausea and sweating  Patient recieved 4mg IV zofran w/o improvement     68-year-old female presents for evaluation of sudden-onset dizziness that occurred shortly prior to arrival   Patient was being evaluated at oncologist's office and when she was laid flat for examination, started to feel a severe room spinning sensation with nausea and vomiting  Patient brought in by EMS and 4 mg of Zofran administered with continued symptoms  Patient states she has a history of benign positional vertigo which is typically exacerbated by certain movements  Patient denies recent illness  No chemotherapy treatments were administered at Oncology office  Prior to Admission Medications   Prescriptions Last Dose Informant Patient Reported? Taking?    Cholecalciferol (VITAMIN D-3) 1000 units CAPS  Self Yes No   Sig: Take by mouth daily    LORazepam (ATIVAN) 1 mg tablet  Self No No   Sig: Take 1 tablet (1 mg total) by mouth every 8 (eight) hours as needed (nausea or anxiety)   Omega 3 1200 MG CAPS  Self Yes No   Sig: Take by mouth daily   SUPER B COMPLEX/C PO  Self Yes No   Sig: Take by mouth daily    amLODIPine (NORVASC) 5 mg tablet  Self Yes No   Sig: Take 5 mg by mouth daily    buPROPion (WELLBUTRIN SR) 150 mg 12 hr tablet  Self Yes No   Sig: every evening    escitalopram (LEXAPRO) 20 mg tablet  Self Yes No   Si mg daily    metFORMIN (GLUCOPHAGE) 500 mg tablet  Self Yes No   Si mg 2 (two) times a day with meals    nystatin (MYCOSTATIN) powder  Self No No   Sig: Apply topically 4 (four) times a day   ondansetron (ZOFRAN) 8 mg tablet  Self No No   Sig: Take 1 tablet (8 mg total) by mouth every 8 (eight) hours as needed for nausea or vomiting   scopolamine (TRANSDERM-SCOP) 1 5 mg/3 days TD 72 hr patch  Self No No   Sig: Place 1 patch on the skin every third day   Patient taking differently: Place 1 patch on the skin as needed    simvastatin (ZOCOR) 20 mg tablet  Self Yes No   Sig: TK 1 T PO QD IN THE KIMBERLYN   valsartan-hydrochlorothiazide (DIOVAN-HCT) 320-25 MG per tablet  Self Yes No   Sig: daily       Facility-Administered Medications: None       Past Medical History:   Diagnosis Date    Anxiety     Cancer ([facility] [facility] )     ovarian    Diabetes mellitus ([place] [address] )     High cholesterol     Hypertension     Morbid obesity ([facility] [place] [facility] )     Ovarian neoplasm 8/26/2019    PONV (postoperative nausea and vomiting)     Post-menopausal bleeding     Sleep apnea        Past Surgical History:   Procedure Laterality Date    BLADDER REPAIR N/A 8/26/2019    Procedure: REPAIR BLADDER; uretero yared cystotomy;  Surgeon: Otoniel Liao MD;  [facility]: [facility];  Service: Gynecology Oncology    CHOLECYSTECTOMY      IR [address] / DRAINAGE  9/27/2019    IR PORT PLACEMENT  9/27/2019    IR PORT PLACEMENT  11/7/2019    IR PORT REMOVAL  10/29/2019    ME TOTAL ABDOM HYSTERECTOMY N/A 8/26/2019    Procedure: TOTAL ABDOMINAL HYSTERECTOMY, BILATERAL SALPINGO-OOPHORECTOMY, Radical omentectomy, pelvic lymph node sampling, staging biopsy, repair of cystotomy, appendectomy;  Surgeon: Otoniel Liao MD;  Location: [facility];  Service: Gynecology Oncology       History reviewed  No pertinent family history  I have reviewed and agree with the history as documented  E-Cigarette/Vaping     E-Cigarette/Vaping Substances     Social History     Tobacco Use    Smoking status: Former Smoker    Smokeless tobacco: Never Used    Tobacco comment: quit in her 27's   Substance Use Topics    Alcohol use: Not Currently     Frequency: Never    Drug use: Never       Review of Systems   Constitutional: Negative for chills, diaphoresis and fever  HENT: Negative for congestion and rhinorrhea      Eyes: Negative for pain and visual disturbance  Respiratory: Negative for cough, shortness of breath and wheezing  Cardiovascular: Negative for chest pain and leg swelling  Gastrointestinal: Positive for nausea and vomiting  Negative for abdominal pain and diarrhea  Genitourinary: Negative for difficulty urinating, dysuria, frequency and urgency  Musculoskeletal: Negative for back pain and neck pain  Skin: Negative for color change and rash  Neurological: Positive for dizziness  Negative for syncope, numbness and headaches  All other systems reviewed and are negative  Physical Exam  Physical Exam  Vitals signs and nursing note reviewed  Constitutional:       Appearance: She is well-developed  HENT:      Head: Normocephalic and atraumatic  Eyes:      Conjunctiva/sclera: Conjunctivae normal    Neck:      Musculoskeletal: Normal range of motion and neck supple  Cardiovascular:      Rate and Rhythm: Normal rate and regular rhythm  Pulmonary:      Effort: Pulmonary effort is normal  No respiratory distress  Abdominal:      Palpations: Abdomen is soft  Tenderness: There is no abdominal tenderness  Musculoskeletal: Normal range of motion  General: No tenderness  Skin:     General: Skin is warm  Findings: No erythema  Neurological:      Mental Status: She is alert and oriented to person, place, and time  Comments: HINTS exam reassuring, no dysdiadochokinesis, finger to nose intact, heel-to-shin intact, negative Romberg, able to ambulate     Psychiatric:         Behavior: Behavior normal          Vital Signs  ED Triage Vitals   Temperature Pulse Respirations Blood Pressure SpO2   09/03/20 1827 09/03/20 1827 09/03/20 1827 09/03/20 1828 09/03/20 1827   97 8 °F (36 6 °C) 88 16 (!) 191/84 100 %      Temp Source Heart Rate Source Patient Position - Orthostatic VS BP Location FiO2 (%)   09/03/20 1827 09/03/20 1830 09/03/20 1828 09/03/20 1828 --   Tympanic Monitor Lying Right arm       Pain Score 09/03/20 1827       No Pain           Vitals:    09/03/20 1915 09/03/20 1930 09/03/20 2000 09/03/20 2030   BP:  141/74 152/76 150/73   Pulse: 88 88 86 86   Patient Position - Orthostatic VS:  Lying Lying Lying         Visual Acuity      ED Medications  Medications   ondansetron (FOR EMS ONLY) (ZOFRAN) 4 mg/2 mL injection 4 mg (0 mg Does not apply Given to EMS 9/3/20 1831)   metoclopramide (REGLAN) injection 10 mg (10 mg Intravenous Given 9/3/20 1831)   diazepam (VALIUM) injection 5 mg (5 mg Intravenous Given 9/3/20 1831)   iohexol (OMNIPAQUE) 350 MG/ML injection (MULTI-DOSE) 100 mL (100 mL Intravenous Given 9/3/20 1948)   potassium chloride oral solution 40 mEq (40 mEq Oral Given 9/3/20 2035)       Diagnostic Studies  Results Reviewed     Procedure Component Value Units Date/Time    Troponin I [[id number]]  (Normal) Collected:  09/03/20 1830    Lab Status:  Final result Specimen:  Blood from Arm, Left Updated:  09/03/20 1855     Troponin I <0 02 ng/mL     CMP [[id number]]  (Abnormal) Collected:  09/03/20 1830    Lab Status:  Final result Specimen:  Blood from Arm, Left Updated:  09/03/20 1853     Sodium 140 mmol/L      Potassium 3 0 mmol/L      Chloride 99 mmol/L      CO2 26 mmol/L      ANION GAP 15 mmol/L      BUN 11 mg/dL      Creatinine 0 79 mg/dL      Glucose 203 mg/dL      Calcium 10 2 mg/dL      AST 24 U/L      ALT 38 U/L      Alkaline Phosphatase 97 U/L      Total Protein 8 2 g/dL      Albumin 4 4 g/dL      Total Bilirubin 0 40 mg/dL      eGFR 80 ml/min/1 73sq m     Narrative:       [place] guidelines for Chronic Kidney Disease (CKD):     Stage 1 with normal or high GFR (GFR > 90 mL/min/1 73 square meters)    Stage 2 Mild CKD (GFR = 60-89 mL/min/1 73 square meters)    Stage 3A Moderate CKD (GFR = 45-59 mL/min/1 73 square meters)    Stage 3B Moderate CKD (GFR = 30-44 mL/min/1 73 square meters)    Stage 4 Severe CKD (GFR = 15-29 mL/min/1 73 square meters)    Stage 5 End Stage CKD (GFR <15 mL/min/1 73 square meters)  Note: GFR calculation is accurate only with a steady state creatinine    Lipase [[id number]]  (Normal) Collected:  09/03/20 1830    Lab Status:  Final result Specimen:  Blood from Arm, Left Updated:  09/03/20 1853     Lipase 107 u/L     CBC and differential [[id number]]  (Abnormal) Collected:  09/03/20 1830    Lab Status:  Final result Specimen:  Blood from Arm, Left Updated:  09/03/20 1836     WBC 13 23 Thousand/uL      RBC 4 92 Million/uL      Hemoglobin 14 4 g/dL      Hematocrit 40 7 %      MCV 83 fL      MCH 29 3 pg      MCHC 35 4 g/dL      RDW 12 0 %      MPV 9 1 fL      Platelets 921 Thousands/uL      nRBC 0 /100 WBCs      Neutrophils Relative 81 %      Immat GRANS % 1 %      Lymphocytes Relative 12 %      Monocytes Relative 4 %      Eosinophils Relative 1 %      Basophils Relative 1 %      Neutrophils Absolute 10 77 Thousands/µL      Immature Grans Absolute 0 12 Thousand/uL      Lymphocytes Absolute 1 60 Thousands/µL      Monocytes Absolute 0 53 Thousand/µL      Eosinophils Absolute 0 12 Thousand/µL      Basophils Absolute 0 09 Thousands/µL                  CT Abdomen pelvis with contrast   Final Result by Wing Billie MD (09/03 2010)      No evidence of acute abdominopelvic abnormality  Status post TWAN/BSO  No evidence of recurrent or metastatic ovarian cancer in the abdomen or pelvis  Stable left pelvis/retroperitoneum lymphocele or seroma  Stable borderline mesenteric nodes  No new or enlarging lymphadenopathy  Workstation performed: [id number]                    Procedures  Procedures         ED Course       US AUDIT      Most Recent Value   Initial Alcohol Screen: US AUDIT-C    1  How often do you have a drink containing alcohol?  0 Filed at: 09/03/2020 1818   2  How many drinks containing alcohol do you have on a typical day you are drinking? 0 Filed at: 09/03/2020 1818   3a  Male UNDER 65:  How often do you have five or more drinks on one occasion? 0 Filed at: 09/03/2020 1818   3b  FEMALE Any Age, or MALE 65+: How often do you have 4 or more drinks on one occassion? 0 Filed at: 09/03/2020 1818   Audit-C Score  0 Filed at: 09/03/2020 1818                  JUAN/DAST-10      Most Recent Value   How many times in the past year have you    Used an illegal drug or used a prescription medication for non-medical reasons? Never Filed at: 09/03/2020 1818                                MDM  Number of Diagnoses or Management Options  Diagnosis management comments: 58-year-old female presenting with dizziness and vomiting  Obtain labs, EKG and CT abdomen pelvis to assess for further etiology  Symptom control reassess  Upon reassessment, patient states she feels sleepy after Valium administration but her vertigo symptoms now completely resolved  Disposition  Final diagnoses:   Vertigo   Nausea & vomiting   Hypokalemia     Time reflects when diagnosis was documented in both MDM as applicable and the Disposition within this note     Time User Action Codes Description Comment    9/3/2020  8:51 PM [place] Gouge Add [R42] Vertigo     9/3/2020  8:51 PM Marisue Honey [R11 2] Nausea & vomiting     9/3/2020  8:52 PM Julien Gouge Add [E87 6] Hypokalemia       ED Disposition     ED Disposition Condition Date/Time Comment    Discharge Stable u Sep 3, 2020  8:51 PM [facility] discharge to home/self care              Follow-up Information     Follow up With Specialties Details Why Contact Info Additional Information    Physical Therapy at [facility] [place] [facility]   [address] [place] [address] 222 [address] [place] [address] [facility] [address] [place] [address], [address], [address] [facility]     [address] [facility] Emergency Medicine  If symptoms worsen [address] [place], [address]  [phone]  ED, [address] [place] [address] Jesus Rivas, Helena Derian 10    [facility], 10 [facility], Nurse Practitioner In 1 week For recheck of electrolytes 4 Chetna Darlingriann  1200 [facility] [address] [phone]             Discharge Medication List as of 9/3/2020  8:52 PM      CONTINUE these medications which have NOT CHANGED    Details   amLODIPine (NORVASC) 5 mg tablet Take 5 mg by mouth daily , Starting Fri 7/12/2019, Historical Med      buPROPion (WELLBUTRIN SR) 150 mg 12 hr tablet every evening , Starting Thu 5/23/2019, Historical Med      Cholecalciferol (VITAMIN D-3) 1000 units CAPS Take by mouth daily , Historical Med      escitalopram (LEXAPRO) 20 mg tablet 20 mg daily , Starting Thu 7/11/2019, Historical Med      LORazepam (ATIVAN) 1 mg tablet Take 1 tablet (1 mg total) by mouth every 8 (eight) hours as needed (nausea or anxiety), Starting Fri 9/13/2019, Normal      metFORMIN (GLUCOPHAGE) 500 mg tablet 500 mg 2 (two) times a day with meals , Starting Thu 7/11/2019, Historical Med      nystatin (MYCOSTATIN) powder Apply topically 4 (four) times a day, Starting Thu 9/26/2019, Normal      Omega 3 1200 MG CAPS Take by mouth daily, Historical Med      ondansetron (ZOFRAN) 8 mg tablet Take 1 tablet (8 mg total) by mouth every 8 (eight) hours as needed for nausea or vomiting, Starting Fri 9/13/2019, Normal      scopolamine (TRANSDERM-SCOP) 1 5 mg/3 days TD 72 hr patch Place 1 patch on the skin every third day, Starting Wed 9/11/2019, Normal      simvastatin (ZOCOR) 20 mg tablet TK 1 T PO QD IN THE KENDY, Historical Med      SUPER B COMPLEX/C PO Take by mouth daily , Historical Med      valsartan-hydrochlorothiazide (DIOVAN-HCT) 320-25 MG per tablet daily , Starting Tue 8/13/2019, Historical Med           No discharge procedures on file      PDMP Review     None          ED Provider  Electronically Signed by           Kendy Joyner,   09/03/20 8097

## 2020-09-03 NOTE — PROGRESS NOTES
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  Inhibin B is less than 7 picograms/mL  She is clinically without evidence of disease recurrence  Performance status is 0   1  CT of abdomen pelvis to evaluate mesenteric lymphadenopathy as well as the seroma  2  Repeat inhibin B in 3 months prior to her next appointment for surveillance   3  Referral to interventional Radiology for port removal          Relevant Orders    CT abdomen pelvis w contrast    BUN    Creatinine, serum    Inhibin B    Ambulatory referral to Interventional Radiology            CHIEF COMPLAINT:  Ovarian cancer surveillance      Problem:  Cancer Staging  Malignant neoplasm of left ovary ([facility] )  Staging form: Ovary, Fallopian Tube, Primary Peritoneal, AJCC 8th Edition  - Clinical stage from 8/26/2019: Darwin Martinez Stage IC1, calculated as Stage IA (cT1a, cN0, cM0) - Signed by Gopi Ca MD on 9/11/2019        Previous therapy:  Oncology History   Malignant neoplasm of left ovary ([facility] )   8/26/2019 Initial Diagnosis    Malignant neoplasm of left ovary ([facility] )     8/26/2019 -  Cancer Staged    Staging form: Ovary, Fallopian Tube, Primary Peritoneal, AJCC 8th Edition  - Clinical stage from 8/26/2019: FIGO Stage IC1, calculated as Stage IA (cT1a, cN0, cM0) - Signed by Gopi Ca MD on 9/11/2019        Chemotherapy    Taxol 175 mg/m2 and carboplatin AUC 6 every 21 days   She received 5 cycles and is scheduled for cycle 6       8/26/2019 Surgery    Total abdominal hysterectomy, bilateral salpingo-oophorectomy, radical omentectomy, pelvic lymph node sampling, repair inherent cystostomy, left ureteroneocystostomy, appendectomy  -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  -intraoperative tumor rupture           Patient ID: Justo Benitez Pili Landaverde is a 56 y o  female  78-year-old returns for ovarian cancer surveillance  Her most recent inhibin B was less than 7 pg per mL  Her last CT imaging was in May of 2020 which revealed stable borderline mesenteric lymphadenopathy and a left pelvic lymphocele  She is back to work full time  She has no abdominal pain, pelvic pain, or vaginal bleeding  No other interval change in her medications or medical history since her last visit  She has benign positional vertigo  The following portions of the patient's history were reviewed and updated as appropriate: allergies, current medications, past family history, past medical history, past social history, past surgical history and problem list     Review of Systems   Constitutional: Positive for fatigue  Negative for activity change and unexpected weight change  HENT: Negative  Eyes: Negative  Cardiovascular: Negative  Gastrointestinal: Negative for abdominal distention and abdominal pain  Endocrine: Negative  Genitourinary: Negative for pelvic pain and vaginal bleeding  Musculoskeletal: Negative  Skin: Negative  Allergic/Immunologic: Negative  Neurological: Negative  Hematological: Negative  Psychiatric/Behavioral: Negative          Current Outpatient Medications   Medication Sig Dispense Refill    amLODIPine (NORVASC) 5 mg tablet Take 5 mg by mouth daily   4    buPROPion (WELLBUTRIN SR) 150 mg 12 hr tablet every evening   0    Cholecalciferol (VITAMIN D-3) 1000 units CAPS Take by mouth daily       escitalopram (LEXAPRO) 20 mg tablet 20 mg daily   0    LORazepam (ATIVAN) 1 mg tablet Take 1 tablet (1 mg total) by mouth every 8 (eight) hours as needed (nausea or anxiety) 20 tablet 1    metFORMIN (GLUCOPHAGE) 500 mg tablet 500 mg 2 (two) times a day with meals   0    nystatin (MYCOSTATIN) powder Apply topically 4 (four) times a day 45 g 0    Omega 3 1200 MG CAPS Take by mouth daily      ondansetron (ZOFRAN) 8 mg tablet Take 1 tablet (8 mg total) by mouth every 8 (eight) hours as needed for nausea or vomiting 20 tablet 1    scopolamine (TRANSDERM-SCOP) 1 5 mg/3 days TD 72 hr patch Place 1 patch on the skin every third day (Patient taking differently: Place 1 patch on the skin as needed ) 2 patch 1    simvastatin (ZOCOR) 20 mg tablet TK 1 T PO QD IN THE KIMBERLYN  5    SUPER B COMPLEX/C PO Take by mouth daily       valsartan-hydrochlorothiazide (DIOVAN-HCT) 320-25 MG per tablet daily   0     No current facility-administered medications for this visit  Objective:    Blood pressure 140/82, pulse 96, temperature 97 7 °F (36 5 °C), temperature source Oral, height 5' 2" (1 575 m), weight 104 kg (230 lb), SpO2 97 %  Body mass index is 42 07 kg/m²  Body surface area is 2 03 meters squared  Physical Exam  Constitutional:       General: She is not in acute distress  Appearance: Normal appearance  She is well-developed  She is obese  She is not ill-appearing, toxic-appearing or diaphoretic  HENT:      Head: Normocephalic and atraumatic  Eyes:      General: No scleral icterus  Right eye: No discharge  Left eye: No discharge  Extraocular Movements: Extraocular movements intact  Conjunctiva/sclera: Conjunctivae normal    Neck:      Musculoskeletal: Normal range of motion and neck supple  Thyroid: No thyromegaly  Pulmonary:      Effort: Pulmonary effort is normal  No respiratory distress  Breath sounds: No stridor  No wheezing  Abdominal:      General: There is no distension  Palpations: Abdomen is soft  There is no mass  Tenderness: There is no abdominal tenderness  There is no guarding or rebound  Hernia: No hernia is present  Genitourinary:     Comments: The external female genitalia is normal  The bartholin's, uretheral and skenes glands are normal  The urethral meatus is normal (midline with no lesions)  Anus without fissure or lesion   Speculum exam reveals a grossly normal vagina  No masses, lesions,discharge or bleeding  No significant cystocele or rectocele noted  Bimanual exam notes a surgical absent cervix, uterus and adnexal structures  No masses or fullness  Bladder is without fullness, mass or tenderness  Musculoskeletal:         General: No swelling, tenderness, deformity or signs of injury  Right lower leg: Edema present  Left lower leg: Edema present  Comments: 1+ bilateral lower extremity edema   Lymphadenopathy:      Cervical: No cervical adenopathy  Skin:     General: Skin is warm and dry  Coloration: Skin is not jaundiced or pale  Findings: No bruising or erythema  Neurological:      General: No focal deficit present  Mental Status: She is alert and oriented to person, place, and time  Motor: No weakness  Gait: Gait normal    Psychiatric:         Mood and Affect: Mood normal          Behavior: Behavior normal          Thought Content: Thought content normal          Judgment: Judgment normal           Due to her benign positional vertigo, as she went from sitting to dorsal supine position, she began to have nausea  When she then returned to the sitting position, she began to have vomiting  This persisted after completion of the examination

## 2020-09-03 NOTE — ASSESSMENT & PLAN NOTE
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

## 2020-09-04 ENCOUNTER — PREP FOR PROCEDURE (OUTPATIENT)
Dept: INTERVENTIONAL RADIOLOGY/VASCULAR | Facility: HOSPITAL | Age: 60
End: 2020-09-04

## 2020-09-04 DIAGNOSIS — C56.2 MALIGNANT NEOPLASM OF LEFT OVARY (HCC): Primary | ICD-10-CM

## 2020-09-04 LAB
ATRIAL RATE: 85 BPM
P AXIS: 60 DEGREES
PR INTERVAL: 190 MS
QRS AXIS: 22 DEGREES
QRSD INTERVAL: 82 MS
QT INTERVAL: 422 MS
QTC INTERVAL: 502 MS
T WAVE AXIS: 20 DEGREES
VENTRICULAR RATE: 85 BPM

## 2020-09-04 PROCEDURE — 93010 ELECTROCARDIOGRAM REPORT: CPT | Performed by: INTERNAL MEDICINE

## 2020-09-06 ENCOUNTER — APPOINTMENT (EMERGENCY)
Dept: CT IMAGING | Facility: HOSPITAL | Age: 60
End: 2020-09-06
Payer: COMMERCIAL

## 2020-09-06 ENCOUNTER — OFFICE VISIT (OUTPATIENT)
Dept: URGENT CARE | Facility: CLINIC | Age: 60
End: 2020-09-06
Payer: COMMERCIAL

## 2020-09-06 ENCOUNTER — HOSPITAL ENCOUNTER (EMERGENCY)
Facility: HOSPITAL | Age: 60
Discharge: HOME | End: 2020-09-06
Attending: EMERGENCY MEDICINE
Payer: COMMERCIAL

## 2020-09-06 VITALS
DIASTOLIC BLOOD PRESSURE: 74 MMHG | SYSTOLIC BLOOD PRESSURE: 142 MMHG | WEIGHT: 220 LBS | TEMPERATURE: 97.3 F | HEART RATE: 74 BPM | OXYGEN SATURATION: 96 % | RESPIRATION RATE: 18 BRPM | BODY MASS INDEX: 38.98 KG/M2 | HEIGHT: 63 IN

## 2020-09-06 DIAGNOSIS — R42 VERTIGO: Primary | ICD-10-CM

## 2020-09-06 LAB
ALBUMIN SERPL BCP-MCNC: 4.1 G/DL (ref 3.5–5)
ALP SERPL-CCNC: 97 U/L (ref 46–116)
ALT SERPL W P-5'-P-CCNC: 54 U/L (ref 12–78)
ANION GAP SERPL CALCULATED.3IONS-SCNC: 9 MMOL/L (ref 4–13)
AST SERPL W P-5'-P-CCNC: 38 U/L (ref 5–45)
BASOPHILS # BLD AUTO: 0.07 THOUSANDS/ΜL (ref 0–0.1)
BASOPHILS NFR BLD AUTO: 1 % (ref 0–1)
BILIRUB SERPL-MCNC: 0.5 MG/DL (ref 0.2–1)
BUN SERPL-MCNC: 8 MG/DL (ref 5–25)
CALCIUM SERPL-MCNC: 9.4 MG/DL (ref 8.3–10.1)
CHLORIDE SERPL-SCNC: 99 MMOL/L (ref 100–108)
CLARITY, POC: CLEAR
CO2 SERPL-SCNC: 30 MMOL/L (ref 21–32)
COLOR, POC: YELLOW
CREAT SERPL-MCNC: 0.73 MG/DL (ref 0.6–1.3)
EOSINOPHIL # BLD AUTO: 0.08 THOUSAND/ΜL (ref 0–0.61)
EOSINOPHIL NFR BLD AUTO: 1 % (ref 0–6)
ERYTHROCYTE [DISTWIDTH] IN BLOOD BY AUTOMATED COUNT: 12 % (ref 11.6–15.1)
EXT BILIRUBIN, UA: NORMAL
EXT BLOOD URINE: NORMAL
EXT GLUCOSE, UA: NORMAL
EXT KETONES: NORMAL
EXT NITRITE, UA: NORMAL
EXT PH, UA: 7
EXT PROTEIN, UA: NORMAL
EXT SPECIFIC GRAVITY, UA: 1.01
EXT UROBILINOGEN: 0.2
GFR SERPL CREATININE-BSD FRML MDRD: 89 ML/MIN/1.73SQ M
GLUCOSE SERPL-MCNC: 162 MG/DL (ref 65–140)
HCT VFR BLD AUTO: 41.6 % (ref 34.8–46.1)
HGB BLD-MCNC: 14.9 G/DL (ref 11.5–15.4)
IMM GRANULOCYTES # BLD AUTO: 0.11 THOUSAND/UL (ref 0–0.2)
IMM GRANULOCYTES NFR BLD AUTO: 1 % (ref 0–2)
LYMPHOCYTES # BLD AUTO: 1.23 THOUSANDS/ΜL (ref 0.6–4.47)
LYMPHOCYTES NFR BLD AUTO: 10 % (ref 14–44)
MCH RBC QN AUTO: 29.7 PG (ref 26.8–34.3)
MCHC RBC AUTO-ENTMCNC: 35.8 G/DL (ref 31.4–37.4)
MCV RBC AUTO: 83 FL (ref 82–98)
MONOCYTES # BLD AUTO: 0.6 THOUSAND/ΜL (ref 0.17–1.22)
MONOCYTES NFR BLD AUTO: 5 % (ref 4–12)
NEUTROPHILS # BLD AUTO: 9.89 THOUSANDS/ΜL (ref 1.85–7.62)
NEUTS SEG NFR BLD AUTO: 82 % (ref 43–75)
NRBC BLD AUTO-RTO: 0 /100 WBCS
PLATELET # BLD AUTO: 298 THOUSANDS/UL (ref 149–390)
PMV BLD AUTO: 8.8 FL (ref 8.9–12.7)
POTASSIUM SERPL-SCNC: 3 MMOL/L (ref 3.5–5.3)
PROT SERPL-MCNC: 8 G/DL (ref 6.4–8.2)
RBC # BLD AUTO: 5.01 MILLION/UL (ref 3.81–5.12)
SODIUM SERPL-SCNC: 138 MMOL/L (ref 136–145)
TROPONIN I SERPL-MCNC: <0.02 NG/ML
WBC # BLD AUTO: 11.98 THOUSAND/UL (ref 4.31–10.16)
WBC # BLD EST: NORMAL 10*3/UL

## 2020-09-06 PROCEDURE — 80053 COMPREHEN METABOLIC PANEL: CPT

## 2020-09-06 PROCEDURE — 96374 THER/PROPH/DIAG INJ IV PUSH: CPT

## 2020-09-06 PROCEDURE — 85025 COMPLETE CBC W/AUTO DIFF WBC: CPT

## 2020-09-06 PROCEDURE — 70450 CT HEAD/BRAIN W/O DYE: CPT

## 2020-09-06 PROCEDURE — G1004 CDSM NDSC: HCPCS

## 2020-09-06 PROCEDURE — 84484 ASSAY OF TROPONIN QUANT: CPT

## 2020-09-06 PROCEDURE — 99213 OFFICE O/P EST LOW 20 MIN: CPT | Performed by: PHYSICIAN ASSISTANT

## 2020-09-06 PROCEDURE — 93005 ELECTROCARDIOGRAM TRACING: CPT

## 2020-09-06 PROCEDURE — 96375 TX/PRO/DX INJ NEW DRUG ADDON: CPT

## 2020-09-06 PROCEDURE — 99285 EMERGENCY DEPT VISIT HI MDM: CPT | Performed by: PHYSICIAN ASSISTANT

## 2020-09-06 PROCEDURE — 36415 COLL VENOUS BLD VENIPUNCTURE: CPT

## 2020-09-06 PROCEDURE — 96361 HYDRATE IV INFUSION ADD-ON: CPT

## 2020-09-06 PROCEDURE — 99284 EMERGENCY DEPT VISIT MOD MDM: CPT

## 2020-09-06 RX ORDER — ONDANSETRON 2 MG/ML
4 INJECTION INTRAMUSCULAR; INTRAVENOUS ONCE
Status: COMPLETED | OUTPATIENT
Start: 2020-09-06 | End: 2020-09-06

## 2020-09-06 RX ORDER — POTASSIUM CHLORIDE 20 MEQ/1
40 TABLET, EXTENDED RELEASE ORAL ONCE
Status: COMPLETED | OUTPATIENT
Start: 2020-09-06 | End: 2020-09-06

## 2020-09-06 RX ORDER — DIAZEPAM 5 MG/ML
2.5 INJECTION, SOLUTION INTRAMUSCULAR; INTRAVENOUS ONCE
Status: COMPLETED | OUTPATIENT
Start: 2020-09-06 | End: 2020-09-06

## 2020-09-06 RX ADMIN — ONDANSETRON 4 MG: 2 INJECTION INTRAMUSCULAR; INTRAVENOUS at 15:59

## 2020-09-06 RX ADMIN — DIAZEPAM 2.5 MG: 10 INJECTION, SOLUTION INTRAMUSCULAR; INTRAVENOUS at 15:59

## 2020-09-06 RX ADMIN — POTASSIUM CHLORIDE 40 MEQ: 20 TABLET, EXTENDED RELEASE ORAL at 17:19

## 2020-09-06 RX ADMIN — SODIUM CHLORIDE 1000 ML: 0.9 INJECTION, SOLUTION INTRAVENOUS at 15:56

## 2020-09-06 NOTE — ED PROVIDER NOTES
History  Chief Complaint   Patient presents with    Dizziness     Patient states that she has not felt better since she was here on Thursday  Still nauseous     Patient is a 57 y/o F with h/o ovarian cancer, DM, HTN, vertigo that presents to the ED with dizziness that started 3 days ago  Dizziness is worse with movement, better with lying still  She has been taking antivert which helps a little  She denies headaches, vision changes, weakness, numbness  SHe states she has been vomiting when she moves her head  No fevers, chills  She denies chest pain or SOB  History provided by:  Patient  Dizziness   Quality:  Head spinning  Severity:  Moderate  Onset quality:  Gradual  Duration:  3 days  Timing:  Constant  Progression:  Unchanged  Chronicity:  Recurrent  Context: eye movement and head movement    Relieved by:  Being still  Worsened by:  Eye movement, movement and turning head  Associated symptoms: nausea and vomiting    Associated symptoms: no blood in stool, no chest pain, no diarrhea, no headaches, no hearing loss, no palpitations, no shortness of breath, no syncope, no tinnitus, no vision changes and no weakness    Risk factors: hx of vertigo    Risk factors: no anemia, no multiple medications and no new medications        Prior to Admission Medications   Prescriptions Last Dose Informant Patient Reported? Taking?    Cholecalciferol (VITAMIN D-3) 1000 units CAPS  Self Yes No   Sig: Take by mouth daily    LORazepam (ATIVAN) 1 mg tablet  Self No No   Sig: Take 1 tablet (1 mg total) by mouth every 8 (eight) hours as needed (nausea or anxiety)   Omega 3 1200 MG CAPS  Self Yes No   Sig: Take by mouth daily   SUPER B COMPLEX/C PO  Self Yes No   Sig: Take by mouth daily    amLODIPine (NORVASC) 5 mg tablet  Self Yes No   Sig: Take 5 mg by mouth daily    buPROPion (WELLBUTRIN SR) 150 mg 12 hr tablet  Self Yes No   Sig: every evening    escitalopram (LEXAPRO) 20 mg tablet  Self Yes No   Si mg daily metFORMIN (GLUCOPHAGE) 500 mg tablet  Self Yes No   Si mg 2 (two) times a day with meals    nystatin (MYCOSTATIN) powder  Self No No   Sig: Apply topically 4 (four) times a day   ondansetron (ZOFRAN) 8 mg tablet  Self No No   Sig: Take 1 tablet (8 mg total) by mouth every 8 (eight) hours as needed for nausea or vomiting   scopolamine (TRANSDERM-SCOP) 1 5 mg/3 days TD 72 hr patch  Self No No   Sig: Place 1 patch on the skin every third day   Patient taking differently: Place 1 patch on the skin as needed    simvastatin (ZOCOR) 20 mg tablet  Self Yes No   Sig: TK 1 T PO QD IN THE KIMBERLYN   valsartan-hydrochlorothiazide (DIOVAN-HCT) 320-25 MG per tablet  Self Yes No   Sig: daily       Facility-Administered Medications: None       Past Medical History:   Diagnosis Date    Anxiety     [facility]     ovarian    [facility] ([facility] )     High cholesterol     Hypertension     Morbid obesity ([facility] )     Ovarian neoplasm 2019    PONV (postoperative nausea and vomiting)     Post-menopausal bleeding     Sleep apnea        Past Surgical History:   Procedure Laterality Date    BLADDER REPAIR N/A 2019    Procedure: REPAIR BLADDER; uretero yared cystotomy;  Surgeon: Mele Mcdonald MD;  [facility];  Service: Gynecology Oncology    [address] / [facility]  2019    IR PORT PLACEMENT  2019    IR PORT PLACEMENT  2019    IR PORT REMOVAL  10/29/2019    MD TOTAL ABDOM HYSTERECTOMY N/A 2019    Procedure: TOTAL ABDOMINAL HYSTERECTOMY, BILATERAL SALPINGO-OOPHORECTOMY, Radical omentectomy, pelvic lymph node sampling, staging biopsy, repair of cystotomy, appendectomy;  Surgeon: Mele Mcdonald MD;  [facility];  Service: Gynecology Oncology       History reviewed  No pertinent family history  I have reviewed and agree with the history as documented      E-Cigarette/Vaping     E-Cigarette/Vaping Substances     Social History     Tobacco Use    Smoking status: Former Smoker    Smokeless tobacco: Never Used    Tobacco comment: quit in her 27's   Substance Use Topics    Alcohol use: Not Currently     Frequency: Never    Drug use: Never       Review of Systems   Constitutional: Negative for chills and fever  HENT: Negative for congestion, hearing loss and tinnitus  Eyes: Negative for visual disturbance  Respiratory: Negative for cough and shortness of breath  Cardiovascular: Negative for chest pain, palpitations, leg swelling and syncope  Gastrointestinal: Positive for nausea and vomiting  Negative for blood in stool and diarrhea  Genitourinary: Negative for dysuria  Skin: Negative for color change, rash and wound  Neurological: Positive for dizziness  Negative for tremors, seizures, syncope, facial asymmetry, speech difficulty, weakness, light-headedness, numbness and headaches  Psychiatric/Behavioral: Negative for confusion and decreased concentration  All other systems reviewed and are negative  Physical Exam  Physical Exam  Vitals signs and nursing note reviewed  Constitutional:       Appearance: Normal appearance  She is well-developed and well-groomed  She is obese  She is not ill-appearing  HENT:      Head: Normocephalic and atraumatic  Right Ear: Hearing and tympanic membrane normal       Left Ear: Hearing and tympanic membrane normal       Nose: Nose normal       Mouth/Throat:      Mouth: Mucous membranes are moist       Pharynx: Oropharynx is clear  Eyes:      General: Lids are normal       Extraocular Movements: Extraocular movements intact  Right eye: No nystagmus  Left eye: No nystagmus  Conjunctiva/sclera: Conjunctivae normal       Pupils: Pupils are equal, round, and reactive to light  Neck:      Musculoskeletal: Normal range of motion  Cardiovascular:      Rate and Rhythm: Normal rate and regular rhythm  Heart sounds: Normal heart sounds     Pulmonary:      Effort: Pulmonary effort is normal       Breath sounds: Normal breath sounds  No wheezing, rhonchi or rales  Abdominal:      General: Abdomen is flat  Tenderness: There is no abdominal tenderness  Musculoskeletal: Normal range of motion  General: No swelling  Skin:     General: Skin is warm and dry  Coloration: Skin is not pale  Neurological:      General: No focal deficit present  Mental Status: She is alert and oriented to person, place, and time  GCS: GCS eye subscore is 4  GCS verbal subscore is 5  GCS motor subscore is 6  Cranial Nerves: Cranial nerves are intact  Sensory: Sensation is intact  Motor: Motor function is intact  Coordination: Coordination is intact  Coordination normal  Finger-Nose-Finger Test normal    Psychiatric:         Attention and Perception: Attention normal          Mood and Affect: Mood normal          Speech: Speech normal          Behavior: Behavior is cooperative           Cognition and Memory: Cognition normal          Vital Signs  ED Triage Vitals   Temperature Pulse Respirations Blood Pressure SpO2   09/06/20 1554 09/06/20 1542 09/06/20 1542 09/06/20 1542 09/06/20 1542   (!) 97 3 °F (36 3 °C) 88 18 (!) 176/98 95 %      Temp Source Heart Rate Source Patient Position - Orthostatic VS BP Location FiO2 (%)   09/06/20 1554 09/06/20 1600 09/06/20 1542 09/06/20 1542 --   Temporal Monitor Lying Right arm       Pain Score       --                  Vitals:    09/06/20 1600 09/06/20 1615 09/06/20 1630 09/06/20 1700   BP: 146/79 142/82 146/87 142/74   Pulse: 81 78 80 74   Patient Position - Orthostatic VS: Lying Lying Lying Lying         Visual Acuity  Visual Acuity      Most Recent Value   L Pupil Size (mm)  3   R Pupil Size (mm)  3          ED Medications  Medications   sodium chloride 0 9 % bolus 1,000 mL (0 mL Intravenous Stopped 9/6/20 1656)   ondansetron (ZOFRAN) injection 4 mg (4 mg Intravenous Given 9/6/20 1559)   diazepam (VALIUM) injection 2 5 mg (2 5 mg Intravenous Given 9/6/20 1559)   potassium chloride (K-DUR,KLOR-CON) CR tablet 40 mEq (40 mEq Oral Given 9/6/20 1719)       Diagnostic Studies  Results Reviewed     Procedure Component Value Units Date/Time    POCT urinalysis dipstick [[id number]]  (Normal) Resulted:  09/06/20 1729    Lab Status:  Final result Specimen:  Urine Updated:  09/06/20 1729     Color, UA yellow     Clarity, UA clear     Glucose, UA (Ref: Negative) neg     Bilirubin, UA (Ref: Negative) neg     Ketones, UA (Ref: Negative) neg     Spec Grav, UA (Ref:1 003-1 030) 1 010     Blood, UA (Ref: Negative) neg     pH, UA (Ref: 4 5-8 0) 7     Protein, UA (Ref: Negative) neg     Urobilinogen, UA (Ref: 0 2- 1 0) 0 2      Leukocytes, UA (Ref: Negative) neg     Nitrite, UA (Ref: Negative) neg    Troponin I [[id number]]  (Normal) Collected:  09/06/20 1554    Lab Status:  Final result Specimen:  Blood from Arm, Left Updated:  09/06/20 1718     Troponin I <0 02 ng/mL     Comprehensive metabolic panel [[id number]]  (Abnormal) Collected:  09/06/20 1554    Lab Status:  Final result Specimen:  Blood from Arm, Left Updated:  09/06/20 1707     Sodium 138 mmol/L      Potassium 3 0 mmol/L      Chloride 99 mmol/L      CO2 30 mmol/L      ANION GAP 9 mmol/L      BUN 8 mg/dL      Creatinine 0 73 mg/dL      Glucose 162 mg/dL      Calcium 9 4 mg/dL      AST 38 U/L      ALT 54 U/L      Alkaline Phosphatase 97 U/L      Total Protein 8 0 g/dL      Albumin 4 1 g/dL      Total Bilirubin 0 50 mg/dL      eGFR 89 ml/min/1 73sq m     Narrative:       Royce guidelines for Chronic Kidney Disease (CKD):     Stage 1 with normal or high GFR (GFR > 90 mL/min/1 73 square meters)    Stage 2 Mild CKD (GFR = 60-89 mL/min/1 73 square meters)    Stage 3A Moderate CKD (GFR = 45-59 mL/min/1 73 square meters)    Stage 3B Moderate CKD (GFR = 30-44 mL/min/1 73 square meters)    Stage 4 Severe CKD (GFR = 15-29 mL/min/1 73 square meters)   Stage 5 End Stage CKD (GFR <15 mL/min/1 73 square meters)  Note: GFR calculation is accurate only with a steady state creatinine    CBC and differential [[id number]]  (Abnormal) Collected:  09/06/20 1554    Lab Status:  Final result Specimen:  Blood from Arm, Left Updated:  09/06/20 1644     WBC 11 98 Thousand/uL      RBC 5 01 Million/uL      Hemoglobin 14 9 g/dL      Hematocrit 41 6 %      MCV 83 fL      MCH 29 7 pg      MCHC 35 8 g/dL      RDW 12 0 %      MPV 8 8 fL      Platelets 961 Thousands/uL      nRBC 0 /100 WBCs      Neutrophils Relative 82 %      Immat GRANS % 1 %      Lymphocytes Relative 10 %      Monocytes Relative 5 %      Eosinophils Relative 1 %      Basophils Relative 1 %      Neutrophils Absolute 9 89 Thousands/µL      Immature Grans Absolute 0 11 Thousand/uL      Lymphocytes Absolute 1 23 Thousands/µL      Monocytes Absolute 0 60 Thousand/µL      Eosinophils Absolute 0 08 Thousand/µL      Basophils Absolute 0 07 Thousands/µL                  CT head without contrast   Final Result by Cydney Orosco MD (09/06 1652)      No acute intracranial abnormality  Workstation performed: [id number]                    Procedures  ECG 12 Lead Documentation Only    Date/Time: 9/6/2020 4:09 PM  Performed by: Fei Edwards PA-C  Authorized by: Fei Edwards PA-C     Indications / Diagnosis:  Dizzy  ECG reviewed by me, the ED Provider: yes    Patient location:  ED  Previous ECG:     Previous ECG:  Compared to current    Similarity:  No change  Rate:     ECG rate:  77    ECG rate assessment: normal    Rhythm:     Rhythm: sinus rhythm    Conduction:     Conduction: normal    ST segments:     ST segments:  Normal  T waves:     T waves: normal               ED Course       US AUDIT      Most Recent Value   Initial Alcohol Screen: US AUDIT-C    1  How often do you have a drink containing alcohol?  0 Filed at: 09/06/2020 1542   2   How many drinks containing alcohol do you have on a typical day you are drinking? 0 Filed at: 09/06/2020 1542   3a  Male UNDER 65: How often do you have five or more drinks on one occasion? 0 Filed at: 09/06/2020 1542   3b  FEMALE Any Age, or MALE 65+: How often do you have 4 or more drinks on one occassion? 0 Filed at: 09/06/2020 1542   Audit-C Score  0 Filed at: 09/06/2020 1542                  JUAN/DAST-10      Most Recent Value   How many times in the past year have you    Used an illegal drug or used a prescription medication for non-medical reasons? Never Filed at: 09/06/2020 1542                                MDM  Number of Diagnoses or Management Options  Vertigo: established and worsening  Diagnosis management comments: Patient with dizziness, worse with movement, better with antivert, most likely vertigo, will check labs, CT head to r/o brain tumor or hemorrhage, electrolyte abnormality or dehydration  Patient improved with valium and zofran, will give kdur for hypokalemia and advised high potassium diet  ADvised f/u with PCP if no improvement in 2-3 days  Amount and/or Complexity of Data Reviewed  Clinical lab tests: ordered and reviewed  Tests in the radiology section of CPT®: ordered and reviewed    Patient Progress  Patient progress: improved        Disposition  Final diagnoses:   Vertigo     Time reflects when diagnosis was documented in both MDM as applicable and the Disposition within this note     Time User Action Codes Description Comment    9/6/2020  5:41 PM Naya Hercules Add [R42] Vertigo       ED Disposition     ED Disposition Condition Date/Time Comment    Discharge Stable Sun Sep 6, 2020  5:41 PM [facility] discharge to home/self care              Follow-up Information     Follow up With Specialties Details Why [address], [address] [place] [address], Nurse Practitioner Call in 2 days For recheck [address]  [address] [facility] [address] [phone]            Patient's Medications   Discharge Prescriptions    No medications on file     No discharge procedures on file      PDMP Review     None          ED Provider  Electronically Signed by           Mercy Underwood PA-C  09/06/20 1248

## 2020-09-06 NOTE — DISCHARGE INSTRUCTIONS
Rest, limit activity  Continue meclizine and zofran  Follow up with family doctor if no improvement in 2-3 days

## 2020-09-07 NOTE — PROGRESS NOTES
[facility]        NAME: Sean Haynes is a 56 y o  female  : 1960    MRN: [id number]  DATE: 2020  TIME: 1:39 PM    Assessment and Plan   Vertigo [R42]  1  Vertigo  Transfer to other facility     A quick initial assessment was performed curbside and decision made for pt to return to ER given her symptoms and failing outpatient therapy of meclizine and zofran  Concern for mild dehydration as she is not tolerating PO's  May require head CT/MRI due to persistent vertigo  Pt's friend driving her to [address] directly  Patient Instructions   There are no Patient Instructions on file for this visit  Proceed to  ER if symptoms worsen  Chief Complaint     Chief Complaint   Patient presents with    Nausea     Nausea, dry heaving, dizziness, lightheadedness, dry mouth, room spinning with movement, photophobic since thursday (post oncology appointment - pt started with the spins, went to ER, gave IVF's and compazine  Denies vomiting, abdominal pain, cp, sob  Pt states she is only slightly better than ER visit as far as not being drenched in sweat and incoherent   Dizziness     Per provider, did not complete rooming process as she will be evaluating the pt and sending to ER         History of Present Illness       Pt presents for evaluation of nausea, vomiting, dizziness, photophobia that began 4 days ago  She was at a follow-up oncologic appointment at the time the symptoms began and was sent to the emergency department  She had a CT scan of her abdomen and pelvis as well as an EKG and some lab work  She was given medications for her dizziness and improved enough to go home  She reports that her symptoms have returned and are very severe  Meclizine and Zofran are not helping  She has not been able to eat or drink due to her symptoms  Review of Systems   Review of Systems   Constitutional: Negative for chills and fever  HENT: Negative      Eyes: Negative  Respiratory: Negative  Cardiovascular: Negative  Gastrointestinal: Positive for nausea and vomiting  Negative for abdominal pain  Genitourinary: Negative  Musculoskeletal: Negative  Skin: Negative  Allergic/Immunologic: Negative  Neurological: Positive for dizziness and light-headedness  Negative for syncope  Hematological: Negative            Current Medications       Current Outpatient Medications:     amLODIPine (NORVASC) 5 mg tablet, Take 5 mg by mouth daily , Disp: , Rfl: 4    buPROPion (WELLBUTRIN SR) 150 mg 12 hr tablet, every evening , Disp: , Rfl: 0    Cholecalciferol (VITAMIN D-3) 1000 units CAPS, Take by mouth daily , Disp: , Rfl:     escitalopram (LEXAPRO) 20 mg tablet, 20 mg daily , Disp: , Rfl: 0    LORazepam (ATIVAN) 1 mg tablet, Take 1 tablet (1 mg total) by mouth every 8 (eight) hours as needed (nausea or anxiety), Disp: 20 tablet, Rfl: 1    metFORMIN (GLUCOPHAGE) 500 mg tablet, 500 mg 2 (two) times a day with meals , Disp: , Rfl: 0    nystatin (MYCOSTATIN) powder, Apply topically 4 (four) times a day, Disp: 45 g, Rfl: 0    Omega 3 1200 MG CAPS, Take by mouth daily, Disp: , Rfl:     ondansetron (ZOFRAN) 8 mg tablet, Take 1 tablet (8 mg total) by mouth every 8 (eight) hours as needed for nausea or vomiting, Disp: 20 tablet, Rfl: 1    scopolamine (TRANSDERM-SCOP) 1 5 mg/3 days TD 72 hr patch, Place 1 patch on the skin every third day (Patient taking differently: Place 1 patch on the skin as needed ), Disp: 2 patch, Rfl: 1    simvastatin (ZOCOR) 20 mg tablet, TK 1 T PO QD IN THE KIMBERLYN, Disp: , Rfl: 5    SUPER B COMPLEX/C PO, Take by mouth daily , Disp: , Rfl:     valsartan-hydrochlorothiazide (DIOVAN-HCT) 320-25 MG per tablet, daily , Disp: , Rfl: 0    Current Allergies     Allergies as of 09/06/2020 - Reviewed 09/06/2020   Allergen Reaction Noted    Sulfites Other (See Comments) 08/21/2019            The following portions of the patient's history were reviewed and updated as appropriate: allergies, current medications, past family history, past medical history, past social history, past surgical history and problem list      Past Medical History:   Diagnosis Date    Anxiety     [facility]     ovarian    [facility]     High cholesterol     Hypertension     [facility]     Ovarian neoplasm 8/26/2019    PONV (postoperative nausea and vomiting)     Post-menopausal bleeding     Sleep apnea        Past Surgical History:   Procedure Laterality Date    BLADDER REPAIR N/A 8/26/2019    Procedure: REPAIR BLADDER; uretero yared cystotomy;  Surgeon: Rickey Lerner MD;  [facility];  Service: Gynecology Oncology    CHOLECYSTECTOMY      IR [address] / DRAINAGE  9/27/2019    IR PORT PLACEMENT  9/27/2019    IR PORT PLACEMENT  11/7/2019    IR PORT REMOVAL  10/29/2019    SC TOTAL ABDOM HYSTERECTOMY N/A 8/26/2019    Procedure: TOTAL ABDOMINAL HYSTERECTOMY, BILATERAL SALPINGO-OOPHORECTOMY, Radical omentectomy, pelvic lymph node sampling, staging biopsy, repair of cystotomy, appendectomy;  Surgeon: Rickey Lerner MD;  Location[facility];  Service: Gynecology Oncology       No family history on file  Medications have been verified  Objective   There were no vitals taken for this visit  Physical Exam     Physical Exam  Constitutional:       Appearance: She is ill-appearing  Pulmonary:      Effort: Pulmonary effort is normal    Abdominal:      Tenderness: There is no abdominal tenderness  Skin:     General: Skin is warm and dry  Neurological:      Mental Status: She is alert and oriented to person, place, and time

## 2020-09-08 LAB
ATRIAL RATE: 77 BPM
P AXIS: 71 DEGREES
PR INTERVAL: 168 MS
QRS AXIS: 58 DEGREES
QRSD INTERVAL: 80 MS
QT INTERVAL: 422 MS
QTC INTERVAL: 477 MS
T WAVE AXIS: 48 DEGREES
VENTRICULAR RATE: 77 BPM

## 2020-09-08 PROCEDURE — 93010 ELECTROCARDIOGRAM REPORT: CPT | Performed by: INTERNAL MEDICINE

## 2020-10-07 ENCOUNTER — HOSPITAL ENCOUNTER (OUTPATIENT)
Dept: INFUSION CENTER | Facility: HOSPITAL | Age: 60
End: 2020-10-07

## 2020-11-04 ENCOUNTER — TELEPHONE (OUTPATIENT)
Dept: INTERVENTIONAL RADIOLOGY/VASCULAR | Facility: HOSPITAL | Age: 60
End: 2020-11-04

## 2020-11-05 ENCOUNTER — TELEPHONE (OUTPATIENT)
Dept: INTERVENTIONAL RADIOLOGY/VASCULAR | Facility: HOSPITAL | Age: 60
End: 2020-11-05

## 2020-11-06 ENCOUNTER — HOSPITAL ENCOUNTER (OUTPATIENT)
Dept: INTERVENTIONAL RADIOLOGY/VASCULAR | Facility: HOSPITAL | Age: 60
Discharge: HOME | End: 2020-11-06
Attending: RADIOLOGY | Admitting: RADIOLOGY
Payer: COMMERCIAL

## 2020-11-06 VITALS
TEMPERATURE: 97.9 F | OXYGEN SATURATION: 93 % | DIASTOLIC BLOOD PRESSURE: 67 MMHG | HEART RATE: 78 BPM | SYSTOLIC BLOOD PRESSURE: 123 MMHG | RESPIRATION RATE: 18 BRPM

## 2020-11-06 DIAGNOSIS — C56.2 MALIGNANT NEOPLASM OF LEFT OVARY (HCC): ICD-10-CM

## 2020-11-06 PROCEDURE — 36590 REMOVAL TUNNELED CV CATH: CPT

## 2020-11-06 PROCEDURE — 99152 MOD SED SAME PHYS/QHP 5/>YRS: CPT

## 2020-11-06 PROCEDURE — 36590 REMOVAL TUNNELED CV CATH: CPT | Performed by: RADIOLOGY

## 2020-11-06 PROCEDURE — 99152 MOD SED SAME PHYS/QHP 5/>YRS: CPT | Performed by: RADIOLOGY

## 2020-11-06 PROCEDURE — 99153 MOD SED SAME PHYS/QHP EA: CPT

## 2020-11-06 RX ORDER — FENTANYL CITRATE 50 UG/ML
INJECTION, SOLUTION INTRAMUSCULAR; INTRAVENOUS CODE/TRAUMA/SEDATION MEDICATION
Status: COMPLETED | OUTPATIENT
Start: 2020-11-06 | End: 2020-11-06

## 2020-11-06 RX ORDER — ONDANSETRON 2 MG/ML
INJECTION INTRAMUSCULAR; INTRAVENOUS CODE/TRAUMA/SEDATION MEDICATION
Status: COMPLETED | OUTPATIENT
Start: 2020-11-06 | End: 2020-11-06

## 2020-11-06 RX ORDER — MIDAZOLAM HYDROCHLORIDE 2 MG/2ML
INJECTION, SOLUTION INTRAMUSCULAR; INTRAVENOUS CODE/TRAUMA/SEDATION MEDICATION
Status: COMPLETED | OUTPATIENT
Start: 2020-11-06 | End: 2020-11-06

## 2020-11-06 RX ADMIN — FENTANYL CITRATE 25 MCG: 50 INJECTION, SOLUTION INTRAMUSCULAR; INTRAVENOUS at 09:24

## 2020-11-06 RX ADMIN — ONDANSETRON 4 MG: 2 INJECTION INTRAMUSCULAR; INTRAVENOUS at 09:12

## 2020-11-06 RX ADMIN — MIDAZOLAM 0.5 MG: 1 INJECTION INTRAMUSCULAR; INTRAVENOUS at 09:24

## 2020-11-06 RX ADMIN — FENTANYL CITRATE 25 MCG: 50 INJECTION, SOLUTION INTRAMUSCULAR; INTRAVENOUS at 09:22

## 2020-11-06 RX ADMIN — MIDAZOLAM 1 MG: 1 INJECTION INTRAMUSCULAR; INTRAVENOUS at 09:13

## 2020-11-06 RX ADMIN — FENTANYL CITRATE 50 MCG: 50 INJECTION, SOLUTION INTRAMUSCULAR; INTRAVENOUS at 09:13

## 2020-11-06 RX ADMIN — MIDAZOLAM 0.5 MG: 1 INJECTION INTRAMUSCULAR; INTRAVENOUS at 09:21

## 2020-11-12 ENCOUNTER — HOSPITAL ENCOUNTER (OUTPATIENT)
Dept: CT IMAGING | Facility: HOSPITAL | Age: 60
Discharge: HOME | End: 2020-11-12
Attending: OBSTETRICS & GYNECOLOGY
Payer: COMMERCIAL

## 2020-11-12 DIAGNOSIS — C56.2 MALIGNANT NEOPLASM OF LEFT OVARY (HCC): ICD-10-CM

## 2020-11-12 PROCEDURE — G1004 CDSM NDSC: HCPCS

## 2020-11-12 PROCEDURE — 74177 CT ABD & PELVIS W/CONTRAST: CPT

## 2020-11-12 RX ADMIN — IOHEXOL 100 ML: 350 INJECTION, SOLUTION INTRAVENOUS at 18:31

## 2020-11-12 RX ADMIN — IOHEXOL 50 ML: 240 INJECTION, SOLUTION INTRATHECAL; INTRAVASCULAR; INTRAVENOUS; ORAL at 18:31

## 2020-11-14 ENCOUNTER — LAB (OUTPATIENT)
Dept: LAB | Facility: HOSPITAL | Age: 60
End: 2020-11-14
Attending: OBSTETRICS & GYNECOLOGY
Payer: COMMERCIAL

## 2020-11-14 DIAGNOSIS — C56.2 MALIGNANT NEOPLASM OF LEFT OVARY (HCC): ICD-10-CM

## 2020-11-14 LAB
BUN SERPL-MCNC: 10 MG/DL (ref 5–25)
CREAT SERPL-MCNC: 0.94 MG/DL (ref 0.6–1.3)
GFR SERPL CREATININE-BSD FRML MDRD: 65 ML/MIN/1.73SQ M

## 2020-11-14 PROCEDURE — 84520 ASSAY OF UREA NITROGEN: CPT

## 2020-11-14 PROCEDURE — 82565 ASSAY OF CREATININE: CPT

## 2020-11-14 PROCEDURE — 36415 COLL VENOUS BLD VENIPUNCTURE: CPT

## 2020-11-14 PROCEDURE — 83520 IMMUNOASSAY QUANT NOS NONAB: CPT

## 2020-11-19 LAB — INHIBIN B SERPL-MCNC: <7 PG/ML (ref 0–16.9)

## 2020-12-09 ENCOUNTER — OFFICE VISIT (OUTPATIENT)
Dept: GYNECOLOGIC ONCOLOGY | Facility: HOSPITAL | Age: 60
End: 2020-12-09
Payer: COMMERCIAL

## 2020-12-09 VITALS
DIASTOLIC BLOOD PRESSURE: 78 MMHG | RESPIRATION RATE: 18 BRPM | HEIGHT: 63 IN | WEIGHT: 234 LBS | BODY MASS INDEX: 41.46 KG/M2 | TEMPERATURE: 98.3 F | HEART RATE: 97 BPM | SYSTOLIC BLOOD PRESSURE: 132 MMHG

## 2020-12-09 DIAGNOSIS — C56.2 MALIGNANT NEOPLASM OF LEFT OVARY (HCC): Primary | ICD-10-CM

## 2020-12-09 PROCEDURE — 99213 OFFICE O/P EST LOW 20 MIN: CPT | Performed by: OBSTETRICS & GYNECOLOGY

## 2021-03-06 ENCOUNTER — LAB (OUTPATIENT)
Dept: LAB | Facility: HOSPITAL | Age: 61
End: 2021-03-06
Attending: OBSTETRICS & GYNECOLOGY
Payer: COMMERCIAL

## 2021-03-06 DIAGNOSIS — C56.2 MALIGNANT NEOPLASM OF LEFT OVARY (HCC): ICD-10-CM

## 2021-03-06 PROCEDURE — 83520 IMMUNOASSAY QUANT NOS NONAB: CPT

## 2021-03-09 LAB — INHIBIN B SERPL-MCNC: 7.6 PG/ML (ref 0–16.9)

## 2021-03-17 ENCOUNTER — OFFICE VISIT (OUTPATIENT)
Dept: GYNECOLOGIC ONCOLOGY | Facility: HOSPITAL | Age: 61
End: 2021-03-17
Payer: COMMERCIAL

## 2021-03-17 VITALS
WEIGHT: 226 LBS | DIASTOLIC BLOOD PRESSURE: 68 MMHG | HEART RATE: 101 BPM | HEIGHT: 63 IN | RESPIRATION RATE: 18 BRPM | TEMPERATURE: 98.7 F | SYSTOLIC BLOOD PRESSURE: 122 MMHG | BODY MASS INDEX: 40.04 KG/M2

## 2021-03-17 DIAGNOSIS — C56.2 MALIGNANT NEOPLASM OF LEFT OVARY (HCC): Primary | ICD-10-CM

## 2021-03-17 PROCEDURE — 3008F BODY MASS INDEX DOCD: CPT | Performed by: OBSTETRICS & GYNECOLOGY

## 2021-03-17 PROCEDURE — 99213 OFFICE O/P EST LOW 20 MIN: CPT | Performed by: OBSTETRICS & GYNECOLOGY

## 2021-03-17 PROCEDURE — 1036F TOBACCO NON-USER: CPT | Performed by: OBSTETRICS & GYNECOLOGY

## 2021-03-17 NOTE — PROGRESS NOTES
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  Her inhibin B is 7 6 pg per mL  She is clinically without evidence of disease recurrence  Performance status is 0   1  Return in 3 months for surveillance  Inhibin B prior to her next visit  Relevant Orders    Inhibin B            CHIEF COMPLAINT:   Ovarian cancer surveillance      Problem:  Cancer Staging  Malignant neoplasm of left ovary ([facility] )  Staging form: Ovary, Fallopian Tube, Primary Peritoneal, AJCC 8th Edition  - Clinical stage from 8/26/2019: FIGO Stage IC1, calculated as Stage IA (cT1a, cN0, cM0) - Signed by Abby Luis MD on 9/11/2019        Previous therapy:  Oncology History   Malignant neoplasm of left ovary ([facility] )   8/26/2019 Initial Diagnosis    Malignant neoplasm of left ovary ([facility] )     8/26/2019 -  Cancer Staged    Staging form: Ovary, Fallopian Tube, Primary Peritoneal, AJCC 8th Edition  - Clinical stage from 8/26/2019: FIGO Stage IC1, calculated as Stage IA (cT1a, cN0, cM0) - Signed by Abby Luis MD on 9/11/2019        Chemotherapy    Taxol 175 mg/m2 and carboplatin AUC 6 every 21 days  She received 5 cycles and is scheduled for cycle 6       8/26/2019 Surgery    Total abdominal hysterectomy, bilateral salpingo-oophorectomy, radical omentectomy, pelvic lymph node sampling, repair inherent cystostomy, left ureteroneocystostomy, appendectomy  -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  -intraoperative tumor rupture           Patient ID: Fer Archer is a 56 y o  female   49-year-old returns for ovarian cancer surveillance  She has no new complaints  Her inhibin B as of 3/6/2021 is 7 6    Her last CT imaging was in November of 2020 that revealed a continued decrease in the seroma of the left pelvis measuring approximately 11 cm  She does not have any abdominal pain, pelvic pain, or vaginal bleeding  No other interval change in her medication or medical history since her last visit  Her quality of life is good  The following portions of the patient's history were reviewed and updated as appropriate: allergies, current medications, past family history, past medical history, past social history, past surgical history and problem list     Review of Systems   Constitutional: Negative for activity change and unexpected weight change  HENT: Negative  Eyes: Negative  Respiratory: Negative  Cardiovascular: Negative  Gastrointestinal: Negative for abdominal distention and abdominal pain  Endocrine: Negative  Genitourinary: Negative for pelvic pain and vaginal bleeding  Musculoskeletal: Negative  Skin: Negative  Allergic/Immunologic: Negative  Neurological: Negative  Hematological: Negative  Psychiatric/Behavioral: Negative          Current Outpatient Medications   Medication Sig Dispense Refill    amLODIPine (NORVASC) 5 mg tablet Take 5 mg by mouth daily   4    buPROPion (WELLBUTRIN SR) 150 mg 12 hr tablet every evening   0    Cholecalciferol (VITAMIN D-3) 1000 units CAPS Take by mouth daily       escitalopram (LEXAPRO) 20 mg tablet 20 mg daily   0    metFORMIN (GLUCOPHAGE) 500 mg tablet 500 mg 2 (two) times a day with meals   0    nystatin (MYCOSTATIN) powder Apply topically 4 (four) times a day 45 g 0    Omega 3 1200 MG CAPS Take by mouth daily      ondansetron (ZOFRAN) 8 mg tablet Take 1 tablet (8 mg total) by mouth every 8 (eight) hours as needed for nausea or vomiting 20 tablet 1    simvastatin (ZOCOR) 20 mg tablet TK 1 T PO QD IN THE KIMBERLYN  5    SUPER B COMPLEX/C PO Take by mouth daily       valsartan-hydrochlorothiazide (DIOVAN-HCT) 320-25 MG per tablet daily   0    LORazepam (ATIVAN) 1 mg tablet Take 1 tablet (1 mg total) by mouth every 8 (eight) hours as needed (nausea or anxiety) (Patient not taking: Reported on 12/9/2020) 20 tablet 1    scopolamine (TRANSDERM-SCOP) 1 5 mg/3 days TD 72 hr patch Place 1 patch on the skin every third day (Patient not taking: Reported on 3/17/2021) 2 patch 1     No current facility-administered medications for this visit  Objective:    Blood pressure 122/68, pulse 101, temperature 98 7 °F (37 1 °C), temperature source Temporal, resp  rate 18, height 5' 3" (1 6 m), weight 103 kg (226 lb)  Body mass index is 40 03 kg/m²  Body surface area is 2 04 meters squared  Physical Exam  Vitals signs reviewed  Exam conducted with a chaperone present  Constitutional:       General: She is not in acute distress  Appearance: Normal appearance  She is well-developed  She is obese  She is not ill-appearing, toxic-appearing or diaphoretic  HENT:      Head: Normocephalic and atraumatic  Eyes:      General: No scleral icterus  Right eye: No discharge  Left eye: No discharge  Extraocular Movements: Extraocular movements intact  Conjunctiva/sclera: Conjunctivae normal    Neck:      Musculoskeletal: Normal range of motion and neck supple  No neck rigidity  Thyroid: No thyromegaly  Pulmonary:      Effort: Pulmonary effort is normal  No respiratory distress  Breath sounds: No stridor  No wheezing  Abdominal:      General: There is no distension  Palpations: Abdomen is soft  There is no mass  Tenderness: There is no abdominal tenderness  There is no guarding or rebound  Hernia: No hernia is present  Genitourinary:     Comments: The external female genitalia is normal  The bartholin's, uretheral and skenes glands are normal  The urethral meatus is normal (midline with no lesions)  Anus without fissure or lesion  Speculum exam reveals a grossly normal vagina  No masses, lesions,discharge or bleeding  No significant cystocele or rectocele noted   Bimanual exam notes a surgical absent cervix, uterus and adnexal structures  No masses or fullness  Bladder is without fullness, mass or tenderness  Musculoskeletal:      Right lower leg: Edema present  Left lower leg: Edema present  Comments: 1+ bilateral   Lymphadenopathy:      Cervical: No cervical adenopathy  Skin:     General: Skin is warm and dry  Coloration: Skin is not jaundiced or pale  Findings: No bruising, erythema, lesion or rash  Neurological:      General: No focal deficit present  Mental Status: She is alert and oriented to person, place, and time  Mental status is at baseline  Cranial Nerves: No cranial nerve deficit  Motor: No weakness  Psychiatric:         Mood and Affect: Mood normal          Behavior: Behavior normal          Thought Content:  Thought content normal          Judgment: Judgment normal

## 2021-03-17 NOTE — ASSESSMENT & PLAN NOTE
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

## 2021-03-30 DIAGNOSIS — Z23 ENCOUNTER FOR IMMUNIZATION: ICD-10-CM

## 2021-04-10 ENCOUNTER — IMMUNIZATIONS (OUTPATIENT)
Dept: FAMILY MEDICINE CLINIC | Facility: HOSPITAL | Age: 61
End: 2021-04-10

## 2021-04-10 DIAGNOSIS — Z23 ENCOUNTER FOR IMMUNIZATION: Primary | ICD-10-CM

## 2021-04-10 PROCEDURE — 91300 SARS-COV-2 / COVID-19 MRNA VACCINE (PFIZER-BIONTECH) 30 MCG: CPT

## 2021-04-10 PROCEDURE — 0001A SARS-COV-2 / COVID-19 MRNA VACCINE (PFIZER-BIONTECH) 30 MCG: CPT

## 2021-05-02 ENCOUNTER — IMMUNIZATIONS (OUTPATIENT)
Dept: FAMILY MEDICINE CLINIC | Facility: HOSPITAL | Age: 61
End: 2021-05-02

## 2021-05-02 DIAGNOSIS — Z23 ENCOUNTER FOR IMMUNIZATION: Primary | ICD-10-CM

## 2021-05-02 PROCEDURE — 0002A SARS-COV-2 / COVID-19 MRNA VACCINE (PFIZER-BIONTECH) 30 MCG: CPT

## 2021-05-02 PROCEDURE — 91300 SARS-COV-2 / COVID-19 MRNA VACCINE (PFIZER-BIONTECH) 30 MCG: CPT

## 2021-06-05 ENCOUNTER — LAB (OUTPATIENT)
Dept: LAB | Facility: HOSPITAL | Age: 61
End: 2021-06-05
Attending: OBSTETRICS & GYNECOLOGY
Payer: COMMERCIAL

## 2021-06-05 DIAGNOSIS — C56.2 MALIGNANT NEOPLASM OF LEFT OVARY (HCC): ICD-10-CM

## 2021-06-05 PROCEDURE — 83520 IMMUNOASSAY QUANT NOS NONAB: CPT

## 2021-06-08 LAB — INHIBIN B SERPL-MCNC: <7 PG/ML (ref 0–16.9)

## 2021-06-14 ENCOUNTER — OFFICE VISIT (OUTPATIENT)
Dept: GYNECOLOGIC ONCOLOGY | Facility: HOSPITAL | Age: 61
End: 2021-06-14
Payer: COMMERCIAL

## 2021-06-14 VITALS
WEIGHT: 229 LBS | DIASTOLIC BLOOD PRESSURE: 58 MMHG | BODY MASS INDEX: 40.57 KG/M2 | HEART RATE: 87 BPM | RESPIRATION RATE: 18 BRPM | HEIGHT: 63 IN | TEMPERATURE: 97.8 F | SYSTOLIC BLOOD PRESSURE: 126 MMHG

## 2021-06-14 DIAGNOSIS — C56.2 MALIGNANT NEOPLASM OF LEFT OVARY (HCC): Primary | ICD-10-CM

## 2021-06-14 PROCEDURE — 1036F TOBACCO NON-USER: CPT | Performed by: OBSTETRICS & GYNECOLOGY

## 2021-06-14 PROCEDURE — 3008F BODY MASS INDEX DOCD: CPT | Performed by: OBSTETRICS & GYNECOLOGY

## 2021-06-14 PROCEDURE — 99214 OFFICE O/P EST MOD 30 MIN: CPT | Performed by: OBSTETRICS & GYNECOLOGY

## 2021-06-14 NOTE — ASSESSMENT & PLAN NOTE
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

## 2021-06-14 NOTE — PROGRESS NOTES
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  Inhibin B is less than 7 P g per mL as of 6/5/2021  She has abdominal distension  She is clinically without evidence of disease recurrence  Her performance status is 0   1  CT of the abdomen pelvis to evaluate for disease recurrence as a cause of her intermittent abdominal distention symptoms  2  Repeat inhibin B level in 6 months prior to her next surveillance visit  Relevant Orders    Inhibin B    CT abdomen pelvis w contrast    BUN    Creatinine, serum            CHIEF COMPLAINT:  Ovarian cancer surveillance  Intermittent abdominal distention      Problem:  Cancer Staging  Malignant neoplasm of left ovary (H[facility])  Staging form: Ovary, Fallopian Tube, Primary Peritoneal, AJCC 8th Edition  - Clinical stage from 8/26/2019: FIGO Stage IC1, calculated as Stage IA (cT1a, cN0, cM0) - Signed by Sonam Fish MD on 9/11/2019        Previous therapy:  Oncology History   Malignant ne[facility] of [facility] ([facility] )   8/26/2019 Initial Diagnosis    Malignant neoplasm of left ovary ([facility] )     8/26/2019 -  Cancer Staged    Staging form: Ovary, Fallopian Tube, Primary Peritoneal, AJCC 8th Edition  - Clinical stage from 8/26/2019: FIGO Stage IC1, calculated as Stage IA (cT1a, cN0, cM0) - Signed by Sonam Fish MD on 9/11/2019        Chemotherapy    Taxol 175 mg/m2 and carboplatin AUC 6 every 21 days   She received 5 cycles and is scheduled for cycle 6       8/26/2019 Surgery    Total abdominal hysterectomy, bilateral salpingo-oophorectomy, radical omentectomy, pelvic lymph node sampling, repair inherent cystostomy, left ureteroneocystostomy, appendectomy  -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  -intraoperative tumor rupture           Patient ID: Ginna Cannon is a 59 y o  female  78-year-old returns for ovarian cancer surveillance  Her last inhibin B was on 6/5/2021 and was less than 7 P g per mL  Her last CT was in November of 2020 and was without evidence of disease but with borderline mesenteric lymph nodes  She has no pelvic pain or vaginal bleeding  She notes abdominal distention after drinking liquids that persists  She states it feels exactly like when she was 1st diagnosed with her ovarian cancer  No other interval change in her medications or medical history since her last visit  She is able to perform her activities of daily living without difficulty  She has fatigue  The following portions of the patient's history were reviewed and updated as appropriate: allergies, current medications, past family history, past medical history, past social history, past surgical history and problem list     Review of Systems   Constitutional: Positive for fatigue  Negative for activity change and unexpected weight change  HENT: Negative  Eyes: Negative  Respiratory: Negative  Cardiovascular: Negative  Gastrointestinal: Positive for abdominal distention  Negative for abdominal pain  Endocrine: Negative  Genitourinary: Negative for pelvic pain and vaginal bleeding  Musculoskeletal: Negative  Skin: Negative  Allergic/Immunologic: Negative  Neurological: Negative  Hematological: Negative  Psychiatric/Behavioral: Negative          Current Outpatient Medications   Medication Sig Dispense Refill    amLODIPine (NORVASC) 5 mg tablet Take 5 mg by mouth daily   4    buPROPion (WELLBUTRIN SR) 150 mg 12 hr tablet every evening   0    Cholecalciferol (VITAMIN D-3) 1000 units CAPS Take by mouth daily       escitalopram (LEXAPRO) 20 mg tablet 20 mg daily   0    metFORMIN (GLUCOPHAGE) 500 mg tablet 500 mg 2 (two) times a day with meals   0    Omega 3 1200 MG CAPS Take by mouth daily      scopolamine (TRANSDERM-SCOP) 1 5 mg/3 days TD 72 hr patch Place 1 patch on the skin every third day 2 patch 1    simvastatin (ZOCOR) 20 mg tablet TK 1 T PO QD IN THE KIMBERLYN  5    SUPER B COMPLEX/C PO Take by mouth daily       valsartan-hydrochlorothiazide (DIOVAN-HCT) 320-25 MG per tablet daily   0     No current facility-administered medications for this visit  Objective:    Blood pressure 126/58, pulse 87, temperature 97 8 °F (36 6 °C), temperature source Tympanic, resp  rate 18, height 5' 3" (1 6 m), weight 104 kg (229 lb)  Body mass index is 40 57 kg/m²  Body surface area is 2 05 meters squared  Physical Exam  Vitals reviewed  Exam conducted with a chaperone present  Constitutional:       General: She is not in acute distress  Appearance: She is well-developed  She is obese  She is not ill-appearing, toxic-appearing or diaphoretic  HENT:      Head: Normocephalic and atraumatic  Eyes:      General: No scleral icterus  Right eye: No discharge  Left eye: No discharge  Extraocular Movements: Extraocular movements intact  Conjunctiva/sclera: Conjunctivae normal    Neck:      Thyroid: No thyromegaly  Pulmonary:      Effort: Pulmonary effort is normal  No respiratory distress  Breath sounds: No stridor  No wheezing  Abdominal:      General: There is no distension  Palpations: Abdomen is soft  There is no mass  Tenderness: There is no abdominal tenderness  There is no guarding or rebound  Hernia: No hernia is present  Genitourinary:     Comments: The external female genitalia is normal  The bartholin's, uretheral and skenes glands are normal  The urethral meatus is normal (midline with no lesions)  Anus without fissure or lesion  Speculum exam reveals a grossly normal vagina  No masses, lesions,discharge or bleeding  No significant cystocele or rectocele noted  Bimanual exam notes a surgical absent cervix, uterus and adnexal structures  No masses or fullness   Bladder is without fullness, mass or tenderness  Musculoskeletal:      Cervical back: Normal range of motion and neck supple  Right lower leg: Edema present  Left lower leg: Edema present  Comments: 1+ bilaterally   Lymphadenopathy:      Cervical: No cervical adenopathy  Skin:     General: Skin is warm and dry  Coloration: Skin is not jaundiced or pale  Findings: No bruising, erythema or rash  Neurological:      General: No focal deficit present  Mental Status: She is alert and oriented to person, place, and time  Mental status is at baseline  Cranial Nerves: No cranial nerve deficit  Motor: No weakness  Gait: Gait normal    Psychiatric:         Mood and Affect: Mood normal          Behavior: Behavior normal          Thought Content:  Thought content normal          Judgment: Judgment normal

## 2021-06-19 ENCOUNTER — HOSPITAL ENCOUNTER (OUTPATIENT)
Dept: CT IMAGING | Facility: HOSPITAL | Age: 61
Discharge: HOME | End: 2021-06-19
Attending: OBSTETRICS & GYNECOLOGY
Payer: COMMERCIAL

## 2021-06-19 DIAGNOSIS — C56.2 MALIGNANT NEOPLASM OF LEFT OVARY (HCC): ICD-10-CM

## 2021-06-19 PROCEDURE — G1004 CDSM NDSC: HCPCS

## 2021-06-19 PROCEDURE — 74177 CT ABD & PELVIS W/CONTRAST: CPT

## 2021-06-19 RX ADMIN — IOHEXOL 100 ML: 350 INJECTION, SOLUTION INTRAVENOUS at 11:14

## 2021-12-04 ENCOUNTER — LAB (OUTPATIENT)
Dept: LAB | Facility: HOSPITAL | Age: 61
End: 2021-12-04
Attending: OBSTETRICS & GYNECOLOGY
Payer: COMMERCIAL

## 2021-12-04 DIAGNOSIS — C56.2 MALIGNANT NEOPLASM OF LEFT OVARY (HCC): ICD-10-CM

## 2021-12-04 LAB
BUN SERPL-MCNC: 10 MG/DL (ref 5–25)
CREAT SERPL-MCNC: 0.77 MG/DL (ref 0.6–1.3)
GFR SERPL CREATININE-BSD FRML MDRD: 82 ML/MIN/1.73SQ M

## 2021-12-04 PROCEDURE — 82565 ASSAY OF CREATININE: CPT

## 2021-12-04 PROCEDURE — 84520 ASSAY OF UREA NITROGEN: CPT

## 2021-12-04 PROCEDURE — 36415 COLL VENOUS BLD VENIPUNCTURE: CPT

## 2021-12-04 PROCEDURE — 83520 IMMUNOASSAY QUANT NOS NONAB: CPT

## 2021-12-07 LAB — INHIBIN B SERPL-MCNC: 55 PG/ML (ref 0–16.9)

## 2021-12-13 ENCOUNTER — HOSPITAL ENCOUNTER (OUTPATIENT)
Dept: CT IMAGING | Facility: HOSPITAL | Age: 61
Discharge: HOME | End: 2021-12-13
Payer: COMMERCIAL

## 2021-12-13 DIAGNOSIS — C56.2 MALIGNANT NEOPLASM OF LEFT OVARY (HCC): ICD-10-CM

## 2021-12-13 DIAGNOSIS — R79.89 HIGH SERUM INHIBIN B: ICD-10-CM

## 2021-12-13 PROCEDURE — G1004 CDSM NDSC: HCPCS

## 2021-12-13 PROCEDURE — 74177 CT ABD & PELVIS W/CONTRAST: CPT

## 2021-12-13 PROCEDURE — 71260 CT THORAX DX C+: CPT

## 2021-12-13 RX ADMIN — IOHEXOL 50 ML: 240 INJECTION, SOLUTION INTRATHECAL; INTRAVASCULAR; INTRAVENOUS; ORAL at 11:24

## 2021-12-13 RX ADMIN — IOHEXOL 100 ML: 350 INJECTION, SOLUTION INTRAVENOUS at 11:24

## 2021-12-16 ENCOUNTER — TELEPHONE (OUTPATIENT)
Dept: UROLOGY | Facility: AMBULATORY SURGERY CENTER | Age: 61
End: 2021-12-16

## 2021-12-16 ENCOUNTER — OFFICE VISIT (OUTPATIENT)
Dept: GYNECOLOGIC ONCOLOGY | Facility: HOSPITAL | Age: 61
End: 2021-12-16
Payer: COMMERCIAL

## 2021-12-16 VITALS
WEIGHT: 222.4 LBS | BODY MASS INDEX: 39.41 KG/M2 | OXYGEN SATURATION: 99 % | TEMPERATURE: 97.7 F | RESPIRATION RATE: 17 BRPM | HEIGHT: 63 IN | SYSTOLIC BLOOD PRESSURE: 124 MMHG | DIASTOLIC BLOOD PRESSURE: 64 MMHG | HEART RATE: 89 BPM

## 2021-12-16 DIAGNOSIS — C56.2 MALIGNANT NEOPLASM OF LEFT OVARY (HCC): Primary | ICD-10-CM

## 2021-12-16 PROCEDURE — 99215 OFFICE O/P EST HI 40 MIN: CPT | Performed by: OBSTETRICS & GYNECOLOGY

## 2021-12-16 RX ORDER — CLINDAMYCIN PHOSPHATE 900 MG/50ML
900 INJECTION INTRAVENOUS ONCE
Status: CANCELLED | OUTPATIENT
Start: 2021-12-16 | End: 2021-12-16

## 2021-12-16 RX ORDER — GENTAMICIN SULFATE 80 MG/50ML
1.5 INJECTION, SOLUTION INTRAVENOUS ONCE
Status: CANCELLED | OUTPATIENT
Start: 2021-12-16 | End: 2021-12-16

## 2021-12-16 RX ORDER — SODIUM CHLORIDE, SODIUM LACTATE, POTASSIUM CHLORIDE, CALCIUM CHLORIDE 600; 310; 30; 20 MG/100ML; MG/100ML; MG/100ML; MG/100ML
125 INJECTION, SOLUTION INTRAVENOUS CONTINUOUS
Status: CANCELLED | OUTPATIENT
Start: 2021-12-16

## 2021-12-16 RX ORDER — NEOMYCIN SULFATE 500 MG/1
1000 TABLET ORAL 3 TIMES DAILY
Qty: 6 TABLET | Refills: 0 | Status: SHIPPED | OUTPATIENT
Start: 2021-12-16 | End: 2021-12-17

## 2021-12-16 RX ORDER — HEPARIN SODIUM 5000 [USP'U]/ML
5000 INJECTION, SOLUTION INTRAVENOUS; SUBCUTANEOUS
Status: CANCELLED | OUTPATIENT
Start: 2021-12-16 | End: 2021-12-17

## 2021-12-16 RX ORDER — GABAPENTIN 100 MG/1
100 CAPSULE ORAL ONCE
Status: CANCELLED | OUTPATIENT
Start: 2021-12-16 | End: 2021-12-16

## 2021-12-16 RX ORDER — ACETAMINOPHEN 325 MG/1
975 TABLET ORAL ONCE
Status: CANCELLED | OUTPATIENT
Start: 2021-12-16 | End: 2021-12-16

## 2021-12-16 RX ORDER — METRONIDAZOLE 500 MG/1
500 TABLET ORAL ONCE
Qty: 1 TABLET | Refills: 0 | Status: SHIPPED | OUTPATIENT
Start: 2021-12-16 | End: 2021-12-16

## 2021-12-16 RX ORDER — POLYETHYLENE GLYCOL 3350 17 G/17G
POWDER, FOR SOLUTION ORAL
Qty: 238 G | Refills: 0 | Status: SHIPPED | OUTPATIENT
Start: 2021-12-16

## 2021-12-17 ENCOUNTER — TELEPHONE (OUTPATIENT)
Dept: HEMATOLOGY ONCOLOGY | Facility: CLINIC | Age: 61
End: 2021-12-17

## 2021-12-21 ENCOUNTER — TELEPHONE (OUTPATIENT)
Dept: GYNECOLOGIC ONCOLOGY | Facility: CLINIC | Age: 61
End: 2021-12-21

## 2021-12-22 ENCOUNTER — HOSPITAL ENCOUNTER (OUTPATIENT)
Dept: RADIOLOGY | Age: 61
Discharge: HOME | End: 2021-12-22
Payer: COMMERCIAL

## 2021-12-22 ENCOUNTER — OFFICE VISIT (OUTPATIENT)
Dept: LAB | Age: 61
End: 2021-12-22
Payer: COMMERCIAL

## 2021-12-22 ENCOUNTER — APPOINTMENT (OUTPATIENT)
Dept: LAB | Age: 61
End: 2021-12-22
Payer: COMMERCIAL

## 2021-12-22 DIAGNOSIS — C56.2 MALIGNANT NEOPLASM OF LEFT OVARY (HCC): ICD-10-CM

## 2021-12-22 DIAGNOSIS — C56.2 MALIGNANT NEOPLASM OF LEFT OVARY (HCC): Primary | ICD-10-CM

## 2021-12-22 LAB
ABO GROUP BLD: NORMAL
ALBUMIN SERPL BCP-MCNC: 4 G/DL (ref 3.5–5)
ALP SERPL-CCNC: 75 U/L (ref 46–116)
ALT SERPL W P-5'-P-CCNC: 28 U/L (ref 12–78)
ANION GAP SERPL CALCULATED.3IONS-SCNC: 7 MMOL/L (ref 4–13)
APTT PPP: 29 SECONDS (ref 23–37)
AST SERPL W P-5'-P-CCNC: 16 U/L (ref 5–45)
ATRIAL RATE: 95 BPM
BASOPHILS # BLD AUTO: 0.07 THOUSANDS/ΜL (ref 0–0.1)
BASOPHILS NFR BLD AUTO: 1 % (ref 0–1)
BILIRUB SERPL-MCNC: 0.6 MG/DL (ref 0.2–1)
BLD GP AB SCN SERPL QL: NEGATIVE
BUN SERPL-MCNC: 17 MG/DL (ref 5–25)
CALCIUM SERPL-MCNC: 9.7 MG/DL (ref 8.3–10.1)
CHLORIDE SERPL-SCNC: 101 MMOL/L (ref 100–108)
CO2 SERPL-SCNC: 28 MMOL/L (ref 21–32)
CREAT SERPL-MCNC: 0.79 MG/DL (ref 0.6–1.3)
EOSINOPHIL # BLD AUTO: 0.18 THOUSAND/ΜL (ref 0–0.61)
EOSINOPHIL NFR BLD AUTO: 2 % (ref 0–6)
ERYTHROCYTE [DISTWIDTH] IN BLOOD BY AUTOMATED COUNT: 12.4 % (ref 11.6–15.1)
GFR SERPL CREATININE-BSD FRML MDRD: 79 ML/MIN/1.73SQ M
GLUCOSE P FAST SERPL-MCNC: 168 MG/DL (ref 65–99)
GLUCOSE SERPL-MCNC: 153 MG/DL (ref 65–140)
HCT VFR BLD AUTO: 38.8 % (ref 34.8–46.1)
HGB BLD-MCNC: 13.4 G/DL (ref 11.5–15.4)
IMM GRANULOCYTES # BLD AUTO: 0.06 THOUSAND/UL (ref 0–0.2)
IMM GRANULOCYTES NFR BLD AUTO: 1 % (ref 0–2)
INR PPP: 0.98 (ref 0.84–1.19)
LYMPHOCYTES # BLD AUTO: 1.84 THOUSANDS/ΜL (ref 0.6–4.47)
LYMPHOCYTES NFR BLD AUTO: 20 % (ref 14–44)
MCH RBC QN AUTO: 29.3 PG (ref 26.8–34.3)
MCHC RBC AUTO-ENTMCNC: 34.5 G/DL (ref 31.4–37.4)
MCV RBC AUTO: 85 FL (ref 82–98)
MONOCYTES # BLD AUTO: 0.49 THOUSAND/ΜL (ref 0.17–1.22)
MONOCYTES NFR BLD AUTO: 5 % (ref 4–12)
NEUTROPHILS # BLD AUTO: 6.82 THOUSANDS/ΜL (ref 1.85–7.62)
NEUTS SEG NFR BLD AUTO: 71 % (ref 43–75)
NRBC BLD AUTO-RTO: 0 /100 WBCS
P AXIS: 70 DEGREES
PLATELET # BLD AUTO: 371 THOUSANDS/UL (ref 149–390)
PMV BLD AUTO: 9.1 FL (ref 8.9–12.7)
POTASSIUM SERPL-SCNC: 3.3 MMOL/L (ref 3.5–5.3)
PR INTERVAL: 172 MS
PROT SERPL-MCNC: 7.3 G/DL (ref 6.4–8.2)
PROTHROMBIN TIME: 12.6 SECONDS (ref 11.6–14.5)
QRS AXIS: 32 DEGREES
QRSD INTERVAL: 80 MS
QT INTERVAL: 382 MS
QTC INTERVAL: 480 MS
RBC # BLD AUTO: 4.58 MILLION/UL (ref 3.81–5.12)
RH BLD: POSITIVE
SODIUM SERPL-SCNC: 136 MMOL/L (ref 136–145)
SPECIMEN EXPIRATION DATE: NORMAL
T WAVE AXIS: 39 DEGREES
VENTRICULAR RATE: 95 BPM
WBC # BLD AUTO: 9.46 THOUSAND/UL (ref 4.31–10.16)

## 2021-12-22 PROCEDURE — 86850 RBC ANTIBODY SCREEN: CPT

## 2021-12-22 PROCEDURE — 82948 REAGENT STRIP/BLOOD GLUCOSE: CPT

## 2021-12-22 PROCEDURE — G1004 CDSM NDSC: HCPCS

## 2021-12-22 PROCEDURE — 85730 THROMBOPLASTIN TIME PARTIAL: CPT

## 2021-12-22 PROCEDURE — A9552 F18 FDG: HCPCS

## 2021-12-22 PROCEDURE — 86901 BLOOD TYPING SEROLOGIC RH(D): CPT

## 2021-12-22 PROCEDURE — 80053 COMPREHEN METABOLIC PANEL: CPT

## 2021-12-22 PROCEDURE — 85025 COMPLETE CBC W/AUTO DIFF WBC: CPT

## 2021-12-22 PROCEDURE — 85610 PROTHROMBIN TIME: CPT

## 2021-12-22 PROCEDURE — 93010 ELECTROCARDIOGRAM REPORT: CPT | Performed by: INTERNAL MEDICINE

## 2021-12-22 PROCEDURE — 36415 COLL VENOUS BLD VENIPUNCTURE: CPT

## 2021-12-22 PROCEDURE — 78815 PET IMAGE W/CT SKULL-THIGH: CPT

## 2021-12-22 PROCEDURE — 93005 ELECTROCARDIOGRAM TRACING: CPT

## 2021-12-22 PROCEDURE — 86900 BLOOD TYPING SEROLOGIC ABO: CPT

## 2022-01-04 ENCOUNTER — ANESTHESIA EVENT (OUTPATIENT)
Dept: PERIOP | Facility: HOSPITAL | Age: 62
DRG: 828 | End: 2022-01-04
Payer: COMMERCIAL

## 2022-01-04 RX ORDER — NEOMYCIN SULFATE 500 MG/1
1000 TABLET ORAL 4 TIMES DAILY
COMMUNITY

## 2022-01-04 RX ORDER — METRONIDAZOLE 500 MG/1
500 TABLET ORAL EVERY 8 HOURS SCHEDULED
COMMUNITY
End: 2022-01-10 | Stop reason: HOSPADM

## 2022-01-04 NOTE — PRE-PROCEDURE INSTRUCTIONS
Pre-Surgery Instructions:   Medication Instructions    amLODIPine (NORVASC) 5 mg tablet Instructed patient per Anesthesia Guidelines  take 1/6    buPROPion (WELLBUTRIN SR) 150 mg 12 hr tablet Instructed patient per Anesthesia Guidelines  take 1/6    Cholecalciferol (VITAMIN D-3) 1000 units CAPS Instructed patient per Anesthesia Guidelines  stop 1/4    escitalopram (LEXAPRO) 20 mg tablet Instructed patient per Anesthesia Guidelines  take 1/6    metFORMIN (GLUCOPHAGE) 500 mg tablet Instructed patient per Anesthesia Guidelines  HOLD 1/6    metroNIDAZOLE (FLAGYL) 500 mg tablet Patient was instructed by Physician and understands   neomycin (MYCIFRADIN) 500 mg tablet Instructed patient per Anesthesia Guidelines   scopolamine (TRANSDERM-SCOP) 1 5 mg/3 days TD 72 hr patch Instructed patient per Anesthesia Guidelines  hold 1/6    simvastatin (ZOCOR) 20 mg tablet Instructed patient per Anesthesia Guidelines  take 1/6    SUPER B COMPLEX/C PO Instructed patient per Anesthesia Guidelines  stop 1/4    valsartan-hydrochlorothiazide (DIOVAN-HCT) 320-25 MG per tablet Instructed patient per Anesthesia Guidelines  HOLD 1/6   Pre procedure instructions reviewed verbalizes understanding  NPO after MN  Bathing reviewed  Morning meds with water  No NSAIDS  Stop supplements/vitamins  Follow Md instructions for bowel prep   Bring CPAP

## 2022-01-05 DIAGNOSIS — C56.2 MALIGNANT NEOPLASM OF LEFT OVARY (HCC): ICD-10-CM

## 2022-01-05 DIAGNOSIS — Z78.9 DEEP VEIN THROMBOSIS (DVT) PROPHYLAXIS PRESCRIBED AT DISCHARGE: Primary | ICD-10-CM

## 2022-01-06 ENCOUNTER — TELEPHONE (OUTPATIENT)
Dept: HEMATOLOGY ONCOLOGY | Facility: CLINIC | Age: 62
End: 2022-01-06

## 2022-01-06 ENCOUNTER — DOCUMENTATION (OUTPATIENT)
Dept: GYNECOLOGIC ONCOLOGY | Facility: CLINIC | Age: 62
End: 2022-01-06

## 2022-01-06 ENCOUNTER — APPOINTMENT (OUTPATIENT)
Dept: RADIOLOGY | Facility: HOSPITAL | Age: 62
DRG: 828 | End: 2022-01-06
Payer: COMMERCIAL

## 2022-01-06 ENCOUNTER — HOSPITAL ENCOUNTER (INPATIENT)
Facility: HOSPITAL | Age: 62
LOS: 4 days | Discharge: HOME HEALTH | DRG: 828 | End: 2022-01-10
Attending: UROLOGY | Admitting: UROLOGY
Payer: COMMERCIAL

## 2022-01-06 ENCOUNTER — ANESTHESIA (OUTPATIENT)
Dept: PERIOP | Facility: HOSPITAL | Age: 62
DRG: 828 | End: 2022-01-06
Payer: COMMERCIAL

## 2022-01-06 DIAGNOSIS — Z98.890 S/P EXPLORATORY LAPAROTOMY: Primary | ICD-10-CM

## 2022-01-06 DIAGNOSIS — C56.2 MALIGNANT NEOPLASM OF LEFT OVARY (HCC): ICD-10-CM

## 2022-01-06 PROBLEM — E11.9 TYPE 2 DIABETES MELLITUS WITHOUT COMPLICATION, WITHOUT LONG-TERM CURRENT USE OF INSULIN (HCC): Status: ACTIVE | Noted: 2022-01-06

## 2022-01-06 PROBLEM — I10 PRIMARY HYPERTENSION: Status: ACTIVE | Noted: 2022-01-06

## 2022-01-06 LAB
FLUAV RNA RESP QL NAA+PROBE: NEGATIVE
FLUBV RNA RESP QL NAA+PROBE: NEGATIVE
GLUCOSE SERPL-MCNC: 171 MG/DL (ref 65–140)
GLUCOSE SERPL-MCNC: 179 MG/DL (ref 65–140)
RSV RNA RESP QL NAA+PROBE: NEGATIVE
SARS-COV-2 RNA RESP QL NAA+PROBE: NEGATIVE

## 2022-01-06 PROCEDURE — C1769 GUIDE WIRE: HCPCS | Performed by: UROLOGY

## 2022-01-06 PROCEDURE — 44120 REMOVAL OF SMALL INTESTINE: CPT | Performed by: OBSTETRICS & GYNECOLOGY

## 2022-01-06 PROCEDURE — 88342 IMHCHEM/IMCYTCHM 1ST ANTB: CPT | Performed by: PATHOLOGY

## 2022-01-06 PROCEDURE — 88309 TISSUE EXAM BY PATHOLOGIST: CPT | Performed by: PATHOLOGY

## 2022-01-06 PROCEDURE — 0DNB0ZZ RELEASE ILEUM, OPEN APPROACH: ICD-10-PCS | Performed by: UROLOGY

## 2022-01-06 PROCEDURE — 0JNC0ZZ RELEASE PELVIC REGION SUBCUTANEOUS TISSUE AND FASCIA, OPEN APPROACH: ICD-10-PCS | Performed by: UROLOGY

## 2022-01-06 PROCEDURE — 0DBB0ZZ EXCISION OF ILEUM, OPEN APPROACH: ICD-10-PCS | Performed by: UROLOGY

## 2022-01-06 PROCEDURE — 88341 IMHCHEM/IMCYTCHM EA ADD ANTB: CPT | Performed by: PATHOLOGY

## 2022-01-06 PROCEDURE — 0T788DZ DILATION OF BILATERAL URETERS WITH INTRALUMINAL DEVICE, VIA NATURAL OR ARTIFICIAL OPENING ENDOSCOPIC: ICD-10-PCS | Performed by: UROLOGY

## 2022-01-06 PROCEDURE — 0241U HB NFCT DS VIR RESP RNA 4 TRGT: CPT | Performed by: OBSTETRICS & GYNECOLOGY

## 2022-01-06 PROCEDURE — 49203 PR EXCISION/DESTRUCTION OPEN ABDOMINAL TUMORS 5 CM: CPT | Performed by: OBSTETRICS & GYNECOLOGY

## 2022-01-06 PROCEDURE — NC001 PR NO CHARGE: Performed by: UROLOGY

## 2022-01-06 PROCEDURE — 52332 CYSTOSCOPY AND TREATMENT: CPT | Performed by: UROLOGY

## 2022-01-06 PROCEDURE — 99024 POSTOP FOLLOW-UP VISIT: CPT | Performed by: PHYSICIAN ASSISTANT

## 2022-01-06 PROCEDURE — 0DN80ZZ RELEASE SMALL INTESTINE, OPEN APPROACH: ICD-10-PCS | Performed by: UROLOGY

## 2022-01-06 PROCEDURE — 99024 POSTOP FOLLOW-UP VISIT: CPT | Performed by: OBSTETRICS & GYNECOLOGY

## 2022-01-06 PROCEDURE — 88307 TISSUE EXAM BY PATHOLOGIST: CPT | Performed by: PATHOLOGY

## 2022-01-06 PROCEDURE — 0WBH0ZZ EXCISION OF RETROPERITONEUM, OPEN APPROACH: ICD-10-PCS | Performed by: UROLOGY

## 2022-01-06 PROCEDURE — 0TP97DZ REMOVAL OF INTRALUMINAL DEVICE FROM URETER, VIA NATURAL OR ARTIFICIAL OPENING: ICD-10-PCS | Performed by: UROLOGY

## 2022-01-06 PROCEDURE — C2617 STENT, NON-COR, TEM W/O DEL: HCPCS | Performed by: UROLOGY

## 2022-01-06 PROCEDURE — 82948 REAGENT STRIP/BLOOD GLUCOSE: CPT

## 2022-01-06 PROCEDURE — 87086 URINE CULTURE/COLONY COUNT: CPT | Performed by: UROLOGY

## 2022-01-06 DEVICE — INLAY OPTIMA URETERAL STENT W/O GUIDEWIRE
Type: IMPLANTABLE DEVICE | Site: URETER | Status: FUNCTIONAL
Brand: BARD® INLAY OPTIMA® URETERAL STENT

## 2022-01-06 RX ORDER — METOCLOPRAMIDE HYDROCHLORIDE 5 MG/ML
10 INJECTION INTRAMUSCULAR; INTRAVENOUS ONCE AS NEEDED
Status: COMPLETED | OUTPATIENT
Start: 2022-01-06 | End: 2022-01-06

## 2022-01-06 RX ORDER — EPHEDRINE SULFATE 50 MG/ML
INJECTION INTRAVENOUS AS NEEDED
Status: DISCONTINUED | OUTPATIENT
Start: 2022-01-06 | End: 2022-01-06

## 2022-01-06 RX ORDER — PROPOFOL 10 MG/ML
INJECTION, EMULSION INTRAVENOUS CONTINUOUS PRN
Status: DISCONTINUED | OUTPATIENT
Start: 2022-01-06 | End: 2022-01-06

## 2022-01-06 RX ORDER — ACETAMINOPHEN 325 MG/1
975 TABLET ORAL ONCE
Status: COMPLETED | OUTPATIENT
Start: 2022-01-06 | End: 2022-01-06

## 2022-01-06 RX ORDER — HYDROMORPHONE HCL IN WATER/PF 6 MG/30 ML
0.2 PATIENT CONTROLLED ANALGESIA SYRINGE INTRAVENOUS
Status: DISCONTINUED | OUTPATIENT
Start: 2022-01-06 | End: 2022-01-06 | Stop reason: HOSPADM

## 2022-01-06 RX ORDER — GENTAMICIN SULFATE 80 MG/50ML
1.5 INJECTION, SOLUTION INTRAVENOUS ONCE
Status: COMPLETED | OUTPATIENT
Start: 2022-01-06 | End: 2022-01-06

## 2022-01-06 RX ORDER — ALBUMIN, HUMAN INJ 5% 5 %
12.5 SOLUTION INTRAVENOUS ONCE
Status: COMPLETED | OUTPATIENT
Start: 2022-01-06 | End: 2022-01-06

## 2022-01-06 RX ORDER — SODIUM CHLORIDE, SODIUM LACTATE, POTASSIUM CHLORIDE, CALCIUM CHLORIDE 600; 310; 30; 20 MG/100ML; MG/100ML; MG/100ML; MG/100ML
125 INJECTION, SOLUTION INTRAVENOUS CONTINUOUS
Status: DISCONTINUED | OUTPATIENT
Start: 2022-01-06 | End: 2022-01-06 | Stop reason: HOSPADM

## 2022-01-06 RX ORDER — PROPOFOL 10 MG/ML
INJECTION, EMULSION INTRAVENOUS AS NEEDED
Status: DISCONTINUED | OUTPATIENT
Start: 2022-01-06 | End: 2022-01-06

## 2022-01-06 RX ORDER — ALBUTEROL SULFATE 2.5 MG/3ML
2.5 SOLUTION RESPIRATORY (INHALATION) ONCE AS NEEDED
Status: DISCONTINUED | OUTPATIENT
Start: 2022-01-06 | End: 2022-01-06 | Stop reason: HOSPADM

## 2022-01-06 RX ORDER — FENTANYL CITRATE 50 UG/ML
INJECTION, SOLUTION INTRAMUSCULAR; INTRAVENOUS AS NEEDED
Status: DISCONTINUED | OUTPATIENT
Start: 2022-01-06 | End: 2022-01-06

## 2022-01-06 RX ORDER — SODIUM CHLORIDE 9 MG/ML
125 INJECTION, SOLUTION INTRAVENOUS CONTINUOUS
Status: DISCONTINUED | OUTPATIENT
Start: 2022-01-06 | End: 2022-01-07

## 2022-01-06 RX ORDER — ROCURONIUM BROMIDE 10 MG/ML
INJECTION, SOLUTION INTRAVENOUS AS NEEDED
Status: DISCONTINUED | OUTPATIENT
Start: 2022-01-06 | End: 2022-01-06

## 2022-01-06 RX ORDER — DEXAMETHASONE SODIUM PHOSPHATE 4 MG/ML
INJECTION, SOLUTION INTRA-ARTICULAR; INTRALESIONAL; INTRAMUSCULAR; INTRAVENOUS; SOFT TISSUE AS NEEDED
Status: DISCONTINUED | OUTPATIENT
Start: 2022-01-06 | End: 2022-01-06

## 2022-01-06 RX ORDER — ACETAMINOPHEN 325 MG/1
650 TABLET ORAL EVERY 6 HOURS SCHEDULED
Status: DISCONTINUED | OUTPATIENT
Start: 2022-01-06 | End: 2022-01-07

## 2022-01-06 RX ORDER — SODIUM CHLORIDE 9 MG/ML
INJECTION, SOLUTION INTRAVENOUS CONTINUOUS PRN
Status: DISCONTINUED | OUTPATIENT
Start: 2022-01-06 | End: 2022-01-06

## 2022-01-06 RX ORDER — ONDANSETRON 2 MG/ML
4 INJECTION INTRAMUSCULAR; INTRAVENOUS EVERY 6 HOURS PRN
Status: DISCONTINUED | OUTPATIENT
Start: 2022-01-06 | End: 2022-01-10 | Stop reason: HOSPADM

## 2022-01-06 RX ORDER — ALBUMIN, HUMAN INJ 5% 5 %
SOLUTION INTRAVENOUS CONTINUOUS PRN
Status: DISCONTINUED | OUTPATIENT
Start: 2022-01-06 | End: 2022-01-06

## 2022-01-06 RX ORDER — ONDANSETRON 2 MG/ML
4 INJECTION INTRAMUSCULAR; INTRAVENOUS EVERY 6 HOURS PRN
Status: DISCONTINUED | OUTPATIENT
Start: 2022-01-06 | End: 2022-01-06 | Stop reason: HOSPADM

## 2022-01-06 RX ORDER — GABAPENTIN 100 MG/1
100 CAPSULE ORAL ONCE
Status: COMPLETED | OUTPATIENT
Start: 2022-01-06 | End: 2022-01-06

## 2022-01-06 RX ORDER — MAGNESIUM HYDROXIDE 1200 MG/15ML
LIQUID ORAL AS NEEDED
Status: DISCONTINUED | OUTPATIENT
Start: 2022-01-06 | End: 2022-01-06 | Stop reason: HOSPADM

## 2022-01-06 RX ORDER — FENTANYL CITRATE/PF 50 MCG/ML
25 SYRINGE (ML) INJECTION
Status: DISCONTINUED | OUTPATIENT
Start: 2022-01-06 | End: 2022-01-06 | Stop reason: HOSPADM

## 2022-01-06 RX ORDER — LIDOCAINE HYDROCHLORIDE 20 MG/ML
INJECTION, SOLUTION EPIDURAL; INFILTRATION; INTRACAUDAL; PERINEURAL AS NEEDED
Status: DISCONTINUED | OUTPATIENT
Start: 2022-01-06 | End: 2022-01-06

## 2022-01-06 RX ORDER — HEPARIN SODIUM 5000 [USP'U]/ML
5000 INJECTION, SOLUTION INTRAVENOUS; SUBCUTANEOUS
Status: COMPLETED | OUTPATIENT
Start: 2022-01-06 | End: 2022-01-06

## 2022-01-06 RX ORDER — DIPHENHYDRAMINE HYDROCHLORIDE 50 MG/ML
INJECTION INTRAMUSCULAR; INTRAVENOUS AS NEEDED
Status: DISCONTINUED | OUTPATIENT
Start: 2022-01-06 | End: 2022-01-06

## 2022-01-06 RX ORDER — MIDAZOLAM HYDROCHLORIDE 2 MG/2ML
INJECTION, SOLUTION INTRAMUSCULAR; INTRAVENOUS AS NEEDED
Status: DISCONTINUED | OUTPATIENT
Start: 2022-01-06 | End: 2022-01-06

## 2022-01-06 RX ORDER — CLINDAMYCIN PHOSPHATE 900 MG/50ML
900 INJECTION INTRAVENOUS ONCE
Status: COMPLETED | OUTPATIENT
Start: 2022-01-06 | End: 2022-01-06

## 2022-01-06 RX ADMIN — ALBUMIN (HUMAN): 12.5 INJECTION, SOLUTION INTRAVENOUS at 15:00

## 2022-01-06 RX ADMIN — EPHEDRINE SULFATE 5 MG: 50 INJECTION, SOLUTION INTRAVENOUS at 16:17

## 2022-01-06 RX ADMIN — PHENYLEPHRINE HYDROCHLORIDE 20 MCG/MIN: 10 INJECTION INTRAVENOUS at 14:27

## 2022-01-06 RX ADMIN — FENTANYL CITRATE 100 MCG: 50 INJECTION INTRAMUSCULAR; INTRAVENOUS at 13:36

## 2022-01-06 RX ADMIN — MIDAZOLAM 2 MG: 1 INJECTION INTRAMUSCULAR; INTRAVENOUS at 13:30

## 2022-01-06 RX ADMIN — ALBUMIN (HUMAN) 12.5 G: 12.5 INJECTION, SOLUTION INTRAVENOUS at 17:29

## 2022-01-06 RX ADMIN — SODIUM CHLORIDE: 0.9 INJECTION, SOLUTION INTRAVENOUS at 15:32

## 2022-01-06 RX ADMIN — PROPOFOL 20 MCG/KG/MIN: 10 INJECTION, EMULSION INTRAVENOUS at 14:13

## 2022-01-06 RX ADMIN — PROPOFOL 100 MG: 10 INJECTION, EMULSION INTRAVENOUS at 13:36

## 2022-01-06 RX ADMIN — METOCLOPRAMIDE HYDROCHLORIDE 10 MG: 5 INJECTION INTRAMUSCULAR; INTRAVENOUS at 18:08

## 2022-01-06 RX ADMIN — ALBUMIN (HUMAN): 12.5 INJECTION, SOLUTION INTRAVENOUS at 15:17

## 2022-01-06 RX ADMIN — SODIUM CHLORIDE: 0.9 INJECTION, SOLUTION INTRAVENOUS at 15:54

## 2022-01-06 RX ADMIN — ONDANSETRON 4 MG: 2 INJECTION INTRAMUSCULAR; INTRAVENOUS at 17:29

## 2022-01-06 RX ADMIN — ACETAMINOPHEN 975 MG: 325 TABLET, FILM COATED ORAL at 11:49

## 2022-01-06 RX ADMIN — ROCURONIUM BROMIDE 50 MG: 50 INJECTION, SOLUTION INTRAVENOUS at 13:37

## 2022-01-06 RX ADMIN — EPHEDRINE SULFATE 5 MG: 50 INJECTION, SOLUTION INTRAVENOUS at 13:47

## 2022-01-06 RX ADMIN — SODIUM CHLORIDE: 0.9 INJECTION, SOLUTION INTRAVENOUS at 13:45

## 2022-01-06 RX ADMIN — LIDOCAINE HYDROCHLORIDE 100 MG: 20 INJECTION, SOLUTION EPIDURAL; INFILTRATION; INTRACAUDAL; PERINEURAL at 13:36

## 2022-01-06 RX ADMIN — CLINDAMYCIN PHOSPHATE 900 MG: 900 INJECTION, SOLUTION INTRAVENOUS at 13:40

## 2022-01-06 RX ADMIN — DIPHENHYDRAMINE HYDROCHLORIDE 25 MG: 50 INJECTION, SOLUTION INTRAMUSCULAR; INTRAVENOUS at 15:01

## 2022-01-06 RX ADMIN — SODIUM CHLORIDE, SODIUM LACTATE, POTASSIUM CHLORIDE, AND CALCIUM CHLORIDE 125 ML/HR: .6; .31; .03; .02 INJECTION, SOLUTION INTRAVENOUS at 12:32

## 2022-01-06 RX ADMIN — GENTAMICIN SULFATE 80 MG: 80 INJECTION, SOLUTION INTRAVENOUS at 13:56

## 2022-01-06 RX ADMIN — Medication: at 18:30

## 2022-01-06 RX ADMIN — DEXAMETHASONE SODIUM PHOSPHATE 4 MG: 4 INJECTION INTRA-ARTICULAR; INTRALESIONAL; INTRAMUSCULAR; INTRAVENOUS; SOFT TISSUE at 13:36

## 2022-01-06 RX ADMIN — SUGAMMADEX 200 MG: 100 INJECTION, SOLUTION INTRAVENOUS at 17:02

## 2022-01-06 RX ADMIN — ROCURONIUM BROMIDE 20 MG: 50 INJECTION, SOLUTION INTRAVENOUS at 14:51

## 2022-01-06 RX ADMIN — HEPARIN SODIUM 5000 UNITS: 5000 INJECTION INTRAVENOUS; SUBCUTANEOUS at 13:24

## 2022-01-06 RX ADMIN — SODIUM CHLORIDE 125 ML/HR: 0.9 INJECTION, SOLUTION INTRAVENOUS at 20:24

## 2022-01-06 RX ADMIN — GABAPENTIN 100 MG: 100 CAPSULE ORAL at 11:50

## 2022-01-06 NOTE — ANESTHESIA POSTPROCEDURE EVALUATION
Post-Op Assessment Note    CV Status:  Stable    Pain management: adequate     Mental Status:  Awake   Hydration Status:  Stable   PONV Controlled:  Controlled   Airway Patency:  Patent      Post Op Vitals Reviewed: Yes      Staff: Anesthesiologist, CRNA         No complications documented      BP (!) 89/47 (01/06/22 1719)    Temp (!) 97 4 °F (36 3 °C) (01/06/22 1719)    Pulse 76 (01/06/22 1719)   Resp 18 (01/06/22 1719)    SpO2 100 % (01/06/22 1719)

## 2022-01-06 NOTE — OP NOTE
PERATIVE REPORT  PATIENT NAME: Alfredito Nicholson    :  1960  MRN: [id number]  Pt Location: BE OR ROOM 09    SURGERY DATE: 2022    Surgeon(s) and Role:  Panel 1:     * Joel Ramos MD - Primary     * Curtis Berry MD - Assisting    Preop Diagnosis:  Malignant neoplasm of left ovary ([address] ) [C56 2]    Post-Op Diagnosis Codes:     * Malignant neoplasm of left ovary ([address] ) [C56 2]    Procedure:    Cysto, insertion of L JJ stent, insertion of R 5fr open ended ureteral catheter    Specimen(s):  ID Type Source Tests Collected by Time Destination   1 : PORTION OF ILEUM Tissue Small Bowel, NOS TISSUE EXAM Sergio Coughlin MD 2022 1604    2 : PELVIC MASS Tissue Soft Tissue, Other TISSUE EXAM Sergio Coughlin MD 2022 1605    A : cystoscopy Urine Urine, Cystoscopic URINE CULTURE Joel Ramos MD 2022 1401        Estimated Blood Loss:   Minimal    Drains:  Closed/Suction Drain Left LLQ Bulb 15 Fr  (Active)   Site Description Unable to view 22   Dressing Status Clean;Dry; Intact 22   Drainage Appearance Serosanguineous 22   Status To bulb suction 22   Number of days: 0       NG/OG/Enteral Tube Nasogastric 18 Fr Left nare (Active)   Placement Reverification Auscultation 22   Site Assessment Clean;Dry; Intact 22   Status Suction-low continuous 22   Drainage Appearance Tan 22 171   Number of days: 0       Urethral Catheter Non-latex;Straight-tip 16 Fr  (Active)   Site Assessment Clean;Skin intact;Pink;Patent 22   Collection Container Standard drainage bag 22   Securement Method Securing device (Describe) 22   Number of days: 0       [REMOVED] Ureteral Drain/Stent Right ureter 5 Fr  (Removed)   Site Assessment Clean; Intact 22 1600   Number of days: 0       Anesthesia Type:   General    Operative Indications:  Malignant neoplasm of left ovary ([address] ) [C56 2]      Operative Findings:  JJ Stent in the L reimplanted ureter, 5 Fr open ended catheter in the Right     Complications:   none    Procedure and Technique:  Cassie Leon is a 68-year-old female known to Dr Steve Shoemaker with recurrent ovarian Ca with a pelvic mass  She presents today to undergo e-lap with excision of pelvic mass  Preoperative ureteral stents of been requested  Risk and benefits of the procedure were discussed and reviewed  Informed consent was obtained  The patient was brought to the operating room on 1/6/22  After the smooth induction of general endotracheal anesthesia, the patient was placed in the dorsal lithotomy position  Her genitalia was prepped and draped in a sterile fashion  Intravenous antibiotics were administered  A timeout was performed with all members of the operative team confirming the patient's identity and procedure to be performed  A 22 [place] rigid cystoscope with 30° lens was inserted  The bladder was thoroughly inspected  Both ureteral orifices were visualized with clear efflux of urine  A urine culture was obtained  The bladder wall was thoroughly inspected and no abnormalities were noted         A right 5 [place] Kat open-ended catheter was placed under direct vision without difficulty  A left 24-6 Fr JJ stent was placed in the L reimplanted ureter identified at the L dome  A tongue of normal appearing ureteral mucosa was noted  The bladder was left full the cystoscope was removed  An 25 [place] Hitchcock catheter was inserted and the Righ 5 [place] Kat open-ended ureteral stent was backloaded into the distal aspect of the Hitchcock catheter  The Hitchcock catheter and the right 5 [place] Kat open-ended catheter were then connected to gravity drainage and secured to the patient's inner thigh with tape  We see the dictation of Dr Steve Shoemaker for additional details regarding his colon resection      Patient Disposition:  Per Dr Steve Shoemaker      SIGNATURE: Zenaida Wilson MD  DATE: January 6, 2022  TIME: 6:08 PM

## 2022-01-06 NOTE — ANESTHESIA PREPROCEDURE EVALUATION
Procedure:  CYSTOSCOPY RETROGRADE PYELOGRAM WITH INSERTION STENT URETERAL (Bilateral Bladder)  EXCHANGE STENT URETERAL (Bilateral Bladder)  POSSIBLE ROBOTIC RESECTION PELVIC MASS, POSSIBLE COLOSTOMY (N/A Abdomen)  LAPAROTOMY EXPLORATORY W/ ROBOTICS (N/A Abdomen)    Relevant Problems   ANESTHESIA   (+) Motion sickness      CARDIO   (+) Primary hypertension      ENDO   (+) Type 2 diabetes mellitus without complication, without long-term current use of insulin (HCC)      GYN   (+) Malignant neoplasm of left ovary (HCC)        Physical Exam    Airway    Mallampati score: II  TM Distance: >3 FB  Neck ROM: limited     Dental   No notable dental hx     Cardiovascular  Cardiovascular exam normal    Pulmonary  Pulmonary exam normal     Other Findings        Anesthesia Plan  ASA Score- 3     Anesthesia Type- general with ASA Monitors  Additional Monitors: arterial line  Airway Plan: ETT  Plan Factors-Exercise tolerance (METS): >4 METS  Chart reviewed  EKG reviewed  Imaging results reviewed  Existing labs reviewed  Patient summary reviewed  Patient is not a current smoker  Induction- intravenous  Postoperative Plan-     Informed Consent- Anesthetic plan and risks discussed with patient  I personally reviewed this patient with the CRNA  Discussed and agreed on the Anesthesia Plan with the CRNA  Emperatriz High

## 2022-01-06 NOTE — DISCHARGE SUMMARY
Discharge Summary   Jessica Gandara MRN: [id number]  Unit/Bed#: OR POOL Encounter: [id number]      Admission Date: 1/6/2022     Discharge Date: 1/10/22    Attending: Teetee Snowden MD    Principal Diagnosis: Malignant neoplasm of left ovary ([address] ) [C56 2]    Procedures:   Cytoscopy with bilateral ureteral stent insertion, exploratory laparotomy, extensive lysis of adhesions, resection of pelvic mass, resection of small bowel, TAP block    Hospital course: Patient was admitted following the above procedures  The open-ended right ureteral catheter was removed at the end of the procedures  The left ureteral catheter was kept in place  Her NG tube was removed on POD#1 and her diet was slowly advanced  She was tolerating a regular diet by day of discharge  Her tate catheter was kept in place at discharge for a total of 10 days  Her left ureteral stent will be kept in place for a total of 6 weeks  Her left ROBE drain was removed prior to discharge  The patient was discharged home with VNA services to assist with tate care  She was also discharged home on prophylactic Eliquis       Lab Results:   Lab Results   Component Value Date    WBC 9 46 12/22/2021    HGB 13 4 12/22/2021    HCT 38 8 12/22/2021    MCV 85 12/22/2021     12/22/2021     Lab Results   Component Value Date    GLUCOSE 188 (H) 08/26/2019    CALCIUM 9 7 12/22/2021    K 3 3 (L) 12/22/2021    CO2 28 12/22/2021     12/22/2021    BUN 17 12/22/2021    CREATININE 0 79 12/22/2021     Lab Results   Component Value Date/Time    POCGLU 171 (H) 01/06/2022 05:24 PM    POCGLU 179 (H) 01/06/2022 11:47 AM    POCGLU 153 (H) 12/22/2021 08:04 AM    POCGLU 176 (H) 11/07/2019 08:44 AM    POCGLU 127 10/29/2019 12:57 PM     Lab Results   Component Value Date    PTT 29 12/22/2021     Lab Results   Component Value Date    INR 0 98 12/22/2021    INR 1 04 08/16/2019    PROTIME 12 6 12/22/2021    PROTIME 13 3 72/81/4319       Complications: none apparent    Condition at discharge: stable     Discharge instructions/Information to patient and family:   See After Visit Summary for information provided to patient and family  Provisions for Follow-Up Care:  See After Visit Summary for information related to follow-up care and any pertinent home health orders  Disposition: See After Visit Summary for discharge disposition information  Planned Readmission: Yes, pending clinical status    Discharge Medications: For a complete list of the patient's medications, please refer to her med rec      Peri Thompson MD  OB/GYN PGY-3  1/10/2022  3:43 PM

## 2022-01-06 NOTE — INTERVAL H&P NOTE
H&P reviewed  After examining the patient I find no changes in the patients condition since the H&P had been written      Vitals:    01/06/22 1141   BP: 107/72   Pulse: 94   Resp: 18   Temp: (!) 97 3 °F (36 3 °C)   SpO2: 94%

## 2022-01-06 NOTE — ANESTHESIA PROCEDURE NOTES
Arterial Line Insertion  Performed by: Cristy Hinds MD  Authorized by: Cristy Hinds MD   Consent: Written consent obtained  Risks and benefits: risks, benefits and alternatives were discussed  Consent given by: patient  Preparation: Patient was prepped and draped in the usual sterile fashion    Indications: hemodynamic monitoring  Orientation:  Right  Location: radial artery  Procedure Details:  Needle gauge: 20    Post-procedure:  Post-procedure: dressing applied  Waveform: good waveform  Patient tolerance: Patient tolerated the procedure well with no immediate complications

## 2022-01-06 NOTE — PROGRESS NOTES
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  She previously underwent left ureteral reimplant at the time of her [facility]BSO  She now has a 3 cm recurrence in the pelvis adjacent to the colon  Preoperative ureteral stents have been requested by Dr Alva Mustafa  Her reimplant was performed by Dr Heriberto Rossi in August 2019  I discussed with the patient that placement of ureteral stents alone does not necessarily exclude the risk of ureteral injury  Ureteral injury especially in a reimplanted ureter console occur even with stent insertion  The stent would allow for more easily identification and repair of the ureteral injury if this were to occur  Risk and benefits of cystoscopy with stent insertion were discussed and reviewed informed consent was obtained

## 2022-01-06 NOTE — ASSESSMENT & PLAN NOTE
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 treated with total abdominal hysterectomy, bilateral salpingo-oophorectomy, omentectomy, appendectomy, left ureteroneocystostomy in August of 2019  She then received 6 cycles of adjuvant chemotherapy with carboplatin and Taxol that completed in January of 2020  She was noted to have pelvic tumor concerning for recurrence in fall 2021, and is now postoperative day 2 s/p Exploratory laparotomy, extensive lysis of adhesions, resection of pelvic mass, resection of small bowel, cystoscopy and ureteral stenting  - NG tube has been removed  No nausea vomiting  Tolerating diabetic clears well  Advance diet today  Patient has had small bowel movement  - Hitchcock in place for 1 week postoperatively  - Left pelvic ROBE drain in place  Output noted  Continue to monitor   - Left ureteral stent in place  - Encourage incentive spirometry  Out of bed  Patient has been ambulating in the hallways  - Pain is well controlled  Discontinue PCA and IV fluids  Use high-dose Tylenol t i d  round the clock plus oxycodone p r n  for moderate (5 mg) to severe (10 mg) pain  Avoid NSAIDs given age and comorbidities  - Lovenox and SCDs for DVT prophylaxis  Appreciate Case Management consult for assistance with postoperative eliquis prophylaxis as patient will need prolonged prophylaxis for 28 days

## 2022-01-06 NOTE — TELEPHONE ENCOUNTER
Patient calling in with questions about prescrition shown in her my chart, alexei was not made aware of medication  Advise  will be explained to her once she goes in for her surgery

## 2022-01-06 NOTE — PROGRESS NOTES
Multidisciplinary Gynecologic Oncology Tumor Case Review 1/3/22      Physician Recommended Plan     Edinson Louis is a 59 y o  female     Diagnosis: stage IC1 ovarian cancer     Patient was discussed at the Multidisciplinary Gynecologic Oncology Case review on 1/3/22  The group recommended to consider treatment with surgical resection of the isolated recurrence site, genetic testing  Surgery scheduled for 1/6/22  No follow-up appointment yet scheduled  NCCN guidelines were available for review  The final treatment plan will be left to the discretion of the patient and the treating physician  DISCLAIMERS:    TO THE TREATING PHYSICIAN:  This conference is a meeting of clinicians from various specialty areas who evaluate and discuss patients for whom a multidisciplinary treatment approach is being considered  Please note that the above opinion was a consensus of the conference attendees and is intended only to assist in quality care of the discussed patient  The responsibility for follow up on the input given during the conference, along with any final decisions regarding plan of care, is that of the patient and the patient's provider  Accordingly, appointments have only been recommended based on this information and have NOT been scheduled unless otherwise noted  TO THE PATIENT:  This summary is a brief record of major aspects of your cancer treatment  You may choose to share a copy with any of your doctors or nurses  However, this is not a detailed or comprehensive record of your care

## 2022-01-06 NOTE — ASSESSMENT & PLAN NOTE
Lab Results   Component Value Date    HGBA1C 6 5 (H) 08/16/2019     Home metformin restarted yesterday with insulin sliding scale for added coverage  Used 4u insulin total yesterday

## 2022-01-06 NOTE — OP NOTE
PERATIVE REPORT  PATIENT NAME: Jordyn Jordan    :  1960  MRN: [id number]  Pt Location: BE OR ROOM 09    SURGERY DATE: 2022    Surgeon(s) and Role:  Panel 1:     * Idalia Moore MD - Primary     * Stefania Mayes MD - Assisting  Panel 2:     * Apryl Garnica MD - Primary     * Stefania Mayes MD - Assisting     * Pili Wall MD - Fellow     * Ellis Leal MD - Co-surgeon    Preop Diagnosis:  Malignant neoplasm of left ovary ([address] ) [C56 2]    Post-Op Diagnosis Codes:     * Malignant neoplasm of left ovary ([address] ) [C56 2]    Procedure(s) (LRB):  CYSTOSCOPY WITH INSERTION STENT URETERAL (Bilateral)  EXPLORATORY LAPAROTOMY, EXTENSIVE LYSIS OF ADHESIONS, RESECTION PELVIC MASS (N/A)  RESECTION SMALL BOWEL    Specimen(s):  ID Type Source Tests Collected by Time Destination   1 : PORTION OF ILEUM Tissue Small Bowel, NOS TISSUE EXAM Apryl Garnica MD 2022 1604    2 : PELVIC MASS Tissue Soft Tissue, Other TISSUE EXAM Apryl Garnica MD 2022 1605    A : cystoscopy Urine Urine, Cystoscopic URINE CULTURE Idalia Moore MD 2022 1401        Estimated Blood Loss:   Minimal    Drains:  Closed/Suction Drain Left LLQ Bulb 15 Fr  (Active)   Number of days: 0       NG/OG/Enteral Tube Nasogastric 18 Fr Right nare (Active)   Number of days: 0       Urethral Catheter Non-latex;Straight-tip 16 Fr  (Active)   Number of days: 0       [REMOVED] Ureteral Drain/Stent Right ureter 5 Fr  (Removed)   Site Assessment Clean; Intact 22 1600   Number of days: 0       Anesthesia Type:   General    Operative Indications:  Malignant neoplasm of left ovary (HCC) [C642]  59year-old with a 3 cm pelvic mass adjacent to the sigmoid colon, elevated inhibin, likely recurrent intermediate grade sertoli-lydig cell tumor of the ovary presents for resection    Operative Findings:  1  Exam under anesthesia revealed a grossly normal vaginal apex    Rectal examination revealed a normal rectal vault  The mass was not palpated  2  On laparotomy, there were extensive adhesions from the small bowel to the anterior abdominal wall, colon to the lateral pelvic sidewall, colon to the bladder serosa, small bowel to the bladder serosa, interloop adhesions  Approximately 80 minutes were spent lysing adhesions in total   There were dense adhesions present from the ileum to the right pelvic sidewall and there was a inherent serosal and muscularis injury to a portion of proximal ileum measuring 10 cm in length  Due to the length of the injury, a resection was preferred over over-sew  3  The mass was adherent to the posterior aspect of the bladder immediately adjacent to the left ureteral implantation site and was adherent to the left pelvic sidewall  The mass ruptured prior to removal   The mass was removed in its entirety  There was no visible remaining tumor at the end of the operation  4  There was no evidence of upper abdominal disease, other peritoneal based disease  Complications:   None    Procedure and Technique:  After informed consent was obtained, the patient was taken to the operating room where general endotracheal anesthesia was administered without incident  She was then prepped and draped in normal sterile fashion in the low dorsal lithotomy position  Examination under anesthesia was then performed with findings noted as above  Dr Richie Guadalupe 1st performed the cystoscopy with left ureteral stent insertion, right ureteral catheter insertion  The patient was then prepped and draped for the abdominal portion of the surgery  A midline vertical incision was made using cutting current cautery over her previous incision extending towards the umbilicus  This was taken down the line layer of fascia using cautery  The fascia was then identified and a small window was made the fashion using cutting current cautery    A tonsil clamp was then inserted a window in the remainder of the fascial incision extended superiorly and inferiorly  The peritoneum was then identified tented upwards using tonsil clamps and entered using Metzenbaum scissors  The peritoneal incision extended superiorly and inferiorly  Adhesions from the small bowel to the anterior abdominal wall were taken down during this portion of the dissection  Using Lella Million clamps to grasp the fascia laterally, additional adhesions were taken down from the small bowel to the anterior abdominal wall all the way to the right and left gutters  Adhesions were then taken down from the small bowel to the anterior abdominal wall inferiorly towards the pelvis  Once the small bowel was able to be retracted adequately enough, the bladder peritoneum was grasped and elevated  Adhesions from the rectosigmoid colon to the bladder were then lysed  The left line of Toldt was identified and opened this allowed better identification of the sigmoid colon mesentery  This was able to be freed somewhat  Residual infundibulopelvic ligament on left side was cauterized and transected with the EnSeal X 1 device  The ureter was able to be palpated due to the double-J stent and the insertion site into the bladder was identified  This was then skeletonized as it was densely adherent to the sigmoid mesentery  The mesentery was stripped off of the ureter  The colon was able to be moved laterally and the mass was able to be visualized emanating from the posterior aspect of the bladder adjacent to the left ureteral reimplantation site  The mass was somewhat adherent posteriorly these adhesions were able to be lysed in the mass was able to be lifted and rolled superiorly  During this process, there was a cystic portion of the mass that ruptured  The mass was dissected free of adhesions from the pelvic sidewall as well as from the posterior aspect of the bladder  A small portion of the serosa/muscularis of the bladder was taken along with the mass    There was no evidence of cystostomy  The serosal/muscularis defect was then oversewn using 3-0 Vicryl suture  Hemostasis found to be adequate  The remainder of the pelvis was then irrigated  There is no visible evidence of any tumor remaining  A 15 Eduard drain was then placed in the pelvis exiting left lower quadrant secured using a 2-0 nylon suture  During the pelvic dissection, a small portion of ileum measuring approximately 10 cm had a serosal and muscularis defect  This was inherent to the dense adhesions that were present  Therefore, a complete lysis of adhesions was performed to remove the bowel from the pelvis and the bowel was run its entirety  The cecum was identified  The previous appendectomy site was identified  Adhesions from the terminal ileum to the right pelvic sidewall/iliac vessels were lysed  The bowel was then run with adhesion lysis to reduce interloop adhesive disease  The entire bowel was able to be visualized  There is no evidence of additional inherent injury  Therefore, the 10 cm segment of small bowel with the inherent serosal/muscularis defect was identified  The mesentery of the normal ileum proximal to the inherent injury was opened using cautery  The proximal small bowel was then transected using a NANDO 75 stapler  A window was made in the mesentery distally and a normal segment of ileum past the injured segment  The bowel was again transected using the NANDO 75 stapler  The intervening mesentery was cauterized and transected with the EnSeal X 1 device  The portion of small bowel measured approximately 10 cm  The anti mesenteric border was grasped at the proximal distal segments and was opened using Metzenbaum scissors  The NANDO 76 stapler was then used to create the anastomosis on the anti mesenteric portion of the small intestine  The anastomosis was then completed with a NANDO 75 stapler  The corners of the anastomotic site were oversewn using 3-0 Vicryl suture    The mesenteric defect was closed using 3-0 Vicryl suture  The abdomen pelvis were then irrigated  There was no evidence of bleeding identified  The incision was then reapproximated using 1  Looped PDS in a running fashion to the fascia  The subcutaneous space was then irrigated  The adipose tissue was closed using 3-0 Vicryl suture  The skin was closed using 3-0 Stratafix followed by Exofin  Sterile dressing was applied  The right ureteral catheter was removed  Anesthesia then placed a tap block  She was then awakened and transferred to the recovery room in stable condition  All sponge counts correct x2 for procedure  There were no complications  Estimated blood loss is 150 mL      I was present for the entire procedure, A qualified resident physician was not available and A co-surgeon was required because of skills and techniques relevant to speciality    Patient Disposition:  PACU       SIGNATURE: Charles Ray MD  DATE: January 6, 2022  TIME: 4:56 PM

## 2022-01-06 NOTE — ASSESSMENT & PLAN NOTE
Home amlodipine and valsartan/hydrochlorothiazide held  Monitor blood pressures  Current blood pressures are reasonably controlled  No further interventions needed

## 2022-01-07 ENCOUNTER — TELEPHONE (OUTPATIENT)
Dept: OTHER | Facility: HOSPITAL | Age: 62
End: 2022-01-07

## 2022-01-07 LAB
ALBUMIN SERPL BCP-MCNC: 3.5 G/DL (ref 3.5–5)
ALP SERPL-CCNC: 55 U/L (ref 46–116)
ALT SERPL W P-5'-P-CCNC: 29 U/L (ref 12–78)
ANION GAP SERPL CALCULATED.3IONS-SCNC: 5 MMOL/L (ref 4–13)
AST SERPL W P-5'-P-CCNC: 19 U/L (ref 5–45)
BASOPHILS # BLD AUTO: 0.03 THOUSANDS/ΜL (ref 0–0.1)
BASOPHILS NFR BLD AUTO: 0 % (ref 0–1)
BILIRUB SERPL-MCNC: 1.65 MG/DL (ref 0.2–1)
BUN SERPL-MCNC: 8 MG/DL (ref 5–25)
CALCIUM SERPL-MCNC: 7.9 MG/DL (ref 8.3–10.1)
CHLORIDE SERPL-SCNC: 108 MMOL/L (ref 100–108)
CO2 SERPL-SCNC: 27 MMOL/L (ref 21–32)
CREAT SERPL-MCNC: 0.58 MG/DL (ref 0.6–1.3)
EOSINOPHIL # BLD AUTO: 0.06 THOUSAND/ΜL (ref 0–0.61)
EOSINOPHIL NFR BLD AUTO: 1 % (ref 0–6)
ERYTHROCYTE [DISTWIDTH] IN BLOOD BY AUTOMATED COUNT: 12.6 % (ref 11.6–15.1)
GFR SERPL CREATININE-BSD FRML MDRD: 98 ML/MIN/1.73SQ M
GLUCOSE SERPL-MCNC: 157 MG/DL (ref 65–140)
GLUCOSE SERPL-MCNC: 197 MG/DL (ref 65–140)
HCT VFR BLD AUTO: 33.7 % (ref 34.8–46.1)
HGB BLD-MCNC: 11.7 G/DL (ref 11.5–15.4)
IMM GRANULOCYTES # BLD AUTO: 0.04 THOUSAND/UL (ref 0–0.2)
IMM GRANULOCYTES NFR BLD AUTO: 0 % (ref 0–2)
LYMPHOCYTES # BLD AUTO: 0.91 THOUSANDS/ΜL (ref 0.6–4.47)
LYMPHOCYTES NFR BLD AUTO: 8 % (ref 14–44)
MAGNESIUM SERPL-MCNC: 1.8 MG/DL (ref 1.6–2.6)
MCH RBC QN AUTO: 29 PG (ref 26.8–34.3)
MCHC RBC AUTO-ENTMCNC: 34.7 G/DL (ref 31.4–37.4)
MCV RBC AUTO: 83 FL (ref 82–98)
MONOCYTES # BLD AUTO: 0.66 THOUSAND/ΜL (ref 0.17–1.22)
MONOCYTES NFR BLD AUTO: 6 % (ref 4–12)
NEUTROPHILS # BLD AUTO: 9.09 THOUSANDS/ΜL (ref 1.85–7.62)
NEUTS SEG NFR BLD AUTO: 85 % (ref 43–75)
NRBC BLD AUTO-RTO: 0 /100 WBCS
PHOSPHATE SERPL-MCNC: 3.3 MG/DL (ref 2.3–4.1)
PLATELET # BLD AUTO: 297 THOUSANDS/UL (ref 149–390)
PMV BLD AUTO: 9 FL (ref 8.9–12.7)
POTASSIUM SERPL-SCNC: 3.2 MMOL/L (ref 3.5–5.3)
PROT SERPL-MCNC: 6.2 G/DL (ref 6.4–8.2)
RBC # BLD AUTO: 4.04 MILLION/UL (ref 3.81–5.12)
SODIUM SERPL-SCNC: 140 MMOL/L (ref 136–145)
WBC # BLD AUTO: 10.79 THOUSAND/UL (ref 4.31–10.16)

## 2022-01-07 PROCEDURE — 85025 COMPLETE CBC W/AUTO DIFF WBC: CPT | Performed by: OBSTETRICS & GYNECOLOGY

## 2022-01-07 PROCEDURE — 83735 ASSAY OF MAGNESIUM: CPT | Performed by: OBSTETRICS & GYNECOLOGY

## 2022-01-07 PROCEDURE — 80053 COMPREHEN METABOLIC PANEL: CPT | Performed by: OBSTETRICS & GYNECOLOGY

## 2022-01-07 PROCEDURE — 97163 PT EVAL HIGH COMPLEX 45 MIN: CPT

## 2022-01-07 PROCEDURE — 97167 OT EVAL HIGH COMPLEX 60 MIN: CPT

## 2022-01-07 PROCEDURE — 99024 POSTOP FOLLOW-UP VISIT: CPT | Performed by: OBSTETRICS & GYNECOLOGY

## 2022-01-07 PROCEDURE — 84100 ASSAY OF PHOSPHORUS: CPT | Performed by: OBSTETRICS & GYNECOLOGY

## 2022-01-07 PROCEDURE — 82948 REAGENT STRIP/BLOOD GLUCOSE: CPT

## 2022-01-07 RX ORDER — LIDOCAINE 50 MG/G
1 PATCH TOPICAL DAILY
Status: DISCONTINUED | OUTPATIENT
Start: 2022-01-07 | End: 2022-01-10 | Stop reason: HOSPADM

## 2022-01-07 RX ORDER — MAGNESIUM SULFATE HEPTAHYDRATE 40 MG/ML
2 INJECTION, SOLUTION INTRAVENOUS ONCE
Status: COMPLETED | OUTPATIENT
Start: 2022-01-07 | End: 2022-01-07

## 2022-01-07 RX ORDER — ACETAMINOPHEN 325 MG/1
650 TABLET ORAL EVERY 6 HOURS PRN
Status: DISCONTINUED | OUTPATIENT
Start: 2022-01-07 | End: 2022-01-09

## 2022-01-07 RX ORDER — DEXTROSE, SODIUM CHLORIDE, AND POTASSIUM CHLORIDE 5; .45; .15 G/100ML; G/100ML; G/100ML
100 INJECTION INTRAVENOUS CONTINUOUS
Status: DISCONTINUED | OUTPATIENT
Start: 2022-01-07 | End: 2022-01-09

## 2022-01-07 RX ORDER — POTASSIUM CHLORIDE 14.9 MG/ML
20 INJECTION INTRAVENOUS
Status: COMPLETED | OUTPATIENT
Start: 2022-01-07 | End: 2022-01-07

## 2022-01-07 RX ADMIN — ENOXAPARIN SODIUM 40 MG: 40 INJECTION SUBCUTANEOUS at 08:56

## 2022-01-07 RX ADMIN — POTASSIUM CHLORIDE 20 MEQ: 14.9 INJECTION, SOLUTION INTRAVENOUS at 11:42

## 2022-01-07 RX ADMIN — MAGNESIUM SULFATE HEPTAHYDRATE 2 G: 40 INJECTION, SOLUTION INTRAVENOUS at 08:56

## 2022-01-07 RX ADMIN — INSULIN LISPRO 1 UNITS: 100 INJECTION, SOLUTION INTRAVENOUS; SUBCUTANEOUS at 18:22

## 2022-01-07 RX ADMIN — DEXTROSE, SODIUM CHLORIDE, AND POTASSIUM CHLORIDE 100 ML/HR: 5; .45; .15 INJECTION INTRAVENOUS at 22:41

## 2022-01-07 RX ADMIN — FAMOTIDINE 20 MG: 10 INJECTION INTRAVENOUS at 08:57

## 2022-01-07 RX ADMIN — DEXTROSE, SODIUM CHLORIDE, AND POTASSIUM CHLORIDE 100 ML/HR: 5; .45; .15 INJECTION INTRAVENOUS at 08:59

## 2022-01-07 RX ADMIN — FAMOTIDINE 20 MG: 10 INJECTION INTRAVENOUS at 22:42

## 2022-01-07 RX ADMIN — POTASSIUM CHLORIDE 20 MEQ: 14.9 INJECTION, SOLUTION INTRAVENOUS at 08:57

## 2022-01-07 NOTE — TELEPHONE ENCOUNTER
Patient is a 39-year-old female underwent gyn Oncology procedure with assistance of Urology for left ureteral reimplantation  Patient had left ureteral stent this remain in place for 6 weeks  Please schedule patient for follow-up with Dr Anne Sarkar in for cysto stent removal at that time

## 2022-01-07 NOTE — UTILIZATION REVIEW
Initial Clinical Review    Elective outpatient surgery scheduled 1/6  Admitted as INPATIENT post op due to INPATIENT only procedure performed  IP surgical procedure  Age/Sex: 59 y o  female  Surgery Date: 1/6/2022  Procedure: CYSTOSCOPY WITH INSERTION STENT URETERAL (Bilateral)   EXPLORATORY LAPAROTOMY, EXTENSIVE LYSIS OF ADHESIONS, RESECTION PELVIC MASS   RESECTION SMALL BOWEL  Anesthesia: General  Operative Findings:   1  Exam under anesthesia revealed a grossly normal vaginal apex  Rectal examination revealed a normal rectal vault  The mass was not palpated  2  On laparotomy, there were extensive adhesions from the small bowel to the anterior abdominal wall, colon to the lateral pelvic sidewall, colon to the bladder serosa, small bowel to the bladder serosa, interloop adhesions  Approximately 80 minutes were spent lysing adhesions in total   There were dense adhesions present from the ileum to the right pelvic sidewall and there was a inherent serosal and muscularis injury to a portion of proximal ileum measuring 10 cm in length  Due to the length of the injury, a resection was preferred over over-sew  3  The mass was adherent to the posterior aspect of the bladder immediately adjacent to the left ureteral implantation site and was adherent to the left pelvic sidewall  The mass ruptured prior to removal   The mass was removed in its entirety  There was no visible remaining tumor at the end of the operation  4  There was no evidence of upper abdominal disease, other peritoneal based disease        JJ Stent in the L reimplanted ureter, 5 Fr open ended catheter in the Right       POD#1 Progress Note: No acute events overnight  This AM pain is well controlled with minimal use of PCA  Hitchcock in place  No current nausea, but states she feels nauseous if moves too much  Unsure if passing flatus  NGT in place  Left pelvic ROBE drain in place  Left ureteral stent in placeHas not been OOB yet   Encourage OOB/ambualte  Incentive spirometry  SCDs  Start Lovenox this AM  Npo  Hold home metformin  Continue sliding scale insulin  Hold home amlodipine and valsartan/hydrochlorothiazide  Monitor BP's  Admission Orders: Date/Time/Statement:   Admission Orders (From admission, onward)     Ordered        01/06/22 1748  Inpatient Admission  Once                      Orders Placed This Encounter   Procedures    Inpatient Admission     Standing Status:   Standing     Number of Occurrences:   1     Order Specific Question:   Level of Care     Answer:   Med Surg [16]     Order Specific Question:   Estimated length of stay     Answer:   More than 2 Midnights     Order Specific Question:   Certification     Answer:   I certify that inpatient services are medically necessary for this patient for a duration of greater than two midnights  See H&P and MD Progress Notes for additional information about the patient's course of treatment       Vital Signs:   Date/Time Temp Pulse Resp BP MAP (mmHg) SpO2 Calculated FIO2 (%) - Nasal Cannula O2 Flow Rate (L/min) Nasal Cannula O2 Flow Rate (L/min) O2 Device   01/07/22 07:31:14 99 3 °F (37 4 °C) 85 18 113/75 88 95 % -- -- -- --   01/07/22 03:00:07 -- 93 20 125/74 91 96 % 32 -- 3 L/min Nasal cannula   01/06/22 23:04:52 98 9 °F (37 2 °C) 92 18 130/74 93 94 % 32 -- 3 L/min Nasal cannula   01/06/22 22:12:53 99 °F (37 2 °C) 90 20 130/75 93 96 % -- -- -- --   01/06/22 21:07:08 98 7 °F (37 1 °C) 88 20 130/75 93 96 % -- -- -- --   01/06/22 20:23:16 97 9 °F (36 6 °C) 86 20 130/75 93 97 % 32 -- 3 L/min Nasal cannula   01/06/22 1900 -- -- -- -- -- -- -- -- -- --   01/06/22 1845 -- 80 18 -- -- 100 % 32 -- 3 L/min Nasal cannula   01/06/22 1830 -- 78 18 117/68 85 99 % 32 -- 3 L/min Nasal cannula   01/06/22 1815 -- 76 18 113/65 82 98 % 32 -- 3 L/min Nasal cannula   01/06/22 1800 -- 74 18 104/60 74 99 % 32 -- 3 L/min Nasal cannula   01/06/22 1745 -- 74 18 101/59 73 100 % 32 -- 3 L/min Nasal cannula 01/06/22 1730 -- 76 18 100/61 61 99 % -- 6 L/min -- Simple mask   01/06/22 1719 97 4 °F (36 3 °C) Abnormal  76 18 89/47 Abnormal  59 100 % -- 6 L/min -- Simple mask   01/06/22 1141 97 3 °F (36 3 °C) Abnormal  94 18 107/72 -- 94 % -- -- -- None (Room air)     Pertinent Labs/Diagnostic Test Results:   Results from last 7 days   Lab Units 01/06/22  1116   SARS-COV-2  Negative     Results from last 7 days   Lab Units 01/07/22  0607   WBC Thousand/uL 10 79*   HEMOGLOBIN g/dL 11 7   HEMATOCRIT % 33 7*   PLATELETS Thousands/uL 297   NEUTROS ABS Thousands/µL 9 09*     Results from last 7 days   Lab Units 01/07/22  0607   SODIUM mmol/L 140   POTASSIUM mmol/L 3 2*   CHLORIDE mmol/L 108   CO2 mmol/L 27   ANION GAP mmol/L 5   BUN mg/dL 8   CREATININE mg/dL 0 58*   EGFR ml/min/1 73sq m 98   CALCIUM mg/dL 7 9*   MAGNESIUM mg/dL 1 8   PHOSPHORUS mg/dL 3 3     Results from last 7 days   Lab Units 01/07/22  0607   AST U/L 19   ALT U/L 29   ALK PHOS U/L 55   TOTAL PROTEIN g/dL 6 2*   ALBUMIN g/dL 3 5   TOTAL BILIRUBIN mg/dL 1 65*     Results from last 7 days   Lab Units 01/06/22  1724 01/06/22  1147   POC GLUCOSE mg/dl 171* 179*     Results from last 7 days   Lab Units 01/07/22  0607   GLUCOSE RANDOM mg/dL 157*     Results from last 7 days   Lab Units 01/06/22  1116   INFLUENZA A PCR  Negative   INFLUENZA B PCR  Negative   RSV PCR  Negative     Scheduled Medications:  enoxaparin, 40 mg, Subcutaneous, Daily  famotidine, 20 mg, Intravenous, Q12H OC  lidocaine, 1 patch, Topical, Daily  magnesium sulfate, 2 g, Intravenous, Once  potassium chloride, 20 mEq, Intravenous, Q2H    Continuous IV Infusions:  dextrose 5 % and sodium chloride 0 45 % with KCl 20 mEq/L, 100 mL/hr, Intravenous, Continuous  HYDROmorphone, , Intravenous, Continuous    PRN Meds:  acetaminophen, 650 mg, Oral, Q6H PRN  ondansetron, 4 mg, Intravenous, Q6H PRN 1/6 x1        Network Utilization Review Department  ATTENTION: Please call with any questions or concerns to [phone] and carefully listen to the prompts so that you are directed to the right person  All voicemails are confidential   Valeta Pastel all requests for admission clinical reviews, approved or denied determinations and any other requests to dedicated fax number below belonging to the campus where the patient is receiving treatment   List of dedicated fax numbers for the Facilities:  [address] DENIALS (Administrative/Medical Necessity) [phone]   [address] (Maternity/NICU/Pediatrics) [phone]   [address] [place] [address]  [address] [facility] [place] [address] [address] [place] [address] [address] [facility] [place] [address] [phone]

## 2022-01-07 NOTE — PLAN OF CARE
Problem: PHYSICAL THERAPY ADULT  Goal: Performs mobility at highest level of function for planned discharge setting  See evaluation for individualized goals  Description: Treatment/Interventions: Functional transfer training,LE strengthening/ROM,Patient/family training,Equipment eval/education,Bed mobility,Gait training,Spoke to nursing,Spoke to case management,OT  Equipment Recommended: Ashutosh Hinds (pending progress )       See flowsheet documentation for full assessment, interventions and recommendations  1/7/2022 1512 by Terrence Roberts PT  Note: Prognosis: Good  Problem List: Decreased endurance,Impaired balance,Decreased mobility,Pain  Assessment: Pt seen for high complexity PT evaluation  Pt with active PT eval/treat and up with assist and up and OOB as tolerated orders  Pt is a 59 y o  male who was admitted to [facility] on 1/6/2022 with malignant neoplasm of L ovary  Pt s/p "exploratory laparotomy, resection of pelvic tumor, extensive lysis of adhesions, small bowel resection with reanastomosis, cystoscopy with left ureteral stent placement"  Pt's current dx/ problem list include: obesity, type 2 DM, HTN  Comorbidities affecting pt's physical performance at time of assessment are as follows:  has a past medical history of Anxiety, Cancer ([facility] ), CPAP (continuous positive airway pressure) dependence, Diabetes mellitus ([facility] ), High cholesterol, Hypertension, Morbid obesity ([facility] ), Ovarian neoplasm (8/26/2019), PONV (postoperative nausea and vomiting), Post-menopausal bleeding, and Sleep apnea  Personal factors affecting pt at time of initial examination include: steps to enter environment, limited home support, advanced age, past experience, inability to perform IADLs, inability to perform ADLs, inability to ambulate household distances   Due to acute medical issues, ongoing medical workup for primary dx; pain, fall risk, increased reliance on more restrictive AD compared to baseline;  decreased activity tolerance compared to baseline, increased assistance needed from caregiver at current time, multiple lines, decline in overall functional mobility status; health management issues; note unstable clinical picture (high complexity)  At baseline, pt resides alone in multi-level home with 3+2 HUONG and was independent without AD prior to hospital admission  Currently, upon initial examination, pt  is requiring min A for bed mobility skills; min A for functional transfers and min A for ambulation with RW  Currently, pt presents functioning below baseline and with overall mobility deficits 2* to: decreased LE strength/AROM; limited flexibility;  generalized weakness/ deconditioning; decreased endurance; decreased activity tolerance; impaired balance; gait deviations; fatigue; multiple lines; as well as impaired balance, decreased endurance, gait deviations, pain, decreased activity tolerance, decreased functional mobility tolerance and fall risk  Pt currently at increased risk for falls  At the end of evaluation, pt was left sitting upright in bedside chair with all needs in reach  Pt would benefit from continued skilled PT services while in hospital to address remaining limitations and maximize functional potential  PT to continue to follow pt and recommends home with increased social support as needed  The patient's AM-PAC Basic Mobility Inpatient Short Form Raw Score is 17  A Raw score of greater than 16 suggests the patient may benefit from discharge to home  Please also refer to the recommendation of the Physical Therapist for safe discharge planning  PT Discharge Recommendation: No rehabilitation needs (increased social support as needed)          See flowsheet documentation for full assessment

## 2022-01-07 NOTE — PHYSICAL THERAPY NOTE
Physical Therapy Evaluation     Patient's Name: Chandana Hinds    Admitting Diagnosis  Malignant neoplasm of left ovary ([facility] ) [C56 2]    Problem List  Patient Active Problem List   Diagnosis    Morbid obesity with BMI of 40 0-44 9, adult ([place] [address] )    Edema of left lower extremity    [facility]    Motion sickness    Adrenal mass, left ([place] [address] )    Encounter for central line care    Type 2 diabetes mellitus without complication, without long-term current use of insulin ([place] [address] )    Primary hypertension       Past Medical History  Past Medical History:   Diagnosis Date    Anxiety     Cancer ([place] [address] )     ovarian    CPAP (continuous positive airway pressure) dependence     Diabetes mellitus ([place] [address] )     High cholesterol     Hypertension     Morbid obesity ([place] [address] )     Ovarian neoplasm 8/26/2019    PONV (postoperative nausea and vomiting)     Post-menopausal bleeding     Sleep apnea        Past Surgical History  Past Surgical History:   Procedure Laterality Date    BLADDER REPAIR N/A 8/26/2019    Procedure: REPAIR BLADDER; uretero yared cystotomy;  Surgeon: Shanti Loza MD;  [facility];  Service: Gynecology Oncology    CHOLECYSTECTOMY      COLONOSCOPY      IR IMAGE GUIDED ASPIRATION / DRAINAGE  9/27/2019    IR PORT PLACEMENT  9/27/2019    IR PORT PLACEMENT  11/7/2019    IR PORT REMOVAL  10/29/2019    IR PORT REMOVAL  11/6/2020    MS CYSTOURETHROSCOPY,URETER CATHETER Bilateral 1/6/2022    Procedure: CYSTOSCOPY WITH INSERTION STENT URETERAL;  Surgeon: Leticia Gar MD;  [facility];  Service: Urology    MS LAP,FULGURATE/EXCISE LESIONS N/A 1/6/2022    Procedure: EXPLORATORY LAPAROTOMY, EXTENSIVE LYSIS OF ADHESIONS, RESECTION PELVIC MASS;  Surgeon: Faiza Menendez MD;  [facility];  Service: Gynecology Oncology    MS TOTAL ABDOM HYSTERECTOMY N/A 8/26/2019    Procedure: TOTAL ABDOMINAL HYSTERECTOMY, BILATERAL SALPINGO-OOPHORECTOMY, Radical omentectomy, pelvic lymph node sampling, staging biopsy, repair of cystotomy, appendectomy;  Surgeon: Coretta Irizarry MD;  [facility];  Service: Gynecology Oncology    SMALL INTESTINE SURGERY N/A 1/6/2022    Procedure: RESECTION SMALL BOWEL;  Surgeon: Chloe Duckworth MD;  [facility];  Service: Gynecology Oncology          01/07/22 1202   PT Last Visit   PT Visit Date 01/07/22   Note Type   Note type Evaluation   Pain Assessment   Pain Assessment Tool 0-10   Pain Score No Pain  (reports "discomfort")   Pain Location/Orientation Location: Abdomen   Hospital Pain Intervention(s) Repositioned; Ambulation/increased activity   Restrictions/Precautions   Weight Bearing Precautions Per Order No   Other Precautions Multiple lines; Fall Risk;Pain  (NG tube, PCA )   Home Living   Type of [address] [place] [address] Multi-level;1/2 bath on main level  (3 SH, 3+2 HUONG )   Bathroom Shower/Tub Walk-in shower  (+ tub shower )   Bathroom Toilet Raised   Bathroom Equipment   (denies )   [address] [facility]  (did not use PTA )   Prior Function   Level of [place] Independent with ADLs and functional mobility   Lives With Alone   Receives Help From Family;Friend(s)   ADL Assistance Independent   IADLs Independent   Falls in the last 6 months 0   Vocational Full time employment   Comments + drives    Cognition   Overall Cognitive Status WFL   Arousal/Participation Cooperative   Orientation Level Oriented X4   Memory Within functional limits   Following Commands Follows all commands and directions without difficulty   Comments Pt pleasant and cooperative to participate in therapy session    LUE Assessment   LUE Assessment   (functionally 4/5 )   RLE Assessment   RLE Assessment   (functionally 4/5 )   Bed Mobility   Supine to Sit 4  Minimal assistance   Additional items Assist x 1;HOB elevated; Bedrails; Increased time required;Verbal cues;LE management   Sit to Supine Unable to assess   Additional Comments Pt supine in bed upon arrival  Pt sitting upright in bedside chair with all needs within reach at the end of therapy session    Transfers   Sit to Stand 4  Minimal assistance   Additional items Assist x 1; Increased time required;Verbal cues   Stand to Sit 4  Minimal assistance   Additional items Assist x 1; Increased time required;Verbal cues   Additional Comments transfers with RW; cues for hand placement and sequencing    Ambulation/Elevation   Gait pattern Excessively slow; Short stride; Foward flexed;Decreased foot clearance   Gait Assistance 4  Minimal assist   Additional items Assist x 1;Verbal cues   Assistive Device Rolling walker   Distance 5ft    Stair Management Assistance Not tested   Balance   Static Sitting Fair   Dynamic Sitting Fair   Static Standing Fair   Dynamic Standing Fair -   Ambulatory Fair -   Activity Tolerance   Activity Tolerance Patient limited by fatigue;Patient limited by pain   Medical Staff Made Aware OT; co-eval performed this date 2* increased medical complexity and multiple co-morbidities    Nurse Made Aware RN cleared pt to participate in therapy session    Assessment   Prognosis Good   Problem List Decreased endurance; Impaired balance;Decreased mobility;Pain   Assessment Pt seen for high complexity PT evaluation  Pt with active PT eval/treat and up with assist and up and OOB as tolerated orders  Pt is a 59 y o  male who was admitted to [facility] on 1/6/2022 with malignant neoplasm of L ovary  Pt s/p "exploratory laparotomy, resection of pelvic tumor, extensive lysis of adhesions, small bowel resection with reanastomosis, cystoscopy with left ureteral stent placement"  Pt's current dx/ problem list include: obesity, type 2 DM, HTN   Comorbidities affecting pt's physical performance at time of assessment are as follows:  has a past medical history of Anxiety, Cancer ([facility] ), CPAP (continuous positive airway pressure) dependence, Diabetes mellitus ([facility] ), High cholesterol, Hypertension, Morbid obesity ([facility] ), Ovarian neoplasm (8/26/2019), PONV (postoperative nausea and vomiting), Post-menopausal bleeding, and Sleep apnea  Personal factors affecting pt at time of initial examination include: steps to enter environment, limited home support, advanced age, past experience, inability to perform IADLs, inability to perform ADLs, inability to ambulate household distances  Due to acute medical issues, ongoing medical workup for primary dx; pain, fall risk, increased reliance on more restrictive AD compared to baseline;  decreased activity tolerance compared to baseline, increased assistance needed from caregiver at current time, multiple lines, decline in overall functional mobility status; health management issues; note unstable clinical picture (high complexity)  At baseline, pt resides alone in multi-level home with 3+2 HUONG and was independent without AD prior to hospital admission  Currently, upon initial examination, pt  is requiring min A for bed mobility skills; min A for functional transfers and min A for ambulation with RW  Currently, pt presents functioning below baseline and with overall mobility deficits 2* to: decreased LE strength/AROM; limited flexibility;  generalized weakness/ deconditioning; decreased endurance; decreased activity tolerance; impaired balance; gait deviations; fatigue; multiple lines; as well as impaired balance, decreased endurance, gait deviations, pain, decreased activity tolerance, decreased functional mobility tolerance and fall risk  Pt currently at increased risk for falls  At the end of evaluation, pt was left sitting upright in bedside chair with all needs in reach  Pt would benefit from continued skilled PT services while in hospital to address remaining limitations and maximize functional potential  PT to continue to follow pt and recommends home with increased social support as needed   The patient's AM-PAC Basic Mobility Inpatient Short Form Raw Score is 17  A Raw score of greater than 16 suggests the patient may benefit from discharge to home  Please also refer to the recommendation of the Physical Therapist for safe discharge planning  Goals   Patient Goals to get OOB   STG Expiration Date 01/21/22   Short Term Goal #1 STG 1  Pt will be able to perform bed mobility tasks with mod I in order to improve overall functional mobility and assist in safe d/c  STG 2  Pt will be able to perform functional transfer with mod I in order to improve overall functional mobility and assist in safe d/c  STG 3  Pt will be able to ambulate at least 150 feet with least restrictive device with mod I A in order to improve overall functional mobility and assist in safe d/c  STG 4  Pt will improve sitting/standing static/dynamic balance 1/2 grade in order to improve functional mobility and assist in safe d/c  STG 5  Pt will improve LE strength by 1/2 grade in order to improve functional mobility and assist in safe d/c  STG 6  Pt will be able to negotiate at least 12 stairs with least restrictive device with mod I A in order to improve overall functional mobility and assist in safe d/c     PT Treatment Day 0   Plan   Treatment/Interventions Functional transfer training;LE strengthening/ROM; Patient/family training;Equipment eval/education; Bed mobility;Gait training;Spoke to nursing;Spoke to case management;OT   PT Frequency 3-5x/wk   Recommendation   PT Discharge Recommendation No rehabilitation needs  (increased social support as needed)   Equipment Recommended Walker  (pending progress )   Additional Comments no to d/c home; pending further ambulation/ stairs    AM-PAC Basic Mobility Inpatient   Turning in Bed Without Bedrails 3   Lying on Back to Sitting on Edge of Flat Bed 3   Moving Bed to Chair 3   Standing Up From Chair 3   Walk in Room 3   Climb 3-5 Stairs 2   Basic Mobility Inpatient Raw Score 17   Basic Mobility Standardized Score 39 67   Highest Level Of Mobility   JH-HLM Goal 5: Stand one or more mins   JH-HLM Highest Level of Mobility 6: Walk 10 steps or more   JH-HLM Goal Achieved Yes   End of Consult   Patient Position at End of Consult Bedside chair; All needs within reach         Lester Escalona, PT

## 2022-01-07 NOTE — PROGRESS NOTES
GYN ONC -  POC:     Patient resting quietly while in bed this evening post-op  She states feeling "tired" with pain well-controlled using PCA pump and TAPS block  Patient has NGT intact and draining green liquid gastric contents without any complaints of nausea or vomiting  EXAM:  /75   Pulse 88   Temp 98 7 °F (37 1 °C)   Resp 20   Ht 5' 3" (1 6 m)   Wt 99 8 kg (220 lb)   SpO2 96%   BMI 38 97 kg/m²        Lungs- clear bilaterally anterior/ lateral without wheezes or rhonchi  Heart- regular rate and rhythm (mildly tachy), S1 and S2, without murmurs or gallops  Abdomen- soft, obese, nondistended, seldom or rare BS, lower midline incision intact and well-approximated, lower abdominal and pelvic tenderness, LLQ ROBE drain with moderate amount of sero-sanguinous drainage within collection bulb  Extremities- good strength and ROM all extremities, SCD's intact on [place] (to be connected yet to pump device)- instructed his nurse  - tate catheter intact and draining a light punch-colored urine into the collection bag  IMP/PLAN- S/P EXPLORATORY LAPAROTOMY, EXTENSIVE LYSIS OF ADHESIONS, RESECTION PELVIC MASS (N/A)  RESECTION SMALL BOWEL, Left ureteral double J stent and right open ended ureteral catheter remain intact- Stable with NGT and tate catheter intact, pain control via Dilaudid PCA, continue NPO and IVF    Labs ordered already for ROSALIO Sykes [facility]  1/6/22  Patient encounter [id number]

## 2022-01-07 NOTE — PROGRESS NOTES
For questions/concerns on this patient, please reach out to the followin AM - 5 PM [facility] GYN-GynOnc- Resident/AP  5 PM - 6 AM: [facility] Surg AP     Gyn Oncology Progress note   Marge Motley 59 y o  female MRN: [id number]  Unit/Bed#: PPHP 920-01 Encounter: [id number]    Assessment: 59 y o  with history of DM, HTN, and recurrent sertoli-leydig tumor now 1 Day Post-Op from exploratory laparotomy, extensive lysis of adhesions, resection of pelvic mass, resection of small bowel, cystoscopy and ureteral stenting, recovering well    Plan:  Primary hypertension  Assessment & Plan  Hold home amlodipine and valsartan/hydrochlorothiazide  Monitor blood pressures    Type 2 diabetes mellitus without complication, without long-term current use of insulin (HCC)  Assessment & Plan    Lab Results   Component Value Date    HGBA1C 6 5 (H) 2019     Hold home metformin  Continue sliding scale insulin    * Malignant neoplasm of left ovary (HCC)  Assessment & Plan  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 treated with total abdominal hysterectomy, bilateral salpingo-oophorectomy, omentectomy, appendectomy, left ureteroneocystostomy in 2019  She then received 6 cycles of adjuvant chemotherapy with carboplatin and Taxol that completed in 2020   She was noted to have pelvic tumor concerning for recurrence in 2021, and is now s/p Exploratory laparotomy, extensive lysis of adhesions, resection of pelvic mass, resection of small bowel, cystoscopy and ureteral stenting  - NGT in place  - Hitchcock in place for 1 week postoperatively  - Left pelvic ROBE drain in place  - Left ureteral stent in place  - Continue routine postoperative care  - Encourage incentive spirometry and ambulation  - Appreciate Case Management consult for assistance with postoperative eliquis prophylaxis        FEN: NPO, NGT in place  DVT ppx: SCDs, to start lovenox this morning  Disposition: inpatient    Subjective:    Otilia Isaac has no current complaints  She slept overnight and denies any pain  Pain is well controlled with minimal use of PCA  Hitchcock is in place  She denies any current nausea but feels like she will be nauseous if she moves too much  She is unsure if she is passing flatus  She has NGT in place and has not moved out of bed yet  Patient denies fever, chills, chest pain, shortness of breath, or calf tenderness  /74   Pulse 93   Temp 98 9 °F (37 2 °C)   Resp 20   Ht 5' 3" (1 6 m)   Wt 99 8 kg (220 lb)   SpO2 96%   BMI 38 97 kg/m²     I/O last 3 completed shifts: In: 3400 [I V :2900; IV Piggyback:500]  Out: 310 [Urine:310]  I/O this shift: In: 0   Out: 865 [Urine:600; Emesis/NG output:150; Drains:115]    Lab Results   Component Value Date    WBC 9 46 12/22/2021    HGB 13 4 12/22/2021    HCT 38 8 12/22/2021    MCV 85 12/22/2021     12/22/2021       Lab Results   Component Value Date    GLUCOSE 188 (H) 08/26/2019    CALCIUM 9 7 12/22/2021    K 3 3 (L) 12/22/2021    CO2 28 12/22/2021     12/22/2021    BUN 17 12/22/2021    CREATININE 0 79 12/22/2021           Physical Exam  Constitutional:       General: She is not in acute distress  Appearance: She is not ill-appearing or toxic-appearing  Interventions: Nasal cannula in place  Comments: NC in place on 4L O2  NGT in place draining dark bilious fluid   HENT:      Head: Normocephalic and atraumatic  Eyes:      General: No scleral icterus  Conjunctiva/sclera: Conjunctivae normal    Cardiovascular:      Rate and Rhythm: Normal rate and regular rhythm  Pulses: Normal pulses  Heart sounds: Normal heart sounds  No murmur heard  No friction rub  No gallop  Pulmonary:      Effort: Pulmonary effort is normal  No respiratory distress  Breath sounds: Normal breath sounds  No wheezing, rhonchi or rales  Abdominal:      General: There is no distension        Palpations: Abdomen is soft       Tenderness: There is abdominal tenderness (appropriate)  There is no guarding or rebound  Comments: LLQ ROBE with serosanguinous drainage  Midline incision c/d/i, no erythema or drainage   Genitourinary:     Comments: Hitchcock in place draining blood tinged urine  Musculoskeletal:      Cervical back: Normal range of motion  Right lower leg: No edema  Left lower leg: No edema  Skin:     General: Skin is warm and dry  Neurological:      General: No focal deficit present  Mental Status: She is alert  Mental status is at baseline     Psychiatric:         Mood and Affect: Mood normal          Behavior: Behavior normal            Edgar Escudero MD  1/7/2022  6:44 AM

## 2022-01-07 NOTE — PLAN OF CARE
Problem: OCCUPATIONAL THERAPY ADULT  Goal: Performs self-care activities at highest level of function for planned discharge setting  See evaluation for individualized goals  Description: Treatment Interventions: ADL retraining,Functional transfer training,Endurance training,Patient/family training,Equipment evaluation/education,Compensatory technique education,Energy conservation,Activityengagement          See flowsheet documentation for full assessment, interventions and recommendations  Note: Limitation: Decreased ADL status,Decreased endurance,Decreased self-care trans,Decreased high-level ADLs  Prognosis: Good  Assessment: Pt is a 59 y o  female who was [facility] to [facility] on 1/6/2022 with Malignant neoplasm of left ovary ([address] ) s/p Cytoscopy with bilateral ureteral stent insertion, exploratory laparotomy, extensive lysis of adhesions, resection of pelvic mass, resection of small bowel   Pt's problem list also includes PMH of HTN, obesity, previous surgery, cancer history and anxiety, CPAP, high cholesterol, SABRINA  At baseline pt was completing adls and mobility independently - I iadls  Pt lives alone in multi level home with 2nd floor bed/bath  Currently pt requires moderate assist for overall ADLS and min assist for functional mobility/transfers  Pt currently presents with impairments in the following categories -steps to enter environment, limited home support, difficulty performing ADLS, difficulty performing IADLS  and environment activity tolerance, endurance and standing balance/tolerance   These impairments, as well as pt's fatigue, SOB, pain, decreased caregiver support, risk for falls and home environment  limit pt's ability to safely engage in all baseline areas of occupation, includingbathing, dressing, toileting, functional mobility/transfers, community mobility, laundry , driving, house maintenance, meal prep, cleaning, work/volunteer work , social participation  and leisure activities  From OT standpoint, recommend home with family support upon D/C  OT will continue to follow to address the below stated goals        OT Discharge Recommendation: No rehabilitation needs

## 2022-01-07 NOTE — OCCUPATIONAL THERAPY NOTE
Occupational Therapy Evaluation     Patient Name: Jordyn Jordan  [id number] Date: 1/7/2022  Problem List  Principal Problem:    Malignant neoplasm of left ovary [facility])  Active Problems:     Morbid obesity with BMI of 40 0-44 9, adult ([facility] )    Type 2 diabetes mellitus without complication, without long-term current use of insulin ([facility] )    Primary hypertension    Past Medical History  Past Medical History:   Diagnosis Date    Anxiety     Cancer ([facility] )     ovarian    CPAP (continuous positive airway pressure) dependence     Diabetes mellitus ([facility] )     High cholesterol     Hypertension     Morbid obesity ([facility] )     Ovarian neoplasm 8/26/2019    PONV (postoperative nausea and vomiting)     Post-menopausal bleeding     Sleep apnea      Past Surgical History  Past Surgical History:   Procedure Laterality Date    BLADDER REPAIR N/A 8/26/2019    Procedure: REPAIR BLADDER; uretero yared cystotomy;  Surgeon: Everardo Bell MD;  [facility];  Service: Gynecology Oncology    CHOLECYSTECTOMY      COLONOSCOPY      IR IMAGE GUIDED ASPIRATION / DRAINAGE  9/27/2019    IR PORT PLACEMENT  9/27/2019    IR PORT PLACEMENT  11/7/2019    IR PORT REMOVAL  10/29/2019    IR PORT REMOVAL  11/6/2020    WA CYSTOURETHROSCOPY,URETER CATHETER Bilateral 1/6/2022    Procedure: CYSTOSCOPY WITH INSERTION STENT URETERAL;  Surgeon: Idalia Moore MD;  [facility];  Service: Urology    WA LAP,FULGURATE/EXCISE LESIONS N/A 1/6/2022    Procedure: EXPLORATORY LAPAROTOMY, EXTENSIVE LYSIS OF ADHESIONS, RESECTION PELVIC MASS;  Surgeon: Joelyn Ahumada, MD;  [facility];  Service: Gynecology Oncology    WA TOTAL ABDOM HYSTERECTOMY N/A 8/26/2019    Procedure: TOTAL ABDOMINAL HYSTERECTOMY, BILATERAL SALPINGO-OOPHORECTOMY, Radical omentectomy, pelvic lymph node sampling, staging biopsy, repair of cystotomy, appendectomy;  Surgeon: Everardo Bell MD;  [facility];  Service: Gynecology Oncology    SMALL INTESTINE SURGERY N/A 1/6/2022    Procedure: RESECTION SMALL BOWEL;  Surgeon: Jesus Guerin MD;  [facility];  Service: Gynecology Oncology         01/07/22 1200   OT Last Visit   OT Visit Date 01/07/22   Note Type   Note type Evaluation   Restrictions/Precautions   Weight Bearing Precautions Per Order No   Other Precautions Multiple lines;Pain   Pain Assessment   Pain Assessment Tool 0-10   Pain Score 2   Pain Location/Orientation Location: Abdomen  (discomfort )   Hospital Pain Intervention(s) Repositioned; Ambulation/increased activity; Emotional support;Relaxation technique   Home Living   Type of [address] [facility] Multi-level;1/2 bath on main level  (3+2 HUONG)   Bathroom Shower/Tub Walk-in shower   Bathroom Toilet Raised   Home Equipment Walker   Prior Function   Level of [place] Independent with ADLs and functional mobility   Lives With Alone   Receives Help From Family;Friend(s)   ADL Assistance Independent   IADLs Independent   Falls in the last 6 months 0   Vocational Full time employment   Lifestyle   Autonomy I adls and mobility - i iadls    Reciprocal Relationships supportive family and friends    Service to Others works FT   Intrinsic Gratification active pta   Subjective   Subjective "I'm sleepy"   ADL   Eating Assistance Unable to assess   Eating Deficit NPO   Grooming Assistance 5  Supervision/Setup   UB Bathing Assistance 4  Minimal Assistance   LB Bathing Assistance 3  Moderate Assistance   UB Dressing Assistance 4  [address] 3  Moderate Assistance   Toileting Assistance  4  Minimal Assistance   Bed Mobility   Supine to Sit 4  Minimal assistance   Transfers   Sit to Stand 4  Minimal assistance   Stand to Sit 4  Minimal assistance   Stand pivot 4  Minimal assistance   Functional Mobility   Functional Mobility 4  Minimal assistance   Additional Comments limited 2* NGT   Additional items Rolling walker   Balance   Static Sitting Fair   Dynamic Sitting Fair   Static Standing Fair   Dynamic Standing Fair -   Ambulatory Fair -   Activity Tolerance   Activity Tolerance Patient limited by fatigue;Patient limited by pain;Treatment limited secondary to medical complications (Comment)   Medical Staff Made Aware Joana Meng DPT - coeval performed 2* medical complexity, comorbidities and limited tolerance to activity - OT focus on self care, cognition/safety and limited tolreance to activity    RUE Assessment   RUE Assessment WFL   LUE Assessment   LUE Assessment WFL   Cognition   Overall Cognitive Status WFL   Assessment   Limitation Decreased ADL status; Decreased endurance;Decreased self-care trans;Decreased high-level ADLs   Prognosis Good   Assessment Pt is a 59 y o  female who was [facility] on [facility]/2022 with Malignant neoplasm of left ovary ([address] ) s/p Cytoscopy with bilateral ureteral stent insertion, exploratory laparotomy, extensive lysis of adhesions, resection of pelvic mass, resection of small bowel   Pt's problem list also includes PMH of HTN, obesity, previous surgery, cancer history and anxiety, CPAP, high cholesterol, SABRINA  At baseline pt was completing adls and mobility independently - I iadls  Pt lives alone in multi level home with 2nd floor bed/bath  Currently pt requires moderate assist for overall ADLS and min assist for functional mobility/transfers  Pt currently presents with impairments in the following categories -steps to enter environment, limited home support, difficulty performing ADLS, difficulty performing IADLS  and environment activity tolerance, endurance and standing balance/tolerance   These impairments, as well as pt's fatigue, SOB, pain, decreased caregiver support, risk for falls and home environment  limit pt's ability to safely engage in all baseline areas of occupation, includingbathing, dressing, toileting, functional mobility/transfers, community mobility, laundry , driving, house maintenance, meal prep, cleaning, work/volunteer work , social participation  and leisure activities  From OT standpoint, recommend home with family support upon D/C  OT will continue to follow to address the below stated goals  Goals   Patient Goals get moving    LTG Time Frame 10-14   Long Term Goal #1 refer to established goals below    Plan   Treatment Interventions ADL retraining;Functional transfer training; Endurance training;Patient/family training;Equipment evaluation/education; Compensatory technique education; Energy conservation; Activityengagement   Goal Expiration Date 01/21/22   OT Frequency 3-5x/wk   Recommendation   OT Discharge Recommendation No rehabilitation needs   AM-PAC Daily Activity Inpatient   Lower Body Dressing 3   Bathing 3   Toileting 3   Upper Body Dressing 3   Grooming 4   Eating 4   Daily Activity Raw Score 20   Daily Activity Standardized Score (Calc for Raw Score >=11) 42 03   AM-PAC Applied Cognition Inpatient   Following a Speech/Presentation 4   Understanding Ordinary Conversation 4   Taking Medications 4   Remembering Where Things Are Placed or Put Away 4   Remembering List of 4-5 Errands 4   Taking Care of Complicated Tasks 4   Applied Cognition Raw Score 24   Applied Cognition Standardized Score 62 21       OCCUPATIONAL THERAPY GOALS:    *Mod I adls after setup with use of AE PRN  *Mod I toileting and clothing management   *Mod I functional mobility and transfers to/from all surfaces with good dynamic balance and safety for participation in dynamic adls and iadl tasks   *Demonstrate good carryover with safe use of RW during functional tasks   *Assess DME needs   *Increase activity tolerance to 35-40 minutes for participation in adls and enjoyable activities  *Assist with safe d/c recommendations     The patient's raw score on the AM-PAC Daily Activity inpatient short form is 20, standardized score is 42 03, greater than 39 4   Patients at this level are likely to benefit from discharge to home  Please refer to the recommendation of the Occupational Therapist for safe discharge planning        Madonna Gerber

## 2022-01-08 PROBLEM — E87.8 ELECTROLYTE ABNORMALITY: Status: ACTIVE | Noted: 2022-01-08

## 2022-01-08 LAB
BACTERIA UR CULT: NORMAL
GLUCOSE SERPL-MCNC: 169 MG/DL (ref 65–140)
GLUCOSE SERPL-MCNC: 177 MG/DL (ref 65–140)
GLUCOSE SERPL-MCNC: 186 MG/DL (ref 65–140)
GLUCOSE SERPL-MCNC: 192 MG/DL (ref 65–140)

## 2022-01-08 PROCEDURE — 99024 POSTOP FOLLOW-UP VISIT: CPT | Performed by: OBSTETRICS & GYNECOLOGY

## 2022-01-08 PROCEDURE — 82948 REAGENT STRIP/BLOOD GLUCOSE: CPT

## 2022-01-08 RX ORDER — POTASSIUM CHLORIDE 14.9 MG/ML
20 INJECTION INTRAVENOUS
Status: COMPLETED | OUTPATIENT
Start: 2022-01-08 | End: 2022-01-08

## 2022-01-08 RX ORDER — MAGNESIUM SULFATE HEPTAHYDRATE 40 MG/ML
2 INJECTION, SOLUTION INTRAVENOUS ONCE
Status: COMPLETED | OUTPATIENT
Start: 2022-01-08 | End: 2022-01-08

## 2022-01-08 RX ADMIN — POTASSIUM CHLORIDE 20 MEQ: 14.9 INJECTION, SOLUTION INTRAVENOUS at 11:58

## 2022-01-08 RX ADMIN — DEXTROSE, SODIUM CHLORIDE, AND POTASSIUM CHLORIDE 100 ML/HR: 5; .45; .15 INJECTION INTRAVENOUS at 20:13

## 2022-01-08 RX ADMIN — DEXTROSE, SODIUM CHLORIDE, AND POTASSIUM CHLORIDE 100 ML/HR: 5; .45; .15 INJECTION INTRAVENOUS at 08:36

## 2022-01-08 RX ADMIN — FAMOTIDINE 20 MG: 10 INJECTION INTRAVENOUS at 08:35

## 2022-01-08 RX ADMIN — POTASSIUM CHLORIDE 20 MEQ: 14.9 INJECTION, SOLUTION INTRAVENOUS at 18:50

## 2022-01-08 RX ADMIN — FAMOTIDINE 20 MG: 10 INJECTION INTRAVENOUS at 20:22

## 2022-01-08 RX ADMIN — LIDOCAINE 5% 1 PATCH: 700 PATCH TOPICAL at 08:35

## 2022-01-08 RX ADMIN — POTASSIUM CHLORIDE 20 MEQ: 14.9 INJECTION, SOLUTION INTRAVENOUS at 21:16

## 2022-01-08 RX ADMIN — INSULIN LISPRO 1 UNITS: 100 INJECTION, SOLUTION INTRAVENOUS; SUBCUTANEOUS at 01:51

## 2022-01-08 RX ADMIN — INSULIN LISPRO 2 UNITS: 100 INJECTION, SOLUTION INTRAVENOUS; SUBCUTANEOUS at 18:50

## 2022-01-08 RX ADMIN — INSULIN LISPRO 1 UNITS: 100 INJECTION, SOLUTION INTRAVENOUS; SUBCUTANEOUS at 07:12

## 2022-01-08 RX ADMIN — POTASSIUM CHLORIDE 20 MEQ: 14.9 INJECTION, SOLUTION INTRAVENOUS at 16:03

## 2022-01-08 RX ADMIN — ENOXAPARIN SODIUM 40 MG: 40 INJECTION SUBCUTANEOUS at 08:35

## 2022-01-08 RX ADMIN — MAGNESIUM SULFATE HEPTAHYDRATE 2 G: 40 INJECTION, SOLUTION INTRAVENOUS at 11:55

## 2022-01-08 NOTE — PROGRESS NOTES
Progress Note - Elly Cobos 59 y o  female MRN: [id number]  Unit/Bed#: [facility] 920-01 Encounter: [id number]    Assessment / Plan:    Electrolyte abnormality  Assessment & Plan  Hypokalemia and hypomagnesemia noted  Will replete with 2 g of magnesium sulfate IVPB and a total of 80 mEq of KCl divided in 2 doses to be administered approximately 6 hours apart  Recheck labs tomorrow  Primary hypertension  Assessment & Plan  Hold home amlodipine and valsartan/hydrochlorothiazide  Monitor blood pressures  Current blood pressures are reasonably controlled  No further interventions needed  Type 2 diabetes mellitus without complication, without long-term current use of insulin ([facility])  Assessment & Plan    Lab Results   Component Value Date    HGBA1C 6 5 (H) 08/16/2019     Hold home metformin  In low of I uncontrolled diabetes with hyperglycemia  Values on current insulin sliding scale are elevated approaching 200 mg/dL  I increased insulin sliding scale  Plan to reinitiate home medications/long-acting therapy once patient tolerating p o       * Malignant neoplasm of left ovary [facility])  Assessment & Plan  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 treated with total abdominal hysterectomy, bilateral salpingo-oophorectomy, omentectomy, appendectomy, left ureteroneocystostomy in August of 2019  She then received 6 cycles of adjuvant chemotherapy with carboplatin and Taxol that completed in January of 2020  She was noted to have pelvic tumor concerning for recurrence in fall 2021, and is now postoperative day 2 s/p Exploratory laparotomy, extensive lysis of adhesions, resection of pelvic mass, resection of small bowel, cystoscopy and ureteral stenting  - plan to do clamp trial for NG tube  Consider removal   Will not advance diet yet  - Hitchcock in place for 1 week postoperatively  - Left pelvic ROBE drain in place  Output noted    Continue to monitor   - Left ureteral stent in place  - encourage incentive spirometry  Out of bed  To ambulate with assistance 3 to 4 times per day  - Lovenox and SCDs for DVT prophylaxis  Appreciate Case Management consult for assistance with postoperative eliquis prophylaxis as patient will need prolonged prophylaxis for 28 days  Subjective/Objective       Subjective:  Pain well controlled  Reports discomfort with NG tube  No significant nausea or vomiting  Denies chest pain or shortness of breath  Objective:     Blood pressure 116/67, pulse 86, temperature 98 7 °F (37 1 °C), temperature source Oral, resp  rate 16, height 5' 3" (1 6 m), weight 99 8 kg (220 lb), SpO2 91 %  ,Body mass index is 38 97 kg/m²  Intake/Output Summary (Last 24 hours) at 1/8/2022 1059  Last data filed at 1/8/2022 0656  Gross per 24 hour   Intake 2145 3 ml   Output 2325 ml   Net -179 7 ml       Invasive Devices  Report    Peripheral Intravenous Line            Peripheral IV 01/06/22 Right Hand 1 day          Drain            Closed/Suction Drain Left LLQ Bulb 15 Fr  1 day    NG/OG/Enteral Tube Nasogastric 18 Fr Left nare 1 day    Urethral Catheter Non-latex;Straight-tip 16 Fr  1 day                Physical Exam: /67 (BP Location: Left arm)   Pulse 86   Temp 98 7 °F (37 1 °C) (Oral)   Resp 16   Ht 5' 3" (1 6 m)   Wt 99 8 kg (220 lb)   SpO2 91%   BMI 38 97 kg/m²     General Appearance:    Alert, cooperative, appears comfortable  Abdomen:     Soft, appropriately tender, nondistended  Incision appears clean, dry and intact  Binder applied  ROBE output serosanguineous  Extremities:   Extremities normal, atraumatic, no cyanosis or edema  SCDs on     Skin:   Skin color, texture, turgor normal, no rashes or lesions       Recent Results (from the past 24 hour(s))   Fingerstick Glucose (POCT)    Collection Time: 01/07/22  5:46 PM   Result Value Ref Range    POC Glucose 197 (H) 65 - 140 mg/dl   Fingerstick Glucose (POCT)    Collection Time: 01/08/22  1:12 AM Result Value Ref Range    POC Glucose 186 (H) 65 - 140 mg/dl   Fingerstick Glucose (POCT)    Collection Time: 01/08/22  7:11 AM   Result Value Ref Range    POC Glucose 192 (H) 65 - 140 mg/dl     Lab, Imaging and other studies:I have personally reviewed pertinent lab results      VTE Pharmacologic Prophylaxis: Enoxaparin (Lovenox)  VTE Mechanical Prophylaxis: sequential compression device     Cyril Velasco MD, FACOG, FACS  1/8/2022  11:00 AM

## 2022-01-08 NOTE — PLAN OF CARE
Problem: PAIN - ADULT  Goal: Verbalizes/displays adequate comfort level or baseline comfort level  Description: Interventions:  - Encourage patient to monitor pain and request assistance  - Assess pain using appropriate pain scale  - Administer analgesics based on type and severity of pain and evaluate response  - Implement non-pharmacological measures as appropriate and evaluate response  - Consider cultural and social influences on pain and pain management  - Notify physician/advanced practitioner if interventions unsuccessful or patient reports new pain  Outcome: Progressing     Problem: MOBILITY - ADULT  Goal: Maintain or return to baseline ADL function  Description: INTERVENTIONS:  -  Assess patient's ability to carry out ADLs; assess patient's baseline for ADL function and identify physical deficits which impact ability to perform ADLs (bathing, care of mouth/teeth, toileting, grooming, dressing, etc )  - Assess/evaluate cause of self-care deficits   - Assess range of motion  - Assess patient's mobility; develop plan if impaired  - Assess patient's need for assistive devices and provide as appropriate  - Encourage maximum independence but intervene and supervise when necessary  - Involve family in performance of ADLs  - Assess for home care needs following discharge   - Consider OT consult to assist with ADL evaluation and planning for discharge  - Provide patient education as appropriate  Outcome: Progressing  Goal: Maintains/Returns to pre admission functional level  Description: INTERVENTIONS:  - Perform BMAT or MOVE assessment daily    - Set and communicate daily mobility goal to care team and patient/family/caregiver     - Collaborate with rehabilitation services on mobility goals if consulted  - Out of bed for toileting  - Record patient progress and toleration of activity level   Outcome: Progressing     Problem: Potential for Falls  Goal: Patient will remain free of falls  Description: INTERVENTIONS:  - Educate patient/family on patient safety including physical limitations  - Instruct patient to call for assistance with activity   - Consult OT/PT to assist with strengthening/mobility   - Keep Call bell within reach  - Keep bed low and locked with side rails adjusted as appropriate  - Keep care items and personal belongings within reach  - Initiate and maintain comfort rounds  - Make Fall Risk Sign visible to staff  - Apply yellow socks and bracelet for high fall risk patients  - Consider moving patient to room near nurses station  Outcome: Progressing

## 2022-01-09 LAB
ANION GAP SERPL CALCULATED.3IONS-SCNC: 4 MMOL/L (ref 4–13)
BUN SERPL-MCNC: 4 MG/DL (ref 5–25)
CALCIUM SERPL-MCNC: 8.5 MG/DL (ref 8.3–10.1)
CHLORIDE SERPL-SCNC: 109 MMOL/L (ref 100–108)
CO2 SERPL-SCNC: 25 MMOL/L (ref 21–32)
CREAT SERPL-MCNC: 0.62 MG/DL (ref 0.6–1.3)
ERYTHROCYTE [DISTWIDTH] IN BLOOD BY AUTOMATED COUNT: 12.7 % (ref 11.6–15.1)
GFR SERPL CREATININE-BSD FRML MDRD: 96 ML/MIN/1.73SQ M
GLUCOSE SERPL-MCNC: 136 MG/DL (ref 65–140)
GLUCOSE SERPL-MCNC: 140 MG/DL (ref 65–140)
GLUCOSE SERPL-MCNC: 143 MG/DL (ref 65–140)
GLUCOSE SERPL-MCNC: 167 MG/DL (ref 65–140)
GLUCOSE SERPL-MCNC: 173 MG/DL (ref 65–140)
GLUCOSE SERPL-MCNC: 187 MG/DL (ref 65–140)
HCT VFR BLD AUTO: 31.5 % (ref 34.8–46.1)
HGB BLD-MCNC: 10.7 G/DL (ref 11.5–15.4)
MAGNESIUM SERPL-MCNC: 2.3 MG/DL (ref 1.6–2.6)
MCH RBC QN AUTO: 29 PG (ref 26.8–34.3)
MCHC RBC AUTO-ENTMCNC: 34 G/DL (ref 31.4–37.4)
MCV RBC AUTO: 85 FL (ref 82–98)
PLATELET # BLD AUTO: 287 THOUSANDS/UL (ref 149–390)
PMV BLD AUTO: 9.1 FL (ref 8.9–12.7)
POTASSIUM SERPL-SCNC: 4.1 MMOL/L (ref 3.5–5.3)
RBC # BLD AUTO: 3.69 MILLION/UL (ref 3.81–5.12)
SODIUM SERPL-SCNC: 138 MMOL/L (ref 136–145)
WBC # BLD AUTO: 8.92 THOUSAND/UL (ref 4.31–10.16)

## 2022-01-09 PROCEDURE — 83735 ASSAY OF MAGNESIUM: CPT | Performed by: OBSTETRICS & GYNECOLOGY

## 2022-01-09 PROCEDURE — 85027 COMPLETE CBC AUTOMATED: CPT | Performed by: OBSTETRICS & GYNECOLOGY

## 2022-01-09 PROCEDURE — 80048 BASIC METABOLIC PNL TOTAL CA: CPT | Performed by: OBSTETRICS & GYNECOLOGY

## 2022-01-09 PROCEDURE — 82948 REAGENT STRIP/BLOOD GLUCOSE: CPT

## 2022-01-09 PROCEDURE — 99024 POSTOP FOLLOW-UP VISIT: CPT | Performed by: OBSTETRICS & GYNECOLOGY

## 2022-01-09 RX ORDER — ACETAMINOPHEN 325 MG/1
975 TABLET ORAL 3 TIMES DAILY
Status: DISCONTINUED | OUTPATIENT
Start: 2022-01-09 | End: 2022-01-10 | Stop reason: HOSPADM

## 2022-01-09 RX ORDER — OXYCODONE HYDROCHLORIDE 5 MG/1
5 TABLET ORAL EVERY 4 HOURS PRN
Status: DISCONTINUED | OUTPATIENT
Start: 2022-01-09 | End: 2022-01-10 | Stop reason: HOSPADM

## 2022-01-09 RX ORDER — OXYCODONE HYDROCHLORIDE 10 MG/1
10 TABLET ORAL EVERY 4 HOURS PRN
Status: DISCONTINUED | OUTPATIENT
Start: 2022-01-09 | End: 2022-01-10 | Stop reason: HOSPADM

## 2022-01-09 RX ADMIN — FAMOTIDINE 20 MG: 10 INJECTION INTRAVENOUS at 09:17

## 2022-01-09 RX ADMIN — ACETAMINOPHEN 975 MG: 325 TABLET, FILM COATED ORAL at 17:30

## 2022-01-09 RX ADMIN — ACETAMINOPHEN 975 MG: 325 TABLET, FILM COATED ORAL at 12:22

## 2022-01-09 RX ADMIN — LIDOCAINE 5% 1 PATCH: 700 PATCH TOPICAL at 09:17

## 2022-01-09 RX ADMIN — ACETAMINOPHEN 975 MG: 325 TABLET, FILM COATED ORAL at 22:23

## 2022-01-09 RX ADMIN — INSULIN LISPRO 2 UNITS: 100 INJECTION, SOLUTION INTRAVENOUS; SUBCUTANEOUS at 05:59

## 2022-01-09 RX ADMIN — DEXTROSE, SODIUM CHLORIDE, AND POTASSIUM CHLORIDE 100 ML/HR: 5; .45; .15 INJECTION INTRAVENOUS at 06:04

## 2022-01-09 RX ADMIN — ENOXAPARIN SODIUM 40 MG: 40 INJECTION SUBCUTANEOUS at 09:16

## 2022-01-09 RX ADMIN — METFORMIN HYDROCHLORIDE 500 MG: 500 TABLET ORAL at 17:30

## 2022-01-09 RX ADMIN — FAMOTIDINE 20 MG: 10 INJECTION INTRAVENOUS at 22:27

## 2022-01-09 RX ADMIN — INSULIN LISPRO 2 UNITS: 100 INJECTION, SOLUTION INTRAVENOUS; SUBCUTANEOUS at 12:22

## 2022-01-09 NOTE — PLAN OF CARE
Problem: PAIN - ADULT  Goal: Verbalizes/displays adequate comfort level or baseline comfort level  Description: Interventions:  - Encourage patient to monitor pain and request assistance  - Assess pain using appropriate pain scale  - Administer analgesics based on type and severity of pain and evaluate response  - Implement non-pharmacological measures as appropriate and evaluate response  - Consider cultural and social influences on pain and pain management  - Notify physician/advanced practitioner if interventions unsuccessful or patient reports new pain  Outcome: Progressing     Problem: MOBILITY - ADULT  Goal: Maintain or return to baseline ADL function  Description: INTERVENTIONS:  -  Assess patient's ability to carry out ADLs; assess patient's baseline for ADL function and identify physical deficits which impact ability to perform ADLs (bathing, care of mouth/teeth, toileting, grooming, dressing, etc )  - Assess/evaluate cause of self-care deficits   - Assess range of motion  - Assess patient's mobility; develop plan if impaired  - Assess patient's need for assistive devices and provide as appropriate  - Encourage maximum independence but intervene and supervise when necessary  - Involve family in performance of ADLs  - Assess for home care needs following discharge   - Consider OT consult to assist with ADL evaluation and planning for discharge  - Provide patient education as appropriate  Outcome: Progressing  Goal: Maintains/Returns to pre admission functional level  Description: INTERVENTIONS:  - Perform BMAT or MOVE assessment daily    - Set and communicate daily mobility goal to care team and patient/family/caregiver  - Collaborate with rehabilitation services on mobility goals if consulted  - Perform Range of Motion  times a day  - Reposition patient every  hours    - Dangle patient  times a day  - Stand patient  times a day  - Ambulate patient  times a day  - Out of bed to chair  times a day   - Out of bed for meals  times a day  - Out of bed for toileting  - Record patient progress and toleration of activity level   Outcome: Progressing     Problem: Potential for Falls  Goal: Patient will remain free of falls  Description: INTERVENTIONS:  - Educate patient/family on patient safety including physical limitations  - Instruct patient to call for assistance with activity   - Consult OT/PT to assist with strengthening/mobility   - Keep Call bell within reach  - Keep bed low and locked with side rails adjusted as appropriate  - Keep care items and personal belongings within reach  - Initiate and maintain comfort rounds  - Make Fall Risk Sign visible to staff  - Offer Toileting every  Hours, in advance of need  - Initiate/Maintain alarm  - Obtain necessary fall risk management equipmen  - Apply yellow socks and bracelet for high fall risk patients  - Consider moving patient to room near nurses station  Outcome: Progressing

## 2022-01-09 NOTE — PROGRESS NOTES
Progress Note - Elly Cobos 59 y o  female MRN: [id number]  Unit/Bed#: [facility] 920-01 Encounter: [id number]    Assessment / Plan:    Electrolyte abnormality  Assessment & Plan  Hypokalemia and hypomagnesemia corrected  Continue to monitor  Primary hypertension  Assessment & Plan  Hold home amlodipine and valsartan/hydrochlorothiazide  Monitor blood pressures  Current blood pressures are reasonably controlled  No further interventions needed  Type 2 diabetes mellitus without complication, without long-term current use of insulin [facility])  Assessment & Plan    Lab Results   Component Value Date    HGBA1C 6 5 (H) 08/16/2019     Restart home metformin today  Continue with insulin sliding scale for added coverage  Still hyperglycemic  * Malignant neoplasm of left ovary [facility])  Assessment & Plan  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 treated with total abdominal hysterectomy, bilateral salpingo-oophorectomy, omentectomy, appendectomy, left ureteroneocystostomy in August of 2019  She then received 6 cycles of adjuvant chemotherapy with carboplatin and Taxol that completed in January of 2020  She was noted to have pelvic tumor concerning for recurrence in fall 2021, and is now postoperative day 2 s/p Exploratory laparotomy, extensive lysis of adhesions, resection of pelvic mass, resection of small bowel, cystoscopy and ureteral stenting  - NG tube has been removed  No nausea vomiting  Plan to start with diabetic clears and crackers today  Advance diet slowly  Patient has had small bowel movement  - Hitchcock in place for 1 week postoperatively  - Left pelvic ROBE drain in place  Output noted  Continue to monitor   - Left ureteral stent in place  - encourage incentive spirometry  Out of bed  Patient has been ambulating in the hallways  - pain is well controlled  Discontinue PCA and IV fluids  Use high-dose Tylenol t i d  round the clock plus oxycodone p r n  for moderate (5 mg) to severe (10 mg) pain  Avoid NSAIDs given age and comorbidities  - Lovenox and SCDs for DVT prophylaxis  Appreciate Case Management consult for assistance with postoperative eliquis prophylaxis as patient will need prolonged prophylaxis for 28 days  Subjective/Objective       Subjective:  Pain control  No nausea  Reports small bowel movement yesterday  Ambulating in the hallways  Hitchcock in place with clear urine output  Objective:     Blood pressure 131/78, pulse 81, temperature 98 7 °F (37 1 °C), temperature source Oral, resp  rate 18, height 5' 3" (1 6 m), weight 99 8 kg (220 lb), SpO2 97 %  ,Body mass index is 38 97 kg/m²  Intake/Output Summary (Last 24 hours) at 1/9/2022 1205  Last data filed at 1/9/2022 0932  Gross per 24 hour   Intake 1855 2 ml   Output 2510 ml   Net -654 8 ml       Invasive Devices  Report    Peripheral Intravenous Line            Peripheral IV 01/06/22 Right Hand 2 days          Drain            Closed/Suction Drain Left LLQ Bulb 15 Fr  2 days    Urethral Catheter Non-latex;Straight-tip 16 Fr  2 days                Physical Exam: /78   Pulse 81   Temp 98 7 °F (37 1 °C) (Oral)   Resp 18   Ht 5' 3" (1 6 m)   Wt 99 8 kg (220 lb)   SpO2 97%   BMI 38 97 kg/m²     General Appearance:    Comfortable, no distress, appears well hydrated  Abdomen:     Soft, non-tender, incision appears clean, dry and intact  ROBE with serosanguineous output  Extremities:   No clubbing, cyanosis or edema  SCDs in place         Recent Results (from the past 24 hour(s))   Fingerstick Glucose (POCT)    Collection Time: 01/08/22  5:46 PM   Result Value Ref Range    POC Glucose 177 (H) 65 - 140 mg/dl   Fingerstick Glucose (POCT)    Collection Time: 01/09/22 12:13 AM   Result Value Ref Range    POC Glucose 143 (H) 65 - 140 mg/dl   Fingerstick Glucose (POCT)    Collection Time: 01/09/22  5:58 AM   Result Value Ref Range    POC Glucose 173 (H) 65 - 140 mg/dl   CBC and Platelet    Collection Time: 01/09/22  6:00 AM   Result Value Ref Range    WBC 8 92 4 31 - 10 16 Thousand/uL    RBC 3 69 (L) 3 81 - 5 12 Million/uL    Hemoglobin 10 7 (L) 11 5 - 15 4 g/dL    Hematocrit 31 5 (L) 34 8 - 46 1 %    MCV 85 82 - 98 fL    MCH 29 0 26 8 - 34 3 pg    MCHC 34 0 31 4 - 37 4 g/dL    RDW 12 7 11 6 - 15 1 %    Platelets 336 260 - 077 Thousands/uL    MPV 9 1 8 9 - 12 7 fL   Basic metabolic panel    Collection Time: 01/09/22  6:00 AM   Result Value Ref Range    Sodium 138 136 - 145 mmol/L    Potassium 4 1 3 5 - 5 3 mmol/L    Chloride 109 (H) 100 - 108 mmol/L    CO2 25 21 - 32 mmol/L    ANION GAP 4 4 - 13 mmol/L    BUN 4 (L) 5 - 25 mg/dL    Creatinine 0 62 0 60 - 1 30 mg/dL    Glucose 187 (H) 65 - 140 mg/dL    Calcium 8 5 8 3 - 10 1 mg/dL    eGFR 96 ml/min/1 73sq m   Magnesium    Collection Time: 01/09/22  6:00 AM   Result Value Ref Range    Magnesium 2 3 1 6 - 2 6 mg/dL       Lab, Imaging and other studies:I have personally reviewed pertinent lab results      VTE Pharmacologic Prophylaxis: Enoxaparin (Lovenox)  VTE Mechanical Prophylaxis: sequential compression device     Candi Mehta MD, [place], [facility]  1/9/2022  12:06 PM

## 2022-01-09 NOTE — PLAN OF CARE
Problem: PAIN - ADULT  Goal: Verbalizes/displays adequate comfort level or baseline comfort level  Description: Interventions:  - Encourage patient to monitor pain and request assistance  - Assess pain using appropriate pain scale  - Administer analgesics based on type and severity of pain and evaluate response  - Implement non-pharmacological measures as appropriate and evaluate response  - Consider cultural and social influences on pain and pain management  - Notify physician/advanced practitioner if interventions unsuccessful or patient reports new pain  Outcome: Progressing     Problem: MOBILITY - ADULT  Goal: Maintain or return to baseline ADL function  Description: INTERVENTIONS:  -  Assess patient's ability to carry out ADLs; assess patient's baseline for ADL function and identify physical deficits which impact ability to perform ADLs (bathing, care of mouth/teeth, toileting, grooming, dressing, etc )  - Assess/evaluate cause of self-care deficits   - Assess range of motion  - Assess patient's mobility; develop plan if impaired  - Assess patient's need for assistive devices and provide as appropriate  - Encourage maximum independence but intervene and supervise when necessary  - Involve family in performance of ADLs  - Assess for home care needs following discharge   - Consider OT consult to assist with ADL evaluation and planning for discharge  - Provide patient education as appropriate  Outcome: Progressing  Goal: Maintains/Returns to pre admission functional level  Description: INTERVENTIONS:  - Perform BMAT or MOVE assessment daily    - Set and communicate daily mobility goal to care team and patient/family/caregiver     - Collaborate with rehabilitation services on mobility goals if consulted  - Perform Range of Motion  - Reposition patient every   - Dangle patient   - Stand patient   - Ambulate patient   - Out of bed to chair   - Out of bed for meals   - Out of bed for toileting  - Record patient progress and toleration of activity level   Outcome: Progressing     Problem: Potential for Falls  Goal: Patient will remain free of falls  Description: INTERVENTIONS:  - Educate patient/family on patient safety including physical limitations  - Instruct patient to call for assistance with activity   - Consult OT/PT to assist with strengthening/mobility   - Keep Call bell within reach  - Keep bed low and locked with side rails adjusted as appropriate  - Keep care items and personal belongings within reach  - Initiate and maintain comfort rounds  - Make Fall Risk Sign visible to staff  - Offer Toileting every , in advance of need  - Initiate/Maintain   - Obtain necessary fall risk management equipment:  - Apply yellow socks and bracelet for high fall risk patients  - Consider moving patient to room near nurses station  Outcome: Progressing

## 2022-01-10 VITALS
WEIGHT: 220 LBS | DIASTOLIC BLOOD PRESSURE: 88 MMHG | OXYGEN SATURATION: 99 % | HEIGHT: 63 IN | HEART RATE: 86 BPM | SYSTOLIC BLOOD PRESSURE: 139 MMHG | BODY MASS INDEX: 38.98 KG/M2 | TEMPERATURE: 98.5 F | RESPIRATION RATE: 18 BRPM

## 2022-01-10 LAB
ANION GAP SERPL CALCULATED.3IONS-SCNC: 5 MMOL/L (ref 4–13)
BUN SERPL-MCNC: 6 MG/DL (ref 5–25)
CALCIUM SERPL-MCNC: 8.9 MG/DL (ref 8.3–10.1)
CHLORIDE SERPL-SCNC: 108 MMOL/L (ref 100–108)
CO2 SERPL-SCNC: 26 MMOL/L (ref 21–32)
CREAT FLD-MCNC: 0.93 MG/DL
CREAT SERPL-MCNC: 0.62 MG/DL (ref 0.6–1.3)
ERYTHROCYTE [DISTWIDTH] IN BLOOD BY AUTOMATED COUNT: 12.5 % (ref 11.6–15.1)
GFR SERPL CREATININE-BSD FRML MDRD: 96 ML/MIN/1.73SQ M
GLUCOSE SERPL-MCNC: 129 MG/DL (ref 65–140)
GLUCOSE SERPL-MCNC: 140 MG/DL (ref 65–140)
GLUCOSE SERPL-MCNC: 146 MG/DL (ref 65–140)
HCT VFR BLD AUTO: 31.7 % (ref 34.8–46.1)
HGB BLD-MCNC: 10.8 G/DL (ref 11.5–15.4)
MAGNESIUM SERPL-MCNC: 2 MG/DL (ref 1.6–2.6)
MCH RBC QN AUTO: 29 PG (ref 26.8–34.3)
MCHC RBC AUTO-ENTMCNC: 34.1 G/DL (ref 31.4–37.4)
MCV RBC AUTO: 85 FL (ref 82–98)
PLATELET # BLD AUTO: 293 THOUSANDS/UL (ref 149–390)
PMV BLD AUTO: 9 FL (ref 8.9–12.7)
POTASSIUM SERPL-SCNC: 3.6 MMOL/L (ref 3.5–5.3)
RBC # BLD AUTO: 3.72 MILLION/UL (ref 3.81–5.12)
SODIUM SERPL-SCNC: 139 MMOL/L (ref 136–145)
WBC # BLD AUTO: 6.81 THOUSAND/UL (ref 4.31–10.16)

## 2022-01-10 PROCEDURE — 82570 ASSAY OF URINE CREATININE: CPT | Performed by: OBSTETRICS & GYNECOLOGY

## 2022-01-10 PROCEDURE — 80048 BASIC METABOLIC PNL TOTAL CA: CPT | Performed by: OBSTETRICS & GYNECOLOGY

## 2022-01-10 PROCEDURE — 83735 ASSAY OF MAGNESIUM: CPT | Performed by: OBSTETRICS & GYNECOLOGY

## 2022-01-10 PROCEDURE — 82948 REAGENT STRIP/BLOOD GLUCOSE: CPT

## 2022-01-10 PROCEDURE — 85027 COMPLETE CBC AUTOMATED: CPT | Performed by: OBSTETRICS & GYNECOLOGY

## 2022-01-10 PROCEDURE — 99024 POSTOP FOLLOW-UP VISIT: CPT | Performed by: OBSTETRICS & GYNECOLOGY

## 2022-01-10 RX ORDER — POTASSIUM CHLORIDE 20 MEQ/1
40 TABLET, EXTENDED RELEASE ORAL ONCE
Status: COMPLETED | OUTPATIENT
Start: 2022-01-10 | End: 2022-01-10

## 2022-01-10 RX ORDER — ACETAMINOPHEN 325 MG/1
975 TABLET ORAL 3 TIMES DAILY
Qty: 30 TABLET | Refills: 0 | Status: SHIPPED | OUTPATIENT
Start: 2022-01-10

## 2022-01-10 RX ADMIN — POTASSIUM CHLORIDE 40 MEQ: 1500 TABLET, EXTENDED RELEASE ORAL at 09:55

## 2022-01-10 RX ADMIN — FAMOTIDINE 20 MG: 10 INJECTION INTRAVENOUS at 08:31

## 2022-01-10 RX ADMIN — ACETAMINOPHEN 975 MG: 325 TABLET, FILM COATED ORAL at 16:58

## 2022-01-10 RX ADMIN — ACETAMINOPHEN 975 MG: 325 TABLET, FILM COATED ORAL at 08:37

## 2022-01-10 RX ADMIN — ENOXAPARIN SODIUM 40 MG: 40 INJECTION SUBCUTANEOUS at 08:30

## 2022-01-10 RX ADMIN — METFORMIN HYDROCHLORIDE 500 MG: 500 TABLET ORAL at 08:37

## 2022-01-10 RX ADMIN — LIDOCAINE 5% 1 PATCH: 700 PATCH TOPICAL at 08:32

## 2022-01-10 NOTE — CASE MANAGEMENT
Case Management Assessment & Discharge Planning Note    Patient name Chandana Hinds  Location [address] 920/PPHP 920-01 MRN [id number]  : 1960 Date 1/10/2022       Current Admission Date: 2022  Current Admission Diagnosis:Malignant neoplasm of left ovary [facility])   Patient Active Problem List    Diagnosis Date Noted    Electrolyte abnormality 2022    Type 2 diabetes mellitus without complication, without long-term current use of insulin ([place] [address] ) 2022    Primary hypertension 2022    Encounter for central line care 2020    Motion sickness 2019    Adrenal mass, left ([place] [address] ) 2019    Malignant neoplasm of left ovary ([place] [address] ) 2019    Morbid obesity with BMI of 40 0-44 9, adult ([place] [address] ) 2019    Edema of left lower extremity 2019      LOS (days): 4  Geometric Mean LOS (GMLOS) (days):   Days to GMLOS:     OBJECTIVE:    Risk of Unplanned Readmission Score: 13         Current admission status: Inpatient       Preferred Pharmacy:   [facility] [address]  [facility] [address]  Phone: [phone] Fax: [phone]    [address] [place] [address]  Phone: [phone] Fax: [phone]    Primary Care Provider: HARMAN Pacheco    Primary Insurance: BLUE CROSS  Secondary Insurance:     ASSESSMENT:  [address] - Sister   Primary Phone: [phone] (Mobile)  Home Phone: [phone]               Patient Information  Admitted from[de-identified] Home  Mental Status: Alert  During Assessment patient was accompanied by: Not accompanied during assessment  Assessment information provided by[de-identified] Patient  Primary Caregiver: Self  Support Systems: Self,Family members  [place] of Residence: [address] [place] [address] do you live in?: [facility] entry access options  Select all that apply  Monika Rajput Stairs  Number of steps to enter home : 2  Type of Current Residence: 2 story home  Living Arrangements: Lives Alone    Activities of Daily Living Prior to Admission  Functional Status: Independent  Completes ADLs independently?: Yes  Ambulates independently?: Yes  Does patient use assisted devices?: No  Does patient currently own DME?: Yes  What DME does the patient currently own?: Shower Chair,Walker  Does patient have a history of Outpatient Therapy (PT/OT)?: No  Does the patient have a history of Short-Term Rehab?: No  Does patient have a history of HHC?: Yes ([facility])  Does patient currently have [facility]?: No    Patient Information Continued  Income Source: Employed  Does patient receive dialysis treatments?: No  Does patient have a history of substance abuse?: No  Does patient have a history of Mental Health Diagnosis?: Yes (Per chart pt has a hx of depression)  Has patient received inpatient treatment related to mental health in the last 2 years?: No    Means of Transportation  Means of Transport to Appts[de-identified] Drives Self    DISCHARGE DETAILS:    Discharge planning discussed with[de-identified] pt  Freedom of Choice: Yes  Comments - Freedom of Choice: Pt requested [facility] referral be sent [facility]  CM contacted family/caregiver?: No- see comments (pt is AxO)  Were Treatment Team discharge recommendations reviewed with patient/caregiver?: Yes  Did patient/caregiver verbalize understanding of patient care needs?: Yes  Were patient/caregiver advised of the risks associated with not following Treatment Team discharge recommendations?: Yes         [address]         Is the patient interested in [facility] at discharge?: Yes  [address] requested[de-identified] 228 TeamPatent Name[de-identified] Other  [address] [place] [address] Provider[de-identified] PCP  [address] Needed[de-identified] Evaluate Functional Status and Safety,Urinary Incontinence Catheter Management  Homebound Criteria Met[de-identified] Requires the Assistance of Another Person for Safe Ambulation or to Leave the Home  Supporting Clincal Findings[de-identified] Limited Endurance    DME Referral Provided  Referral made for DME?: No (Pt has RW at home)    Other Referral/Resources/Interventions Provided:  Interventions: [facility]  Referral Comments: Referral sent via [facility] as requested  Treatment Team Recommendation: [address]  Discharge Destination Plan[de-identified] Home with [place] at Discharge : Family     ETA of Transport (Date): 01/10/22  Accompanied by: Family member             Patient/caregiver received discharge checklist   Content reviewed  Patient/caregiver encouraged to participate in discharge plan of care prior to discharge home  CM reviewed d/c planning process including the following: identifying help at home, patient preference for d/c planning needs, Discharge Lounge, [facility] Meds to Bed program, availability of treatment team to discuss questions or concerns patient and/or family may have regarding understanding medications and recognizing signs and symptoms once discharged  CM also encouraged patient to follow up with all recommended appointments after discharge  Patient advised of importance for patient and family to participate in managing patients medical well being

## 2022-01-10 NOTE — DISCHARGE INSTRUCTIONS
Exploratory Laparotomy   WHAT YOU NEED TO KNOW:   An exploratory laparotomy is surgery to look for causes of pain, infection, disease, or scar tissue inside your abdomen  An exploratory laparotomy may help your healthcare provider diagnose a medical problem  If your healthcare provider finds a problem during exploratory laparotomy, he may fix it  DISCHARGE INSTRUCTIONS:   Medicines:   · Antibiotics: This medicine is given to fight or prevent an infection caused by bacteria  · Pain medicine: You may be given a prescription medicine to decrease pain  Do not wait until the pain is severe before you take this medicine  · Take your medicine as directed  Contact your healthcare provider if you think your medicine is not helping or if you have side effects  Tell him or her if you are allergic to any medicine  Keep a list of the medicines, vitamins, and herbs you take  Include the amounts, and when and why you take them  Bring the list or the pill bottles to follow-up visits  Carry your medicine list with you in case of an emergency  Follow up with your healthcare provider or surgeon as directed: You may need to return to have your stitches or bandage removed  Write down your questions so you remember to ask them during your visits  Wound care:   · Follow your healthcare provider or surgeon's instructions: You may need to keep the bandage on your incision for 1 to 2 days or until your follow-up visit  After your follow-up visit, you may need to change your bandage 1 to 2 times a day  · Wash your hands:  Use soap and warm water to wash your hands  Do this before and after you care for your wound  Hand washing helps prevent an infection  · Remove your bandage gently:  If the bandage sticks to your wound, use warm water on the bandage and lift it off slowly  Lift the edges toward the center of your wound  Carefully wash around the wound with soap and water   Try not to get soap or water directly on your wound  Dry the area and put on new, clean bandages as directed  Change your bandages when they get wet or dirty  Ask how to clean your wound after your stitches are removed  Self-care:   · Rest:  You may feel like resting more after your surgery  Slowly start to do more each day  Rest when you feel it is needed  · Prevent constipation:  High-fiber foods, extra liquids, and regular exercise can help you prevent constipation  Examples of high-fiber foods are fruit and bran  Prune juice and water are good liquids to drink  Regular exercise helps your digestive system work  You may also be told to take over-the-counter fiber and stool softener medicines  Take these items as directed  · Drink liquids as directed:  Ask your healthcare provider how much liquid to drink each day and which liquids are best for you  This may help prevent constipation  Ask when you can bathe:  Ask your healthcare provider when you can take a shower or bath  Contact your healthcare provider or surgeon if:   · You have a fever  · Your incision is swollen, red, or has pus coming from it  · You have trouble having a bowel movement or frequent diarrhea  · You have repeated vomiting  · You have chills, a cough, or feel weak and achy  · You have questions or concerns about your condition or care  Seek care immediately or call 911 if:   · Your incision comes apart  · Blood soaks through your bandage  · Your arm or leg feels warm, tender, and painful  It may look swollen and red  · You suddenly feel lightheaded and short of breath  · You have chest pain when you take a deep breath or cough  You may cough up blood  © Copyright [employer] 2018 Information is for End User's use only and may not be sold, redistributed or otherwise used for commercial purposes   All illustrations and images included in CareNotes® are the copyrighted property of [employer]  or Cristi Dexter  The above information is an  only  It is not intended as medical advice for individual conditions or treatments  Talk to your doctor, nurse or pharmacist before following any medical regimen to see if it is safe and effective for you  How to Change a Catheter Drainage Bag   WHAT YOU NEED TO KNOW:   The catheter is attached to a drainage bag that holds the urine  A large drainage bag is usually used during the night  A leg bag can be worn under your clothes during the day  The leg bag is worn around your calf or thigh  It allows you to be in public without anyone knowing you have a catheter  A leg bag will have to be emptied frequently because it is not as big as the large bag  DISCHARGE INSTRUCTIONS:   Call your doctor if:   · Your catheter comes out  · You have a fever  · You have material that looks like sand in the tubing or drainage bag  · You see blood in the tubing or drainage bag  · You see a layer of crystals inside the tubing  · You have questions or concerns about your condition or care  Attach or remove a drainage bag: You use the same steps to attach or remove a large drainage bag and a leg bag  · Gather supplies:      ? Large drainage bag    ? Clean leg bag    ? A clean towel    ? Alcohol pads    ·          · Wash your hands with soap and water  for at least 20 seconds  · Empty the drainage bag  Do not touch the tip against the container or the toilet as you empty the urine  · Place the clean towel under the connection between the catheter tube and the drainage bag tubing  The towel will catch urine that may leak out of the tubing  · Pinch and hold the catheter tubing  Disconnect the tubing from the bag by using a twisting motion  Be careful not to pull on the catheter  Place the disconnected bag on the towel  · Clean the tip of the tubing on the bag to be connected  Use an alcohol pad  Start at the tip and clean towards the bag   You may have to hold the bag tubing in the same hand as the pinched catheter tubing while you are cleaning the tip  Do not  allow the tip to touch the catheter tubing  · Connect the bag tubing to the catheter tubing  Place the tip into the catheter tubing with a twisting motion until it is securely connected  · Use straps to fasten the bag to your calf  if you are attaching a leg bag  You may need to tape the catheter tubing to your thigh  Be careful not to pull the catheter tubing tight  Damage can be done to your urethra and bladder  · Wash your hands with soap and water  for at least 20 seconds  What you need to know about drainage bags:   · Wash your hands with soap and water before and after emptying the drainage bag  · Empty your drainage bag when it is half full throughout the day  Also empty the large drainage bag when you wake in the morning or from a nap  · Make sure to clamp or twist the drainage bag closed after emptying  · Put a cap on the end of the connection tubing to prevent germs in the tubing  Prevent problems with your catheter and drainage bag:   · Drink liquids as directed  Ask your healthcare provider how much liquid to drink each day and which liquids are best for you  Liquids will help flush your kidneys and bladder to help prevent infection  · Check for kinks in the tubing and straighten them out  · Check the tape or strap used to secure the catheter tube to your skin  Make sure it is not blocking the tube  · Make sure you are not sitting or lying on the tubing  · Make sure the urine bag is hanging below the level of your waist     Follow up with your doctor as directed:  Write down your questions so you remember to ask them during your visits  © Copyright [employer] 2021 Information is for End User's use only and may not be sold, redistributed or otherwise used for commercial purposes   All illustrations and images included in CareNotes® are the copyrighted property of A D A M , Inc  or [facility] Rhea Gleason   The above information is an  only  It is not intended as medical advice for individual conditions or treatments  Talk to your doctor, nurse or pharmacist before following any medical regimen to see if it is safe and effective for you

## 2022-01-10 NOTE — PROGRESS NOTES
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, with prior chemotherapy and surgery, now POD# 4 from resection of pelvic tumor recurrence  Electrolyte abnormality  Assessment & Plan  Hypokalemia and hypomagnesemia corrected yesterday  Am labs pending    Primary hypertension  Assessment & Plan  Home amlodipine and valsartan/hydrochlorothiazide held  Monitor blood pressures  Current blood pressures are reasonably controlled  No further interventions needed  Type 2 diabetes mellitus without complication, without long-term current use of insulin [facility])  Assessment & Plan    Lab Results   Component Value Date    HGBA1C 6 5 (H) 08/16/2019     Home metformin restarted yesterday with insulin sliding scale for added coverage  Used 4u insulin total yesterday  * Malignant neoplasm of left ovary [facility])  Assessment & Plan  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 treated with total abdominal hysterectomy, bilateral salpingo-oophorectomy, omentectomy, appendectomy, left ureteroneocystostomy in August of 2019  She then received 6 cycles of adjuvant chemotherapy with carboplatin and Taxol that completed in January of 2020  She was noted to have pelvic tumor concerning for recurrence in fall 2021, and is now postoperative day 2 s/p Exploratory laparotomy, extensive lysis of adhesions, resection of pelvic mass, resection of small bowel, cystoscopy and ureteral stenting  - NG tube has been removed  No nausea vomiting  Tolerating diabetic clears well  Advance diet today  Patient has had small bowel movement  - Hitchcock in place for 1 week postoperatively  - Left pelvic ROBE drain in place  Output noted    Continue to monitor   - Left ureteral stent in place  - Encourage incentive spirometry  Out of bed  Patient has been ambulating in the hallways  - Pain is well controlled  Discontinue PCA and IV fluids  Use high-dose Tylenol t i d  round the clock plus oxycodone p r n  for moderate (5 mg) to severe (10 mg) pain  Avoid NSAIDs given age and comorbidities  - Lovenox and SCDs for DVT prophylaxis  Appreciate Case Management consult for assistance with postoperative eliquis prophylaxis as patient will need prolonged prophylaxis for 28 days  Subjective:    Dimple Lozano has no current complaints  Pain is well controlled with Tylenol around the clock  Patient currently has a tate catheter in place  She is ambulating  Patient is currently passing flatus and has had bowel movement  She is tolerating PO , and denies nausea or vomitting  Patient denies fever, chills, chest pain, shortness of breath, or calf tenderness  Objective:  /82   Pulse 79   Temp 98 6 °F (37 °C)   Resp 18   Ht 5' 3" (1 6 m)   Wt 99 8 kg (220 lb)   SpO2 94%   BMI 38 97 kg/m²     I/O last 3 completed shifts: In: 2655 2 [P O :300; I V :2355 2]  Out: 8139 [Urine:3150; Drains:15]  No intake/output data recorded  Lab Results   Component Value Date    WBC 8 92 01/09/2022    HGB 10 7 (L) 01/09/2022    HCT 31 5 (L) 01/09/2022    MCV 85 01/09/2022     01/09/2022       Lab Results   Component Value Date    GLUCOSE 188 (H) 08/26/2019    CALCIUM 8 5 01/09/2022    K 4 1 01/09/2022    CO2 25 01/09/2022     (H) 01/09/2022    BUN 4 (L) 01/09/2022    CREATININE 0 62 01/09/2022       Physical Exam  Constitutional:       Appearance: She is obese  HENT:      Head: Normocephalic  Mouth/Throat:      Mouth: Mucous membranes are moist       Pharynx: Oropharynx is clear  Eyes:      Conjunctiva/sclera: Conjunctivae normal    Cardiovascular:      Rate and Rhythm: Normal rate and regular rhythm  Pulses: Normal pulses        Heart sounds: Normal heart sounds  Pulmonary:      Effort: Pulmonary effort is normal  No respiratory distress  Breath sounds: Normal breath sounds  Abdominal:      Palpations: Abdomen is soft  Comments: Mild distension  Mild tenderness to palpation  Vertical midline incision healing well, with no drainage, erythema, warmth, fluctuance  LLQ ROBE drain in place with serosanguinous output  Some leakage around drain noted on dressing holding drain in place  No signs of infection visualized  Skin:     General: Skin is warm and dry  Capillary Refill: Capillary refill takes less than 2 seconds  Neurological:      General: No focal deficit present  Mental Status: She is alert and oriented to person, place, and time     Psychiatric:         Mood and Affect: Mood normal          Behavior: Behavior normal          Jhoan Gomez MD  1/10/2022  7:32 AM

## 2022-01-10 NOTE — CASE MANAGEMENT
Case Management Discharge Planning Note    Patient name Dorothy Virgen  [facility] [address] [facility]N [id number]  : 1960 Date 1/10/2022       Current Admission Date: 2022  Current Admission Diagnosis:Malignant neoplasm of left ovary [facility])   Patient Active Problem List    Diagnosis Date Noted    Electrolyte abnormality 2022    Type 2 diabetes mellitus without complication, without long-term current use of insulin ([place] [address] ) 2022    Primary hypertension 2022    Encounter for central line care 2020    Motion sickness 2019    Adrenal mass, left ([place] [address] ) 2019    Malignant neoplasm of left ovary ([place] [address] ) 2019    Morbid obesity with BMI of 40 0-44 9, adult ([place] [address] ) 2019    Edema of left lower extremity 2019      LOS (days): 4  Geometric Mean LOS (GMLOS) (days):   Days to GMLOS:     OBJECTIVE:  Risk of Unplanned Readmission Score: 13         Current admission status: Inpatient   Preferred Pharmacy:   [facility] [address]  [facility] [address]  Phone: [phone] Fax: [phone]    [facility] [place] [address]   [address] [place] [address]  Phone: [phone] Fax: [phone]    Primary Care Provider: HARMAN Crespo    Primary Insurance: BLUE CROSS  Secondary Insurance:     DISCHARGE DETAILS:                   Additional Comments: CM faxed AVS as requested by [facility]

## 2022-01-11 ENCOUNTER — TELEPHONE (OUTPATIENT)
Dept: UROLOGY | Facility: AMBULATORY SURGERY CENTER | Age: 62
End: 2022-01-11

## 2022-01-11 ENCOUNTER — TELEPHONE (OUTPATIENT)
Dept: HEMATOLOGY ONCOLOGY | Facility: CLINIC | Age: 62
End: 2022-01-11

## 2022-01-11 ENCOUNTER — TELEPHONE (OUTPATIENT)
Dept: OTHER | Facility: OTHER | Age: 62
End: 2022-01-11

## 2022-01-11 NOTE — TELEPHONE ENCOUNTER
Called and spoke with patient  Patient states she is doing well  Tate bag is draining clear, yellow urine  She got car sick on the way home which she normally does but is much better today  She is trying to stay well hydrated  She does not have pain at this time  Reminded of cysto with stent removal in the office on 3/18 in [facility] as it is closest for her  Patient stated she had a tate which was not relayed to us  Set up tate pull in [place] on 1/19 as nurse is not in the [facility] office next week  Confirmed office location, date, and time  Confirmed it is just a pull with Dr Sindy Ling

## 2022-01-11 NOTE — TELEPHONE ENCOUNTER
----- Message from [place] sent at 1/11/2022 12:34 PM EST -----  This patient has a nurse visit appt on 1/19 to have her tate removed (I believe)  The visiting nurse will be removing it at her home this week, so this can be cancelled  Thanks

## 2022-01-11 NOTE — TELEPHONE ENCOUNTER
Patient had surgery on 1/6/2022 and is is scheduled for post-op on 1/26/2022  Her discharge paperwork instructs her to schedule a post-op visit for 1 week post surgery  Please advise how soon the patient needs to be seen, as well as when she needs to have her catheter removed      Best call back [phone]

## 2022-01-11 NOTE — UTILIZATION REVIEW
Notification of Discharge   This is a Notification of Discharge from our facility [address]  Please be advised that this patient has been discharge from our facility  Below you will find the admission and discharge date and time including the patients disposition  UTILIZATION REVIEW CONTACT:  Cricket Garcia  Utilization   Network Utilization Review Department  Phone: [phone] x carefully listen to the prompts  All voicemails are confidential   Email: [email or web address]     PHYSICIAN ADVISORY SERVICES:  FOR LYRE-TR-OQTB REVIEW - MEDICAL NECESSITY DENIAL  Phone: [phone]  Fax: [phone]  Email: [email or web address]     PRESENTATION DATE: 1/6/2022 10:54 AM  OBERVATION ADMISSION DATE:   INPATIENT ADMISSION DATE: 1/6/22  5:49 PM   DISCHARGE DATE: 1/10/2022  6:14 PM  DISPOSITION: Home with [facility]      IMPORTANT INFORMATION:  Send all requests for admission clinical reviews, approved or denied determinations and any other requests to dedicated fax number below belonging to the campus where the patient is receiving treatment   List of dedicated fax numbers:  [address] DENIALS (Administrative/Medical Necessity) [phone]   1000  [facility] (Maternity/NICU/Pediatrics) [phone]   Josué Mcbride [phone]   Barrett Haq [phone]   [facility] [phone]   [facility] [address] [place] [address] [phone]   [facility]  [phone]   [address] [facility] [address]  [address] [facility] [address] [facility] [phone]

## 2022-01-11 NOTE — TELEPHONE ENCOUNTER
----- Message from [employer] sent at 1/11/2022 12:34 PM EST -----  This patient has a nurse visit appt on 1/19 to have her tate removed (I believe)  The visiting nurse will be removing it at her home this week, so this can be cancelled  Thanks

## 2022-01-12 NOTE — TELEPHONE ENCOUNTER
Patient called saying that she is having a lot of pressure from her catheter it feels like she have to pee and it's not coming out and it is very uncomfortable

## 2022-01-17 ENCOUNTER — DOCUMENTATION (OUTPATIENT)
Dept: GYNECOLOGIC ONCOLOGY | Facility: CLINIC | Age: 62
End: 2022-01-17

## 2022-01-17 ENCOUNTER — TELEPHONE (OUTPATIENT)
Dept: GYNECOLOGIC ONCOLOGY | Facility: CLINIC | Age: 62
End: 2022-01-17

## 2022-01-17 NOTE — TELEPHONE ENCOUNTER
We received forms from CLAUDIO MOER  [facility] requesting pt record from 01/06/2022 through present [facility] note from 01/06/2022 was fax over to [phone]

## 2022-01-20 ENCOUNTER — TELEPHONE (OUTPATIENT)
Dept: GYNECOLOGIC ONCOLOGY | Facility: CLINIC | Age: 62
End: 2022-01-20

## 2022-01-24 ENCOUNTER — DOCUMENTATION (OUTPATIENT)
Dept: GYNECOLOGIC ONCOLOGY | Facility: CLINIC | Age: 62
End: 2022-01-24

## 2022-01-24 NOTE — PROGRESS NOTES
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, and new well-differentiated neuroendocrine tumor of the ileum     Patient was discussed at the Multidisciplinary Gynecologic Oncology Case review on 01/24/2022  The group recommended to consider treatment with systemic chemotherapy for recurrent sertoli leydig tumor and referral to surgical oncology +/- medical oncology for neuroendocrine tumor, as well as genetic counseling referral      Follow-up appointment with Dr Isabella Aguila on 01/26/2022  NCCN guidelines were available for review  The final treatment plan will be left to the discretion of the patient and the treating physician  DISCLAIMERS:    TO THE TREATING PHYSICIAN:  This conference is a meeting of clinicians from various specialty areas who evaluate and discuss patients for whom a multidisciplinary treatment approach is being considered  Please note that the above opinion was a consensus of the conference attendees and is intended only to assist in quality care of the discussed patient  The responsibility for follow up on the input given during the conference, along with any final decisions regarding plan of care, is that of the patient and the patient's provider  Accordingly, appointments have only been recommended based on this information and have NOT been scheduled unless otherwise noted  TO THE PATIENT:  This summary is a brief record of major aspects of your cancer treatment  You may choose to share a copy with any of your doctors or nurses  However, this is not a detailed or comprehensive record of your care

## 2022-01-26 ENCOUNTER — TELEPHONE (OUTPATIENT)
Dept: HEMATOLOGY ONCOLOGY | Facility: CLINIC | Age: 62
End: 2022-01-26

## 2022-01-26 ENCOUNTER — OFFICE VISIT (OUTPATIENT)
Dept: GYNECOLOGIC ONCOLOGY | Facility: HOSPITAL | Age: 62
End: 2022-01-26
Payer: COMMERCIAL

## 2022-01-26 VITALS
OXYGEN SATURATION: 97 % | SYSTOLIC BLOOD PRESSURE: 122 MMHG | HEART RATE: 86 BPM | HEIGHT: 63 IN | RESPIRATION RATE: 17 BRPM | BODY MASS INDEX: 38.55 KG/M2 | WEIGHT: 217.6 LBS | TEMPERATURE: 98.4 F | DIASTOLIC BLOOD PRESSURE: 62 MMHG

## 2022-01-26 DIAGNOSIS — C56.2 MALIGNANT NEOPLASM OF LEFT OVARY (HCC): Primary | ICD-10-CM

## 2022-01-26 DIAGNOSIS — C56.2 MALIGNANT NEOPLASM OF LEFT OVARY (HCC): ICD-10-CM

## 2022-01-26 DIAGNOSIS — C7A.8 NEUROENDOCRINE CARCINOMA OF SMALL BOWEL (HCC): ICD-10-CM

## 2022-01-26 PROCEDURE — 99215 OFFICE O/P EST HI 40 MIN: CPT | Performed by: OBSTETRICS & GYNECOLOGY

## 2022-01-26 RX ORDER — LETROZOLE 2.5 MG/1
2.5 TABLET, FILM COATED ORAL DAILY
Qty: 30 TABLET | Refills: 3 | Status: SHIPPED | OUTPATIENT
Start: 2022-01-26 | End: 2022-01-27

## 2022-01-26 NOTE — PROGRESS NOTES
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 status post exploratory laparotomy, extensive lysis of adhesions, resection of recurrent tumor, small bowel resection with reanastomosis, cystoscopy with left ureteral stent placement  She has an 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 which was completely resected  She is recovering well from surgery  Performance status is 0   1  She has follow-up with Urology for left ureteral stent removal  2  We discussed multiple treatment options for her recurrent stromal cell tumor  There is no measurable disease  Treatment options include adjuvant chemotherapy with BEP, adjuvant radiation therapy, aromatase inhibitor therapy, combination therapy, or observation  I reviewed the risks and benefits of each treatment approach  3  Based on the risks and benefits, she would prefer to start treatment with letrozole and follow inhibin B on a monthly basis with a low threshold to transition to cytotoxic chemotherapy or radiation therapy with rising inhibin levels  She understands the risks and benefits of starting treatment with letrozole and agrees to proceed  She was provided with written information regarding potential side effects as well  4  Inhibin B prior to her next visit in 1 month  5  Referral to medical oncology for discussion regarding treatment options for her well-differentiated neuroendocrine tumor  She was reassured regarding the prognosis of this malignancy  I spent 30 minutes with the patient    More than half the time was spent in counseling and coordination of care regarding treatment of her 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  Relevant Medications    letrozole (FEMARA) 2 5 mg tablet    Other Relevant Orders    Inhibin B            CHIEF COMPLAINT:  Treatment discussion and postoperative evaluation      Problem:  Cancer Staging  Malignant neoplasm of left ovary (HCC)  Staging form: Ovary, Fallopian Tube, Primary Peritoneal, AJCC 8th Edition  - Clinical stage from 8/26/2019: FIGO Stage IC1, calculated as Stage IA (cT1a, cN0, cM0) - Signed by Pearl Turpin MD on 9/11/2019        Previous therapy:  Oncology History   Malignant neoplasm of left ovary ([facility] [address] )   8/26/2019 Initial Diagnosis    Malignant neoplasm of left ovary ([facility] [address] )     8/26/2019 -  Cancer Staged    Staging form: Ovary, Fallopian Tube, Primary Peritoneal, AJCC 8th Edition  - Clinical stage from 8/26/2019: FIGO Stage IC1, calculated as Stage IA (cT1a, cN0, cM0) - Signed by Pearl Turpin MD on 9/11/2019        Chemotherapy    Taxol 175 mg/m2 and carboplatin AUC 6 every 21 days  She received 5 cycles and is scheduled for cycle 6       8/26/2019 Surgery    Total abdominal hysterectomy, bilateral salpingo-oophorectomy, radical omentectomy, pelvic lymph node sampling, repair inherent cystostomy, left ureteroneocystostomy, appendectomy  -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  -intraoperative tumor rupture           Patient ID: Chan Garza is a 59 y o  female  Returns for treatment discussion and postoperative evaluation  She is ambulating  Pain is reasonably well controlled  She is voiding although more frequently than usual   No nausea or vomiting  She has not yet returned to work  She is able to perform her activities of daily living  No other interval change in her medications or medical history since her surgery        The following portions of the patient's history were reviewed and updated as appropriate: allergies, current medications, past family history, past medical history, past social history, past surgical history and problem list     Review of Systems    Current Outpatient Medications   Medication Sig Dispense Refill    acetaminophen (TYLENOL) 325 mg tablet Take 3 tablets (975 mg total) by mouth 3 (three) times a day 30 tablet 0    amLODIPine (NORVASC) 5 mg tablet Take 5 mg by mouth daily   4    apixaban (Eliquis) 2 5 mg Take 1 tablet (2 5 mg total) by mouth 2 (two) times a day 56 tablet 0    buPROPion (WELLBUTRIN SR) 150 mg 12 hr tablet every evening   0    Cholecalciferol (VITAMIN D-3) 1000 units CAPS Take by mouth daily       escitalopram (LEXAPRO) 20 mg tablet 20 mg daily   0    metFORMIN (GLUCOPHAGE) 500 mg tablet 500 mg 2 (two) times a day with meals   0    scopolamine (TRANSDERM-SCOP) 1 5 mg/3 days TD 72 hr patch Place 1 patch on the skin every third day 2 patch 1    simvastatin (ZOCOR) 20 mg tablet TK 1 T PO QD IN THE KIMBERLYN  5    SUPER B COMPLEX/C PO Take by mouth daily       valsartan-hydrochlorothiazide (DIOVAN-HCT) 320-25 MG per tablet daily   0    letrozole (FEMARA) 2 5 mg tablet Take 1 tablet (2 5 mg total) by mouth daily 30 tablet 3    neomycin (MYCIFRADIN) 500 mg tablet Take 1,000 mg by mouth 4 (four) times a day (Patient not taking: Reported on 1/26/2022 )      Omega 3 1200 MG CAPS Take by mouth daily      polyethylene glycol (MiraLax) 17 GM/SCOOP powder Mix with 64 oz Gatorade, begin 4 PM day before surgery per bowel prep instructions  (Patient not taking: Reported on 1/26/2022 ) 238 g 0     No current facility-administered medications for this visit  Objective:    Blood pressure 122/62, pulse 86, temperature 98 4 °F (36 9 °C), temperature source Temporal, resp  rate 17, height 5' 3" (1 6 m), weight 98 7 kg (217 lb 9 6 oz), SpO2 97 %  Body mass index is 38 55 kg/m²  Body surface area is 2 meters squared  Physical Exam  Vitals reviewed  Constitutional:       General: She is not in acute distress  Appearance: Normal appearance  HENT:      Head: Normocephalic and atraumatic  Mouth/Throat:      Mouth: Mucous membranes are moist    Pulmonary:      Effort: Pulmonary effort is normal    Abdominal:      General: There is no distension  Palpations: Abdomen is soft  There is no mass  Tenderness: There is no abdominal tenderness  There is no guarding or rebound  Skin:     General: Skin is warm and dry  Comments: Incision and drain site is clean dry and intact  Neurological:      General: No focal deficit present  Mental Status: She is alert and oriented to person, place, and time  Mental status is at baseline  Cranial Nerves: No cranial nerve deficit  Motor: No weakness  Gait: Gait normal    Psychiatric:         Mood and Affect: Mood normal          Behavior: Behavior normal          Thought Content:  Thought content normal          Judgment: Judgment normal          Lab Results   Component Value Date     42 7 (H) 08/16/2019     Lab Results   Component Value Date    K 3 6 01/10/2022     01/10/2022    CO2 26 01/10/2022    BUN 6 01/10/2022    CREATININE 0 62 01/10/2022    GLUCOSE 188 (H) 08/26/2019    GLUF 168 (H) 12/22/2021    CALCIUM 8 9 01/10/2022    AST 19 01/07/2022    ALT 29 01/07/2022    ALKPHOS 55 01/07/2022    EGFR 96 01/10/2022     Lab Results   Component Value Date    WBC 6 81 01/10/2022    HGB 10 8 (L) 01/10/2022    HCT 31 7 (L) 01/10/2022    MCV 85 01/10/2022     01/10/2022     Lab Results   Component Value Date    NEUTROABS 9 09 (H) 01/07/2022        Trend:  Lab Results   Component Value Date     42 7 (H) 08/16/2019

## 2022-01-26 NOTE — ASSESSMENT & PLAN NOTE
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 which was completely resected  She is recovering well from surgery  Performance status is 0   1  She has follow-up with Urology for left ureteral stent removal  2  We discussed multiple treatment options for her recurrent stromal cell tumor  There is no measurable disease  Treatment options include adjuvant chemotherapy with BEP, adjuvant radiation therapy, aromatase inhibitor therapy, combination therapy, or observation  I reviewed the risks and benefits of each treatment approach  3  Based on the risks and benefits, she would prefer to start treatment with letrozole and follow inhibin B on a monthly basis with a low threshold to transition to cytotoxic chemotherapy or radiation therapy with rising inhibin levels  She understands the risks and benefits of starting treatment with letrozole and agrees to proceed  She was provided with written information regarding potential side effects as well  4  Inhibin B prior to her next visit in 1 month  5  Referral to medical oncology for discussion regarding treatment options for her well-differentiated neuroendocrine tumor  She was reassured regarding the prognosis of this malignancy  I spent 30 minutes with the patient  More than half the time was spent in counseling and coordination of care regarding treatment of her 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

## 2022-01-26 NOTE — TELEPHONE ENCOUNTER
Intra-Department Referral    Patient Details:  Amy Garrett  1960  [id number]   Background Information:  [address] starts by opening a telephone encounter and gathering the following information   What department is patient being referred to? [facility]   Who is calling to schedule and relationship?    Physician Office     [place]   Diagnosis Neuroendocrine carcinoma of small bowel    What department was patient seen in last?       Antonina Barajas          Miscellaneous:

## 2022-01-27 RX ORDER — LETROZOLE 2.5 MG/1
TABLET, FILM COATED ORAL
Qty: 90 TABLET | Refills: 3 | Status: SHIPPED | OUTPATIENT
Start: 2022-01-27

## 2022-02-04 ENCOUNTER — CONSULT (OUTPATIENT)
Dept: HEMATOLOGY ONCOLOGY | Facility: HOSPITAL | Age: 62
End: 2022-02-04
Payer: COMMERCIAL

## 2022-02-04 VITALS
WEIGHT: 213 LBS | HEART RATE: 91 BPM | RESPIRATION RATE: 14 BRPM | TEMPERATURE: 97.6 F | DIASTOLIC BLOOD PRESSURE: 80 MMHG | HEIGHT: 63 IN | BODY MASS INDEX: 37.74 KG/M2 | OXYGEN SATURATION: 97 % | SYSTOLIC BLOOD PRESSURE: 130 MMHG

## 2022-02-04 DIAGNOSIS — C7A.8 NEUROENDOCRINE CARCINOMA OF SMALL BOWEL (HCC): ICD-10-CM

## 2022-02-04 PROCEDURE — 99245 OFF/OP CONSLTJ NEW/EST HI 55: CPT | Performed by: INTERNAL MEDICINE

## 2022-02-04 NOTE — PROGRESS NOTES
Oncology Outpatient Consult Note  Divina Lopez 59 y o  female MRN: @ Encounter: [id number]        Date:  2/4/2022        CC:  Incidental finding of a 4 mm well differentiated neuroendocrine tumor with a Ki-67 <1%  This was discovered at the time of surgery for a recurrence stromal tumor of the ovary for which she was having of exploratory laparotomy, extensive lysis of adhesions, resection of recurrent tumor and small bowel resection with reanastomosis and left ureteral stent placement  HPI:  Divina Lopez is seen for initial consultation 2/4/2022 regarding a newly diagnosed well-differentiated neuroendocrine tumor with a Ki-67 < 1% measuring 4 mm in the small bowel which was resected at the time of a cancer surgery for recurrent stromal tumor of the ovary  It appears this was an incidental finding  It was very well-differentiated and she has no evidence of carcinoid syndrome and no metastatic disease from what I can see in the liver or lung which which predisposed her to this  She has chosen to go on an aromatase inhibitor for her ovarian tumor with close monitoring with a plan to switch to cytotoxic chemotherapy if she has any evidence of progression which I think is very reasonable  I do not think she needs any further treatment for her 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  I explained as far as I was concerned her main concern right now was ovarian tumor and she was in excellent hands with Dr Glinda Oppenheim who is managing this  The patient was reassured after discussing this  She did have some anxiety related to it  Today she is otherwise at baseline  Her 14 point review of systems today was negative      Cancer Staging:  Cancer Staging  Malignant neoplasm of left ovary [facility])  Staging form: Ovary, Fallopian Tube, Primary Peritoneal, AJCC 8th Edition  - Clinical stage from 8/26/2019: FIGO Stage IC1, calculated as Stage IA (cT1a, cN0, cM0) - Signed by Carlos Singleton MD on 9/11/2019      Molecular Testing:     Previous Hematologic/ Oncologic History:    Oncology History   Malignant neoplasm of left ovary ([facility] )   8/26/2019 Initial Diagnosis    Malignant neoplasm of left ovary ([facility] )     8/26/2019 -  Cancer Staged    Staging form: Ovary, Fallopian Tube, Primary Peritoneal, AJCC 8th Edition  - Clinical stage from 8/26/2019: FIGO Stage IC1, calculated as Stage IA (cT1a, cN0, cM0) - Signed by Dontae Tenorio MD on 9/11/2019        Chemotherapy    Taxol 175 mg/m2 and carboplatin AUC 6 every 21 days  She received 5 cycles and is scheduled for cycle 6       8/26/2019 Surgery    Total abdominal hysterectomy, bilateral salpingo-oophorectomy, radical omentectomy, pelvic lymph node sampling, repair inherent cystostomy, left ureteroneocystostomy, appendectomy  -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  -intraoperative tumor rupture             Test Results:    Imaging: No results found  Labs:   Lab Results   Component Value Date    WBC 6 81 01/10/2022    HGB 10 8 (L) 01/10/2022    HCT 31 7 (L) 01/10/2022    MCV 85 01/10/2022     01/10/2022     Lab Results   Component Value Date    K 3 6 01/10/2022     01/10/2022    CO2 26 01/10/2022    BUN 6 01/10/2022    CREATININE 0 62 01/10/2022    GLUCOSE 188 (H) 08/26/2019    GLUF 168 (H) 12/22/2021    CALCIUM 8 9 01/10/2022    AST 19 01/07/2022    ALT 29 01/07/2022    ALKPHOS 55 01/07/2022    EGFR 96 01/10/2022       Lab Results   Component Value Date     42 7 (H) 08/16/2019     ROS: As stated in history of present illness otherwise her 14 point review of systems today was negative      Active Problems:   Patient Active Problem List   Diagnosis    Morbid obesity with BMI of 40 0-44 9, adult ([facility] )    Edema of left lower extremity    Malignant neoplasm of left ovary (HCC)    Motion sickness    Adrenal mass, left ([facility] )    Encounter for central line care    Type 2 diabetes mellitus without complication, without long-term current use of insulin ([facility] )    Primary hypertension    Electrolyte abnormality    Neuroendocrine carcinoma of small bowel ([facility] )       Past Medical History:   Past Medical History:   Diagnosis Date    Anxiety     Cancer ([facility] )     ovarian    CPAP (continuous positive airway pressure) dependence     Diabetes mellitus ([facility] )     High cholesterol     Hypertension     Morbid obesity ([facility] )     Ovarian neoplasm 8/26/2019    PONV (postoperative nausea and vomiting)     Post-menopausal bleeding     Sleep apnea        Surgical History:   Past Surgical History:   Procedure Laterality Date    BLADDER REPAIR N/A 8/26/2019    Procedure: REPAIR BLADDER; uretero yared cystotomy;  Surgeon: Florinda Boyd MD;  [facility];  Service: Gynecology Oncology    CHOLECYSTECTOMY      COLONOSCOPY      IR IMAGE GUIDED ASPIRATION / DRAINAGE  9/27/2019    IR PORT PLACEMENT  9/27/2019    IR PORT PLACEMENT  11/7/2019    IR PORT REMOVAL  10/29/2019    IR PORT REMOVAL  11/6/2020    HI CYSTOURETHROSCOPY,URETER CATHETER Bilateral 1/6/2022    Procedure: CYSTOSCOPY WITH INSERTION STENT URETERAL;  Surgeon: Tacho Cabrera MD;  [facility];  Service: Urology    HI LAP,FULGURATE/EXCISE LESIONS N/A 1/6/2022    Procedure: EXPLORATORY LAPAROTOMY, EXTENSIVE LYSIS OF ADHESIONS, RESECTION PELVIC MASS;  Surgeon: Juli Atwood MD;  [facility];  Service: Gynecology Oncology    HI TOTAL ABDOM HYSTERECTOMY N/A 8/26/2019    Procedure: TOTAL ABDOMINAL HYSTERECTOMY, BILATERAL SALPINGO-OOPHORECTOMY, Radical omentectomy, pelvic lymph node sampling, staging biopsy, repair of cystotomy, appendectomy;  Surgeon: Florinda Boyd MD;  [facility];  Service: Gynecology Oncology    SMALL INTESTINE SURGERY N/A 1/6/2022    Procedure: RESECTION SMALL BOWEL;  Surgeon: Juli Atwood MD;  [facility];  Service: Gynecology Oncology       Family History: No family history on file  Cancer-related family history is not on file      Social History:   Social History     Socioeconomic History    Marital status: Single     Spouse name: Not on file    Number of children: Not on file    Years of education: Not on file    Highest education level: Not on file   Occupational History    Not on file   Tobacco Use    Smoking status: Former Smoker    Smokeless tobacco: Never Used    Tobacco comment: quit in her 27's   Vaping Use    Vaping Use: Never used   Substance and Sexual Activity    Alcohol use: Not Currently    Drug use: Never    Sexual activity: Not Currently   Other Topics Concern    Not on file   Social History Narrative    Not on file     Social Determinants of Health     Financial Resource Strain: Not on file   Food Insecurity: Not on file   Transportation Needs: Not on file   Physical Activity: Not on file   Stress: Not on file   Social Connections: Not on file   Intimate Partner Violence: Not on file   Housing Stability: Not on file       Current Medications:   Current Outpatient Medications   Medication Sig Dispense Refill    amLODIPine (NORVASC) 5 mg tablet Take 5 mg by mouth daily   4    apixaban (Eliquis) 2 5 mg Take 1 tablet (2 5 mg total) by mouth 2 (two) times a day 56 tablet 0    buPROPion (WELLBUTRIN SR) 150 mg 12 hr tablet every evening   0    Cholecalciferol (VITAMIN D-3) 1000 units CAPS Take by mouth daily       escitalopram (LEXAPRO) 20 mg tablet 20 mg daily   0    letrozole (FEMARA) 2 5 mg tablet TAKE 1 TABLET(2 5 MG) BY MOUTH DAILY 90 tablet 3    metFORMIN (GLUCOPHAGE) 500 mg tablet 500 mg 2 (two) times a day with meals   0    neomycin (MYCIFRADIN) 500 mg tablet Take 1,000 mg by mouth 4 (four) times a day        scopolamine (TRANSDERM-SCOP) 1 5 mg/3 days TD 72 hr patch Place 1 patch on the skin every third day 2 patch 1    simvastatin (ZOCOR) 20 mg tablet TK 1 T PO QD IN THE KIMBELRYN  5    SUPER B COMPLEX/C PO Take by mouth daily  valsartan-hydrochlorothiazide (DIOVAN-HCT) 320-25 MG per tablet daily   0    acetaminophen (TYLENOL) 325 mg tablet Take 3 tablets (975 mg total) by mouth 3 (three) times a day 30 tablet 0    Omega 3 1200 MG CAPS Take by mouth daily      polyethylene glycol (MiraLax) 17 GM/SCOOP powder Mix with 64 oz Gatorade, begin 4 PM day before surgery per bowel prep instructions  (Patient not taking: Reported on 1/26/2022 ) 238 g 0     No current facility-administered medications for this visit  Allergies: Allergies   Allergen Reactions    Sulfites - Food Allergy Other (See Comments)     Causes flushing         Physical Exam:    Body surface area is 1 99 meters squared  Wt Readings from Last 3 Encounters:   02/04/22 96 6 kg (213 lb)   01/26/22 98 7 kg (217 lb 9 6 oz)   01/06/22 99 8 kg (220 lb)        Temp Readings from Last 3 Encounters:   02/04/22 97 6 °F (36 4 °C)   01/26/22 98 4 °F (36 9 °C) (Temporal)   01/10/22 98 5 °F (36 9 °C)        BP Readings from Last 3 Encounters:   02/04/22 130/80   01/26/22 122/62   01/10/22 139/88         Pulse Readings from Last 3 Encounters:   02/04/22 91   01/26/22 86   01/10/22 86       Physical Exam     Constitutional   General appearance: No acute distress, well appearing and well nourished  Eyes   Conjunctiva and lids: No swelling, erythema or discharge  Pupils and irises: Equal, round and reactive to light  Ears, Nose, Mouth, and Throat   External inspection of ears and nose: Normal     Nasal mucosa, septum, and turbinates: Normal without edema or erythema  Oropharynx: Normal with no erythema, edema, exudate or lesions  Pulmonary   Respiratory effort: No increased work of breathing or signs of respiratory distress  Auscultation of lungs: Clear to auscultation  Cardiovascular   Palpation of heart: Normal PMI, no thrills  Auscultation of heart: Normal rate and rhythm, normal S1 and S2, without murmurs      Examination of extremities for edema and/or varicosities: Normal     Carotid pulses: Normal     Abdomen   Abdomen: Non-tender, no masses  Liver and spleen: No hepatomegaly or splenomegaly  Lymphatic   Palpation of lymph nodes in neck: No lymphadenopathy  Musculoskeletal   Gait and station: Normal     Digits and nails: Normal without clubbing or cyanosis  Inspection/palpation of joints, bones, and muscles: Normal     Skin   Skin and subcutaneous tissue: Normal without rashes or lesions  Neurologic   Cranial nerves: Cranial nerves 2-12 intact  Sensation: No sensory loss  Psychiatric   Orientation to person, place, and time: Normal     Mood and affect: Normal           Assessment/ Plan: This is a very pleasant 20-year-old female with a newly diagnosed incidentally found 4 mm carcinoid with a Ki-67 < 1% which was found at the time of surgery for a recurrent ovarian stromal tumor  She is following with our colleagues in [address] [place] [address] for this and they have her on an aromatase inhibitor with a plan to switch to cytotoxic chemotherapy for her ovarian tumor if she has any evidence of progression  They also sent off Caris testing  In terms of her neuroendocrine tumor it had a very low proliferative index and was small in size and I suspect was just an incidental finding  I reassured the patient that this should not impact her treatment and I explained these were usually incidentally found low-grade tumors with no significant detriment to patient's quantity of life  I did explain rarely they can metastasize but at this point I think the clinical likelihood of this happening is low  There is no indication to put her on Sandostatin or lanreotide at this point  I explained if she did not have the ovarian tumor we would have just put her on observation  The patient understands and agrees with this  She will follow with our colleagues in Gynecologic Oncology and get treatment through them for her ovarian tumor    We will see her as needed  Goals and Barriers:  Current Goal: Prolong Survival from carcinoid  Barriers: None  Patient's Capacity to Self Care:  Patient able to self care  Portions of the record may have been created with voice recognition software  Occasional wrong word or "sound a like" substitutions may have occurred due to the inherent limitations of voice recognition software  Read the chart carefully and recognize, using context, where substitutions have occurred

## 2022-02-09 ENCOUNTER — TELEPHONE (OUTPATIENT)
Dept: HEMATOLOGY ONCOLOGY | Facility: CLINIC | Age: 62
End: 2022-02-09

## 2022-02-09 LAB — SCAN RESULT: NORMAL

## 2022-02-09 NOTE — TELEPHONE ENCOUNTER
RTW letter sent to patient employer at her request   Sent to fax  # [phone] to the attention of [employer]

## 2022-02-19 ENCOUNTER — APPOINTMENT (OUTPATIENT)
Dept: LAB | Facility: HOSPITAL | Age: 62
End: 2022-02-19
Attending: OBSTETRICS & GYNECOLOGY
Payer: COMMERCIAL

## 2022-02-19 DIAGNOSIS — C56.2 MALIGNANT NEOPLASM OF LEFT OVARY (HCC): ICD-10-CM

## 2022-02-19 PROCEDURE — 83520 IMMUNOASSAY QUANT NOS NONAB: CPT

## 2022-02-22 LAB — INHIBIN B SERPL-MCNC: <7 PG/ML (ref 0–16.9)

## 2022-02-24 ENCOUNTER — OFFICE VISIT (OUTPATIENT)
Dept: GYNECOLOGIC ONCOLOGY | Facility: HOSPITAL | Age: 62
End: 2022-02-24

## 2022-02-24 VITALS
WEIGHT: 212 LBS | HEART RATE: 77 BPM | HEIGHT: 63 IN | DIASTOLIC BLOOD PRESSURE: 70 MMHG | RESPIRATION RATE: 16 BRPM | SYSTOLIC BLOOD PRESSURE: 124 MMHG | BODY MASS INDEX: 37.56 KG/M2 | OXYGEN SATURATION: 98 % | TEMPERATURE: 96.6 F

## 2022-02-24 DIAGNOSIS — C56.2 MALIGNANT NEOPLASM OF LEFT OVARY (HCC): Primary | ICD-10-CM

## 2022-02-24 PROCEDURE — 99024 POSTOP FOLLOW-UP VISIT: CPT | Performed by: OBSTETRICS & GYNECOLOGY

## 2022-02-24 NOTE — LETTER
February 24, 2022     Tierney Aguiar, [address] [place] [address] Markel Howe [address]    Patient: Magdaleno Monterroso   YOB: 1960   Date of Visit: 2/24/2022       Dear Dr Magaly Kwon: Thank you for referring Magdaleno Monterroso to me for evaluation  Below are my notes for this consultation  If you have questions, please do not hesitate to call me  I look forward to following your patient along with you  Sincerely,        Casper Curiel MD        CC: No Recipients  Casper Curiel MD  2/24/2022 10:12 AM  Sign when Signing Visit  Assessment/Plan:    Problem List Items Addressed This Visit        Endocrine    Malignant neoplasm of left ovary ([facility] ) - Primary     20-year-old with recurrent stromal (sertoli-lydig) tumor of the ovary status post exploratory laparotomy, extensive lysis of adhesions, resection of recurrent tumor, small bowel resection with reanastomosis, cystoscopy with left ureteral stent placement on 1/6/2022  Mary Grace Lopez An 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 was completely resected  Her current inhibin B level as of 2/19/2022 is less than 7  She has recovered well from surgery  Performance status is 0   1  Return in 3 months for ovarian cancer surveillance  Inhibin B prior to her next visit  2  She will continue follow-up with Urology for ureteral stent removal in March  3  Continue anastrozole with the goal of reducing risk of recurrence           Relevant Orders    Inhibin B            CHIEF COMPLAINT:  Postoperative evaluation       Problem:  Cancer Staging  Malignant neoplasm of left ovary ([facility] )  Staging form: Ovary, Fallopian Tube, Primary Peritoneal, AJCC 8th Edition  - Clinical stage from 8/26/2019: FIGO Stage IC1, calculated as Stage IA (cT1a, cN0, cM0) - Signed by Sonam Fish MD on 9/11/2019        Previous therapy:  Oncology History   Malignant neoplasm of left ovary ([facility] )   8/26/2019 Initial Diagnosis    Malignant neoplasm of left ovary ([facility] )     8/26/2019 -  Cancer Staged    Staging form: Ovary, Fallopian Tube, Primary Peritoneal, AJCC 8th Edition  - Clinical stage from 8/26/2019: FIGO Stage IC1, calculated as Stage IA (cT1a, cN0, cM0) - Signed by Elisha Fernandes MD on 9/11/2019        Chemotherapy    Taxol 175 mg/m2 and carboplatin AUC 6 every 21 days  She received 5 cycles and is scheduled for cycle 6       8/26/2019 Surgery    Total abdominal hysterectomy, bilateral salpingo-oophorectomy, radical omentectomy, pelvic lymph node sampling, repair inherent cystostomy, left ureteroneocystostomy, appendectomy  -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  -intraoperative tumor rupture           Patient ID: Jay Mc is a 59 y o  female  Returns for postoperative evaluation  She fell with some injury to her abdomen  Pain is improving  No nausea or vomiting  She does not require pain medication  She is ambulating  No vaginal bleeding  No other interval change in her medications or medical history since her last visit  Inhibin B on 2/19/2022 is less than 7  Preop value was 55        The following portions of the patient's history were reviewed and updated as appropriate: allergies, current medications, past family history, past medical history, past social history, past surgical history and problem list     Review of Systems      Current Outpatient Medications:     amLODIPine (NORVASC) 5 mg tablet, Take 5 mg by mouth daily , Disp: , Rfl: 4    buPROPion (WELLBUTRIN SR) 150 mg 12 hr tablet, every evening , Disp: , Rfl: 0    Cholecalciferol (VITAMIN D-3) 1000 units CAPS, Take by mouth daily , Disp: , Rfl:     escitalopram (LEXAPRO) 20 mg tablet, 20 mg daily , Disp: , Rfl: 0    letrozole (FEMARA) 2 5 mg tablet, TAKE 1 TABLET(2 5 MG) BY MOUTH DAILY, Disp: 90 tablet, Rfl: 3    metFORMIN (GLUCOPHAGE) 500 mg tablet, 500 mg 2 (two) times a day with meals , Disp: , Rfl: 0    neomycin (MYCIFRADIN) 500 mg tablet, Take 1,000 mg by mouth 4 (four) times a day  , Disp: , Rfl:     scopolamine (TRANSDERM-SCOP) 1 5 mg/3 days TD 72 hr patch, Place 1 patch on the skin every third day, Disp: 2 patch, Rfl: 1    simvastatin (ZOCOR) 20 mg tablet, TK 1 T PO QD IN THE KIMBERLYN, Disp: , Rfl: 5    SUPER B COMPLEX/C PO, Take by mouth daily , Disp: , Rfl:     valsartan-hydrochlorothiazide (DIOVAN-HCT) 320-25 MG per tablet, daily , Disp: , Rfl: 0    acetaminophen (TYLENOL) 325 mg tablet, Take 3 tablets (975 mg total) by mouth 3 (three) times a day, Disp: 30 tablet, Rfl: 0    apixaban (Eliquis) 2 5 mg, Take 1 tablet (2 5 mg total) by mouth 2 (two) times a day, Disp: 56 tablet, Rfl: 0    Omega 3 1200 MG CAPS, Take by mouth daily, Disp: , Rfl:     polyethylene glycol (MiraLax) 17 GM/SCOOP powder, Mix with 64 oz Gatorade, begin 4 PM day before surgery per bowel prep instructions  (Patient not taking: Reported on 1/26/2022 ), Disp: 238 g, Rfl: 0    Objective:    Blood pressure 124/70, pulse 77, temperature (!) 96 6 °F (35 9 °C), temperature source Temporal, resp  rate 16, height 5' 3" (1 6 m), weight 96 2 kg (212 lb), SpO2 98 %  Body mass index is 37 55 kg/m²  Body surface area is 1 98 meters squared  Physical Exam  Vitals reviewed  Exam conducted with a chaperone present  Constitutional:       General: She is not in acute distress  Appearance: Normal appearance  HENT:      Head: Normocephalic and atraumatic  Abdominal:      Palpations: Abdomen is soft  There is no mass  Tenderness: There is no abdominal tenderness  Genitourinary:     Comments: The external female genitalia is normal  The bartholin's, uretheral and skenes glands are normal  The urethral meatus is normal (midline with no lesions)  Anus without fissure or lesion  Speculum exam reveals a grossly normal vagina  Vagina is intact, without dehiscense  No masses, lesions, discharge or bleeding  No significant cystocele or rectocele noted  Bimanual exam notes a surgical absent cervix, uterus and adnexal structures  No masses or fullness  Bladder is without fullness, mass or tenderness  Musculoskeletal:      Right lower leg: No edema  Left lower leg: No edema  Skin:     General: Skin is warm and dry  Comments: Midline vertical incision is clean dry and intact   Neurological:      General: No focal deficit present  Mental Status: She is alert and oriented to person, place, and time  Mental status is at baseline  Psychiatric:         Mood and Affect: Mood normal          Behavior: Behavior normal          Thought Content:  Thought content normal          Judgment: Judgment normal

## 2022-02-24 NOTE — ASSESSMENT & PLAN NOTE
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

## 2022-02-24 NOTE — PROGRESS NOTES
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 was completely resected  Her current inhibin B level as of 2/19/2022 is less than 7  She has recovered well from surgery  Performance status is 0   1  Return in 3 months for ovarian cancer surveillance  Inhibin B prior to her next visit  2  She will continue follow-up with Urology for ureteral stent removal in March  3  Continue anastrozole with the goal of reducing risk of recurrence  Relevant Orders    Inhibin B            CHIEF COMPLAINT:  Postoperative evaluation       Problem:  Cancer Staging  Malignant neoplasm of left ovary ([facility] )  Staging form: Ovary, Fallopian Tube, Primary Peritoneal, AJCC 8th Edition  - Clinical stage from 8/26/2019: FIGO Stage IC1, calculated as Stage IA (cT1a, cN0, cM0) - Signed by Tremayne Romero MD on 9/11/2019        Previous therapy:  Oncology History   Malignant neoplasm of left ovary ([facility] )   8/26/2019 Initial Diagnosis    Malignant neoplasm of left ovary ([facility] )     8/26/2019 -  Cancer Staged    Staging form: Ovary, Fallopian Tube, Primary Peritoneal, AJCC 8th Edition  - Clinical stage from 8/26/2019: FIGO Stage IC1, calculated as Stage IA (cT1a, cN0, cM0) - Signed by Tremayne Romero MD on 9/11/2019        Chemotherapy    Taxol 175 mg/m2 and carboplatin AUC 6 every 21 days   She received 5 cycles and is scheduled for cycle 6       8/26/2019 Surgery    Total abdominal hysterectomy, bilateral salpingo-oophorectomy, radical omentectomy, pelvic lymph node sampling, repair inherent cystostomy, left ureteroneocystostomy, appendectomy  -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  -intraoperative tumor rupture           Patient ID: Edinson Louis is a 59 y o  female  Returns for postoperative evaluation  She fell with some injury to her abdomen  Pain is improving  No nausea or vomiting  She does not require pain medication  She is ambulating  No vaginal bleeding  No other interval change in her medications or medical history since her last visit  Inhibin B on 2/19/2022 is less than 7  Preop value was 55        The following portions of the patient's history were reviewed and updated as appropriate: allergies, current medications, past family history, past medical history, past social history, past surgical history and problem list     Review of Systems      Current Outpatient Medications:     amLODIPine (NORVASC) 5 mg tablet, Take 5 mg by mouth daily , Disp: , Rfl: 4    buPROPion (WELLBUTRIN SR) 150 mg 12 hr tablet, every evening , Disp: , Rfl: 0    Cholecalciferol (VITAMIN D-3) 1000 units CAPS, Take by mouth daily , Disp: , Rfl:     escitalopram (LEXAPRO) 20 mg tablet, 20 mg daily , Disp: , Rfl: 0    letrozole (FEMARA) 2 5 mg tablet, TAKE 1 TABLET(2 5 MG) BY MOUTH DAILY, Disp: 90 tablet, Rfl: 3    metFORMIN (GLUCOPHAGE) 500 mg tablet, 500 mg 2 (two) times a day with meals , Disp: , Rfl: 0    neomycin (MYCIFRADIN) 500 mg tablet, Take 1,000 mg by mouth 4 (four) times a day  , Disp: , Rfl:     scopolamine (TRANSDERM-SCOP) 1 5 mg/3 days TD 72 hr patch, Place 1 patch on the skin every third day, Disp: 2 patch, Rfl: 1    simvastatin (ZOCOR) 20 mg tablet, TK 1 T PO QD IN THE KIMBERLYN, Disp: , Rfl: 5    SUPER B COMPLEX/C PO, Take by mouth daily , Disp: , Rfl:     valsartan-hydrochlorothiazide (DIOVAN-HCT) 320-25 MG per tablet, daily , Disp: , Rfl: 0    acetaminophen (TYLENOL) 325 mg tablet, Take 3 tablets (975 mg total) by mouth 3 (three) times a day, Disp: 30 tablet, Rfl: 0    apixaban (Eliquis) 2 5 mg, Take 1 tablet (2 5 mg total) by mouth 2 (two) times a day, Disp: 56 tablet, Rfl: 0    Omega 3 1200 MG CAPS, Take by mouth daily, Disp: , Rfl:     polyethylene glycol (MiraLax) 17 GM/SCOOP powder, Mix with 64 oz Gatorade, begin 4 PM day before surgery per bowel prep instructions  (Patient not taking: Reported on 1/26/2022 ), Disp: 238 g, Rfl: 0    Objective:    Blood pressure 124/70, pulse 77, temperature (!) 96 6 °F (35 9 °C), temperature source Temporal, resp  rate 16, height 5' 3" (1 6 m), weight 96 2 kg (212 lb), SpO2 98 %  Body mass index is 37 55 kg/m²  Body surface area is 1 98 meters squared  Physical Exam  Vitals reviewed  Exam conducted with a chaperone present  Constitutional:       General: She is not in acute distress  Appearance: Normal appearance  HENT:      Head: Normocephalic and atraumatic  Abdominal:      Palpations: Abdomen is soft  There is no mass  Tenderness: There is no abdominal tenderness  Genitourinary:     Comments: The external female genitalia is normal  The bartholin's, uretheral and skenes glands are normal  The urethral meatus is normal (midline with no lesions)  Anus without fissure or lesion  Speculum exam reveals a grossly normal vagina  Vagina is intact, without dehiscense  No masses, lesions, discharge or bleeding  No significant cystocele or rectocele noted  Bimanual exam notes a surgical absent cervix, uterus and adnexal structures  No masses or fullness  Bladder is without fullness, mass or tenderness  Musculoskeletal:      Right lower leg: No edema  Left lower leg: No edema  Skin:     General: Skin is warm and dry  Comments: Midline vertical incision is clean dry and intact   Neurological:      General: No focal deficit present  Mental Status: She is alert and oriented to person, place, and time  Mental status is at baseline     Psychiatric:         Mood and Affect: Mood normal          Behavior: Behavior normal          Thought Content:  Thought content normal          Judgment: Judgment normal

## 2022-03-18 ENCOUNTER — PROCEDURE VISIT (OUTPATIENT)
Dept: UROLOGY | Facility: HOSPITAL | Age: 62
End: 2022-03-18
Payer: COMMERCIAL

## 2022-03-18 VITALS
OXYGEN SATURATION: 99 % | HEIGHT: 63 IN | BODY MASS INDEX: 38.98 KG/M2 | DIASTOLIC BLOOD PRESSURE: 70 MMHG | HEART RATE: 94 BPM | WEIGHT: 220 LBS | SYSTOLIC BLOOD PRESSURE: 118 MMHG

## 2022-03-18 DIAGNOSIS — Z46.6 ENCOUNTER FOR REMOVAL OF URETERAL STENT: Primary | ICD-10-CM

## 2022-03-18 PROCEDURE — 99213 OFFICE O/P EST LOW 20 MIN: CPT | Performed by: UROLOGY

## 2022-03-18 PROCEDURE — 52310 CYSTOSCOPY AND TREATMENT: CPT | Performed by: UROLOGY

## 2022-03-18 NOTE — PROGRESS NOTES
HISTORY:    Now several weeks out from laparotomy and placement of ureteral catheters by us  Specifically, right side had a catheter in for few days, left side had a stent placed  That left side had been reimplanted some years before  Preop CT scan showed no hydro of that reimplanted kidney  Has had no symptoms of the stent at all  ASSESSMENT / PLAN:    Cysto stent removal today without difficulty  Patient knows to watch for a bit of blood, some burning for a day or two  She will call us if any issues    We do not need specific follow-up unless a problem develops, we will be available as needed    The following portions of the patient's history were reviewed and updated as appropriate: allergies, current medications, past family history, past medical history, past social history, past surgical history and problem list     Review of Systems   All other systems reviewed and are negative  Cystoscopy     Date/Time 3/18/2022 8:49 AM     Performed by  Juliet Méndez MD     Authorized by Juliet Méndez MD          Procedure Details:  Procedure type: simple removal of a foreign body, stone, or stent    Additional Procedure Details:     Patient presents for cystoscopy  I described the procedure, answered any questions, and we discussed potential risks and complications  Patient expressed understanding, and signed an informed consent document  The patient was carefully placed in lithotomy position on the examining table  The urethra was prepped with sterile disinfectant  The 15 French flexible cystoscope was passed in the urethra with the following findings:    Urethra:  Normal     Bladder:  Mildly inflamed and cloudy urine  Stent removed without difficulty    Residual urine estimated to be 100 mL    The patient tolerated the procedure well and was escorted from the examining table  Objective:     Physical Exam  Constitutional:       General: She is not in acute distress       Appearance: She is well-developed  She is not diaphoretic  HENT:      Head: Normocephalic and atraumatic  Eyes:      General: No scleral icterus  Pulmonary:      Effort: Pulmonary effort is normal    Skin:     Coloration: Skin is not pale  Neurological:      Mental Status: She is alert and oriented to person, place, and time  Psychiatric:         Behavior: Behavior normal          Thought Content: Thought content normal          Judgment: Judgment normal              No results found for: PSA]  BUN   Date Value Ref Range Status   01/10/2022 6 5 - 25 mg/dL Final   02/10/2020 14 7 - 25 mg/dL Final     Creatinine   Date Value Ref Range Status   01/10/2022 0 62 0 60 - 1 30 mg/dL Final     Comment:     Standardized to IDMS reference method     No components found for: CBC      Patient Active Problem List   Diagnosis    Morbid obesity with BMI of 40 0-44 9, adult ([facility] )    Edema of left lower extremity    Malignant neoplasm of left ovary (HCC)    Motion sickness    [facility]    Encounter for central line care    Type 2 diabetes mellitus without complication, without long-term current use of insulin ([facility] )    Primary hypertension    Electrolyte abnormality    Neuroendocrine carcinoma of small bowel ([facility] )        There are no diagnoses linked to this encounter  Patient ID: Mónica Waterman is a 59 y o  female        Current Outpatient Medications:     amLODIPine (NORVASC) 5 mg tablet, Take 5 mg by mouth daily , Disp: , Rfl: 4    buPROPion (WELLBUTRIN SR) 150 mg 12 hr tablet, every evening , Disp: , Rfl: 0    Cholecalciferol (VITAMIN D-3) 1000 units CAPS, Take by mouth daily , Disp: , Rfl:     escitalopram (LEXAPRO) 20 mg tablet, 20 mg daily , Disp: , Rfl: 0    letrozole (FEMARA) 2 5 mg tablet, TAKE 1 TABLET(2 5 MG) BY MOUTH DAILY, Disp: 90 tablet, Rfl: 3    metFORMIN (GLUCOPHAGE) 500 mg tablet, 500 mg 2 (two) times a day with meals , Disp: , Rfl: 0    neomycin (MYCIFRADIN) 500 mg tablet, Take 1,000 mg by mouth 4 (four) times a day  , Disp: , Rfl:     scopolamine (TRANSDERM-SCOP) 1 5 mg/3 days TD 72 hr patch, Place 1 patch on the skin every third day, Disp: 2 patch, Rfl: 1    simvastatin (ZOCOR) 20 mg tablet, TK 1 T PO QD IN THE KIMBERLYN, Disp: , Rfl: 5    SUPER B COMPLEX/C PO, Take by mouth daily , Disp: , Rfl:     valsartan-hydrochlorothiazide (DIOVAN-HCT) 320-25 MG per tablet, daily , Disp: , Rfl: 0    acetaminophen (TYLENOL) 325 mg tablet, Take 3 tablets (975 mg total) by mouth 3 (three) times a day, Disp: 30 tablet, Rfl: 0    apixaban (Eliquis) 2 5 mg, Take 1 tablet (2 5 mg total) by mouth 2 (two) times a day, Disp: 56 tablet, Rfl: 0    Omega 3 1200 MG CAPS, Take by mouth daily, Disp: , Rfl:     polyethylene glycol (MiraLax) 17 GM/SCOOP powder, Mix with 64 oz Gatorade, begin 4 PM day before surgery per bowel prep instructions   (Patient not taking: Reported on 1/26/2022 ), Disp: 238 g, Rfl: 0    Past Medical History:   Diagnosis Date    Anxiety     Cancer [facility] )     ovarian    CPAP (continuous positive airway pressure) dependence     Diabetes mellitus ([facility] )     High cholesterol     Hypertension     Morbid obesity ([facility] )     Ovarian neoplasm 8/26/2019    PONV (postoperative nausea and vomiting)     Post-menopausal bleeding     Sleep apnea        Past Surgical History:   Procedure Laterality Date    BLADDER REPAIR N/A 8/26/2019    Procedure: REPAIR BLADDER; uretero yared cystotomy;  Surgeon: Mele Mcdonald MD;  [facility];  Service: Gynecology Oncology    CHOLECYSTECTOMY      COLONOSCOPY      IR IMAGE GUIDED ASPIRATION / DRAINAGE  9/27/2019    IR PORT PLACEMENT  9/27/2019    IR PORT PLACEMENT  11/7/2019    IR PORT REMOVAL  10/29/2019    IR PORT REMOVAL  11/6/2020    IN CYSTOURETHROSCOPY,URETER CATHETER Bilateral 1/6/2022    Procedure: CYSTOSCOPY WITH INSERTION STENT URETERAL;  Surgeon: Jeff Morel MD;  [facility];  Service: Urology    IN LAP,FULGURATE/EXCISE LESIONS N/A 1/6/2022    Procedure: EXPLORATORY LAPAROTOMY, EXTENSIVE LYSIS OF ADHESIONS, RESECTION PELVIC MASS;  Surgeon: Hayes Campos MD;  [facility];  Service: Gynecology Oncology    WI TOTAL ABDOM HYSTERECTOMY N/A 8/26/2019    Procedure: TOTAL ABDOMINAL HYSTERECTOMY, BILATERAL SALPINGO-OOPHORECTOMY, Radical omentectomy, pelvic lymph node sampling, staging biopsy, repair of cystotomy, appendectomy;  Surgeon: Didier Holm MD;  [facility];  Service: Gynecology Oncology    SMALL INTESTINE SURGERY N/A 1/6/2022    Procedure: RESECTION SMALL BOWEL;  Surgeon: Hayes Campos MD;  [facility];  Service: Gynecology Oncology       Social History

## 2022-05-14 ENCOUNTER — LAB (OUTPATIENT)
Dept: LAB | Facility: HOSPITAL | Age: 62
End: 2022-05-14
Attending: OBSTETRICS & GYNECOLOGY
Payer: COMMERCIAL

## 2022-05-14 DIAGNOSIS — C56.2 MALIGNANT NEOPLASM OF LEFT OVARY (HCC): ICD-10-CM

## 2022-05-14 PROCEDURE — 83520 IMMUNOASSAY QUANT NOS NONAB: CPT

## 2022-05-16 LAB — INHIBIN B SERPL-MCNC: <0.7 PG/ML (ref 0–16.9)

## 2022-05-23 NOTE — ASSESSMENT & PLAN NOTE
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  She is recovering well from total abdominal hysterectomy, bilateral salpingo-oophorectomy, pelvic lymph node dissection, peritoneal and omental biopsies, left ureteroneocystostomy  Performance status is 0   1  H[place]cock catheter was removed today as cystogram was normal   2  I discussed the risks and benefits of chemotherapy with carboplatin at AUC 6 and Taxol 175 milligrams/meter squared to be given every 21 days for at least 4 cycles with a goal of achieving 6 cycles of treatment  I discussed the alternative which is treatment with bleomycin, etoposide, and cisplatin  I discussed the difference between the 2 regimens  I discussed that there both category 2 B as per NCCN guidelines  She understands the risks and benefits of both chemotherapy regimens and will proceed with carboplatin and Taxol  She was given written information regarding side effects  Consent was obtained by me in the office  3  CT of chest  4  Inhibin B   I spent 40 minutes with the patient and her friends  More than half the time was spent in counseling and coordination of care regarding treatment of her ovarian cancer  Only a brief time was spent on the postoperative history and physical examination  Is This A New Presentation, Or A Follow-Up?: Skin Lesions

## 2022-05-26 ENCOUNTER — OFFICE VISIT (OUTPATIENT)
Dept: GYNECOLOGIC ONCOLOGY | Facility: HOSPITAL | Age: 62
End: 2022-05-26
Payer: COMMERCIAL

## 2022-05-26 VITALS
TEMPERATURE: 97 F | BODY MASS INDEX: 37.92 KG/M2 | WEIGHT: 214 LBS | SYSTOLIC BLOOD PRESSURE: 110 MMHG | DIASTOLIC BLOOD PRESSURE: 70 MMHG | HEIGHT: 63 IN

## 2022-05-26 DIAGNOSIS — C56.2 MALIGNANT NEOPLASM OF LEFT OVARY (HCC): Primary | ICD-10-CM

## 2022-05-26 PROCEDURE — 99214 OFFICE O/P EST MOD 30 MIN: CPT | Performed by: OBSTETRICS & GYNECOLOGY

## 2022-05-26 NOTE — PROGRESS NOTES
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 that was completely resected  Inhibin B level as of 5/14/2022 was less than 7  She is tolerating letrozole therapy reasonably well with hot flashes  Clinical exam is without evidence of disease recurrence  Performance status is 0   1  Inhibin B level prior to her next visit in 3 months  2  I reviewed the risks and benefits of continuing anastrozole therapy given her menopausal symptoms  She would like to continue therapy for now  Relevant Orders    Inhibin B            CHIEF COMPLAINT:  Follow-up while on maintenance anastrozole therapy  Problem:  Cancer Staging  Malignant neoplasm of left ovary (HCC)  Staging form: Ovary, Fallopian Tube, Primary Peritoneal, AJCC 8th Edition  - Clinical stage from 8/26/2019: FIGO Stage IC1, calculated as Stage IA (cT1a, cN0, cM0) - Signed by Femi Vanessa MD on 9/11/2019        Previous therapy:  Oncology History   Malignant neoplasm of left ovary ([facility] )   8/26/2019 Initial Diagnosis    Malignant neoplasm of left ovary ([facility] )     8/26/2019 -  Cancer Staged    Staging form: Ovary, Fallopian Tube, Primary Peritoneal, AJCC 8th Edition  - Clinical stage from 8/26/2019: FIGO Stage IC1, calculated as Stage IA (cT1a, cN0, cM0) - Signed by Femi Vanessa MD on 9/11/2019        Chemotherapy    Taxol 175 mg/m2 and carboplatin AUC 6 every 21 days   She received 5 cycles and is scheduled for cycle 6       8/26/2019 Surgery    Total abdominal hysterectomy, bilateral salpingo-oophorectomy, radical omentectomy, pelvic lymph node sampling, repair inherent cystostomy, left ureteroneocystostomy, appendectomy  -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  -intraoperative tumor rupture           Patient ID: David Hagen is a 57 y o  female  Returns for follow-up  She has been tolerating anastrozole therapy reasonably well  She notes hot flashes and sweats  These have not been significantly affecting her quality of life  No vaginal bleeding, abdominal pain, or pelvic pain  No joint pain  No other interval change in her medications or medical history since her last visit  Inhibin B level as of 5/14/2022 is less than 7  The following portions of the patient's history were reviewed and updated as appropriate: allergies, current medications, past family history, past medical history, past social history, past surgical history and problem list     Review of Systems   Constitutional: Negative for activity change and unexpected weight change  HENT: Negative  Eyes: Negative  Respiratory: Negative  Cardiovascular: Negative  Gastrointestinal: Negative for abdominal distention and abdominal pain  Endocrine: Positive for heat intolerance  Genitourinary: Negative for pelvic pain and vaginal bleeding  Musculoskeletal: Negative  Skin: Negative  Allergic/Immunologic: Negative  Neurological: Negative  Hematological: Negative  Psychiatric/Behavioral: Negative          Current Outpatient Medications   Medication Sig Dispense Refill    acetaminophen (TYLENOL) 325 mg tablet Take 3 tablets (975 mg total) by mouth 3 (three) times a day 30 tablet 0    amLODIPine (NORVASC) 5 mg tablet Take 5 mg by mouth daily   4    apixaban (Eliquis) 2 5 mg Take 1 tablet (2 5 mg total) by mouth 2 (two) times a day 56 tablet 0    buPROPion (WELLBUTRIN SR) 150 mg 12 hr tablet every evening   0    Cholecalciferol (VITAMIN D-3) 1000 units CAPS Take by mouth daily       escitalopram (LEXAPRO) 20 mg tablet 20 mg daily   0    letrozole (FEMARA) 2 5 mg tablet TAKE 1 TABLET(2 5 MG) BY MOUTH DAILY 90 tablet 3    metFORMIN (GLUCOPHAGE) 500 mg tablet 500 mg 2 (two) times a day with meals   0    neomycin (MYCIFRADIN) 500 mg tablet Take 1,000 mg by mouth 4 (four) times a day        Omega 3 1200 MG CAPS Take by mouth daily      polyethylene glycol (MiraLax) 17 GM/SCOOP powder Mix with 64 oz Gatorade, begin 4 PM day before surgery per bowel prep instructions  238 g 0    scopolamine (TRANSDERM-SCOP) 1 5 mg/3 days TD 72 hr patch Place 1 patch on the skin every third day 2 patch 1    simvastatin (ZOCOR) 20 mg tablet TK 1 T PO QD IN THE KIMBERLYN  5    SUPER B COMPLEX/C PO Take by mouth daily       valsartan-hydrochlorothiazide (DIOVAN-HCT) 320-25 MG per tablet daily   0     No current facility-administered medications for this visit  Objective:    Blood pressure 110/70, temperature (!) 97 °F (36 1 °C), temperature source Tympanic, height 5' 3" (1 6 m), weight 97 1 kg (214 lb)  Body mass index is 37 91 kg/m²  Body surface area is 1 99 meters squared  Physical Exam  Vitals reviewed  Exam conducted with a chaperone present  Constitutional:       General: She is not in acute distress  Appearance: Normal appearance  She is well-developed  She is obese  She is not ill-appearing, toxic-appearing or diaphoretic  HENT:      Head: Normocephalic and atraumatic  Eyes:      Extraocular Movements: Extraocular movements intact  Neck:      Thyroid: No thyromegaly  Pulmonary:      Effort: Pulmonary effort is normal    Abdominal:      General: There is no distension  Palpations: Abdomen is soft  There is no mass  Tenderness: There is no abdominal tenderness  There is no guarding or rebound  Hernia: No hernia is present  Genitourinary:     Comments: The external female genitalia is normal  The bartholin's, uretheral and skenes glands are normal  The urethral meatus is normal (midline with no lesions)  Anus without fissure or lesion  Speculum exam reveals a grossly normal vagina   No masses, lesions,discharge or bleeding  No significant cystocele or rectocele noted  Bimanual exam notes a surgical absent cervix, uterus and adnexal structures  No masses or fullness  Bladder is without fullness, mass or tenderness  Musculoskeletal:         General: No deformity or signs of injury  Cervical back: Normal range of motion and neck supple  Right lower leg: No edema  Left lower leg: No edema  Lymphadenopathy:      Cervical: No cervical adenopathy  Skin:     General: Skin is warm and dry  Coloration: Skin is not jaundiced or pale  Findings: No bruising or erythema  Neurological:      General: No focal deficit present  Mental Status: She is alert and oriented to person, place, and time  Mental status is at baseline  Cranial Nerves: No cranial nerve deficit  Motor: No weakness  Gait: Gait normal    Psychiatric:         Mood and Affect: Mood normal          Behavior: Behavior normal          Thought Content:  Thought content normal          Judgment: Judgment normal

## 2022-05-26 NOTE — ASSESSMENT & PLAN NOTE
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

## 2022-08-20 ENCOUNTER — APPOINTMENT (OUTPATIENT)
Dept: LAB | Facility: HOSPITAL | Age: 62
End: 2022-08-20
Attending: OBSTETRICS & GYNECOLOGY
Payer: COMMERCIAL

## 2022-08-20 DIAGNOSIS — C56.2 MALIGNANT NEOPLASM OF LEFT OVARY (HCC): ICD-10-CM

## 2022-08-20 PROCEDURE — 83520 IMMUNOASSAY QUANT NOS NONAB: CPT

## 2022-08-22 LAB — INHIBIN B SERPL-MCNC: <7 PG/ML (ref 0–16.9)

## 2022-08-25 ENCOUNTER — OFFICE VISIT (OUTPATIENT)
Dept: GYNECOLOGIC ONCOLOGY | Facility: HOSPITAL | Age: 62
End: 2022-08-25
Payer: COMMERCIAL

## 2022-08-25 VITALS
OXYGEN SATURATION: 98 % | DIASTOLIC BLOOD PRESSURE: 64 MMHG | HEART RATE: 89 BPM | SYSTOLIC BLOOD PRESSURE: 118 MMHG | BODY MASS INDEX: 37.91 KG/M2 | HEIGHT: 63 IN | TEMPERATURE: 98.3 F | RESPIRATION RATE: 17 BRPM

## 2022-08-25 DIAGNOSIS — N90.4 LICHEN SCLEROSUS ET ATROPHICUS OF THE VULVA: ICD-10-CM

## 2022-08-25 DIAGNOSIS — Z08 ENCOUNTER FOR FOLLOW-UP SURVEILLANCE OF OVARIAN CANCER: ICD-10-CM

## 2022-08-25 DIAGNOSIS — Z85.43 ENCOUNTER FOR FOLLOW-UP SURVEILLANCE OF OVARIAN CANCER: ICD-10-CM

## 2022-08-25 DIAGNOSIS — Z91.89 AT HIGH RISK FOR OSTEOPOROSIS: ICD-10-CM

## 2022-08-25 DIAGNOSIS — C56.2 MALIGNANT NEOPLASM OF LEFT OVARY (HCC): Primary | ICD-10-CM

## 2022-08-25 PROCEDURE — 99214 OFFICE O/P EST MOD 30 MIN: CPT | Performed by: PHYSICIAN ASSISTANT

## 2022-08-25 RX ORDER — CLOBETASOL PROPIONATE 0.5 MG/G
OINTMENT TOPICAL
Qty: 30 G | Refills: 1 | Status: SHIPPED | OUTPATIENT
Start: 2022-08-25

## 2022-08-25 NOTE — PROGRESS NOTES
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 in January 2022  She is clinically without evidence of disease recurrence  Her inhibin B is normal  She continues on letrozole therapy and is tolerating well  Return to the office in 3 months with a pre-visit inhibin B  Continue letrozole  Relevant Orders    DXA bone density spine hip and pelvis    Inhibin B       Genitourinary    Lichen sclerosus et atrophicus of the vulva     Worsening vulvar irritation with clinical exam findings consistent with lichen sclerosus  Plan to start clobetasol 0 05% ointment  Relevant Medications    clobetasol (TEMOVATE) 0 05 % ointment    Other Relevant Orders    DXA bone density spine hip and pelvis       Other    Encounter for follow-up surveillance of ovarian cancer      Other Visit Diagnoses     At high risk for osteoporosis        Relevant Orders    DXA bone density spine hip and pelvis            CHIEF COMPLAINT:   Ovarian cancer surveillance    Problem:  Cancer Staging  Malignant neoplasm of left ovary ([place] [id number] )  Staging form: Ovary, Fallopian Tube, Primary Peritoneal, AJCC 8th Edition  - Clinical stage from 8/26/2019: FIGO Stage IC1, calculated as Stage IA (cT1a, cN0, cM0) - Signed by Iris Oseguera MD on 9/11/2019        Previous therapy:  Oncology History   Malignant neoplasm of left ovary ([facility] )   8/26/2019 Initial Diagnosis    Malignant neoplasm of left ovary ([place] [id number] )     8/26/2019 -  Cancer Staged    Staging form: Ovary, Fallopian Tube, Primary Peritoneal, AJCC 8th Edition  - Clinical stage from 8/26/2019: FIGO Stage IC1, calculated as Stage IA (cT1a, cN0, cM0) - Signed by Iris Oseguera MD on 9/11/2019        Chemotherapy    Taxol 175 mg/m2 and carboplatin AUC 6 every 21 days   She received 5 cycles and is scheduled for cycle 6       8/26/2019 Surgery    Total abdominal hysterectomy, bilateral salpingo-oophorectomy, radical omentectomy, pelvic lymph node sampling, repair inherent cystostomy, left ureteroneocystostomy, appendectomy  -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  -intraoperative tumor rupture           Patient ID: Mónica Waterman is a 57 y o  female  who presents to the office for ovarian cancer surveillance  Overall, she has been well in the interim  She denies n/v/abdominal pain  Appetite is appropriate  Normal bowel/bladder function  The patient notes vulvar irritation, which is worsening, and primarily at nighttime  She is tolerating letrozole without significant complaints  Her inhibin B from 8/20/22 was reviewed and normal        The following portions of the patient's history were reviewed and updated as appropriate: allergies, current medications, past medical history, past surgical history and problem list     Review of Systems   Constitutional: Negative  HENT: Negative  Eyes: Negative  Respiratory: Negative  Cardiovascular: Negative  Gastrointestinal: Negative  Genitourinary: Negative  Musculoskeletal: Negative  Skin: Negative  Neurological: Negative  Psychiatric/Behavioral: Negative          Current Outpatient Medications   Medication Sig Dispense Refill    amLODIPine (NORVASC) 5 mg tablet Take 5 mg by mouth daily   4    buPROPion (WELLBUTRIN SR) 150 mg 12 hr tablet every evening   0    Cholecalciferol (VITAMIN D-3) 1000 units CAPS Take by mouth daily       clobetasol (TEMOVATE) 0 05 % ointment Apply to the affected area 1-3x/week 30 g 1    escitalopram (LEXAPRO) 20 mg tablet 20 mg daily   0    letrozole (FEMARA) 2 5 mg tablet TAKE 1 TABLET(2 5 MG) BY MOUTH DAILY 90 tablet 3    metFORMIN (GLUCOPHAGE) 500 mg tablet 500 mg 2 (two) times a day with meals   0    scopolamine (TRANSDERM-SCOP) 1 5 mg/3 days TD 72 hr patch Place 1 patch on the skin every third day 2 patch 1    simvastatin (ZOCOR) 20 mg tablet TK 1 T PO QD IN THE KIMBERLYN  5    SUPER B COMPLEX/C PO Take by mouth daily       valsartan-hydrochlorothiazide (DIOVAN-HCT) 320-25 MG per tablet daily   0    acetaminophen (TYLENOL) 325 mg tablet Take 3 tablets (975 mg total) by mouth 3 (three) times a day 30 tablet 0    apixaban (Eliquis) 2 5 mg Take 1 tablet (2 5 mg total) by mouth 2 (two) times a day 56 tablet 0    neomycin (MYCIFRADIN) 500 mg tablet Take 1,000 mg by mouth 4 (four) times a day        Omega 3 1200 MG CAPS Take by mouth daily      polyethylene glycol (MiraLax) 17 GM/SCOOP powder Mix with 64 oz Gatorade, begin 4 PM day before surgery per bowel prep instructions  238 g 0     No current facility-administered medications for this visit  Objective:    Blood pressure 118/64, pulse 89, temperature 98 3 °F (36 8 °C), temperature source Temporal, resp  rate 17, height 5' 3" (1 6 m), SpO2 98 %  Body mass index is 37 91 kg/m²  Body surface area is 1 99 meters squared  Physical Exam  Vitals reviewed  Exam conducted with a chaperone present  Constitutional:       General: She is not in acute distress  Appearance: Normal appearance  She is not ill-appearing  HENT:      Head: Normocephalic and atraumatic  Mouth/Throat:      Mouth: Mucous membranes are moist    Eyes:      General:         Right eye: No discharge  Left eye: No discharge  Conjunctiva/sclera: Conjunctivae normal    Pulmonary:      Effort: Pulmonary effort is normal    Abdominal:      Palpations: Abdomen is soft  There is no mass  Tenderness: There is no abdominal tenderness  Hernia: No hernia is present  Genitourinary:     Comments: Loss of labia minora bilaterally with hypopigmentation  The bartholin's, uretheral and skenes glands are normal  The urethral meatus is normal (midline with no lesions)  Anus without fissure or lesion  Speculum exam reveals a grossly normal vagina   No masses, lesions,discharge or bleeding  No significant cystocele or rectocele noted  Bimanual exam notes a surgical absent cervix, uterus and adnexal structures  No masses or fullness  Bladder is without fullness, mass or tenderness  Musculoskeletal:      Right lower leg: No edema  Left lower leg: No edema  Skin:     General: Skin is warm and dry  Coloration: Skin is not jaundiced  Findings: No rash  Neurological:      General: No focal deficit present  Mental Status: She is alert and oriented to person, place, and time  Cranial Nerves: No cranial nerve deficit  Sensory: No sensory deficit  Motor: No weakness  Gait: Gait normal    Psychiatric:         Mood and Affect: Mood normal          Behavior: Behavior normal          Thought Content:  Thought content normal          Judgment: Judgment normal        Lab Results   Component Value Date    INHBB <7 0 08/20/2022    INHBB <0 7 05/14/2022    INHBB <7 0 02/19/2022    INHBB 55 0 (H) 12/04/2021    INHBB <7 0 06/05/2021    INHBB 7 6 03/06/2021

## 2022-08-26 PROBLEM — Z85.43 ENCOUNTER FOR FOLLOW-UP SURVEILLANCE OF OVARIAN CANCER: Status: ACTIVE | Noted: 2022-08-26

## 2022-08-26 PROBLEM — Z08 ENCOUNTER FOR FOLLOW-UP SURVEILLANCE OF OVARIAN CANCER: Status: ACTIVE | Noted: 2022-08-26

## 2022-08-26 PROBLEM — N90.4 LICHEN SCLEROSUS ET ATROPHICUS OF THE VULVA: Status: ACTIVE | Noted: 2022-08-26

## 2022-08-26 NOTE — ASSESSMENT & PLAN NOTE
Worsening vulvar irritation with clinical exam findings consistent with lichen sclerosus  Plan to start clobetasol 0 05% ointment

## 2022-08-26 NOTE — ASSESSMENT & PLAN NOTE
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

## 2022-10-06 ENCOUNTER — HOSPITAL ENCOUNTER (OUTPATIENT)
Dept: BONE DENSITY | Facility: IMAGING CENTER | Age: 62
Discharge: HOME | End: 2022-10-06
Payer: COMMERCIAL

## 2022-10-06 DIAGNOSIS — N90.4 LICHEN SCLEROSUS ET ATROPHICUS OF THE VULVA: ICD-10-CM

## 2022-10-06 DIAGNOSIS — C56.2 MALIGNANT NEOPLASM OF LEFT OVARY (HCC): ICD-10-CM

## 2022-10-06 DIAGNOSIS — Z91.89 AT HIGH RISK FOR OSTEOPOROSIS: ICD-10-CM

## 2022-10-06 PROCEDURE — 77080 DXA BONE DENSITY AXIAL: CPT

## 2022-11-01 NOTE — ANESTHESIA PREPROCEDURE EVALUATION
Cecily Abdalla is a 3 year old female year old that presents to [facility] at [facility] for complaints of  An asthma exacerbation.  Increased use of albuterol due to wheezing for 3-4 days.  Mild cold symptoms.  Albuterol only partially helping now.    There is no problem list on file for this patient.    No current outpatient medications on file.     No current facility-administered medications for this visit.     Family history is noncontributory    Social History - denies tobacco, alcohol, illicit drug use  Travel History/sick contacts are negative    ROS- constitutional- denies fevers/chills/fatigue and weight loss, cancer   Head and neck - denies sore throat, ear pain, headache   Cardiovascular- no chest pain palpitations, orthopnes, PND, edema   Pulmonary- no hemoptysis   GI-denies nausea, vomiting, diarrhea, hematemesis, melena, BRBPR   -denies dysuria, discharge   MSK-no joint neck or back pain, pathologic fracture, osteoporosis, trauma   Neuro- no numbness, weakness    Endo- no diabetes, polydipsia, polyuria    All other systems reviewed and otherwise negative     Comprehensive Physical Exam  Visit Vitals  Pulse 118   Temp 100.4 °F (38 °C)   Resp (!) 20   Wt 19.5 kg (43 lb)   SpO2 97%     No acute distress, non toxic in appearance  Psychoneuro- appropriate affect, alert and oriented times three    Cranial Nerves 2-12 intact    Eyes- sclera anicteric, EOMI, conjunctiva clear, anicteric, no lid lag, CAROLINE    HENT- TM's clear, oropharynx - moist mucus membranes, airways patent, tonsils within normal limits. Mostly clear rhinorhea    Neck- trachea midline, full range of motion, no thyromegaly or lymphadenopathy    Lungs-wheeze noted bilaterally, normal respiratory effort, no intercostal retractions    Cardiovascular-S1 S2 present, RRR, no murmurs, rubs or gallop      Extremities-no peripheral edema or lymphadenopathy    Skin- normal temperature, turgor, texture- no rash, ulcers, or subcutaneous  Review of Systems/Medical History  Patient summary reviewed  Chart reviewed  History of anesthetic complications (PONV with multiple anesthetics) PONV    Cardiovascular  Hyperlipidemia, Hypertension , No past MI , No CAD , No history of CABG, No cardiac stents  No history of percutaneous transluminal coronary angioplasty, No dysrhythmias , No angina , No orthopnea, No PND, No GIORDANO,    Pulmonary  No shortness of breath, No recent URI , Sleep apnea CPAP,        GI/Hepatic  Negative GI/hepatic ROS   No GERD ,             Endo/Other  Diabetes well controlled type 2 Oral agent,   Obesity  morbid obesity   GYN      Comment: Complex adnexal masses     Hematology  Negative hematology ROS No anemia ,     Musculoskeletal  Negative musculoskeletal ROS        Neurology    No TIA, No CVA , No motor deficit ,    Psychology   Anxiety,              Physical Exam    Airway    Mallampati score: II  TM Distance: >3 FB  Neck ROM: full     Dental   No notable dental hx     Cardiovascular  Rhythm: regular, Rate: normal,     Pulmonary  Breath sounds clear to auscultation,     Other Findings        Anesthesia Plan  ASA Score- 3     Anesthesia Type- epidural and general with ASA Monitors  Additional Monitors:   Airway Plan: ETT  Comment: Epidural requested by Dr Felix Stokes for post operative analgesia, GETA, Large Bore IV x2, Multimodal analgesia   Hx of PONV, Vertigo, Motion sickness- multimodal ponv prophylaxis and treatment        Plan Factors-    Induction- intravenous  Postoperative Plan- Plan for postoperative opioid use  Informed Consent- Anesthetic plan and risks discussed with patient  I personally reviewed this patient with the CRNA  Discussed and agreed on the Anesthesia Plan with the CRNA  Vivi Puente nodules    Assesment  Asthma exacerbation    Plan        Cecily had no medications administered during this visit.  Continue use of inhalers as needed    Side effects of all medications were discussed.  Patient is instructed to follow up in 2-3 days or as needed.  Patient is to go to the emergency room for any significant change or worsening.  Patient was discharged in a stable condition and was in agreement with the plan.  No further questions.    Marky Phillips, DO

## 2022-11-26 ENCOUNTER — LAB (OUTPATIENT)
Dept: LAB | Facility: HOSPITAL | Age: 62
End: 2022-11-26

## 2022-11-26 DIAGNOSIS — C56.2 MALIGNANT NEOPLASM OF LEFT OVARY (HCC): ICD-10-CM

## 2022-11-28 LAB — INHIBIN B SERPL-MCNC: 9.2 PG/ML (ref 0–16.9)

## 2022-11-29 ENCOUNTER — TELEPHONE (OUTPATIENT)
Dept: GYNECOLOGIC ONCOLOGY | Facility: CLINIC | Age: 62
End: 2022-11-29

## 2022-11-29 DIAGNOSIS — R79.89 HIGH SERUM INHIBIN B: ICD-10-CM

## 2022-11-29 DIAGNOSIS — C56.2 MALIGNANT NEOPLASM OF LEFT OVARY (HCC): Primary | ICD-10-CM

## 2022-11-29 NOTE — TELEPHONE ENCOUNTER
Called to inform patient of CT Scan appointment that is scheduled for 12/1/22 @ 2pm at the [facility]  Instructed the patient to go to the [facility] location to  her oral contrast prior to her test  Instructed the patient to call the office with any questions or concerns

## 2022-11-29 NOTE — RESULT ENCOUNTER NOTE
Discussed inhibin B result with patient  Plan for CT C/A/P  Follow-up appt rescheduled to 12/8 to allow time for imaging 
Universal Safety Interventions

## 2022-12-01 ENCOUNTER — HOSPITAL ENCOUNTER (OUTPATIENT)
Dept: CT IMAGING | Facility: HOSPITAL | Age: 62
Discharge: HOME | End: 2022-12-01

## 2022-12-01 DIAGNOSIS — R79.89 HIGH SERUM INHIBIN B: ICD-10-CM

## 2022-12-01 DIAGNOSIS — C56.2 MALIGNANT NEOPLASM OF LEFT OVARY (HCC): ICD-10-CM

## 2022-12-01 RX ADMIN — IOHEXOL 100 ML: 350 INJECTION, SOLUTION INTRAVENOUS at 14:02

## 2022-12-08 ENCOUNTER — OFFICE VISIT (OUTPATIENT)
Dept: GYNECOLOGIC ONCOLOGY | Facility: HOSPITAL | Age: 62
End: 2022-12-08

## 2022-12-08 VITALS
SYSTOLIC BLOOD PRESSURE: 116 MMHG | HEIGHT: 63 IN | BODY MASS INDEX: 37.92 KG/M2 | WEIGHT: 214 LBS | DIASTOLIC BLOOD PRESSURE: 76 MMHG | TEMPERATURE: 97 F

## 2022-12-08 DIAGNOSIS — C56.2 MALIGNANT NEOPLASM OF LEFT OVARY (HCC): Primary | ICD-10-CM

## 2022-12-08 RX ORDER — MEGESTROL ACETATE 40 MG/1
TABLET ORAL
Qty: 120 TABLET | Refills: 3 | Status: SHIPPED | OUTPATIENT
Start: 2022-12-08

## 2022-12-08 NOTE — ASSESSMENT & PLAN NOTE
58-year-old with a history of recurrent Sertoli-Leydig tumor of the ovary most recently status post resection of a pelvic recurrence in January 2022  I reviewed her CBC, CMP, inhibin B level, CT of chest abdomen and pelvis images  Inhibin B has risen from less than 7 to 9 2  She is clinically and radiologically without evidence of disease  Her performance status is 0   1   We discussed options including continuing letrozole versus switching to high-dose Megace therapy given genomic testing versus stopping hormone therapy  2   Based on genomic testing and a rising inhibin B level, will stop letrozole therapy and start Megace at 80 mg twice daily given the high progesterone expression in her malignancy  She understands that efficacy of Megace in the setting is unclear  3   Given that she does not have measurable disease, will continue to trend inhibin B level  If the inhibin B continues to rise, PET/CT imaging can be performed  4   With local, measurable recurrence of the pelvis, radiation would be favored  If there is disease outside the pelvis nonamenable to radiation, chemotherapy with bleomycin, etoposide and cisplatin would be considered  4   Plan to repeat her inhibin B in 2 months  If inhibin B is stable or reduced, Megace will be continued

## 2022-12-08 NOTE — PROGRESS NOTES
Assessment/Plan:    Problem List Items Addressed This Visit        Endocrine    Malignant neoplasm of left ovary ([facility] ) - Primary     58-year-old with a history of recurrent Sertoli-Leydig tumor of the ovary most recently status post resection of a pelvic recurrence in January 2022  I reviewed her CBC, CMP, inhibin B level, CT of chest abdomen and pelvis images  Inhibin B has risen from less than 7 to 9 2  She is clinically and radiologically without evidence of disease  Her performance status is 0   1   We discussed options including continuing letrozole versus switching to high-dose Megace therapy given genomic testing versus stopping hormone therapy  2   Based on genomic testing and a rising inhibin B level, will stop letrozole therapy and start Megace at 80 mg twice daily given the high progesterone expression in her malignancy  She understands that efficacy of Megace in the setting is unclear  3   Given that she does not have measurable disease, will continue to trend inhibin B level  If the inhibin B continues to rise, PET/CT imaging can be performed  4   With local, measurable recurrence of the pelvis, radiation would be favored  If there is disease outside the pelvis nonamenable to radiation, chemotherapy with bleomycin, etoposide and cisplatin would be considered  4   Plan to repeat her inhibin B in 2 months  If inhibin B is stable or reduced, Megace will be continued           Relevant Medications    megestrol (MEGACE) 40 mg tablet    Other Relevant Orders    Inhibin B         CHIEF COMPLAINT:       Problem:  Cancer Staging   Malignant neoplasm of left ovary ([facility])  Staging form: Ovary, Fallopian Tube, Primary Peritoneal, AJCC 8th Edition  - Clinical stage from 8/26/2019: FIGO Stage IC1, calculated as Stage IA (cT1a, cN0, cM0) - Signed by Michelle Garces MD on 9/11/2019        Previous therapy:  Oncology History   Malignant neoplasm of left ovary ([facility] )   8/26/2019 Initial Diagnosis    Malignant neoplasm of left ovary ([facility] )     8/26/2019 -  Cancer Staged    Staging form: Ovary, Fallopian Tube, Primary Peritoneal, AJCC 8th Edition  - Clinical stage from 8/26/2019: FIGO Stage IC1, calculated as Stage IA (cT1a, cN0, cM0) - Signed by Sascha Meraz MD on 9/11/2019        Chemotherapy    Taxol 175 mg/m2 and carboplatin AUC 6 every 21 days  She received 5 cycles and is scheduled for cycle 6       8/26/2019 Surgery    Total abdominal hysterectomy, bilateral salpingo-oophorectomy, radical omentectomy, pelvic lymph node sampling, repair inherent cystostomy, left ureteroneocystostomy, appendectomy  -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  -intraoperative tumor rupture     1/6/2022 Surgery    Xlap, extensive RADHA, resection pelvic mass, small bowel resection  - recurrrent sex cord stromal tumor  -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, margins negative     1/6/2022 Genomic Testing    Caris- [place]+, PD-L1 neg, MMR intact  Patient ID: Antonio Walton is a 57 y o  female  Who returns for evaluation  She is asymptomatic  She has been taking letrozole for recurrent Sertoli-Leydig cell tumor of the pelvis status post resection in January 2022  Inhibin B November 26, 2022 has risen to 9 2 from a prior value of less than 7  CBC and CMP were normal on November 2, 2022  She had a CT scan of the chest abdomen pelvis given the rise in inhibin which revealed small stable bilateral lung nodules  There was no other evidence of measurable disease  No other interval change in medications or medical history since her last visit  Quality of life is good        The following portions of the patient's history were reviewed and updated as appropriate: allergies, current medications, past family history, past medical history, past social history, past surgical history and problem list     Review of Systems   Constitutional: Negative for activity change and unexpected weight change  HENT: Negative  Eyes: Negative  Respiratory: Negative  Cardiovascular: Negative  Gastrointestinal: Negative for abdominal distention and abdominal pain  Endocrine: Negative  Genitourinary: Negative for pelvic pain and vaginal bleeding  Musculoskeletal: Negative  Skin: Negative  Allergic/Immunologic: Negative  Neurological: Negative  Hematological: Negative  Psychiatric/Behavioral: Negative  Current Outpatient Medications   Medication Sig Dispense Refill   • acetaminophen (TYLENOL) 325 mg tablet Take 3 tablets (975 mg total) by mouth 3 (three) times a day 30 tablet 0   • amLODIPine (NORVASC) 5 mg tablet Take 5 mg by mouth daily   4   • apixaban (Eliquis) 2 5 mg Take 1 tablet (2 5 mg total) by mouth 2 (two) times a day 56 tablet 0   • buPROPion (WELLBUTRIN SR) 150 mg 12 hr tablet every evening   0   • Cholecalciferol (VITAMIN D-3) 1000 units CAPS Take by mouth daily      • clobetasol (TEMOVATE) 0 05 % ointment Apply to the affected area 1-3x/week 30 g 1   • escitalopram (LEXAPRO) 20 mg tablet 20 mg daily   0   • letrozole (FEMARA) 2 5 mg tablet TAKE 1 TABLET(2 5 MG) BY MOUTH DAILY 90 tablet 3   • metFORMIN (GLUCOPHAGE) 500 mg tablet 500 mg 2 (two) times a day with meals   0   • neomycin (MYCIFRADIN) 500 mg tablet Take 1,000 mg by mouth 4 (four) times a day       • Omega 3 1200 MG CAPS Take by mouth daily     • polyethylene glycol (MiraLax) 17 GM/SCOOP powder Mix with 64 oz Gatorade, begin 4 PM day before surgery per bowel prep instructions  238 g 0   • scopolamine (TRANSDERM-SCOP) 1 5 mg/3 days TD 72 hr patch Place 1 patch on the skin every third day 2 patch 1   • simvastatin (ZOCOR) 20 mg tablet TK 1 T PO QD IN THE KIMBERLYN  5   • SUPER B COMPLEX/C PO Take by mouth daily      • valsartan-hydrochlorothiazide (DIOVAN-HCT) 320-25 MG per tablet daily   0     No current facility-administered medications for this visit             Objective:    Blood pressure 116/76, temperature (!) 97 °F (36 1 °C), temperature source Tympanic, height 5' 3" (1 6 m), weight 97 1 kg (214 lb)  Body mass index is 37 91 kg/m²  Body surface area is 1 99 meters squared  Physical Exam  Vitals reviewed  Exam conducted with a chaperone present  Constitutional:       General: She is not in acute distress  Appearance: Normal appearance  She is well-developed  She is obese  She is not ill-appearing, toxic-appearing or diaphoretic  HENT:      Head: Normocephalic and atraumatic  Eyes:      General: No scleral icterus  Extraocular Movements: Extraocular movements intact  Conjunctiva/sclera: Conjunctivae normal    Neck:      Thyroid: No thyromegaly  Pulmonary:      Effort: Pulmonary effort is normal    Abdominal:      General: There is no distension  Palpations: Abdomen is soft  There is no mass  Tenderness: There is no abdominal tenderness  There is no guarding or rebound  Hernia: No hernia is present  Genitourinary:     Comments: The external female genitalia is normal  The bartholin's, uretheral and skenes glands are normal  The urethral meatus is normal (midline with no lesions)  Anus without fissure or lesion  Speculum exam reveals a grossly normal vagina  No masses, lesions,discharge or bleeding  No significant cystocele or rectocele noted  Bimanual exam notes a surgical absent cervix, uterus and adnexal structures  No masses or fullness  Bladder is without fullness, mass or tenderness  Musculoskeletal:         General: No swelling or tenderness  Cervical back: Normal range of motion and neck supple  Right lower leg: No edema  Left lower leg: No edema  Lymphadenopathy:      Cervical: No cervical adenopathy  Skin:     General: Skin is warm and dry  Coloration: Skin is not jaundiced or pale  Findings: No lesion or rash  Neurological:      General: No focal deficit present        Mental Status: She is alert and oriented to person, place, and time  Cranial Nerves: No cranial nerve deficit  Motor: No weakness  Gait: Gait normal    Psychiatric:         Mood and Affect: Mood normal          Behavior: Behavior normal          Thought Content:  Thought content normal          Judgment: Judgment normal          Lab Results   Component Value Date     42 7 (H) 08/16/2019     Lab Results   Component Value Date    K 3 6 01/10/2022     01/10/2022    CO2 26 01/10/2022    BUN 6 01/10/2022    CREATININE 0 62 01/10/2022    GLUCOSE 188 (H) 08/26/2019    GLUF 168 (H) 12/22/2021    CALCIUM 8 9 01/10/2022    AST 19 01/07/2022    ALT 29 01/07/2022    ALKPHOS 55 01/07/2022    EGFR 96 01/10/2022     Lab Results   Component Value Date    WBC 6 81 01/10/2022    HGB 10 8 (L) 01/10/2022    HCT 31 7 (L) 01/10/2022    MCV 85 01/10/2022     01/10/2022     Lab Results   Component Value Date    NEUTROABS 9 09 (H) 01/07/2022        Trend:  Lab Results   Component Value Date     42 7 (H) 08/16/2019

## 2023-02-25 ENCOUNTER — APPOINTMENT (OUTPATIENT)
Dept: LAB | Facility: HOSPITAL | Age: 63
End: 2023-02-25
Attending: OBSTETRICS & GYNECOLOGY

## 2023-02-25 DIAGNOSIS — C56.2 MALIGNANT NEOPLASM OF LEFT OVARY (HCC): ICD-10-CM

## 2023-02-27 LAB — INHIBIN B SERPL-MCNC: <7 PG/ML (ref 0–16.9)

## 2023-03-03 ENCOUNTER — TELEPHONE (OUTPATIENT)
Dept: GYNECOLOGIC ONCOLOGY | Facility: CLINIC | Age: 63
End: 2023-03-03

## 2023-03-03 NOTE — TELEPHONE ENCOUNTER
Left patient message to confirm new address for appointment with Dipesh Yanez on 3/9 in [facility]

## 2023-03-09 ENCOUNTER — OFFICE VISIT (OUTPATIENT)
Age: 63
End: 2023-03-09

## 2023-03-09 VITALS
WEIGHT: 227 LBS | DIASTOLIC BLOOD PRESSURE: 74 MMHG | BODY MASS INDEX: 40.22 KG/M2 | HEIGHT: 63 IN | SYSTOLIC BLOOD PRESSURE: 128 MMHG | TEMPERATURE: 98.7 F

## 2023-03-09 DIAGNOSIS — Z08 ENCOUNTER FOR FOLLOW-UP SURVEILLANCE OF OVARIAN CANCER: ICD-10-CM

## 2023-03-09 DIAGNOSIS — C56.2 MALIGNANT NEOPLASM OF LEFT OVARY (HCC): Primary | ICD-10-CM

## 2023-03-09 DIAGNOSIS — Z85.43 ENCOUNTER FOR FOLLOW-UP SURVEILLANCE OF OVARIAN CANCER: ICD-10-CM

## 2023-03-09 NOTE — ASSESSMENT & PLAN NOTE
History of recurrent sertoli-leydig tumor of the ovary s/p resection of pelvic recurrence in January 2022  She has previously been managed on letrozole but transitioned to megace due to slightly elevated inhibin B and genomic testing  She is tolerating megace well  Her inhibin B has normalized  She is clinically without evidence of disease recurrence  Continue megace 80 mg BID  Return to the office in 3 months with a pre-visit inhibin B  Patient will call the office sooner with new symptom development

## 2023-03-09 NOTE — PROGRESS NOTES
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 in January 2022  She has previously been managed on letrozole but transitioned to megace due to slightly elevated inhibin B and genomic testing  She is tolerating megace well  Her inhibin B has normalized  She is clinically without evidence of disease recurrence  Continue megace 80 mg BID  Return to the office in 3 months with a pre-visit inhibin B  Patient will call the office sooner with new symptom development  Relevant Orders    Inhibin B       Other    Encounter for follow-up surveillance of ovarian cancer         CHIEF COMPLAINT:   Ovarian cancer surveillance    Problem:  Cancer Staging   Malignant neoplasm of left ovary ([facility] )  Staging form: Ovary, Fallopian Tube, Primary Peritoneal, AJCC 8th Edition  - Clinical stage from 8/26/2019: FIGO Stage IC1, calculated as Stage IA (cT1a, cN0, cM0) - Signed by Toi Hough MD on 9/11/2019        Previous therapy:  Oncology History   [facility]   8/26/2019 Initial Diagnosis    Malign[facility] ([facility] )     8/26/2019 -  Cancer Staged    Staging form: Ovary, Fallopian Tube, Primary Peritoneal, AJCC 8th Edition  - Clinical stage from 8/26/2019: FIGO Stage IC1, calculated as Stage IA (cT1a, cN0, cM0) - Signed by Toi Hough MD on 9/11/2019        Chemotherapy    Taxol 175 mg/m2 and carboplatin AUC 6 every 21 days   She received 5 cycles and is scheduled for cycle 6       8/26/2019 Surgery    Total abdominal hysterectomy, bilateral salpingo-oophorectomy, radical omentectomy, pelvic lymph node sampling, repair inherent cystostomy, left ureteroneocystostomy, appendectomy  -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  -intraoperative tumor rupture     1/6/2022 Surgery Xlap, extensive RADHA, resection pelvic mass, small bowel resection  - recurrrent sex cord stromal tumor  -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, margins negative     1/6/2022 Genomic Testing    Caris- [place]+, PD-L1 neg, MMR intact  12/2022 -  Hormone Therapy    Megace 80 mg PO BID (starting inhibin B, 9 2 pg/ml)           Patient ID: Walt Cleveland is a 57 y o  female  who has no new complaints today  No vaginal bleeding, abdominal/pelvic pain  Normal bowel and bladder function  She was transitioned to megace approximately 2 months ago  She is tolerating well  She notes weight gain, but it working on her diet  Inhibin B from 2/25/23 reviewed  No interval change in medical history since last visit  Quality of life is good  The following portions of the patient's history were reviewed and updated as appropriate: allergies, current medications, past medical history, past surgical history and problem list     Review of Systems   Constitutional: Negative  HENT: Negative  Eyes: Negative  Respiratory: Negative  Cardiovascular: Negative  Gastrointestinal: Negative  Genitourinary: Negative  Musculoskeletal: Negative  Skin: Negative  Neurological: Negative  Psychiatric/Behavioral: Negative          Current Outpatient Medications   Medication Sig Dispense Refill   • acetaminophen (TYLENOL) 325 mg tablet Take 3 tablets (975 mg total) by mouth 3 (three) times a day 30 tablet 0   • amLODIPine (NORVASC) 5 mg tablet Take 5 mg by mouth daily   4   • apixaban (Eliquis) 2 5 mg Take 1 tablet (2 5 mg total) by mouth 2 (two) times a day 56 tablet 0   • buPROPion (WELLBUTRIN SR) 150 mg 12 hr tablet every evening   0   • Cholecalciferol (VITAMIN D-3) 1000 units CAPS Take by mouth daily      • clobetasol (TEMOVATE) 0 05 % ointment Apply to the affected area 1-3x/week 30 g 1   • escitalopram (LEXAPRO) 20 mg tablet 20 mg daily   0   • megestrol (MEGACE) 40 mg tablet 2 tablets by mouth twice daily 120 tablet 3   • metFORMIN (GLUCOPHAGE) 500 mg tablet 500 mg 2 (two) times a day with meals   0   • neomycin (MYCIFRADIN) 500 mg tablet Take 1,000 mg by mouth 4 (four) times a day       • Omega 3 1200 MG CAPS Take by mouth daily     • polyethylene glycol (MiraLax) 17 GM/SCOOP powder Mix with 64 oz Gatorade, begin 4 PM day before surgery per bowel prep instructions  238 g 0   • scopolamine (TRANSDERM-SCOP) 1 5 mg/3 days TD 72 hr patch Place 1 patch on the skin every third day 2 patch 1   • simvastatin (ZOCOR) 20 mg tablet TK 1 T PO QD IN THE KIMBERLYN  5   • SUPER B COMPLEX/C PO Take by mouth daily      • valsartan-hydrochlorothiazide (DIOVAN-HCT) 320-25 MG per tablet daily   0     No current facility-administered medications for this visit  Objective:    Blood pressure 128/74, temperature 98 7 °F (37 1 °C), temperature source Tympanic, height 5' 3" (1 6 m), weight 103 kg (227 lb)  Body mass index is 40 21 kg/m²  Body surface area is 2 04 meters squared  Physical Exam  Vitals reviewed  Exam conducted with a chaperone present  Constitutional:       General: She is not in acute distress  Appearance: Normal appearance  She is not ill-appearing  HENT:      Head: Normocephalic and atraumatic  Mouth/Throat:      Mouth: Mucous membranes are moist    Eyes:      General:         Right eye: No discharge  Left eye: No discharge  Conjunctiva/sclera: Conjunctivae normal    Pulmonary:      Effort: Pulmonary effort is normal    Abdominal:      Palpations: Abdomen is soft  There is no mass  Tenderness: There is no abdominal tenderness  Hernia: No hernia is present  Genitourinary:     Comments: The external female genitalia is normal  The bartholin's, uretheral and skenes glands are normal  The urethral meatus is normal (midline with no lesions)  Anus without fissure or lesion  Speculum exam reveals a grossly normal vagina   No masses, lesions,discharge or bleeding  No significant cystocele or rectocele noted  Bimanual exam notes a surgical absent cervix, uterus and adnexal structures  No masses or fullness  Bladder is without fullness, mass or tenderness  Musculoskeletal:      Right lower leg: No edema  Left lower leg: No edema  Skin:     General: Skin is warm and dry  Coloration: Skin is not jaundiced  Findings: No rash  Neurological:      General: No focal deficit present  Mental Status: She is alert and oriented to person, place, and time  Cranial Nerves: No cranial nerve deficit  Sensory: No sensory deficit  Motor: No weakness  Gait: Gait normal    Psychiatric:         Mood and Affect: Mood normal          Behavior: Behavior normal          Thought Content:  Thought content normal          Judgment: Judgment normal          Lab Results   Component Value Date    INHBB <7 0 02/25/2023    INHBB 9 2 11/26/2022    INHBB <7 0 08/20/2022    INHBB <0 7 05/14/2022    INHBB <7 0 02/19/2022

## 2023-05-27 ENCOUNTER — APPOINTMENT (OUTPATIENT)
Dept: LAB | Facility: HOSPITAL | Age: 63
End: 2023-05-27

## 2023-05-27 DIAGNOSIS — C56.2 MALIGNANT NEOPLASM OF LEFT OVARY (HCC): ICD-10-CM

## 2023-05-29 LAB — INHIBIN B SERPL-MCNC: <7 PG/ML (ref 0–16.9)

## 2023-06-01 ENCOUNTER — OFFICE VISIT (OUTPATIENT)
Age: 63
End: 2023-06-01

## 2023-06-01 VITALS
HEART RATE: 98 BPM | HEIGHT: 63 IN | SYSTOLIC BLOOD PRESSURE: 125 MMHG | TEMPERATURE: 99.1 F | OXYGEN SATURATION: 96 % | DIASTOLIC BLOOD PRESSURE: 78 MMHG | WEIGHT: 227.6 LBS | BODY MASS INDEX: 40.33 KG/M2

## 2023-06-01 DIAGNOSIS — C56.2 MALIGNANT NEOPLASM OF LEFT OVARY (HCC): Primary | ICD-10-CM

## 2023-06-01 NOTE — ASSESSMENT & PLAN NOTE
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

## 2023-06-01 NOTE — PROGRESS NOTES
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 in January 2022  She has previously been managed on letrozole but transitioned to megace due to slightly elevated inhibin B and genomic testing  She is tolerating megace well  She is clinically without evidence of disease recurrence  Her inhibin B is normal and she is asymptomatic       Continue megace 80 mg BID  Return to the office in 3 months with a pre-visit inhibin B  Relevant Orders    Inhibin B         CHIEF COMPLAINT:   Ovarian cancer surveillance    Problem:  Cancer Staging   Malignant neoplasm of left ovary ([facility] )  Staging form: Ovary, Fallopian Tube, Primary Peritoneal, AJCC 8th Edition  - Clinical stage from 8/26/2019: FIGO Stage IC1, calculated as Stage IA (cT1a, cN0, cM0) - Signed by Bessy Fuller MD on 9/11/2019        Previous therapy:  Oncology History   Malignant neoplasm of left ovary ([facility] )   8/26/2019 Initial Diagnosis    Malignant neoplasm of left ovary ([facility] )     8/26/2019 -  Cancer Staged    Staging form: Ovary, Fallopian Tube, Primary Peritoneal, AJCC 8th Edition  - Clinical stage from 8/26/2019: FIGO Stage IC1, calculated as Stage IA (cT1a, cN0, cM0) - Signed by Bessy Fuller MD on 9/11/2019        Chemotherapy    Taxol 175 mg/m2 and carboplatin AUC 6 every 21 days   She received 5 cycles and is scheduled for cycle 6       8/26/2019 Surgery    Total abdominal hysterectomy, bilateral salpingo-oophorectomy, radical omentectomy, pelvic lymph node sampling, repair inherent cystostomy, left ureteroneocystostomy, appendectomy  -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  -intraoperative tumor rupture     1/6/2022 Surgery    Xlap, extensive RADHA, resection pelvic mass, small bowel resection  - recurrrent sex cord stromal tumor  -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, margins negative     1/6/2022 Genomic Testing    Caris- [place]+, PD-L1 neg, MMR intact  12/2022 -  Hormone Therapy    Megace 80 mg PO BID (starting inhibin B, 9 2 pg/ml)           Patient ID: Ioana Montesinos is a 70 y o  female  who has no new complaints today  No vaginal bleeding, abdominal/pelvic pain  Normal bowel and bladder function  No interval change in medical history since last visit  Her inhibin B from 5/27/23 was reviewed  Quality of life is good  The following portions of the patient's history were reviewed and updated as appropriate: allergies, current medications, past medical history, past surgical history and problem list     Review of Systems   Constitutional: Positive for fatigue  HENT: Negative  Eyes: Negative  Respiratory: Negative  Cardiovascular: Negative  Gastrointestinal: Negative  Genitourinary: Negative  Musculoskeletal: Negative  Skin: Negative  Neurological: Negative  Psychiatric/Behavioral: Negative          Current Outpatient Medications   Medication Sig Dispense Refill   • amLODIPine (NORVASC) 5 mg tablet Take 5 mg by mouth daily   4   • Cholecalciferol (VITAMIN D-3) 1000 units CAPS Take by mouth daily      • clobetasol (TEMOVATE) 0 05 % ointment Apply to the affected area 1-3x/week 30 g 1   • escitalopram (LEXAPRO) 20 mg tablet 20 mg daily   0   • megestrol (MEGACE) 40 mg tablet TAKE 2 TABLETS BY MOUTH TWICE DAILY 120 tablet 3   • metFORMIN (GLUCOPHAGE) 500 mg tablet 500 mg 2 (two) times a day with meals   0   • scopolamine (TRANSDERM-SCOP) 1 5 mg/3 days TD 72 hr patch Place 1 patch on the skin every third day 2 patch 1   • simvastatin (ZOCOR) 20 mg tablet TK 1 T PO QD IN THE KIMBERLYN  5   • SUPER B COMPLEX/C PO Take by mouth daily      • valsartan-hydrochlorothiazide (DIOVAN-HCT) 320-25 MG per tablet daily   0     No current facility-administered medications for this "visit  Objective:    Blood pressure 125/78, pulse 98, temperature 99 1 °F (37 3 °C), temperature source Temporal, height 5' 3\" (1 6 m), weight 103 kg (227 lb 9 6 oz), SpO2 96 %  Body mass index is 40 32 kg/m²  Body surface area is 2 04 meters squared  Physical Exam  Vitals reviewed  Exam conducted with a chaperone present  Constitutional:       General: She is not in acute distress  Appearance: Normal appearance  She is not ill-appearing  HENT:      Head: Normocephalic and atraumatic  Mouth/Throat:      Mouth: Mucous membranes are moist    Eyes:      General:         Right eye: No discharge  Left eye: No discharge  Conjunctiva/sclera: Conjunctivae normal    Pulmonary:      Effort: Pulmonary effort is normal    Abdominal:      Palpations: Abdomen is soft  There is no mass  Tenderness: There is no abdominal tenderness  Hernia: No hernia is present  Genitourinary:     Comments: The external female genitalia is normal  The bartholin's, uretheral and skenes glands are normal  The urethral meatus is normal (midline with no lesions)  Anus without fissure or lesion  Speculum exam reveals a grossly normal vagina  No masses, lesions,discharge or bleeding  No significant cystocele or rectocele noted  Bimanual exam notes a surgical absent cervix, uterus and adnexal structures  No masses or fullness  Bladder is without fullness, mass or tenderness  Musculoskeletal:      Right lower leg: No edema  Left lower leg: No edema  Skin:     General: Skin is warm and dry  Coloration: Skin is not jaundiced  Findings: No rash  Neurological:      General: No focal deficit present  Mental Status: She is alert and oriented to person, place, and time  Cranial Nerves: No cranial nerve deficit  Sensory: No sensory deficit  Motor: No weakness        Gait: Gait normal    Psychiatric:         Mood and Affect: Mood normal          Behavior: Behavior normal     " Thought Content:  Thought content normal          Judgment: Judgment normal          Lab Results   Component Value Date    INHBB <7 0 05/27/2023    INHBB <7 0 02/25/2023    INHBB 9 2 11/26/2022    INHBB <7 0 08/20/2022

## 2023-06-05 ENCOUNTER — TELEPHONE (OUTPATIENT)
Dept: HEMATOLOGY ONCOLOGY | Facility: CLINIC | Age: 63
End: 2023-06-05

## 2023-06-05 ENCOUNTER — TELEPHONE (OUTPATIENT)
Dept: GYNECOLOGIC ONCOLOGY | Facility: CLINIC | Age: 63
End: 2023-06-05

## 2023-06-05 DIAGNOSIS — C56.2 MALIGNANT NEOPLASM OF LEFT OVARY (HCC): Primary | ICD-10-CM

## 2023-06-05 NOTE — TELEPHONE ENCOUNTER
Patient Call    Who are you speaking with? Patient    If it is not the patient, are they listed on an active communication consent form? N/A   What is the reason for this call? Patient is returning a missed call from [place]   Does this require a call back? Yes   If a call back is required, please list best call back number [phone]   If a call back is required, advise that a message will be forwarded to their care team and someone will return their call as soon as possible  Did you relay this information to the patient?  Yes

## 2023-06-05 NOTE — TELEPHONE ENCOUNTER
Return call placed  Reviewed plan for CT scan prior to next visit  Gyn-onc staff, please call patient to schedule  She prefers [facility]  Thanks!

## 2023-06-06 ENCOUNTER — TELEPHONE (OUTPATIENT)
Dept: GYNECOLOGIC ONCOLOGY | Facility: CLINIC | Age: 63
End: 2023-06-06

## 2023-06-06 NOTE — TELEPHONE ENCOUNTER
Called and assisted patient with scheduling her CT SCAN  Patient was provided with date, time and location of ct scan  Patient was also instructed to go the week prior to scan to get blood work drawn  Patient stated she understood all instructions  Patient was instructed if needed to change this appointment to call the office

## 2023-07-10 DIAGNOSIS — C56.2 MALIGNANT NEOPLASM OF LEFT OVARY (HCC): ICD-10-CM

## 2023-07-10 RX ORDER — MEGESTROL ACETATE 40 MG/1
TABLET ORAL
Qty: 360 TABLET | Refills: 1 | Status: SHIPPED | OUTPATIENT
Start: 2023-07-10

## 2023-08-21 ENCOUNTER — HOSPITAL ENCOUNTER (OUTPATIENT)
Dept: CT IMAGING | Facility: HOSPITAL | Age: 63
Discharge: HOME | End: 2023-08-21
Payer: COMMERCIAL

## 2023-08-21 ENCOUNTER — APPOINTMENT (OUTPATIENT)
Dept: LAB | Facility: HOSPITAL | Age: 63
End: 2023-08-21
Payer: COMMERCIAL

## 2023-08-21 DIAGNOSIS — C56.2 MALIGNANT NEOPLASM OF LEFT OVARY (HCC): ICD-10-CM

## 2023-08-21 LAB
BUN SERPL-MCNC: 11 MG/DL (ref 5–25)
CREAT SERPL-MCNC: 0.74 MG/DL (ref 0.6–1.3)
GFR SERPL CREATININE-BSD FRML MDRD: 85 ML/MIN/1.73SQ M

## 2023-08-21 PROCEDURE — G1004 CDSM NDSC: HCPCS

## 2023-08-21 PROCEDURE — 71260 CT THORAX DX C+: CPT

## 2023-08-21 PROCEDURE — 36415 COLL VENOUS BLD VENIPUNCTURE: CPT

## 2023-08-21 PROCEDURE — 84520 ASSAY OF UREA NITROGEN: CPT

## 2023-08-21 PROCEDURE — 74177 CT ABD & PELVIS W/CONTRAST: CPT

## 2023-08-21 PROCEDURE — 82565 ASSAY OF CREATININE: CPT

## 2023-08-21 RX ADMIN — IOHEXOL 100 ML: 350 INJECTION, SOLUTION INTRAVENOUS at 13:06

## 2023-08-21 RX ADMIN — IOHEXOL 50 ML: 240 INJECTION, SOLUTION INTRATHECAL; INTRAVASCULAR; INTRAVENOUS; ORAL at 13:06

## 2023-08-31 ENCOUNTER — TELEPHONE (OUTPATIENT)
Dept: HEMATOLOGY ONCOLOGY | Facility: CLINIC | Age: 63
End: 2023-08-31

## 2023-08-31 NOTE — TELEPHONE ENCOUNTER
Call Transfer   Who are you speaking with? Patient   If it is not the patient, are they listed on an active communication consent form? N/A   Who is the patients HemOnc/SurgOnc provider? Claude Auerbach, PA-C   What is the reason for this call? Patient calling in requesting to be transferred to billing. Person/Department that the call was transferred to? Time that call was transferred? Billing @ 11:45AM   Your call will be transferred now. If you receive a voicemail, please leave a detailed message and a member of the team will return your call as soon as possible. Did you relay this information to the caller?   Yes

## 2023-09-07 ENCOUNTER — OFFICE VISIT (OUTPATIENT)
Age: 63
End: 2023-09-07
Payer: COMMERCIAL

## 2023-09-07 VITALS
HEIGHT: 63 IN | SYSTOLIC BLOOD PRESSURE: 138 MMHG | WEIGHT: 228.8 LBS | HEART RATE: 88 BPM | OXYGEN SATURATION: 98 % | TEMPERATURE: 97.7 F | DIASTOLIC BLOOD PRESSURE: 84 MMHG | BODY MASS INDEX: 40.54 KG/M2

## 2023-09-07 DIAGNOSIS — E11.65 TYPE 2 DIABETES MELLITUS WITH HYPERGLYCEMIA, WITHOUT LONG-TERM CURRENT USE OF INSULIN (HCC): ICD-10-CM

## 2023-09-07 DIAGNOSIS — E66.01 MORBID OBESITY WITH BMI OF 40.0-44.9, ADULT (HCC): ICD-10-CM

## 2023-09-07 DIAGNOSIS — Z85.43 ENCOUNTER FOR FOLLOW-UP SURVEILLANCE OF OVARIAN CANCER: ICD-10-CM

## 2023-09-07 DIAGNOSIS — N90.4 LICHEN SCLEROSUS ET ATROPHICUS OF THE VULVA: ICD-10-CM

## 2023-09-07 DIAGNOSIS — C56.2 MALIGNANT NEOPLASM OF LEFT OVARY (HCC): Primary | ICD-10-CM

## 2023-09-07 DIAGNOSIS — Z08 ENCOUNTER FOR FOLLOW-UP SURVEILLANCE OF OVARIAN CANCER: ICD-10-CM

## 2023-09-07 PROCEDURE — 99214 OFFICE O/P EST MOD 30 MIN: CPT | Performed by: PHYSICIAN ASSISTANT

## 2023-09-07 RX ORDER — CLOBETASOL PROPIONATE 0.5 MG/G
OINTMENT TOPICAL
Qty: 30 G | Refills: 1 | Status: SHIPPED | OUTPATIENT
Start: 2023-09-07

## 2023-09-07 NOTE — ASSESSMENT & PLAN NOTE
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 She has previously been managed on letrozole but transitioned to megace due to slightly elevated inhibin B and genomic testing. She is tolerating megace well. She is clinically and radiologically without evidence of disease recurrence. Continue megace 80 mg BID. Inhibin B now.  Return to the office in 3 months with a pre-visit inhibin B.

## 2023-09-07 NOTE — PROGRESS NOTES
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 She has previously been managed on letrozole but transitioned to megace due to slightly elevated inhibin B and genomic testing. She is tolerating megace well. She is clinically and radiologically without evidence of disease recurrence. Continue megace 80 mg BID. Inhibin B now. Return to the office in 3 months with a pre-visit inhibin B. Relevant Orders    Inhibin B    Inhibin B    Type 2 diabetes mellitus with hyperglycemia, without long-term current use of insulin ([address])     8/24/23 HbA1C reviewed. Management per PCP. Genitourinary    Lichen sclerosus et atrophicus of the vulva     Stable, well controlled with clobetasol ointment. Refill provided. Relevant Medications    clobetasol (TEMOVATE) 0.05 % ointment       Other    Morbid obesity with BMI of 40.0-44.9, adult ([address])     Recommended well-balanced diet/exercise.          Encounter for follow-up surveillance of ovarian cancer         CHIEF COMPLAINT:   Ovarian cancer surveillance    Problem:  Cancer Staging   Malignant neoplasm of left ovary ([address])  Staging form: Ovary, Fallopian Tube, Primary Peritoneal, AJCC 8th Edition  - Clinical stage from 8/26/2019: FIGO Stage IC1, calculated as Stage IA (cT1a, cN0, cM0) - Signed by Brittny Bauman MD on 9/11/2019        Previous therapy:  Oncology History   Malignant neoplasm of left ovary ([address])   8/26/2019 Initial Diagnosis    Malignant neoplasm of left ovary ([address])     8/26/2019 -  Cancer Staged    Staging form: Ovary, Fallopian Tube, Primary Peritoneal, AJCC 8th Edition  - Clinical stage from 8/26/2019: FIGO Stage IC1, calculated as Stage IA (cT1a, cN0, cM0) - Signed by Brittny Bauman MD on 9/11/2019        Chemotherapy    Taxol 175 mg/m2 and carboplatin AUC 6 every 21 days. She received 5 cycles and is scheduled for cycle 6.      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 12/2022 -  Hormone Therapy    Megace 80 mg PO BID (starting inhibin B, 9.2 pg/ml)           Patient ID: Ernie Samson is a 70 y.o. female  who has no new complaints today. No vaginal bleeding, abdominal/pelvic pain. Normal bowel and bladder function. She is tolterating megace well. No interval change in medical history since last visit. Quality of life is good. CT chest/abdomen/pelvis from 8/21/23 reviewed. The following portions of the patient's history were reviewed and updated as appropriate: allergies, current medications, past medical history, past surgical history and problem list.    Review of Systems   Constitutional: Negative. HENT: Negative. Eyes: Negative. Respiratory: Negative. Cardiovascular: Negative. Gastrointestinal: Negative. Genitourinary: Negative. Musculoskeletal: Negative. Skin: Negative. Neurological: Negative. Psychiatric/Behavioral: Negative.         Current Outpatient Medications   Medication Sig Dispense Refill   • amLODIPine (NORVASC) 5 mg tablet Take 5 mg by mouth daily   4   • Cholecalciferol (VITAMIN D-3) 1000 units CAPS Take by mouth daily      • clobetasol (TEMOVATE) 0.05 % ointment Apply to the affected area 1-3x/week 30 g 1   • escitalopram (LEXAPRO) 20 mg tablet 20 mg daily   0   • megestrol (MEGACE) 40 mg tablet TAKE 2 TABLETS BY MOUTH TWICE DAILY 360 tablet 1   • metFORMIN (GLUCOPHAGE) 500 mg tablet 500 mg 2 (two) times a day with meals   0   • scopolamine (TRANSDERM-SCOP) 1.5 mg/3 days TD 72 hr patch Place 1 patch on the skin every third day 2 patch 1   • simvastatin (ZOCOR) 20 mg tablet TK 1 T PO QD IN THE KIMBERLYN  5   • SUPER B COMPLEX/C PO Take by mouth daily      • valsartan-hydrochlorothiazide (DIOVAN-HCT) 320-25 MG per tablet daily   0     No current facility-administered medications for this visit. Objective:    Blood pressure 138/84, pulse 88, temperature 97.7 °F (36.5 °C), temperature source Temporal, height 5' 3" (1.6 m), weight 104 kg (228 lb 12.8 oz), SpO2 98 %. Body mass index is 40.53 kg/m². Body surface area is 2.05 meters squared. Physical Exam  Vitals reviewed. Exam conducted with a chaperone present. Constitutional:       General: She is not in acute distress. Appearance: Normal appearance. She is not ill-appearing. HENT:      Head: Normocephalic and atraumatic. Mouth/Throat:      Mouth: Mucous membranes are moist.   Eyes:      General:         Right eye: No discharge. Left eye: No discharge. Conjunctiva/sclera: Conjunctivae normal.   Pulmonary:      Effort: Pulmonary effort is normal.   Abdominal:      Palpations: Abdomen is soft. There is no mass. Tenderness: There is no abdominal tenderness. Hernia: No hernia is present. Genitourinary:     Comments: Loss of labia minora b/l. Grossly consistent with lichen sclerosus. The bartholin's, uretheral and skenes glands are normal. The urethral meatus is normal (midline with no lesions). Anus without fissure or lesion. Speculum exam reveals a grossly normal vagina. No masses, lesions,discharge or bleeding. No significant cystocele or rectocele noted. Bimanual exam notes a surgical absent cervix, uterus and adnexal structures. No masses or fullness. Bladder is without fullness, mass or tenderness. Musculoskeletal:      Right lower leg: No edema. Left lower leg: No edema. Skin:     General: Skin is warm and dry. Coloration: Skin is not jaundiced. Findings: No rash. Neurological:      General: No focal deficit present. Mental Status: She is alert and oriented to person, place, and time. Cranial Nerves: No cranial nerve deficit. Sensory: No sensory deficit. Motor: No weakness. Gait: Gait normal.   Psychiatric:         Mood and Affect: Mood normal.         Behavior: Behavior normal.         Thought Content:  Thought content normal.         Judgment: Judgment normal.         Lab Results   Component Value Date    INHBB <7.0 05/27/2023    INHBB <7.0 02/25/2023    INHBB 9.2 11/26/2022    INHBB <7.0 08/20/2022

## 2023-12-07 ENCOUNTER — OFFICE VISIT (OUTPATIENT)
Age: 63
End: 2023-12-07
Payer: COMMERCIAL

## 2023-12-07 VITALS
WEIGHT: 223 LBS | HEART RATE: 90 BPM | TEMPERATURE: 98 F | DIASTOLIC BLOOD PRESSURE: 85 MMHG | BODY MASS INDEX: 39.51 KG/M2 | SYSTOLIC BLOOD PRESSURE: 154 MMHG | OXYGEN SATURATION: 100 % | HEIGHT: 63 IN

## 2023-12-07 DIAGNOSIS — C56.2 MALIGNANT NEOPLASM OF LEFT OVARY (HCC): Primary | ICD-10-CM

## 2023-12-07 PROCEDURE — 99213 OFFICE O/P EST LOW 20 MIN: CPT | Performed by: PHYSICIAN ASSISTANT

## 2023-12-07 NOTE — PROGRESS NOTES
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 She has previously been managed on letrozole but transitioned to megace due to slightly elevated inhibin B and genomic testing. She is tolerating megace well. She is clinically without evidence of disease recurrence. Her inhibin B is normal.     Continue megace 80 mg BID. Return to the office in 3 months with a pre-visit inhibin B. Relevant Orders    Inhibin B         CHIEF COMPLAINT:   Ovarian cancer surveillance    Problem:  Cancer Staging   Malignant neoplasm of left ovary ([address])  Staging form: Ovary, Fallopian Tube, Primary Peritoneal, AJCC 8th Edition  - Clinical stage from 8/26/2019: FIGO Stage IC1, calculated as Stage IA (cT1a, cN0, cM0) - Signed by Sandro Dacosta MD on 9/11/2019        Previous therapy:  Oncology History   Malignant neoplasm of left ovary ([address])   8/26/2019 Initial Diagnosis    Malignant neoplasm of left ovary ([address])     8/26/2019 -  Cancer Staged    Staging form: Ovary, Fallopian Tube, Primary Peritoneal, AJCC 8th Edition  - Clinical stage from 8/26/2019: FIGO Stage IC1, calculated as Stage IA (cT1a, cN0, cM0) - Signed by Sandro Dacosta MD on 9/11/2019        Chemotherapy    Taxol 175 mg/m2 and carboplatin AUC 6 every 21 days.  She received 5 cycles and is scheduled for cycle 6.      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 12/2022 -  Hormone Therapy    Megace 80 mg PO BID (starting inhibin B, 9.2 pg/ml)           Patient ID: Gilmer Cloud is a 70 y.o. female  who has no new complaints today. No vaginal bleeding, abdominal/pelvic pain. Normal bowel and bladder function. She notes a meniscus tear in the interim. She is starting physical therapy. Her inhibin B from 12/4/23 was less than 7. Quality of life is good. The following portions of the patient's history were reviewed and updated as appropriate: allergies, current medications, past medical history, past surgical history, and problem list.    Review of Systems   Constitutional: Negative. HENT: Negative. Eyes: Negative. Respiratory: Negative. Cardiovascular: Negative. Gastrointestinal: Negative. Genitourinary: Negative. Musculoskeletal:         As per HPI. Skin: Negative. Neurological: Negative. Psychiatric/Behavioral: Negative. Current Outpatient Medications   Medication Sig Dispense Refill    amLODIPine (NORVASC) 5 mg tablet Take 5 mg by mouth daily   4    Cholecalciferol (VITAMIN D-3) 1000 units CAPS Take by mouth daily       clobetasol (TEMOVATE) 0.05 % ointment Apply to the affected area 1-3x/week 30 g 1    escitalopram (LEXAPRO) 20 mg tablet 20 mg daily   0    megestrol (MEGACE) 40 mg tablet TAKE 2 TABLETS BY MOUTH TWICE DAILY 360 tablet 1    metFORMIN (GLUCOPHAGE) 500 mg tablet 500 mg 2 (two) times a day with meals   0    scopolamine (TRANSDERM-SCOP) 1.5 mg/3 days TD 72 hr patch Place 1 patch on the skin every third day 2 patch 1    simvastatin (ZOCOR) 20 mg tablet TK 1 T PO QD IN THE KIMBERLYN  5    SUPER B COMPLEX/C PO Take by mouth daily       valsartan-hydrochlorothiazide (DIOVAN-HCT) 320-25 MG per tablet daily   0     No current facility-administered medications for this visit. Objective:    Blood pressure 154/85, pulse 90, temperature 98 °F (36.7 °C), temperature source Temporal, height 5' 3" (1.6 m), weight 101 kg (223 lb), SpO2 100 %. Body mass index is 39.5 kg/m². Body surface area is 2.02 meters squared. Physical Exam  Vitals reviewed. Exam conducted with a chaperone present. Constitutional:       General: She is not in acute distress. Appearance: Normal appearance. She is not ill-appearing. HENT:      Head: Normocephalic and atraumatic. Mouth/Throat:      Mouth: Mucous membranes are moist.   Eyes:      General:         Right eye: No discharge. Left eye: No discharge. Conjunctiva/sclera: Conjunctivae normal.   Pulmonary:      Effort: Pulmonary effort is normal.   Abdominal:      Palpations: Abdomen is soft. There is no mass. Tenderness: There is no abdominal tenderness. Hernia: No hernia is present. Genitourinary:     Comments: Loss of labia minor bilaterally. The bartholin's, uretheral and skenes glands are normal. The urethral meatus is normal (midline with no lesions). Anus without fissure or lesion. Speculum exam reveals a grossly normal vagina. No masses, lesions,discharge or bleeding. No significant cystocele or rectocele noted. Bimanual exam notes a surgical absent cervix, uterus and adnexal structures. No masses or fullness. Bladder is without fullness, mass or tenderness. Musculoskeletal:      Right lower leg: No edema. Left lower leg: No edema. Skin:     General: Skin is warm and dry. Coloration: Skin is not jaundiced. Findings: No rash. Neurological:      General: No focal deficit present. Mental Status: She is alert and oriented to person, place, and time. Cranial Nerves: No cranial nerve deficit. Sensory: No sensory deficit. Motor: No weakness.       Gait: Gait normal.   Psychiatric:         Mood and Affect: Mood normal.         Behavior: Behavior normal.         Thought Content: Thought content normal.         Judgment: Judgment normal.

## 2023-12-07 NOTE — PROGRESS NOTES
Assessment/Plan:    {Assess/PlanSmartLinks:76116}      CHIEF COMPLAINT:       Problem:  Cancer Staging   Malignant neoplasm of left ovary ([facility])  Staging form: Ovary, Fallopian Tube, Primary Peritoneal, AJCC 8th Edition  - Clinical stage from 8/26/2019: FIGO Stage IC1, calculated as Stage IA (cT1a, cN0, cM0) - Signed by Leslie Dumont MD on 9/11/2019        Previous therapy:  Oncology History   Malignant neoplasm of left ovary ([address])   8/26/2019 Initial Diagnosis    Malignant neoplasm of left ovary ([address])     8/26/2019 -  Cancer Staged    Staging form: Ovary, Fallopian Tube, Primary Peritoneal, AJCC 8th Edition  - Clinical stage from 8/26/2019: FIGO Stage IC1, calculated as Stage IA (cT1a, cN0, cM0) - Signed by Leslie Dumont MD on 9/11/2019        Chemotherapy    Taxol 175 mg/m2 and carboplatin AUC 6 every 21 days. She received 5 cycles and is scheduled for cycle 6.      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 12/2022 -  Hormone Therapy    Megace 80 mg PO BID (starting inhibin B, 9.2 pg/ml)           Patient ID: Randall Smart is a 70 y.o. female  Inhibin B 12/4/23        {Common ambulatory SmartLinks:58253}    Review of Systems   Constitutional: Negative. HENT: Negative. Eyes: Negative. Respiratory: Negative. Cardiovascular: Negative. Gastrointestinal: Negative. Genitourinary: Negative. Musculoskeletal:         As per HPI.     Skin: Negative. Neurological: Negative. Psychiatric/Behavioral: Negative. Current Outpatient Medications   Medication Sig Dispense Refill    amLODIPine (NORVASC) 5 mg tablet Take 5 mg by mouth daily   4    Cholecalciferol (VITAMIN D-3) 1000 units CAPS Take by mouth daily       clobetasol (TEMOVATE) 0.05 % ointment Apply to the affected area 1-3x/week 30 g 1    escitalopram (LEXAPRO) 20 mg tablet 20 mg daily   0    megestrol (MEGACE) 40 mg tablet TAKE 2 TABLETS BY MOUTH TWICE DAILY 360 tablet 1    metFORMIN (GLUCOPHAGE) 500 mg tablet 500 mg 2 (two) times a day with meals   0    scopolamine (TRANSDERM-SCOP) 1.5 mg/3 days TD 72 hr patch Place 1 patch on the skin every third day 2 patch 1    simvastatin (ZOCOR) 20 mg tablet TK 1 T PO QD IN THE KIMBERLYN  5    SUPER B COMPLEX/C PO Take by mouth daily       valsartan-hydrochlorothiazide (DIOVAN-HCT) 320-25 MG per tablet daily   0     No current facility-administered medications for this visit. Objective:    Blood pressure 154/85, pulse 90, temperature 98 °F (36.7 °C), temperature source Temporal, height 5' 3" (1.6 m), weight 101 kg (223 lb), SpO2 100 %. Body mass index is 39.5 kg/m². Body surface area is 2.02 meters squared. Physical Exam  Vitals reviewed. Exam conducted with a chaperone present. Constitutional:       General: She is not in acute distress. Appearance: Normal appearance. She is not ill-appearing. HENT:      Head: Normocephalic and atraumatic. Mouth/Throat:      Mouth: Mucous membranes are moist.   Eyes:      General:         Right eye: No discharge. Left eye: No discharge. Conjunctiva/sclera: Conjunctivae normal.   Pulmonary:      Effort: Pulmonary effort is normal.   Abdominal:      Palpations: Abdomen is soft. There is no mass. Tenderness: There is no abdominal tenderness. Hernia: No hernia is present. Genitourinary:     Comments: The external female genitalia with loss of labia minor bilaterally. Stable exam findings. The bartholin's, uretheral and skenes glands are normal. The urethral meatus is normal (midline with no lesions). Anus without fissure or lesion. Speculum exam reveals a grossly normal vagina. No masses, lesions,discharge or bleeding. No significant cystocele or rectocele noted. Bimanual exam notes a surgical absent cervix, uterus and adnexal structures. No masses or fullness. Bladder is without fullness, mass or tenderness. Musculoskeletal:      Right lower leg: No edema. Left lower leg: No edema. Skin:     General: Skin is warm and dry. Coloration: Skin is not jaundiced. Findings: No rash. Neurological:      General: No focal deficit present. Mental Status: She is alert and oriented to person, place, and time. Cranial Nerves: No cranial nerve deficit. Sensory: No sensory deficit. Motor: No weakness. Gait: Gait normal.   Psychiatric:         Mood and Affect: Mood normal.         Behavior: Behavior normal.         Thought Content:  Thought content normal.         Judgment: Judgment normal.         Lab Results   Component Value Date     42.7 (H) 08/16/2019     Lab Results   Component Value Date    K 3.6 01/10/2022     01/10/2022    CO2 26 01/10/2022    BUN 11 08/21/2023    CREATININE 0.74 08/21/2023    GLUCOSE 188 (H) 08/26/2019    GLUF 168 (H) 12/22/2021    CALCIUM 8.9 01/10/2022    AST 19 01/07/2022    ALT 29 01/07/2022    ALKPHOS 55 01/07/2022    EGFR 85 08/21/2023     Lab Results   Component Value Date    WBC 6.81 01/10/2022    HGB 10.8 (L) 01/10/2022    HCT 31.7 (L) 01/10/2022    MCV 85 01/10/2022     01/10/2022     Lab Results   Component Value Date    NEUTROABS 9.09 (H) 01/07/2022        Trend:  Lab Results   Component Value Date     42.7 (H) 08/16/2019

## 2023-12-07 NOTE — ASSESSMENT & PLAN NOTE
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 She has previously been managed on letrozole but transitioned to megace due to slightly elevated inhibin B and genomic testing. She is tolerating megace well. She is clinically without evidence of disease recurrence. Her inhibin B is normal.     Continue megace 80 mg BID.  Return to the office in 3 months with a pre-visit inhibin B.

## 2024-01-29 DIAGNOSIS — C56.2 MALIGNANT NEOPLASM OF LEFT OVARY (HCC): ICD-10-CM

## 2024-01-30 RX ORDER — MEGESTROL ACETATE 40 MG/1
TABLET ORAL
Qty: 360 TABLET | Refills: 0 | Status: SHIPPED | OUTPATIENT
Start: 2024-01-30

## 2024-01-31 ENCOUNTER — TELEPHONE (OUTPATIENT)
Dept: HEMATOLOGY ONCOLOGY | Facility: CLINIC | Age: 64
End: 2024-01-31

## 2024-01-31 NOTE — TELEPHONE ENCOUNTER
Patient Call    Who are you speaking with? Patient    If it is not the patient, are they listed on an active communication consent form? N/A   What is the reason for this call? The patient would like to know why her medication (megace 40MG) was not sent to the  pharmacy.     The patient states she has been out of her medication for a few days now and she would like to know what she needs to do to have this sent to her pharmacy.    Does this require a call back? Yes   If a call back is required, please list best call back number [phone]   If a call back is required, advise that a message will be forwarded to their care team and someone will return their call as soon as possible.   Did you relay this information to the patient? Yes

## 2024-03-07 ENCOUNTER — OFFICE VISIT (OUTPATIENT)
Age: 64
End: 2024-03-07
Payer: MEDICARE

## 2024-03-07 VITALS
BODY MASS INDEX: 39.55 KG/M2 | OXYGEN SATURATION: 98 % | SYSTOLIC BLOOD PRESSURE: 122 MMHG | HEIGHT: 63 IN | WEIGHT: 223.2 LBS | TEMPERATURE: 97.4 F | HEART RATE: 98 BPM | DIASTOLIC BLOOD PRESSURE: 72 MMHG

## 2024-03-07 DIAGNOSIS — Z08 ENCOUNTER FOR FOLLOW-UP SURVEILLANCE OF OVARIAN CANCER: ICD-10-CM

## 2024-03-07 DIAGNOSIS — R79.89 HIGH SERUM INHIBIN B: ICD-10-CM

## 2024-03-07 DIAGNOSIS — C56.2 MALIGNANT NEOPLASM OF LEFT OVARY (HCC): Primary | ICD-10-CM

## 2024-03-07 DIAGNOSIS — Z85.43 ENCOUNTER FOR FOLLOW-UP SURVEILLANCE OF OVARIAN CANCER: ICD-10-CM

## 2024-03-07 PROCEDURE — 99214 OFFICE O/P EST MOD 30 MIN: CPT | Performed by: PHYSICIAN ASSISTANT

## 2024-03-07 NOTE — ASSESSMENT & PLAN NOTE
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 She has previously been managed on letrozole but transitioned to megace due to slightly elevated inhibin B and genomic testing. She is tolerating megace well. Her inhibin B is elevated at 20.5. Her clinical exam is without evidence of disease recurrence and she is asymptomatic.      Due to rising inhibin B, will plan for CT chest/abdomen/pelvis to evaluate for disease recurrence.     F/U results of CT scan and plan next steps accordingly.

## 2024-03-07 NOTE — PROGRESS NOTES
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 She has previously been managed on letrozole but transitioned to megace due to slightly elevated inhibin B and genomic testing. She is tolerating megace well. Her inhibin B is elevated at 20.5. Her clinical exam is without evidence of disease recurrence and she is asymptomatic.      Due to rising inhibin B, will plan for CT chest/abdomen/pelvis to evaluate for disease recurrence.     F/U results of CT scan and plan next steps accordingly.         Relevant Orders    CT chest abdomen pelvis w contrast       Other    Encounter for follow-up surveillance of ovarian cancer     Other Visit Diagnoses       High serum inhibin B        Relevant Orders    CT chest abdomen pelvis w contrast              CHIEF COMPLAINT:   Ovarian cancer surveillance    Problem:  Cancer Staging   Malignant neoplasm of left ovary (HCC)  Staging form: Ovary, Fallopian Tube, Primary Peritoneal, AJCC 8th Edition  - Clinical stage from 8/26/2019: FIGO Stage IC1, calculated as Stage IA (cT1a, cN0, cM0) - Signed by Trevin Bonilla MD on 9/11/2019        Previous therapy:  Oncology History   Malignant neoplasm of left ovary (HCC)   8/26/2019 Initial Diagnosis    Malignant neoplasm of left ovary (HCC)     8/26/2019 -  Cancer Staged    Staging form: Ovary, Fallopian Tube, Primary Peritoneal, AJCC 8th Edition  - Clinical stage from 8/26/2019: FIGO Stage IC1, calculated as Stage IA (cT1a, cN0, cM0) - Signed by Trevin Bonilla MD on 9/11/2019        Chemotherapy    Taxol 175 mg/m2 and carboplatin AUC 6 every 21 days. She received 5 cycles and is scheduled for cycle 6.      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      12/2022 -  Hormone Therapy    Megace 80 mg PO BID (starting inhibin B, 9.2 pg/ml)           Patient ID: Eli Cobos is a 63 y.o. female  who has no new complaints today. No vaginal bleeding, abdominal/pelvic pain. Normal bowel and bladder function. Her inhibin B from 2/28/24 was reviewed and is elevated to 20.5. The patient has continued on megace and is without symptoms. Quality of life is good.      The following portions of the patient's history were reviewed and updated as appropriate: allergies, current medications, past medical history, past surgical history, and problem listbut .    Review of Systems   Constitutional: Negative.    HENT: Negative.     Eyes: Negative.    Respiratory: Negative.     Cardiovascular: Negative.    Gastrointestinal: Negative.    Genitourinary: Negative.    Musculoskeletal: Negative.    Skin: Negative.    Neurological: Negative.    Psychiatric/Behavioral: Negative.         Current Outpatient Medications   Medication Sig Dispense Refill    amLODIPine (NORVASC) 5 mg tablet Take 5 mg by mouth daily   4    Cholecalciferol (VITAMIN D-3) 1000 units CAPS Take by mouth daily       clobetasol (TEMOVATE) 0.05 % ointment Apply to the affected area 1-3x/week 30 g 1    escitalopram (LEXAPRO) 20 mg tablet 20 mg daily   0    megestrol (MEGACE) 40 mg tablet TAKE 2 TABLETS BY MOUTH TWICE DAILY 360 tablet 0    metFORMIN (GLUCOPHAGE) 500 mg tablet 500 mg 2 (two) times a day with meals   0    scopolamine (TRANSDERM-SCOP) 1.5 mg/3 days TD 72  "hr patch Place 1 patch on the skin every third day 2 patch 1    simvastatin (ZOCOR) 20 mg tablet TK 1 T PO QD IN THE KIMBERLYN  5    SUPER B COMPLEX/C PO Take by mouth daily       valsartan-hydrochlorothiazide (DIOVAN-HCT) 320-25 MG per tablet daily   0     No current facility-administered medications for this visit.           Objective:    Blood pressure 122/72, pulse 98, temperature (!) 97.4 °F (36.3 °C), height 5' 3\" (1.6 m), weight 101 kg (223 lb 3.2 oz), SpO2 98%.  Body mass index is 39.54 kg/m².  Body surface area is 2.02 meters squared.    Physical Exam  Vitals reviewed. Exam conducted with a chaperone present.   Constitutional:       General: She is not in acute distress.     Appearance: Normal appearance. She is not ill-appearing.   HENT:      Head: Normocephalic and atraumatic.      Mouth/Throat:      Mouth: Mucous membranes are moist.   Eyes:      General:         Right eye: No discharge.         Left eye: No discharge.      Conjunctiva/sclera: Conjunctivae normal.   Pulmonary:      Effort: Pulmonary effort is normal.   Abdominal:      Palpations: Abdomen is soft. There is no mass.      Tenderness: There is no abdominal tenderness.      Hernia: No hernia is present.   Genitourinary:     Comments: The external female genitalia is normal. The bartholin's, uretheral and skenes glands are normal. The urethral meatus is normal (midline with no lesions). Anus without fissure or lesion. Speculum exam reveals a grossly normal vagina. No masses, lesions,discharge or bleeding. No significant cystocele or rectocele noted. Bimanual exam notes a surgical absent cervix, uterus and adnexal structures. No masses or fullness. Bladder is without fullness, mass or tenderness.  Musculoskeletal:      Right lower leg: No edema.      Left lower leg: No edema.   Skin:     General: Skin is warm and dry.      Coloration: Skin is not jaundiced.      Findings: No rash.   Neurological:      General: No focal deficit present.      Mental " Status: She is alert and oriented to person, place, and time.      Cranial Nerves: No cranial nerve deficit.      Sensory: No sensory deficit.      Motor: No weakness.      Gait: Gait normal.   Psychiatric:         Mood and Affect: Mood normal.         Behavior: Behavior normal.         Thought Content: Thought content normal.         Judgment: Judgment normal.       Lab Results   Component Value Date     42.7 (H) 08/16/2019     Lab Results   Component Value Date    K 3.6 01/10/2022     01/10/2022    CO2 26 01/10/2022    BUN 11 08/21/2023    CREATININE 0.74 08/21/2023    GLUCOSE 188 (H) 08/26/2019    GLUF 168 (H) 12/22/2021    CALCIUM 8.9 01/10/2022    AST 19 01/07/2022    ALT 29 01/07/2022    ALKPHOS 55 01/07/2022    EGFR 85 08/21/2023     Lab Results   Component Value Date    WBC 6.81 01/10/2022    HGB 10.8 (L) 01/10/2022    HCT 31.7 (L) 01/10/2022    MCV 85 01/10/2022     01/10/2022     Lab Results   Component Value Date    NEUTROABS 9.09 (H) 01/07/2022        Trend:  Lab Results   Component Value Date     42.7 (H) 08/16/2019

## 2024-03-08 ENCOUNTER — TELEPHONE (OUTPATIENT)
Dept: HEMATOLOGY ONCOLOGY | Facility: CLINIC | Age: 64
End: 2024-03-08

## 2024-03-08 DIAGNOSIS — C56.2 MALIGNANT NEOPLASM OF LEFT OVARY (HCC): Primary | ICD-10-CM

## 2024-03-08 NOTE — TELEPHONE ENCOUNTER
Call Transfer   Who are you speaking with?  Patient   If it is not the patient, are they listed on an active communication consent form? N/A   Who is the patients HemOnc/SurgOnc provider? Luisana Freedman PA-C   What is the reason for this call? Schedule CT   Person/Department that the call was transferred to?    Time that call was transferred?    central scheduling   Your call will be transferred now. If you receive a voicemail, please leave a detailed message and a member of the team will return your call as soon as possible.    Did you relay this information to the caller?  Yes

## 2024-03-08 NOTE — TELEPHONE ENCOUNTER
Patient Call    Who are you speaking with? Patient    If it is not the patient, are they listed on an active communication consent form? N/A   What is the reason for this call? The patient would like a call back to discuss her CT order.    Does this require a call back? Yes   If a call back is required, please list best call back number [phone]   If a call back is required, advise that a message will be forwarded to their care team and someone will return their call as soon as possible.   Did you relay this information to the patient? Yes

## 2024-03-12 DIAGNOSIS — C56.2 MALIGNANT NEOPLASM OF LEFT OVARY (HCC): ICD-10-CM

## 2024-03-13 RX ORDER — MEGESTROL ACETATE 40 MG/1
TABLET ORAL
Qty: 360 TABLET | Refills: 0 | Status: SHIPPED | OUTPATIENT
Start: 2024-03-13

## 2024-04-04 ENCOUNTER — HOSPITAL ENCOUNTER (OUTPATIENT)
Dept: CT IMAGING | Facility: HOSPITAL | Age: 64
Discharge: HOME | End: 2024-04-04
Payer: MEDICARE

## 2024-04-04 DIAGNOSIS — C56.2 MALIGNANT NEOPLASM OF LEFT OVARY (HCC): ICD-10-CM

## 2024-04-04 PROCEDURE — 71260 CT THORAX DX C+: CPT

## 2024-04-04 PROCEDURE — 74177 CT ABD & PELVIS W/CONTRAST: CPT

## 2024-04-04 RX ADMIN — IOHEXOL 80 ML: 350 INJECTION, SOLUTION INTRAVENOUS at 16:28

## 2024-04-04 RX ADMIN — IOHEXOL 50 ML: 240 INJECTION, SOLUTION INTRATHECAL; INTRAVASCULAR; INTRAVENOUS; ORAL at 16:28

## 2024-04-18 ENCOUNTER — OFFICE VISIT (OUTPATIENT)
Age: 64
End: 2024-04-18
Payer: MEDICARE

## 2024-04-18 VITALS
OXYGEN SATURATION: 97 % | BODY MASS INDEX: 39.48 KG/M2 | HEART RATE: 93 BPM | WEIGHT: 222.8 LBS | DIASTOLIC BLOOD PRESSURE: 76 MMHG | SYSTOLIC BLOOD PRESSURE: 128 MMHG | TEMPERATURE: 97.9 F | HEIGHT: 63 IN

## 2024-04-18 DIAGNOSIS — C7A.8 NEUROENDOCRINE CARCINOMA OF SMALL BOWEL (HCC): ICD-10-CM

## 2024-04-18 DIAGNOSIS — C56.2 MALIGNANT NEOPLASM OF LEFT OVARY (HCC): Primary | ICD-10-CM

## 2024-04-18 DIAGNOSIS — E11.65 TYPE 2 DIABETES MELLITUS WITH HYPERGLYCEMIA, WITHOUT LONG-TERM CURRENT USE OF INSULIN (HCC): ICD-10-CM

## 2024-04-18 PROCEDURE — 99215 OFFICE O/P EST HI 40 MIN: CPT | Performed by: OBSTETRICS & GYNECOLOGY

## 2024-04-18 RX ORDER — GABAPENTIN 100 MG/1
200 CAPSULE ORAL ONCE
OUTPATIENT
Start: 2024-04-18 | End: 2024-04-18

## 2024-04-18 RX ORDER — HEPARIN SODIUM 5000 [USP'U]/ML
5000 INJECTION, SOLUTION INTRAVENOUS; SUBCUTANEOUS
OUTPATIENT
Start: 2024-04-18 | End: 2024-04-19

## 2024-04-18 RX ORDER — METRONIDAZOLE 500 MG/1
500 TABLET ORAL ONCE
Qty: 1 TABLET | Refills: 0 | Status: SHIPPED | OUTPATIENT
Start: 2024-04-18 | End: 2024-04-18

## 2024-04-18 RX ORDER — SODIUM CHLORIDE, SODIUM LACTATE, POTASSIUM CHLORIDE, CALCIUM CHLORIDE 600; 310; 30; 20 MG/100ML; MG/100ML; MG/100ML; MG/100ML
125 INJECTION, SOLUTION INTRAVENOUS CONTINUOUS
OUTPATIENT
Start: 2024-04-18

## 2024-04-18 RX ORDER — POLYETHYLENE GLYCOL 3350 17 G/17G
POWDER, FOR SOLUTION ORAL
Qty: 238 G | Refills: 0 | Status: SHIPPED | OUTPATIENT
Start: 2024-04-18

## 2024-04-18 RX ORDER — ACETAMINOPHEN 325 MG/1
975 TABLET ORAL ONCE
OUTPATIENT
Start: 2024-04-18 | End: 2024-04-18

## 2024-04-18 RX ORDER — NEOMYCIN SULFATE 500 MG/1
1000 TABLET ORAL 3 TIMES DAILY
Qty: 6 TABLET | Refills: 0 | Status: SHIPPED | OUTPATIENT
Start: 2024-04-18 | End: 2024-04-19

## 2024-04-18 NOTE — PROGRESS NOTES
Assessment/Plan:    Problem List Items Addressed This Visit          Digestive    Neuroendocrine carcinoma of small bowel (HCC)     Well-differentiated, saw medical oncology after initial diagnosis, no further therapy indicated.             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  Her inhibin B level was rising as of 2/28/2024 was 20.5.  She then had a CT scan of the chest abdomen pelvis 4/4/2024 that revealed a 2.8 x 1.2 cm solid lesion in the right hemipelvis adjacent to the small intestine.  She has been on Megace therapy which she has been tolerating well.  Her performance status is 0.  1.  We discussed that given rising inhibin, CT findings that this is likely a second recurrence of her Sertoli-Leydig cell tumor.  Based on the location, it is not immediately amenable to IR biopsy.  2.  I discussed treatment options including surgical resection plus or minus adjuvant chemotherapy, curative intent radiation therapy, chemotherapy alone.  3.  We discussed the possible risks of curative intent radiation therapy including the additional risks of acute and long-term bowel toxicity with possible bowel obstruction, need for secondary surgery given her previous surgery with documented dense adhesive disease.  She is interested in radiation oncology consultation to discuss further.  4.  We discussed alternative chemotherapy regimen inclusive of bleomycin, etoposide, cisplatin.  Given the relative indolent nature of this malignancy, primary treatment with chemotherapy is likely to be less efficacious than either radiation or surgery.  5.  I discussed the risks and benefits of possible robotic assisted total laparoscopic resection of the right sided pelvic mass, possible exploratory laparotomy,  all other indicated procedures including possible bowel resection.  She understands the risks of the surgery including the additional risks of injury to bowel, bladder, ureters, possible need for ICU stay and she agrees to proceed as outlined.  Consent for surgery was obtained by me in the office.  Plan for preoperative mechanical and antibiotic bowel preparation.    Instructions given.  I have spent a total time of 45 minutes on 04/18/24 in caring for this patient including Diagnostic results, Prognosis, Risks and benefits of tx options, Instructions for management, Patient and family education, Impressions, Counseling / Coordination of care, Documenting in the medical record, Reviewing / ordering tests, medicine, procedures  , Obtaining or reviewing history  , and Communicating with other healthcare professionals .           Relevant Medications    neomycin (MYCIFRADIN) 500 mg tablet    metroNIDAZOLE (FLAGYL) 500 mg tablet    polyethylene glycol (MiraLax) 17 GM/SCOOP powder    Other Relevant Orders    Case request operating room: LAPAROTOMY EXPLORATORY W/ ROBOTICS (Completed)    Type and screen    CBC and Platelet    Comprehensive metabolic panel    HEMOGLOBIN A1C W/ EAG ESTIMATION    Ambulatory referral to surgical optimization    Ambulatory referral to Radiation Oncology    Type 2 diabetes mellitus with hyperglycemia, without long-term current use of insulin (HCC)       Lab Results   Component Value Date    HGBA1C 6.5 (H) 08/16/2019   Takes metformin, follow-up repeat A1c prior to surgery.                CHIEF COMPLAINT: New pelvic mass, rising inhibin        Problem:  Cancer Staging   Malignant neoplasm of left ovary (HCC)  Staging form: Ovary, Fallopian Tube, Primary Peritoneal, AJCC 8th Edition  - Clinical stage from 8/26/2019: FIGO Stage IC1, calculated as Stage IA (cT1a, cN0, cM0) - Signed by Trevin Bonilla MD on 9/11/2019      Previous therapy:  Oncology History   Malignant neoplasm of left ovary (HCC)    8/26/2019 Initial Diagnosis    Malignant neoplasm of left ovary (HCC)     8/26/2019 -  Cancer Staged    Staging form: Ovary, Fallopian Tube, Primary Peritoneal, AJCC 8th Edition  - Clinical stage from 8/26/2019: FIGO Stage IC1, calculated as Stage IA (cT1a, cN0, cM0) - Signed by Trevin Bonilla MD on 9/11/2019        Chemotherapy    Taxol 175 mg/m2 and carboplatin AUC 6 every 21 days. She received 5 cycles and is scheduled for cycle 6.      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      12/2022 -  Hormone Therapy    Megace 80 mg PO BID (starting inhibin B, 9.2 pg/ml)           Patient ID: Eli Cobos is a 64 y.o. female  Who returns for a treatment discussion.  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  Given complete resection, she did not receive adjuvant therapy.  She has been on maintenance Megace as the tumor is [place] positive.  Her inhibin B on 2/28/2024 was 20.5.  She therefore had a CT scan of the chest abdomen pelvis on 4/4/2024 that revealed a bilobed mass in the right hemipelvis measuring 2.8 x 1.2 cm.  There is no evidence of other lesion identified.  No lymphadenopathy.  I personally reviewed the images.  Prior PET/CT before her last  surgery did not show uptake in the tumor.  She is asymptomatic.  She is able to perform her activities of daily living without difficulty.  No other interval change in medications or medical history since her last visit the office.        Review of Systems   Constitutional:  Negative for activity change and unexpected weight change.   HENT: Negative.     Eyes: Negative.    Respiratory: Negative.     Cardiovascular: Negative.    Gastrointestinal:  Negative for abdominal distention and abdominal pain.   Endocrine: Negative.    Genitourinary:  Negative for pelvic pain and vaginal bleeding.   Musculoskeletal: Negative.    Skin: Negative.    Allergic/Immunologic: Negative.    Neurological: Negative.    Hematological: Negative.    Psychiatric/Behavioral: Negative.         Current Outpatient Medications   Medication Sig Dispense Refill    amLODIPine (NORVASC) 5 mg tablet Take 5 mg by mouth daily   4    Cholecalciferol (VITAMIN D-3) 1000 units CAPS Take by mouth daily       clobetasol (TEMOVATE) 0.05 % ointment Apply to the affected area 1-3x/week 30 g 1    escitalopram (LEXAPRO) 20 mg tablet 20 mg daily   0    megestrol (MEGACE) 40 mg tablet TAKE TWO TABLETS BY MOUTH TWICE A  tablet 0    metFORMIN (GLUCOPHAGE) 500 mg tablet 500 mg 2 (two) times a day with meals   0    metroNIDAZOLE (FLAGYL) 500 mg tablet Take 1 tablet (500 mg total) by mouth once for 1 dose Take at 9 PM the night before the procedure 1 tablet 0    neomycin (MYCIFRADIN) 500 mg tablet Take 2 tablets (1,000 mg total) by mouth 3 (three) times a day for 3 doses Take at 1 PM, 4 PM, and 9 PM the day before procedure. 6 tablet 0    polyethylene glycol (MiraLax) 17 GM/SCOOP powder Mix with 64 oz Gatorade, begin 4 PM day before surgery per bowel prep instructions. 238 g 0    scopolamine (TRANSDERM-SCOP) 1.5 mg/3 days TD 72 hr patch Place 1 patch on the skin every third day 2 patch 1    simvastatin (ZOCOR) 20 mg tablet TK 1 T PO QD IN THE KIMBERLYN  5    [facility] B  COMPLEX/C PO Take by mouth daily       valsartan-hydrochlorothiazide (DIOVAN-HCT) 320-25 MG per tablet daily   0     No current facility-administered medications for this visit.       Allergies   Allergen Reactions    Sulfa Antibiotics Other (See Comments)     Pt gets ill        Past Medical History:   Diagnosis Date    Anxiety     Cancer (HCC)     ovarian    CPAP (continuous positive airway pressure) dependence     Diabetes mellitus (HCC)     High cholesterol     Hypertension     Morbid obesity (HCC)     Ovarian cancer (HCC)     Ovarian neoplasm 08/26/2019    PONV (postoperative nausea and vomiting)     Post-menopausal bleeding     Sleep apnea        Past Surgical History:   Procedure Laterality Date    BLADDER REPAIR N/A 08/26/2019    Procedure: REPAIR BLADDER; uretero yared cystotomy;  Surgeon: Cachorro Olmedo MD;  Location: [facility];  Service: Gynecology Oncology    CHOLECYSTECTOMY      COLONOSCOPY      HYSTERECTOMY      IR IMAGE GUIDED ASPIRATION / DRAINAGE  09/27/2019    IR PORT PLACEMENT  09/27/2019    IR PORT PLACEMENT  11/07/2019    IR PORT REMOVAL  10/29/2019    IR PORT REMOVAL  11/06/2020    TN CYSTO BLADDER W/URETERAL CATHETERIZATION Bilateral 01/06/2022    Procedure: CYSTOSCOPY WITH INSERTION STENT URETERAL;  Surgeon: Noel Baig MD;  Location: [facility];  Service: Urology    TN LAPS FULG/EXC OVARY VISCERA/PERITONEAL SURFACE N/A 01/06/2022    Procedure: EXPLORATORY LAPAROTOMY, EXTENSIVE LYSIS OF ADHESIONS, RESECTION PELVIC MASS;  Surgeon: Trevin Bonilla MD;  Location: [facility];  Service: Gynecology Oncology    TN TOTAL ABDOMINAL HYSTERECT W/WO RMVL TUBE OVARY N/A 08/26/2019    Procedure: TOTAL ABDOMINAL HYSTERECTOMY, BILATERAL SALPINGO-OOPHORECTOMY, Radical omentectomy, pelvic lymph node sampling, staging biopsy, repair of cystotomy, appendectomy;  Surgeon: Cachorro Olmedo MD;  Location: [facility];  Service: Gynecology Oncology    SMALL INTESTINE SURGERY N/A 01/06/2022     "Procedure: RESECTION SMALL BOWEL;  Surgeon: Trevin Bonilla MD;  Location: [facility];  Service: Gynecology Oncology       OB History          1    Para   1    Term                AB        Living             SAB        IAB        Ectopic        Multiple        Live Births                     Family History   Problem Relation Age of Onset    Diabetes Mother     Hypertension Mother     Hypertension Father     Colon cancer Paternal Uncle         My fathers twin       The following portions of the patient's history were reviewed and updated as appropriate: allergies, current medications, past family history, past medical history, past social history, past surgical history, and problem list.      Objective:    Blood pressure 128/76, pulse 93, temperature 97.9 °F (36.6 °C), height 5' 3\" (1.6 m), weight 101 kg (222 lb 12.8 oz), SpO2 97%.  Body mass index is 39.47 kg/m².    Physical Exam  Vitals reviewed.   Constitutional:       General: She is not in acute distress.     Appearance: Normal appearance. She is obese. She is not ill-appearing.   HENT:      Head: Normocephalic and atraumatic.      Mouth/Throat:      Mouth: Mucous membranes are moist.   Eyes:      General: No scleral icterus.        Right eye: No discharge.         Left eye: No discharge.      Conjunctiva/sclera: Conjunctivae normal.   Pulmonary:      Effort: Pulmonary effort is normal.   Musculoskeletal:      Right lower leg: No edema.      Left lower leg: No edema.   Skin:     General: Skin is warm and dry.      Coloration: Skin is not jaundiced.      Findings: No rash.   Neurological:      General: No focal deficit present.      Mental Status: She is alert and oriented to person, place, and time.      Cranial Nerves: No cranial nerve deficit.      Sensory: No sensory deficit.      Motor: No weakness.      Gait: Gait normal.   Psychiatric:         Mood and Affect: Mood normal.         Behavior: Behavior normal.         Thought Content: " Thought content normal.         Judgment: Judgment normal.           CT chest abdomen pelvis w contrast  Narrative: CT CHEST, ABDOMEN AND PELVIS WITH IV CONTRAST    INDICATION: C56.2: Malignant neoplasm of left ovary. Sertoli-Leydig cell tumor of the ovary resected in August 2019 with recurrence diagnosed in January 2022. Rising inhibin B.    COMPARISON: Numerous prior examinations, most recently August 21, 2023    TECHNIQUE: CT examination of the chest, abdomen and pelvis was performed. Dual energy CT scan technique (DECT) was employed. Multiplanar 2D reformatted images were created from the source data.    This examination, like all CT scans performed in the [facility], was performed utilizing techniques to minimize radiation dose exposure, including the use of iterative reconstruction and automated exposure control. Radiation dose length   product (DLP) for this visit: 1106.5 mGy-cm    IV Contrast: 80 mL of iohexol (OMNIPAQUE)  Enteric Contrast: Administered.    FINDINGS:    CHEST    LUNGS: Subcentimeter noncalcified pulmonary nodules, unchanged at least as far back September 2019 consistent with benign process. No new, enlarging, or otherwise suspicious pulmonary nodule. No focal pulmonary consolidation. No tracheal or central   endobronchial lesion.    PLEURA:  Unremarkable.    HEART/GREAT VESSELS: Heart size normal. Coronary artery calcification. No pericardial effusion. No thoracic aortic aneurysm.    MEDIASTINUM AND SHAY:  Unremarkable.    CHEST WALL AND LOWER NECK:  Unremarkable.    ABDOMEN    LIVER/BILIARY TREE:  Unremarkable.    GALLBLADDER:  Gallbladder is surgically absent.    SPLEEN:  Unremarkable.    PANCREAS:  Unremarkable.    ADRENAL GLANDS:  Unremarkable.    KIDNEYS/URETERS: No suspicious renal mass. No urinary tract calculus. No hydronephrosis. Mild fullness of bilateral extrarenal pelvis, similar from previous examinations. Surgical changes of left ureteral  "reimplantation.    STOMACH AND BOWEL:  Unremarkable.    APPENDIX:  No findings to suggest appendicitis.    ABDOMINOPELVIC CAVITY:  Reidentified borderline and minimally enlarged mesenteric lymph nodes, largest measuring up to 12 mm in short axis on image 158 of series 301, unchanged over numerous prior examinations as far back as August 2019. Groundglass   changes in the mesentery are unchanged from at least as far back as December 2021.    There is a dumbbell shaped soft tissue density nodule in the right hemipelvis measuring 2.8 x 1.3 cm in axial dimensions on image 197 of series 302 by 1.2 cm in craniocaudal dimension on image 111 of series 601. This was not abnormally enlarged by CT   criteria in August 2023 when this measured 1.7 x 0.6 x 0.6 cm.    No other new or enlarging mesenteric, retroperitoneal, or pelvic lymphadenopathy. No peritoneal nodules. No ascites. No pneumoperitoneum.    VESSELS:  Unremarkable for patient's age.    PELVIS    REPRODUCTIVE ORGANS: Hysterectomy and bilateral salpingo-oophorectomy. No suspicious abnormality at the vaginal cuff. No suspicious pelvic sidewall mass.    URINARY BLADDER:  Unremarkable.    ABDOMINAL WALL/INGUINAL REGIONS:  Unremarkable.    OSSEOUS STRUCTURES:  No acute fracture or destructive osseous lesion.  Impression: Small but enlarging dumbbell-shaped soft tissue density nodule in the right hemipelvis suspicious for possible small developing recurrent tumor mass, only 2.8 x 1.3 x 1.2 cm.    Otherwise no CT findings for recurrent or metastatic tumor in the chest, abdomen or pelvis.    This examination was marked \"significant notification\" in Epic in order to begin the standard process by which the radiology reading room liaison alerts the referring practitioner.    Workstation performed: [id number]            "

## 2024-04-18 NOTE — ASSESSMENT & PLAN NOTE
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  Her inhibin B level was rising as of 2/28/2024 was 20.5.  She then had a CT scan of the chest abdomen pelvis 4/4/2024 that revealed a 2.8 x 1.2 cm solid lesion in the right hemipelvis adjacent to the small intestine.  She has been on Megace therapy which she has been tolerating well.  Her performance status is 0.  1.  We discussed that given rising inhibin, CT findings that this is likely a second recurrence of her Sertoli-Leydig cell tumor.  Based on the location, it is not immediately amenable to IR biopsy.  2.  I discussed treatment options including surgical resection plus or minus adjuvant chemotherapy, curative intent radiation therapy, chemotherapy alone.  3.  We discussed the possible risks of curative intent radiation therapy including the additional risks of acute and long-term bowel toxicity with possible bowel obstruction, need for secondary surgery given her previous surgery with documented dense adhesive disease.  She is interested in radiation oncology consultation to discuss further.  4.  We discussed alternative chemotherapy regimen inclusive of bleomycin, etoposide, cisplatin.  Given the relative indolent nature of this malignancy, primary treatment with chemotherapy is likely to be less efficacious than either radiation or surgery.  5.  I discussed the risks and benefits of possible robotic assisted total laparoscopic resection of the right sided pelvic mass, possible exploratory laparotomy, all other indicated procedures including possible bowel resection.  She understands the risks of the surgery including the additional risks of injury to bowel, bladder, ureters, possible need for ICU stay and she agrees to proceed as outlined.  Consent for surgery was obtained by me in the office.  Plan for  preoperative mechanical and antibiotic bowel preparation.    Instructions given.  I have spent a total time of 45 minutes on 04/18/24 in caring for this patient including Diagnostic results, Prognosis, Risks and benefits of tx options, Instructions for management, Patient and family education, Impressions, Counseling / Coordination of care, Documenting in the medical record, Reviewing / ordering tests, medicine, procedures  , Obtaining or reviewing history  , and Communicating with other healthcare professionals .

## 2024-04-18 NOTE — ASSESSMENT & PLAN NOTE
Lab Results   Component Value Date    HGBA1C 6.5 (H) 08/16/2019   Takes metformin, follow-up repeat A1c prior to surgery.

## 2024-04-18 NOTE — PATIENT INSTRUCTIONS
1. Nothing to eat or drink after midnight prior to the operation.    2. Please avoid ibuprofen, aspirin, fish oil for 7 days prior to surgery  3. Medications to take the morning of surgery with a sip of water: lexapro, amlodipine  Stop metformin 24hrs before surgery.

## 2024-04-23 ENCOUNTER — RADIATION ONCOLOGY CONSULT (OUTPATIENT)
Facility: HOSPITAL | Age: 64
End: 2024-04-23
Attending: OBSTETRICS & GYNECOLOGY
Payer: MEDICARE

## 2024-04-23 VITALS
SYSTOLIC BLOOD PRESSURE: 146 MMHG | HEART RATE: 93 BPM | WEIGHT: 223.6 LBS | OXYGEN SATURATION: 96 % | BODY MASS INDEX: 39.61 KG/M2 | RESPIRATION RATE: 18 BRPM | DIASTOLIC BLOOD PRESSURE: 96 MMHG | TEMPERATURE: 97.6 F

## 2024-04-23 DIAGNOSIS — C56.2 MALIGNANT NEOPLASM OF LEFT OVARY (HCC): ICD-10-CM

## 2024-04-23 PROCEDURE — 99211 OFF/OP EST MAY X REQ PHY/QHP: CPT | Performed by: RADIOLOGY

## 2024-04-23 PROCEDURE — 99205 OFFICE O/P NEW HI 60 MIN: CPT | Performed by: RADIOLOGY

## 2024-04-23 NOTE — PROGRESS NOTES
Eli Cobos 1960 is a 64 y.o. 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 She presents today for radiation oncology consult.    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 She then received 6 cycles of adjuvant chemotherapy with carboplatin and Taxol that completed in January of 2020. She was noted to have pelvic tumor concerning for recurrence in fall 2021. In January 2022 she had exploratory laparotomy, extensive lysis of adhesions, resection of pelvic mass, resection of small bowel, cystoscopy and ureteral stenting . 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  She then started letrozole but transitioned to Megace therapy in December 2022 due to slightly elevated inhibin B and genomic testing.    8/26/19 Total abdominal hysterectomy, bilateral salpingo-oophorectomy, radical omentectomy, pelvic lymph node sampling, repair inherent cystostomy, left ureteroneocystostomy, appendectomy  -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  -intraoperative tumor rupture    1/6/22 Xlap, extensive RADHA, resection pelvic mass, small bowel resection  - recurrrent sex cord stromal tumor  -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, margins negative    2/28/24 inhibin B 20.5    3/7/24 Luisana CHEN- Tolerating Megace well. Inhibin B elevated at 20.5. Exam without evidence of disease recurrence and she is asymptomatic. Plan for CT to evaluate for disease recurrence.    4/4/24 CT C/A/P-  Small but enlarging dumbbell-shaped soft tissue density nodule in the right hemipelvis suspicious for possible small developing recurrent tumor mass, only 2.8 x 1.3 x 1.2 cm.  Otherwise no CT findings for recurrent or metastatic tumor in the chest,  abdomen or pelvis.    24 Dr. Bonilla- Discussed that given rising inhibin, CT findings that this is likely a second recurrence of her Sertoli-Leydig cell tumor. Discussed options including surgical resection plus or minus adjuvant chemotherapy, curative intent radiation therapy, chemotherapy alone. Primary treatment with chemotherapy is likely to be less efficacious than either radiation or surgery. She is interested in rad onc consult. Discussed surgery and obtained consent      Upcomin24 exploratory laparotomy  24 Dr. Bonilla    Oncology History   Malignant neoplasm of left ovary (HCC)   2019 Initial Diagnosis    Malignant neoplasm of left ovary (HCC)     2019 -  Cancer Staged    Staging form: Ovary, Fallopian Tube, Primary Peritoneal, AJCC 8th Edition  - Clinical stage from 2019: FIGO Stage IC1, calculated as Stage IA (cT1a, cN0, cM0) - Signed by Trevin Bonilla MD on 2019        Chemotherapy    Taxol 175 mg/m2 and carboplatin AUC 6 every 21 days. She received 5 cycles and is scheduled for cycle 6.      2019 Surgery    Total abdominal hysterectomy, bilateral salpingo-oophorectomy, radical omentectomy, pelvic lymph node sampling, repair inherent cystostomy, left ureteroneocystostomy, appendectomy  -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  -intraoperative tumor rupture     2022 Surgery    Xlap, extensive RADHA, resection pelvic mass, small bowel resection  - recurrrent sex cord stromal tumor  -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, margins negative     2022 Genomic Testing    Caris- AR+, PD-L1 neg, MMR intact.      2022 -  Hormone Therapy    Megace 80 mg PO BID (starting inhibin B, 9.2 pg/ml)         Review of Systems:  Review of Systems   Constitutional:  Positive for diaphoresis (head at times. On megace) and fatigue.   HENT: Negative.     Eyes: Negative.    Respiratory:  Positive for  shortness of breath (with exertion at times).    Cardiovascular: Negative.    Gastrointestinal: Negative.    Endocrine: Negative.    Genitourinary: Negative.    Musculoskeletal:  Positive for arthralgias (left knee).   Skin: Negative.    Allergic/Immunologic: Negative.    Neurological: Negative.    Hematological: Negative.    Psychiatric/Behavioral: Negative.         Clinical Trial: no    OB/GYN History:  The patient underwent menarche at 10 years  Menopause Status Post  No LMP recorded. Patient has had a hysterectomy.  Menopause at early 50's  Menopause Reason natural  Hormone replacement therapy: yes.  Years used 2 years   1   Para 1   Age at first delivery being 14 years.   Nursing: no.   Birth control pills: no.      Pregnancy test needed:  no    Prior Radiation no    Teaching NCI Rt packet given    MST done    Implantable Devices (Port, Pacemaker, pain stimulator) no    Hip Replacement no        Health Maintenance   Topic Date Due    Hepatitis C Screening  Never done    Medicare Annual Wellness Visit (AWV)  Never done    Kidney Health Evaluation: Albumin/Creatinine Ratio  Never done    Diabetic Foot Exam  Never done    DM Eye Exam  Never done    Colorectal Cancer Screening  Never done    Zoster Vaccine (1 of 2) Never done    HEMOGLOBIN A1C  2020    Fall Risk  Never done    Urinary Incontinence Screening  Never done    COVID-19 Vaccine (2023- season) 2023    Kidney Health Evaluation: GFR  2024    Breast Cancer Screening: Mammogram  2024    BMI: Followup Plan  2025    BMI: Adult  2025    Depression Screening  2025    Osteoporosis Screening  Completed    Pneumococcal Vaccine: 65+ Years  Completed    Influenza Vaccine  Completed    HIB Vaccine  Aged Out    IPV Vaccine  Aged Out    Hepatitis A Vaccine  Aged Out    Meningococcal ACWY Vaccine  Aged Out    HPV Vaccine  Aged Out     Past Medical History:   Diagnosis Date    Anxiety     Cancer (HCC)     ovarian     CPAP (continuous positive airway pressure) dependence     Diabetes mellitus (HCC)     High cholesterol     Hypertension     Morbid obesity (HCC)     Ovarian cancer (HCC)     Ovarian neoplasm 08/26/2019    PONV (postoperative nausea and vomiting)     Post-menopausal bleeding     Sleep apnea      Past Surgical History:   Procedure Laterality Date    BLADDER REPAIR N/A 08/26/2019    Procedure: REPAIR BLADDER; uretero yared cystotomy;  Surgeon: Cachorro Olmedo MD;  Location: [facility];  Service: Gynecology Oncology    CHOLECYSTECTOMY      COLONOSCOPY      HYSTERECTOMY      IR IMAGE GUIDED ASPIRATION / DRAINAGE  09/27/2019    IR PORT PLACEMENT  09/27/2019    IR PORT PLACEMENT  11/07/2019    IR PORT REMOVAL  10/29/2019    IR PORT REMOVAL  11/06/2020    MT CYSTO BLADDER W/URETERAL CATHETERIZATION Bilateral 01/06/2022    Procedure: CYSTOSCOPY WITH INSERTION STENT URETERAL;  Surgeon: Noel Baig MD;  Location: [facility];  Service: Urology    MT LAPS FULG/EXC OVARY VISCERA/PERITONEAL SURFACE N/A 01/06/2022    Procedure: EXPLORATORY LAPAROTOMY, EXTENSIVE LYSIS OF ADHESIONS, RESECTION PELVIC MASS;  Surgeon: Trevin Bonilla MD;  Location: [facility];  Service: Gynecology Oncology    MT TOTAL ABDOMINAL HYSTERECT W/WO RMVL TUBE OVARY N/A 08/26/2019    Procedure: TOTAL ABDOMINAL HYSTERECTOMY, BILATERAL SALPINGO-OOPHORECTOMY, Radical omentectomy, pelvic lymph node sampling, staging biopsy, repair of cystotomy, appendectomy;  Surgeon: Cachorro Olmedo MD;  Location: [facility];  Service: Gynecology Oncology    SMALL INTESTINE SURGERY N/A 01/06/2022    Procedure: RESECTION SMALL BOWEL;  Surgeon: Trevin Bonilla MD;  Location: [facility];  Service: Gynecology Oncology     Family History   Problem Relation Age of Onset    Diabetes Mother     Hypertension Mother     Hypertension Father     Colon cancer Paternal Uncle         My fathers twin    Breast cancer Cousin      Social History     Tobacco Use     Smoking status: Former     Current packs/day: 0.00     Average packs/day: 0.5 packs/day for 10.0 years (5.0 ttl pk-yrs)     Types: Cigarettes     Start date: 9/10/1976     Quit date: 9/10/1986     Years since quittin.6    Smokeless tobacco: Never    Tobacco comments:     quit in her 30's   Vaping Use    Vaping status: Never Used   Substance Use Topics    Alcohol use: Not Currently    Drug use: Never        Current Outpatient Medications:     amLODIPine (NORVASC) 5 mg tablet, Take 5 mg by mouth daily , Disp: , Rfl: 4    Cholecalciferol (VITAMIN D-3) 1000 units CAPS, Take by mouth daily , Disp: , Rfl:     clobetasol (TEMOVATE) 0.05 % ointment, Apply to the affected area 1-3x/week, Disp: 30 g, Rfl: 1    escitalopram (LEXAPRO) 20 mg tablet, 20 mg daily , Disp: , Rfl: 0    megestrol (MEGACE) 40 mg tablet, TAKE TWO TABLETS BY MOUTH TWICE A DAY, Disp: 360 tablet, Rfl: 0    metFORMIN (GLUCOPHAGE) 500 mg tablet, 500 mg 2 (two) times a day with meals , Disp: , Rfl: 0    polyethylene glycol (MiraLax) 17 GM/SCOOP powder, Mix with 64 oz Gatorade, begin 4 PM day before surgery per bowel prep instructions., Disp: 238 g, Rfl: 0    scopolamine (TRANSDERM-SCOP) 1.5 mg/3 days TD 72 hr patch, Place 1 patch on the skin every third day, Disp: 2 patch, Rfl: 1    simvastatin (ZOCOR) 20 mg tablet, TK 1 T PO QD IN THE KIMBERLYN, Disp: , Rfl: 5    SUPER B COMPLEX/C PO, Take by mouth daily , Disp: , Rfl:     valsartan-hydrochlorothiazide (DIOVAN-HCT) 320-25 MG per tablet, daily , Disp: , Rfl: 0  Allergies   Allergen Reactions    Sulfa Antibiotics Other (See Comments)     Pt gets ill       Vitals:    24 1258   BP: 146/96   BP Location: Right arm   Patient Position: Sitting   Cuff Size: Standard   Pulse: 93   Resp: 18   Temp: 97.6 °F (36.4 °C)   TempSrc: Temporal   SpO2: 96%   Weight: 101 kg (223 lb 9.6 oz)     Pain Score: 0-No pain

## 2024-04-23 NOTE — PROGRESS NOTES
Eli Cobos  1960  [id number]    Radiation Oncology Consult    August 26, 2019: Total abdominal hysterectomy, bilateral salpingo-oophorectomy, radical omentectomy, pelvic lymph node dissection and appendectomy by Dr. Olmedo.  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  Stage IA    December 13, 2021: CT chest, abdomen and pelvis showed an enhancing spherical mass in the left hemipelvis measuring 3 cm, suspicious for recurrent tumor    December 22, 2021:  PET/CT showed no hypermetabolic uptake    January 6, 2022: Exploratory laparotomy with extensive lysis of adhesions, resection of pelvic mass and small bowel resection.  Pathology showed recurrent malignant sex cord stromal tumor measuring 4.1 cm, a 4 mm well-differentiated neuroendocrine tumor in the resected section of ileum    April 4, 2024: CT chest, abdomen and pelvis shows a 2.8 cm dumbbell shaped soft tissue density in the right hemipelvis, present but not enlarged on previous scan from August 2023, no other evidence of recurrent or distant disease.    History of present illness: Ms. 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  She did well until a surveillance CT scan in December 2021 showed a recurrent mass in the left hemipelvis which was not hypermetabolic on PET/CT.  She underwent resection of the mass which measured 4.1 cm on final pathology and an incidental well differentiated neuroendocrine tumor was noted in the resected section of ileum at the time.  She was treated postoperatively with Megace.  In December 2023 serum inhibin B level was elevated at 20.5.  Therefore surveillance CT of the chest, abdomen and pelvis was obtained on April 4, 2024.  This showed a 2.8 cm mass in the right  hemipelvis enlarged since her prior scan.  The location is not readily amenable to image guided biopsy.  In addition her previous disease was not avid on PET.  She is otherwise asymptomatic.  She met with Dr. Bonilla who discussed possible management options including surgical resection with or without chemotherapy, chemotherapy alone or ablative radiation treatment.  She is referred by Dr. Bonilla for discussion of possible stereotactic ablative radiation to the presumed recurrent pelvic mass for known Sertoli-Leydig cell ovarian cancer.      Oncology History   Malignant neoplasm of left ovary (HCC)   8/26/2019 Initial Diagnosis    Malignant neoplasm of left ovary (HCC)     8/26/2019 -  Cancer Staged    Staging form: Ovary, Fallopian Tube, Primary Peritoneal, AJCC 8th Edition  - Clinical stage from 8/26/2019: FIGO Stage IC1, calculated as Stage IA (cT1a, cN0, cM0) - Signed by Trevin Bonilla MD on 9/11/2019        Chemotherapy    Taxol 175 mg/m2 and carboplatin AUC 6 every 21 days. She received 5 cycles and is scheduled for cycle 6.      8/26/2019 Surgery    Total abdominal hysterectomy, bilateral salpingo-oophorectomy, radical omentectomy, pelvic lymph node sampling, repair inherent cystostomy, left ureteroneocystostomy, appendectomy  -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  -intraoperative tumor rupture     1/6/2022 Surgery    Xlap, extensive RADHA, resection pelvic mass, small bowel resection  - recurrrent sex cord stromal tumor  -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, margins negative     1/6/2022 Genomic Testing    Caris- AK+, PD-L1 neg, MMR intact.      12/2022 -  Hormone Therapy    Megace 80 mg PO BID (starting inhibin B, 9.2 pg/ml)         Patient Active Problem List   Diagnosis    Morbid obesity with BMI of 40.0-44.9, adult (HCC)    Edema of left lower extremity    Malignant neoplasm of left ovary (HCC)    Motion sickness    Adrenal  mass, left (HCC)    Encounter for central line care    Type 2 diabetes mellitus with hyperglycemia, without long-term current use of insulin (HCC)    Primary hypertension    Electrolyte abnormality    Neuroendocrine carcinoma of small bowel (HCC)    Encounter for follow-up surveillance of ovarian cancer    Lichen sclerosus et atrophicus of the vulva    Cancer Staging   Malignant neoplasm of left ovary (HCC)  Staging form: Ovary, Fallopian Tube, Primary Peritoneal, AJCC 8th Edition  - Clinical stage from 2019: FIGO Stage IC1, calculated as Stage IA (cT1a, cN0, cM0) - Signed by Trevin Bonilla MD on 2019    Past Medical History:   Diagnosis Date    Anxiety     Cancer (HCC)     ovarian    CPAP (continuous positive airway pressure) dependence     Diabetes mellitus (HCC)     High cholesterol     Hypertension     Morbid obesity (HCC)     Ovarian cancer (HCC)     Ovarian neoplasm 2019    PONV (postoperative nausea and vomiting)     Post-menopausal bleeding     Sleep apnea      Social History     Socioeconomic History    Marital status: Single     Spouse name: Not on file    Number of children: Not on file    Years of education: Not on file    Highest education level: Not on file   Occupational History    Not on file   Tobacco Use    Smoking status: Former     Current packs/day: 0.00     Average packs/day: 0.5 packs/day for 10.0 years (5.0 ttl pk-yrs)     Types: Cigarettes     Start date: 9/10/1976     Quit date: 9/10/1986     Years since quittin.6    Smokeless tobacco: Never    Tobacco comments:     quit in her 30's   Vaping Use    Vaping status: Never Used   Substance and Sexual Activity    Alcohol use: Not Currently    Drug use: Never    Sexual activity: Not Currently   Other Topics Concern    Not on file   Social History Narrative    Not on file     Social Determinants of Health     Financial Resource Strain: Not At Risk (2023)    Received from [facility]    Financial  Resource Strain     In the last 12 months did you skip medications to save money?: No     In the last 12 months, was there a time when you needed to see a doctor but could not because of cost?: No   Food Insecurity: Not At Risk (12/4/2023)    Received from [facility]    Food Insecurity     In the last 12 months did you ever eat less than you felt you should because there wasn't enough money for food?: No   Transportation Needs: Not At Risk (12/4/2023)    Received from [facility]    Transporation     In the last 12 months, have you ever had to go without healthcare because you didn't have a way to get there?: No   Physical Activity: Not on file   Stress: Not on file   Social Connections: Not At Risk (5/23/2023)    Received from [facility]    Social Connections     Do you often feel lonely?: No   Intimate Partner Violence: Not on file   Housing Stability: Not At Risk (12/4/2023)    Received from [facility]    Housing Stability     Are you worried that in the next 2 months you may not have stable housing?: No      Family History   Problem Relation Age of Onset    Diabetes Mother     Hypertension Mother     Hypertension Father     Colon cancer Paternal Uncle         My fathers twin    Breast cancer Cousin      Past Surgical History:   Procedure Laterality Date    BLADDER REPAIR N/A 08/26/2019    Procedure: REPAIR BLADDER; uretero yared cystotomy;  Surgeon: Cachorro Olmedo MD;  Location: [facility];  Service: Gynecology Oncology    CHOLECYSTECTOMY      COLONOSCOPY      HYSTERECTOMY      IR IMAGE GUIDED ASPIRATION / DRAINAGE  09/27/2019    IR PORT PLACEMENT  09/27/2019    IR PORT PLACEMENT  11/07/2019    IR PORT REMOVAL  10/29/2019    IR PORT REMOVAL  11/06/2020    MO CYSTO BLADDER W/URETERAL CATHETERIZATION Bilateral 01/06/2022    Procedure: CYSTOSCOPY WITH INSERTION STENT URETERAL;  Surgeon: Noel Baig MD;   Location: [facility];  Service: Urology    IN LAPS FULG/EXC OVARY VISCERA/PERITONEAL SURFACE N/A 01/06/2022    Procedure: EXPLORATORY LAPAROTOMY, EXTENSIVE LYSIS OF ADHESIONS, RESECTION PELVIC MASS;  Surgeon: Trevin Bonilla MD;  Location: [facility];  Service: Gynecology Oncology    IN TOTAL ABDOMINAL HYSTERECT W/WO RMVL TUBE OVARY N/A 08/26/2019    Procedure: TOTAL ABDOMINAL HYSTERECTOMY, BILATERAL SALPINGO-OOPHORECTOMY, Radical omentectomy, pelvic lymph node sampling, staging biopsy, repair of cystotomy, appendectomy;  Surgeon: Cachorro Olmedo MD;  Location: [facility];  Service: Gynecology Oncology    SMALL INTESTINE SURGERY N/A 01/06/2022    Procedure: RESECTION SMALL BOWEL;  Surgeon: Trevin Bonilla MD;  Location: [facility];  Service: Gynecology Oncology       Current Outpatient Medications:     amLODIPine (NORVASC) 5 mg tablet, Take 5 mg by mouth daily , Disp: , Rfl: 4    Cholecalciferol (VITAMIN D-3) 1000 units CAPS, Take by mouth daily , Disp: , Rfl:     clobetasol (TEMOVATE) 0.05 % ointment, Apply to the affected area 1-3x/week, Disp: 30 g, Rfl: 1    escitalopram (LEXAPRO) 20 mg tablet, 20 mg daily , Disp: , Rfl: 0    megestrol (MEGACE) 40 mg tablet, TAKE TWO TABLETS BY MOUTH TWICE A DAY, Disp: 360 tablet, Rfl: 0    metFORMIN (GLUCOPHAGE) 500 mg tablet, 500 mg 2 (two) times a day with meals , Disp: , Rfl: 0    polyethylene glycol (MiraLax) 17 GM/SCOOP powder, Mix with 64 oz Gatorade, begin 4 PM day before surgery per bowel prep instructions., Disp: 238 g, Rfl: 0    scopolamine (TRANSDERM-SCOP) 1.5 mg/3 days TD 72 hr patch, Place 1 patch on the skin every third day, Disp: 2 patch, Rfl: 1    simvastatin (ZOCOR) 20 mg tablet, TK 1 T PO QD IN THE KIMBERLYN, Disp: , Rfl: 5    SUPER B COMPLEX/C PO, Take by mouth daily , Disp: , Rfl:     valsartan-hydrochlorothiazide (DIOVAN-HCT) 320-25 MG per tablet, daily , Disp: , Rfl: 0  Allergies   Allergen Reactions    Sulfa Antibiotics Other (See Comments)      Pt gets ill        Review of Systems:  Review of Systems   Constitutional:  Positive for diaphoresis (head at times. On megace) and fatigue.   HENT: Negative.     Eyes: Negative.    Respiratory:  Positive for shortness of breath (with exertion at times).    Cardiovascular: Negative.    Gastrointestinal: Negative.    Endocrine: Negative.    Genitourinary: Negative.    Musculoskeletal:  Positive for arthralgias (left knee).   Skin: Negative.    Allergic/Immunologic: Negative.    Neurological: Negative.    Hematological: Negative.    Psychiatric/Behavioral: Negative.           OB/GYN History:  The patient underwent menarche at 10 years  Menopause Status Post  No LMP recorded. Patient has had a hysterectomy.  Menopause at early 50's  Menopause Reason natural  Hormone replacement therapy: yes.  Years used 2 years   1   Para 1   Age at first delivery being 14 years.   Nursing: no.   Birth control pills: no.      Physical Exam:  Physical Exam  Vitals and nursing note reviewed.   Constitutional:       General: She is not in acute distress.     Appearance: Normal appearance.   HENT:      Nose: No congestion.   Eyes:      General: No scleral icterus.     Extraocular Movements: Extraocular movements intact.      Pupils: Pupils are equal, round, and reactive to light.   Pulmonary:      Effort: Pulmonary effort is normal. No respiratory distress.   Musculoskeletal:         General: No swelling. Normal range of motion.      Cervical back: Normal range of motion.   Lymphadenopathy:      Cervical: No cervical adenopathy.   Skin:     General: Skin is warm and dry.   Neurological:      General: No focal deficit present.      Mental Status: She is alert and oriented to person, place, and time.   Psychiatric:         Mood and Affect: Mood normal.         Thought Content: Thought content normal.         Judgment: Judgment normal.       LABS:    CBC  Diff   Lab Results   Component Value Date/Time    WBC 6.81 01/10/2022 06:58  AM    HGB 10.8 (L) 01/10/2022 06:58 AM    HCT 31.7 (L) 01/10/2022 06:58 AM    RBC 3.72 (L) 01/10/2022 06:58 AM    MCV 85 01/10/2022 06:58 AM    MCHC 34.1 01/10/2022 06:58 AM    MCH 29.0 01/10/2022 06:58 AM    RDW 12.5 01/10/2022 06:58 AM    MPV 9.0 01/10/2022 06:58 AM    Lab Results   Component Value Date/Time    LYMPHSABS 0.91 01/07/2022 06:07 AM    EOSABS 0.06 01/07/2022 06:07 AM    MONOSABS 0.66 01/07/2022 06:07 AM    BASOSABS 0.03 01/07/2022 06:07 AM        Basic Metabolic Profile    Lab Results   Component Value Date/Time    SODIUM 139 01/10/2022 06:58 AM    SODIUM 140 02/10/2020 02:19 PM    CO2 26 01/10/2022 06:58 AM    CO2 31 02/10/2020 02:19 PM    Lab Results   Component Value Date/Time    BUN 11 08/21/2023 11:47 AM    BUN 14 02/10/2020 02:19 PM    CREATININE 0.74 08/21/2023 11:47 AM    GLUCOSE 188 (H) 08/26/2019 05:02 PM          Discussion/Summary:  Ms. 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  She had a local recurrence in the left hemipelvis in 2022 which was treated with surgery and endocrine therapy.  Her inhibin B was rising and a surveillance CT suggests a small recurrent mass in the right hemipelvis.  She presents for information regarding the possible use of stereotactic ablative radiation (SBRT) for treatment of an isolated pelvic recurrence of Sertoli-Leydig cell ovarian cancer.    I explained to the patient and her sister that radiation could be used to treat the recurrent mass with the goal of durable local control.  Radiation using stereotactic technique (SBRT) would be used instead of surgical resection with the goal of ablating any active disease.  I personally reviewed the CT images with the patient and her family.  There is a loop of bowel in close proximity to the mass in the right hemipelvis.  When treating pelvic masses with adjacent bowel, dose  constraints to the bowel are the primary threshold utilized in treatment planning.  Therefore any segment of bowel must be kept under published dose constraints guidelines.  This would likely require a 5 fraction SBRT dose regimen.  This constraint could also result in the last dose being given to that surface of the mass.  However most ovarian cancers are relatively radiosensitive.  Use of initial SBRT would not preclude the ability to perform surgery in the future should the radiation be ineffective or cause a local toxicity.  The primary toxicity that could occur would be damage to the adjacent loop of bowel which could manifest as stricture, bowel obstruction or ulceration.  Any of those conditions could require surgical management.  I described the procedure involved in SBRT radiation.  Acute side effects would be limited though she might experience some mild fatigue, loose bowels or skin irritation.  Late effects as described primarily include the possibility of damage to the adjacent loop bowel as there are no other critical organs in close proximity to the mass.    Patient and her sister asked a number of pertinent questions which were answered to their satisfaction.  They would like to consider the treatment options as presented.  She will let us know of her decision whether to proceed with radiation or with surgical resection which is currently scheduled for June 4, 2024.    I spent 60 minutes reviewing the medical record including multiple imaging studies and the reports, multiple pathology and laboratory reports, provider encounters and operative notes as described in the history of present illness, discussing treatment options, radiation techniques and toxicities with the patient and her family and performing limited physical examination.

## 2024-04-30 ENCOUNTER — TELEPHONE (OUTPATIENT)
Facility: HOSPITAL | Age: 64
End: 2024-04-30

## 2024-04-30 NOTE — TELEPHONE ENCOUNTER
Patient called as follow up to Radiation Oncology consult. No answer. Brief message left for patient to call Radiation Oncology office back.

## 2024-05-14 ENCOUNTER — TELEPHONE (OUTPATIENT)
Dept: HEMATOLOGY ONCOLOGY | Facility: CLINIC | Age: 64
End: 2024-05-14

## 2024-05-14 ENCOUNTER — TELEPHONE (OUTPATIENT)
Dept: GYNECOLOGIC ONCOLOGY | Facility: CLINIC | Age: 64
End: 2024-05-14

## 2024-05-14 PROCEDURE — 77263 THER RADIOLOGY TX PLNG CPLX: CPT | Performed by: RADIOLOGY

## 2024-05-14 NOTE — TELEPHONE ENCOUNTER
Patient Call    Who are you speaking with? Patient    If it is not the patient, are they listed on an active communication consent form? N/A   What is the reason for this call? Ibis would like to cancel her surgery with Dr Bonilla on 6/4/24   Does this require a call back? No   If a call back is required, please list best call back number na   If a call back is required, advise that a message will be forwarded to their care team and someone will return their call as soon as possible.   Did you relay this information to the patient? N/A

## 2024-05-14 NOTE — TELEPHONE ENCOUNTER
Called Patient to discuss why she was cancelling the surgery. Patient states that Dr. Bonilla offered her radiation treatment or surgery. Patient decided to try radiation so will be canceling surgical procedure. Will inform provider that patient is going to try radiation first.

## 2024-05-14 NOTE — TELEPHONE ENCOUNTER
Patient Call    Who are you speaking with? Patient    If it is not the patient, are they listed on an active communication consent form? N/A   What is the reason for this call? Ibis would like to speak to someone in the office.   Does this require a call back? Yes   If a call back is required, please list best call back number [phone]    If a call back is required, advise that a message will be forwarded to their care team and someone will return their call as soon as possible.   Did you relay this information to the patient? Yes

## 2024-05-16 ENCOUNTER — RADIATION THERAPY TREATMENT (OUTPATIENT)
Facility: HOSPITAL | Age: 64
End: 2024-05-16
Payer: MEDICARE

## 2024-05-16 PROCEDURE — 77334 RADIATION TREATMENT AID(S): CPT | Performed by: RADIOLOGY

## 2024-05-30 ENCOUNTER — APPOINTMENT (OUTPATIENT)
Dept: RADIATION ONCOLOGY | Facility: CLINIC | Age: 64
End: 2024-05-30
Attending: RADIOLOGY
Payer: MEDICARE

## 2024-05-30 PROCEDURE — 77300 RADIATION THERAPY DOSE PLAN: CPT | Performed by: RADIOLOGY

## 2024-05-30 PROCEDURE — 77338 DESIGN MLC DEVICE FOR IMRT: CPT | Performed by: RADIOLOGY

## 2024-05-30 PROCEDURE — 77301 RADIOTHERAPY DOSE PLAN IMRT: CPT | Performed by: RADIOLOGY

## 2024-06-04 ENCOUNTER — APPOINTMENT (OUTPATIENT)
Dept: RADIATION ONCOLOGY | Facility: CLINIC | Age: 64
End: 2024-06-04
Attending: RADIOLOGY
Payer: MEDICARE

## 2024-06-04 PROCEDURE — 77300 RADIATION THERAPY DOSE PLAN: CPT | Performed by: RADIOLOGY

## 2024-06-04 PROCEDURE — 77338 DESIGN MLC DEVICE FOR IMRT: CPT | Performed by: RADIOLOGY

## 2024-06-04 PROCEDURE — 77373 STRTCTC BDY RAD THER TX DLVR: CPT | Performed by: RADIOLOGY

## 2024-06-04 PROCEDURE — 77301 RADIOTHERAPY DOSE PLAN IMRT: CPT | Performed by: RADIOLOGY

## 2024-06-06 ENCOUNTER — APPOINTMENT (OUTPATIENT)
Dept: RADIATION ONCOLOGY | Facility: CLINIC | Age: 64
End: 2024-06-06
Attending: RADIOLOGY
Payer: MEDICARE

## 2024-06-06 PROCEDURE — 77338 DESIGN MLC DEVICE FOR IMRT: CPT | Performed by: RADIOLOGY

## 2024-06-06 PROCEDURE — 77373 STRTCTC BDY RAD THER TX DLVR: CPT | Performed by: RADIOLOGY

## 2024-06-06 PROCEDURE — 77300 RADIATION THERAPY DOSE PLAN: CPT | Performed by: RADIOLOGY

## 2024-06-06 PROCEDURE — 77301 RADIOTHERAPY DOSE PLAN IMRT: CPT | Performed by: RADIOLOGY

## 2024-06-11 ENCOUNTER — APPOINTMENT (OUTPATIENT)
Dept: RADIATION ONCOLOGY | Facility: CLINIC | Age: 64
End: 2024-06-11
Attending: RADIOLOGY
Payer: MEDICARE

## 2024-06-11 PROCEDURE — 77338 DESIGN MLC DEVICE FOR IMRT: CPT | Performed by: RADIOLOGY

## 2024-06-11 PROCEDURE — 77300 RADIATION THERAPY DOSE PLAN: CPT | Performed by: RADIOLOGY

## 2024-06-11 PROCEDURE — 77301 RADIOTHERAPY DOSE PLAN IMRT: CPT | Performed by: RADIOLOGY

## 2024-06-11 PROCEDURE — 77373 STRTCTC BDY RAD THER TX DLVR: CPT | Performed by: RADIOLOGY

## 2024-06-13 ENCOUNTER — DOCUMENTATION (OUTPATIENT)
Dept: RADIATION ONCOLOGY | Facility: CLINIC | Age: 64
End: 2024-06-13

## 2024-06-13 ENCOUNTER — HOSPITAL ENCOUNTER (EMERGENCY)
Facility: HOSPITAL | Age: 64
Discharge: HOME | End: 2024-06-13
Attending: INTERNAL MEDICINE
Payer: MEDICARE

## 2024-06-13 ENCOUNTER — APPOINTMENT (OUTPATIENT)
Dept: RADIATION ONCOLOGY | Facility: CLINIC | Age: 64
End: 2024-06-13
Attending: RADIOLOGY
Payer: MEDICARE

## 2024-06-13 VITALS
OXYGEN SATURATION: 98 % | WEIGHT: 225.75 LBS | RESPIRATION RATE: 16 BRPM | BODY MASS INDEX: 39.99 KG/M2 | TEMPERATURE: 97.7 F | SYSTOLIC BLOOD PRESSURE: 140 MMHG | HEART RATE: 82 BPM | DIASTOLIC BLOOD PRESSURE: 66 MMHG

## 2024-06-13 DIAGNOSIS — R42 VERTIGO: Primary | ICD-10-CM

## 2024-06-13 DIAGNOSIS — R11.2 NAUSEA AND VOMITING: ICD-10-CM

## 2024-06-13 LAB
ALBUMIN SERPL BCP-MCNC: 4.4 G/DL (ref 3.5–5)
ALP SERPL-CCNC: 54 U/L (ref 34–104)
ALT SERPL W P-5'-P-CCNC: 10 U/L (ref 7–52)
ANION GAP SERPL CALCULATED.3IONS-SCNC: 14 MMOL/L (ref 4–13)
AST SERPL W P-5'-P-CCNC: 11 U/L (ref 13–39)
ATRIAL RATE: 71 BPM
BASOPHILS # BLD AUTO: 0.08 THOUSANDS/ÂΜL (ref 0–0.1)
BASOPHILS NFR BLD AUTO: 1 % (ref 0–1)
BILIRUB SERPL-MCNC: 0.84 MG/DL (ref 0.2–1)
BUN SERPL-MCNC: 11 MG/DL (ref 5–25)
CALCIUM SERPL-MCNC: 10.1 MG/DL (ref 8.4–10.2)
CARDIAC TROPONIN I PNL SERPL HS: <2 NG/L
CHLORIDE SERPL-SCNC: 96 MMOL/L (ref 96–108)
CO2 SERPL-SCNC: 22 MMOL/L (ref 21–32)
CREAT SERPL-MCNC: 0.66 MG/DL (ref 0.6–1.3)
EOSINOPHIL # BLD AUTO: 0.11 THOUSAND/ÂΜL (ref 0–0.61)
EOSINOPHIL NFR BLD AUTO: 1 % (ref 0–6)
ERYTHROCYTE [DISTWIDTH] IN BLOOD BY AUTOMATED COUNT: 11.8 % (ref 11.6–15.1)
GFR SERPL CREATININE-BSD FRML MDRD: 92 ML/MIN/1.73SQ M
GLUCOSE SERPL-MCNC: 222 MG/DL (ref 65–140)
GLUCOSE SERPL-MCNC: 223 MG/DL (ref 65–140)
HCT VFR BLD AUTO: 38.7 % (ref 34.8–46.1)
HGB BLD-MCNC: 13.9 G/DL (ref 11.5–15.4)
IMM GRANULOCYTES # BLD AUTO: 0.11 THOUSAND/UL (ref 0–0.2)
IMM GRANULOCYTES NFR BLD AUTO: 1 % (ref 0–2)
LYMPHOCYTES # BLD AUTO: 1.97 THOUSANDS/ÂΜL (ref 0.6–4.47)
LYMPHOCYTES NFR BLD AUTO: 17 % (ref 14–44)
MCH RBC QN AUTO: 29.3 PG (ref 26.8–34.3)
MCHC RBC AUTO-ENTMCNC: 35.9 G/DL (ref 31.4–37.4)
MCV RBC AUTO: 82 FL (ref 82–98)
MONOCYTES # BLD AUTO: 0.7 THOUSAND/ÂΜL (ref 0.17–1.22)
MONOCYTES NFR BLD AUTO: 6 % (ref 4–12)
NEUTROPHILS # BLD AUTO: 8.82 THOUSANDS/ÂΜL (ref 1.85–7.62)
NEUTS SEG NFR BLD AUTO: 74 % (ref 43–75)
NRBC BLD AUTO-RTO: 0 /100 WBCS
P AXIS: 67 DEGREES
PLATELET # BLD AUTO: 345 THOUSANDS/UL (ref 149–390)
PMV BLD AUTO: 8.8 FL (ref 8.9–12.7)
POTASSIUM SERPL-SCNC: 3.1 MMOL/L (ref 3.5–5.3)
PR INTERVAL: 214 MS
PROT SERPL-MCNC: 7.1 G/DL (ref 6.4–8.4)
QRS AXIS: 67 DEGREES
QRSD INTERVAL: 82 MS
QT INTERVAL: 412 MS
QTC INTERVAL: 447 MS
RBC # BLD AUTO: 4.75 MILLION/UL (ref 3.81–5.12)
SODIUM SERPL-SCNC: 132 MMOL/L (ref 135–147)
T WAVE AXIS: 14 DEGREES
VENTRICULAR RATE: 71 BPM
WBC # BLD AUTO: 11.79 THOUSAND/UL (ref 4.31–10.16)

## 2024-06-13 PROCEDURE — 77301 RADIOTHERAPY DOSE PLAN IMRT: CPT | Performed by: RADIOLOGY

## 2024-06-13 PROCEDURE — 93005 ELECTROCARDIOGRAM TRACING: CPT

## 2024-06-13 PROCEDURE — 96374 THER/PROPH/DIAG INJ IV PUSH: CPT

## 2024-06-13 PROCEDURE — 99284 EMERGENCY DEPT VISIT MOD MDM: CPT

## 2024-06-13 PROCEDURE — 82948 REAGENT STRIP/BLOOD GLUCOSE: CPT

## 2024-06-13 PROCEDURE — 93010 ELECTROCARDIOGRAM REPORT: CPT

## 2024-06-13 PROCEDURE — 77338 DESIGN MLC DEVICE FOR IMRT: CPT | Performed by: RADIOLOGY

## 2024-06-13 PROCEDURE — 77300 RADIATION THERAPY DOSE PLAN: CPT | Performed by: RADIOLOGY

## 2024-06-13 PROCEDURE — 77373 STRTCTC BDY RAD THER TX DLVR: CPT | Performed by: RADIOLOGY

## 2024-06-13 PROCEDURE — 96361 HYDRATE IV INFUSION ADD-ON: CPT

## 2024-06-13 PROCEDURE — 80053 COMPREHEN METABOLIC PANEL: CPT | Performed by: INTERNAL MEDICINE

## 2024-06-13 PROCEDURE — 36415 COLL VENOUS BLD VENIPUNCTURE: CPT | Performed by: INTERNAL MEDICINE

## 2024-06-13 PROCEDURE — 85025 COMPLETE CBC W/AUTO DIFF WBC: CPT | Performed by: INTERNAL MEDICINE

## 2024-06-13 PROCEDURE — 84484 ASSAY OF TROPONIN QUANT: CPT | Performed by: INTERNAL MEDICINE

## 2024-06-13 PROCEDURE — 99284 EMERGENCY DEPT VISIT MOD MDM: CPT | Performed by: INTERNAL MEDICINE

## 2024-06-13 RX ORDER — ACETAMINOPHEN 325 MG/1
975 TABLET ORAL ONCE
Status: DISCONTINUED | OUTPATIENT
Start: 2024-06-13 | End: 2024-06-13 | Stop reason: HOSPADM

## 2024-06-13 RX ORDER — ONDANSETRON 2 MG/ML
4 INJECTION INTRAMUSCULAR; INTRAVENOUS ONCE
Status: COMPLETED | OUTPATIENT
Start: 2024-06-13 | End: 2024-06-13

## 2024-06-13 RX ORDER — MECLIZINE HYDROCHLORIDE 25 MG/1
25 TABLET ORAL ONCE
Status: COMPLETED | OUTPATIENT
Start: 2024-06-13 | End: 2024-06-13

## 2024-06-13 RX ORDER — ONDANSETRON 4 MG/1
4 TABLET, ORALLY DISINTEGRATING ORAL ONCE
Status: COMPLETED | OUTPATIENT
Start: 2024-06-13 | End: 2024-06-13

## 2024-06-13 RX ORDER — ONDANSETRON 4 MG/1
4 TABLET, ORALLY DISINTEGRATING ORAL EVERY 6 HOURS PRN
Qty: 20 TABLET | Refills: 0 | Status: SHIPPED | OUTPATIENT
Start: 2024-06-13

## 2024-06-13 RX ORDER — MECLIZINE HYDROCHLORIDE 25 MG/1
25 TABLET ORAL 3 TIMES DAILY PRN
Qty: 30 TABLET | Refills: 0 | Status: SHIPPED | OUTPATIENT
Start: 2024-06-13

## 2024-06-13 RX ADMIN — ONDANSETRON 4 MG: 4 TABLET, ORALLY DISINTEGRATING ORAL at 19:20

## 2024-06-13 RX ADMIN — MECLIZINE HYDROCHLORIDE 25 MG: 25 TABLET ORAL at 16:41

## 2024-06-13 RX ADMIN — ONDANSETRON 4 MG: 2 INJECTION INTRAMUSCULAR; INTRAVENOUS at 15:58

## 2024-06-13 RX ADMIN — SODIUM CHLORIDE 1000 ML: 0.9 INJECTION, SOLUTION INTRAVENOUS at 15:56

## 2024-06-13 NOTE — ED PROVIDER NOTES
History  Chief Complaint   Patient presents with    Dizziness     Brought by EMS from [facility] pt having vertigo, vomiting, diarrhea, chills & sweats.  States it feels like vertigo - did not take meclizine today. Given Zofran IM by EMS for vomiting     HPI  64-year-old woman presents to ED for evaluation of dizziness, nausea and vomiting.  Patient states she was at the cancer center, waiting to get radiation when she had sudden onset of the symptoms.  Reports is similar to her previous episodes of vertigo.  She did not have Antivert for that with her so she did not try to take it, she did not try any other medications or therapies.  She states she sees physical therapy for vertigo and has not had episode in over a year.  Patient reports vertigo was sudden and severe.  She describes a room spinning sensation.  She denies any headache, head strike or falls. Patient denieschest pain, palpitations, abdominal pain, diarrhea, melena, hematochezia, dysuria, new skin rashes or numbness or tingling of the extremities.      Prior to Admission Medications   Prescriptions Last Dose Informant Patient Reported? Taking?   Cholecalciferol (VITAMIN D-3) 1000 units CAPS  Self Yes No   Sig: Take by mouth daily    SUPER B COMPLEX/C PO  Self Yes No   Sig: Take by mouth daily    amLODIPine (NORVASC) 5 mg tablet  Self Yes No   Sig: Take 5 mg by mouth daily    clobetasol (TEMOVATE) 0.05 % ointment  Self No No   Sig: Apply to the affected area 1-3x/week   escitalopram (LEXAPRO) 20 mg tablet  Self Yes No   Si mg daily    megestrol (MEGACE) 40 mg tablet   No No   Sig: TAKE TWO TABLETS BY MOUTH TWICE A DAY   metFORMIN (GLUCOPHAGE) 500 mg tablet  Self Yes No   Si mg 2 (two) times a day with meals    polyethylene glycol (MiraLax) 17 GM/SCOOP powder   No No   Sig: Mix with 64 oz Gatorade, begin 4 PM day before surgery per bowel prep instructions.   scopolamine (TRANSDERM-SCOP) 1.5 mg/3 days TD 72 hr patch  Self No No   Sig: Place  1 patch on the skin every third day   simvastatin (ZOCOR) 20 mg tablet  Self Yes No   Sig: TK 1 T PO QD IN THE KIMBERLYN   valsartan-hydrochlorothiazide (DIOVAN-HCT) 320-25 MG per tablet  Self Yes No   Sig: daily       Facility-Administered Medications: None       Past Medical History:   Diagnosis Date    Anxiety     Cancer (HCC)     ovarian    CPAP (continuous positive airway pressure) dependence     Diabetes mellitus (HCC)     High cholesterol     Hypertension     Morbid obesity (HCC)     Ovarian cancer (HCC)     Ovarian neoplasm 08/26/2019    PONV (postoperative nausea and vomiting)     Post-menopausal bleeding     Sleep apnea     Vertigo        Past Surgical History:   Procedure Laterality Date    BLADDER REPAIR N/A 08/26/2019    Procedure: REPAIR BLADDER; uretero yared cystotomy;  Surgeon: Cachorro Olmedo MD;  [facility];  Service: Gynecology Oncology    CHOLECYSTECTOMY      COLONOSCOPY      HYSTERECTOMY      IR IMAGE GUIDED ASPIRATION / DRAINAGE  09/27/2019    IR PORT PLACEMENT  09/27/2019    IR PORT PLACEMENT  11/07/2019    IR PORT REMOVAL  10/29/2019    IR PORT REMOVAL  11/06/2020    VA CYSTO BLADDER W/URETERAL CATHETERIZATION Bilateral 01/06/2022    Procedure: CYSTOSCOPY WITH INSERTION STENT URETERAL;  Surgeon: Noel Baig MD;  [facility];  Service: Urology    VA LAPS FULG/EXC OVARY VISCERA/PERITONEAL SURFACE N/A 01/06/2022    Procedure: EXPLORATORY LAPAROTOMY, EXTENSIVE LYSIS OF ADHESIONS, RESECTION PELVIC MASS;  Surgeon: Trevin Bonilla MD;  [facility];  Service: Gynecology Oncology    VA TOTAL ABDOMINAL HYSTERECT W/WO RMVL TUBE OVARY N/A 08/26/2019    Procedure: TOTAL ABDOMINAL HYSTERECTOMY, BILATERAL SALPINGO-OOPHORECTOMY, Radical omentectomy, pelvic lymph node sampling, staging biopsy, repair of cystotomy, appendectomy;  Surgeon: Cachorro Olmedo MD;  [facility];  Service: Gynecology Oncology    SMALL INTESTINE SURGERY N/A 01/06/2022    Procedure:  RESECTION SMALL BOWEL;  Surgeon: Trevin Bonilla MD;  [facility];  Service: Gynecology Oncology       Family History   Problem Relation Age of Onset    Diabetes Mother     Hypertension Mother     Hypertension Father     Colon cancer Paternal Uncle         My fathers twin    Breast cancer Cousin      I have reviewed and agree with the history as documented.    E-Cigarette/Vaping    E-Cigarette Use Never User      E-Cigarette/Vaping Substances    Nicotine No     THC No     CBD No     Flavoring No     Other No     Unknown No      Social History     Tobacco Use    Smoking status: Former     Current packs/day: 0.00     Average packs/day: 0.5 packs/day for 10.0 years (5.0 ttl pk-yrs)     Types: Cigarettes     Start date: 9/10/1976     Quit date: 9/10/1986     Years since quittin.7    Smokeless tobacco: Never    Tobacco comments:     quit in her 30's   Vaping Use    Vaping status: Never Used   Substance Use Topics    Alcohol use: Not Currently    Drug use: Never       Review of Systems   All other systems reviewed and are negative.      Physical Exam  Physical Exam  PHYSICAL EXAM    Constitutional:  Well developed, no acute distress  HEENT:  Conjunctiva normal. Oropharynx moist.  Horizontal nystagmus, no vertical nystagmus  Respiratory:  No respiratory distress  Cardiovascular:  Normal rate  GI:  Soft, nondistended, nontender  :  No costovertebral angle tenderness   Musculoskeletal:  No edema, no tenderness, no deformities  Integument:  Well hydrated, no rash   Lymphatic:  No lymphadenopathy noted   Neurologic:  Alert & oriented x 3, normal motor function, no focal deficits noted   Psychiatric:  Speech and behavior appropriate       Vital Signs  ED Triage Vitals   Temperature Pulse Respirations Blood Pressure SpO2   24 1455 24 1451 24 1451 24 1451 24 1451   97.7 °F (36.5 °C) 73 18 151/70 97 %      Temp Source Heart Rate Source Patient Position - Orthostatic VS BP  Location FiO2 (%)   06/13/24 1455 06/13/24 1700 -- -- --   Oral Monitor         Pain Score       06/13/24 1451       No Pain           Vitals:    06/13/24 1451 06/13/24 1700 06/13/24 1730 06/13/24 1800   BP: 151/70  144/67 140/66   Pulse: 73 75 79 82         Visual Acuity      ED Medications  Medications   ondansetron (ZOFRAN) injection 4 mg (4 mg Intravenous Given 6/13/24 1558)   sodium chloride 0.9 % bolus 1,000 mL (0 mL Intravenous Stopped 6/13/24 1848)   meclizine (ANTIVERT) tablet 25 mg (25 mg Oral Given 6/13/24 1641)   ondansetron (ZOFRAN-ODT) dispersible tablet 4 mg (4 mg Oral Given 6/13/24 1920)       Diagnostic Studies  Results Reviewed       Procedure Component Value Units Date/Time    HS Troponin 0hr (reflex protocol) [[id number]]  (Normal) Collected: 06/13/24 1555    Lab Status: Final result Specimen: Blood from Arm, Left Updated: 06/13/24 1627     hs TnI 0hr <2 ng/L     Comprehensive metabolic panel [[id number]]  (Abnormal) Collected: 06/13/24 1555    Lab Status: Final result Specimen: Blood from Arm, Left Updated: 06/13/24 1620     Sodium 132 mmol/L      Potassium 3.1 mmol/L      Chloride 96 mmol/L      CO2 22 mmol/L      ANION GAP 14 mmol/L      BUN 11 mg/dL      Creatinine 0.66 mg/dL      Glucose 222 mg/dL      Calcium 10.1 mg/dL      AST 11 U/L      ALT 10 U/L      Alkaline Phosphatase 54 U/L      Total Protein 7.1 g/dL      Albumin 4.4 g/dL      Total Bilirubin 0.84 mg/dL      eGFR 92 ml/min/1.73sq m     Narrative:      National Kidney Disease Foundation guidelines for Chronic Kidney Disease (CKD):     Stage 1 with normal or high GFR (GFR > 90 mL/min/1.73 square meters)    Stage 2 Mild CKD (GFR = 60-89 mL/min/1.73 square meters)    Stage 3A Moderate CKD (GFR = 45-59 mL/min/1.73 square meters)    Stage 3B Moderate CKD (GFR = 30-44 mL/min/1.73 square meters)    Stage 4 Severe CKD (GFR = 15-29 mL/min/1.73 square meters)    Stage 5 End Stage CKD (GFR <15 mL/min/1.73 square meters)  Note: GFR calculation  is accurate only with a steady state creatinine    CBC and differential [[id number]]  (Abnormal) Collected: 06/13/24 1555    Lab Status: Final result Specimen: Blood from Arm, Left Updated: 06/13/24 1607     WBC 11.79 Thousand/uL      RBC 4.75 Million/uL      Hemoglobin 13.9 g/dL      Hematocrit 38.7 %      MCV 82 fL      MCH 29.3 pg      MCHC 35.9 g/dL      RDW 11.8 %      MPV 8.8 fL      Platelets 345 Thousands/uL      nRBC 0 /100 WBCs      Segmented % 74 %      Immature Grans % 1 %      Lymphocytes % 17 %      Monocytes % 6 %      Eosinophils Relative 1 %      Basophils Relative 1 %      Absolute Neutrophils 8.82 Thousands/µL      Absolute Immature Grans 0.11 Thousand/uL      Absolute Lymphocytes 1.97 Thousands/µL      Absolute Monocytes 0.70 Thousand/µL      Eosinophils Absolute 0.11 Thousand/µL      Basophils Absolute 0.08 Thousands/µL     Fingerstick Glucose (POCT) [[id number]]  (Abnormal) Collected: 06/13/24 1547    Lab Status: Final result Specimen: Blood Updated: 06/13/24 1549     POC Glucose 223 mg/dl                    No orders to display              Procedures  Procedures         ED Course  ED Course as of 06/14/24 0921   Thu Jun 13, 2024   1527 EKG: sinus rhythm with 1st degree block    1716 Patient reexamined, reports symptoms are improving.    Discussed results with patient.  All questions answered, she reported understanding   1809 Patient tolerating p.o. intake, asking to walk to the bathroom   1815 Patient was able to ambulate                                             Medical Decision Making  Differential diagnosis includes BPPV, labyrinthitis, vestibular neuritis, Ménière's disease, otosclerosis, atypical migraine, anemia, electrolyte disturbance, polycythemia, hyperviscosity syndrome, medication side effect, chronic renal failure, thyroid disease, hypoglycemia      Problems Addressed:  Nausea and vomiting: acute illness or injury  Vertigo: acute illness or injury    Amount and/or Complexity of  Data Reviewed  External Data Reviewed: labs, radiology, ECG and notes.  Labs: ordered.  ECG/medicine tests: ordered and independent interpretation performed. Decision-making details documented in ED Course.    Risk  Prescription drug management.             Disposition  Final diagnoses:   Vertigo   Nausea and vomiting     Time reflects when diagnosis was documented in both MDM as applicable and the Disposition within this note       Time User Action Codes Description Comment    6/13/2024  6:07 PM Terrie Holder Add [R42] Vertigo     6/13/2024  6:07 PM Terrie Holder Add [R11.2] Nausea and vomiting           ED Disposition       ED Disposition   Discharge    Condition   Stable    Date/Time   Thu Jun 13, 2024  6:07 PM    Comment   Eli Saavedrajacqueline discharge to home/self care.                   Follow-up Information       Follow up With Specialties Details Why Contact Info Additional Information    [facility], Nurse Practitioner Call  As needed [address] [facility] [address]  [phone]       [facility] Emergency Department Emergency Medicine Go to  As needed [address] [place] [id number]  [phone] [facility] Emergency Department, [address] [place] [address], [phone]            Discharge Medication List as of 6/13/2024  6:09 PM        START taking these medications    Details   meclizine (ANTIVERT) 25 mg tablet Take 1 tablet (25 mg total) by mouth 3 (three) times a day as needed for dizziness, Starting Thu 6/13/2024, Normal      ondansetron (ZOFRAN-ODT) 4 mg disintegrating tablet Take 1 tablet (4 mg total) by mouth every 6 (six) hours as needed for nausea or vomiting, Starting Thu 6/13/2024, Normal           CONTINUE these medications which have NOT CHANGED    Details   amLODIPine (NORVASC) 5 mg tablet Take 5 mg by mouth daily , Starting Fri 7/12/2019, Historical Med      Cholecalciferol (VITAMIN D-3) 1000  units CAPS Take by mouth daily , Historical Med      clobetasol (TEMOVATE) 0.05 % ointment Apply to the affected area 1-3x/week, Normal      escitalopram (LEXAPRO) 20 mg tablet 20 mg daily , Starting Thu 7/11/2019, Historical Med      megestrol (MEGACE) 40 mg tablet TAKE TWO TABLETS BY MOUTH TWICE A DAY, Normal      metFORMIN (GLUCOPHAGE) 500 mg tablet 500 mg 2 (two) times a day with meals , Starting Thu 7/11/2019, Historical Med      polyethylene glycol (MiraLax) 17 GM/SCOOP powder Mix with 64 oz Gatorade, begin 4 PM day before surgery per bowel prep instructions., Normal      scopolamine (TRANSDERM-SCOP) 1.5 mg/3 days TD 72 hr patch Place 1 patch on the skin every third day, Starting Wed 9/11/2019, Normal      simvastatin (ZOCOR) 20 mg tablet TK 1 T PO QD IN THE KIMBERLYN, Historical Med      SUPER B COMPLEX/C PO Take by mouth daily , Historical Med      valsartan-hydrochlorothiazide (DIOVAN-HCT) 320-25 MG per tablet daily , Starting Tue 8/13/2019, Historical Med             No discharge procedures on file.    PDMP Review       None            ED Provider  Electronically Signed by             Terrie Holder MD  06/14/24 7861

## 2024-06-13 NOTE — ED NOTES
Spoke with pt sister at this time. Sister will be leaving shortly to  pt. Pt sister states she lives an hour away.      Bharat Forde RN  06/13/24 1929

## 2024-06-13 NOTE — ED NOTES
Spoke with Ynes - pt's sister.  Aware that we are waiting on results & meds to help pt,  Advised that we will know more after 19:30 about if she will be discharged or admitted.  If being d/c'd Ynes will come get pt.     Chloe Thao RN  06/13/24 4569

## 2024-06-13 NOTE — ED NOTES
Attempted to return pt's Sister Ynes's phone call, let a message for her to call back. Pt Ok for us to tell pt any information - she will be the one to give a ride home.     Chloe Thao RN  06/13/24 1083

## 2024-06-13 NOTE — PROGRESS NOTES
Called back to machine while patient receiving treatment.  C/O sudden onset extreme vertigo and nausea.  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  Has received PT for same in past..  Symptoms resolved spontaneously and patient received her radiation treatment.  At end of treatment when patient sat up, she started to experience acute vertigo, accompanied by severe nausea and vomiting.  She also experienced several episodes of diarrhea.  As patient is unable to drive in current state, ambulance was called.  Patient trasnsported to [facility] for additional treatment

## 2024-06-13 NOTE — ED NOTES
Hung Mann,    Based on your symptoms you are being treated for a urinary tract infection (UTI)/bladder infection.     Guidelines for the treatment of these infections indicate that uncomplicated, infrequent UTIs can be treated based on symptoms.     If you have more than 3 UTIs in a year or more than 2 in a 6-month period a urine sample should be obtained.     Urinary tract infections are very common with most resulting from bacteria traveling up the urethra into the bladder. Women are at higher risk for infections due to a shorter urethra allowing for bacteria to travel into the bladder more easily. Most (80-90%) of uncomplicated infections are caused from the bacteria Escherichia coli which is commonly found in stool or around the rectum.     It is important to reduce the risk of developing or having recurrent infections.   • Those who are sexually active and use spermicide (especially if you also use a diaphragm) are at an increased risk. It is recommended to reduce or stop using spermicidal products if able.   • Urinate promptly after intercourse.   • Proper hygiene with wiping front to back to avoid contamination from the rectum is important.     It is important to initiate treatment at the onset of symptoms.   o Increase your fluid intake up to 2-3 liters to help dilute and clear bacteria from your bladder.  o Over the counter phenazopyridine three times daily for up to two days can reduce the burning and irritation but should not be used chronically as it may mask symptoms that require further intervention.  o There is no significant evidence that cranberry products, probiotics or antiseptics are clearly beneficial, but if you are using any of these products and feel they are helpful you do not need to stop using them.     When you are prescribed an antibiotic take the entire course of treatment even if you feel better after a few doses.     If your symptoms persist for up to 48-72 hours after antibiotic  Actively vomiting during IV attempt     Chloe Thao, REGULO  06/13/24 1600     [facility] service of [facility]. nearby hospital.    Thank you,      SURENDRA Garza          Bladder Infection, Female (Adult)   Urine normally doesn't have any germs (bacteria) in it. But bacteria can get into the urinary tract from the skin around the rectum. Or they can travel in the blood from other parts of the body. Once they are in your urinary tract, they can cause infection in these areas:  · The urethra (urethritis)  · The bladder (cystitis)  · The kidneys (pyelonephritis)  The most common place for an infection is in the bladder. This is called a bladder infection. This is one of the most common infections in women. Most bladder infections are easily treated. They are not serious unless the infection spreads to the kidney.  The terms bladder infection, UTI, and cystitis are often used to  describe the same thing. But they are not always the same. Cystitis is an inflammation of the bladder. The most common cause of cystitis is an infection.  Symptoms  The infection causes inflammation in the urethra and bladder. This causes many of the symptoms. The most common symptoms of a bladder infection are:  · Pain or burning when urinating  · Having to urinate more often than normal  · Urgent need to urinate  · Only a small amount of urine comes out  · Blood in urine  · Belly (abdominal) discomfort. This is often in the lower belly above the pubic bone.  · Cloudy urine  · Strong- or bad-smelling urine  · Unable to urinate (urinary retention)  · Unable to hold urine in (urinary incontinence)  · Fever  · Loss of appetite  · Confusion (in older adults)  Causes  Bladder infections are not contagious. You can't get one from someone else, from a toilet seat, or from sharing a bath.  The most common cause of bladder infections is bacteria from the bowels. The bacteria get onto the skin around the opening of the urethra. From there, they can get into the urine. Then they travel up to the bladder, causing inflammation and infection. This often happens because of:  · Wiping incorrectly after urinating. Always wipe from front to back.  · Bowel incontinence  · Pregnancy  · Procedures such as having a catheter put in  · Older age  · Not emptying your bladder. This can give bacteria a chance to grow in your urine.  · Fluid loss (dehydration)  · Constipation  · Having sex  · Using a diaphragm for birth control   Treatment  Bladder infections are diagnosed by a urine test and urine culture. They are treated with antibiotics. They often clear up quickly without problems. Treatment helps prevent a more serious kidney infection.  Medicines  Medicines can help in the treatment of a bladder infection:  · Take antibiotics until they are used up, even if you feel better. It's important to finish them to make sure the infection has  cleared.  · You can use acetaminophen or ibuprofen for pain, fever, or discomfort, unless another medicine was prescribed. If you have long-term (chronic) liver or kidney disease, talk with your healthcare provider before using these medicines. Also talk with your provider if you've ever had a stomach ulcer or GI (gastrointestinal) bleeding, or are taking blood-thinner medicines.  · If you are given phenazopydridine to reduce burning with urination, it will make your urine a bright orange color. This can stain clothing.  Care and prevention  These self-care steps can help prevent future infections:  · Drink plenty of fluids. This helps to prevent dehydration and flush out your bladder. Do this unless you must restrict fluids for other health reasons, or your healthcare provider told you not to.  · Clean yourself correctly after going to the bathroom. Wipe from front to back after using the toilet. This helps prevent the spread of bacteria.  · Urinate more often. Don't try to hold urine in for a long time.  · Wear loose-fitting clothes and cotton underwear. Don't wear tight-fitting pants.  · Improve your diet and prevent constipation. Eat more fresh fruits and vegetables, and fiber. Eat less junk foods and fatty foods.  · Don't have sex until your symptoms are gone.  · Don't have caffeine, alcohol, and spicy foods. These can irritate your bladder.  · Urinate right after you have sex to flush out your bladder.  · If you use birth control pills and have frequent bladder infections, discuss it with your healthcare provider.  Follow-up care  Call your healthcare provider if all symptoms are not gone after 3 days of treatment. This is especially important if you have repeat infections.  If a culture was done, you will be told if your treatment needs to be changed. If directed, you can call to find out the results.  If X-rays were done, you will be told if the results will affect your treatment.  Call 911  Call 911 if any  of the following occur:  · Trouble breathing  · Hard to wake up or confusion  · Fainting (loss of consciousness)  · Fast heart rate  When to get medical advice  Call your healthcare provider right away if any of these occur:  · Fever of 100.4ºF (38.0ºC) or higher, or as directed by your healthcare provider  · Symptoms are not better after 3 days of treatment  · Back or belly pain that gets worse  · Repeated vomiting, or unable to keep medicine down  · Weakness or dizziness  · Vaginal discharge  · Pain, redness, or swelling in the outer vaginal area (labia)  Taskforce last reviewed this educational content on 11/1/2019  © 6274-0656 [employer]. All rights reserved. This information is not intended as a substitute for professional medical care. Always follow your healthcare professional's instructions.

## 2024-06-18 ENCOUNTER — APPOINTMENT (OUTPATIENT)
Dept: RADIATION ONCOLOGY | Facility: CLINIC | Age: 64
End: 2024-06-18
Attending: RADIOLOGY
Payer: MEDICARE

## 2024-06-20 ENCOUNTER — APPOINTMENT (OUTPATIENT)
Dept: RADIATION ONCOLOGY | Facility: CLINIC | Age: 64
End: 2024-06-20
Payer: MEDICARE

## 2024-06-21 PROCEDURE — 77301 RADIOTHERAPY DOSE PLAN IMRT: CPT | Performed by: RADIOLOGY

## 2024-06-21 PROCEDURE — 77300 RADIATION THERAPY DOSE PLAN: CPT | Performed by: RADIOLOGY

## 2024-06-21 PROCEDURE — 77336 RADIATION PHYSICS CONSULT: CPT | Performed by: RADIOLOGY

## 2024-06-21 PROCEDURE — 77338 DESIGN MLC DEVICE FOR IMRT: CPT | Performed by: RADIOLOGY

## 2024-06-21 PROCEDURE — 77373 STRTCTC BDY RAD THER TX DLVR: CPT | Performed by: RADIOLOGY

## 2024-06-21 PROCEDURE — 77435 SBRT MANAGEMENT: CPT | Performed by: RADIOLOGY

## 2024-06-27 ENCOUNTER — OFFICE VISIT (OUTPATIENT)
Age: 64
End: 2024-06-27
Payer: MEDICARE

## 2024-06-27 VITALS
TEMPERATURE: 98 F | SYSTOLIC BLOOD PRESSURE: 130 MMHG | BODY MASS INDEX: 38.62 KG/M2 | OXYGEN SATURATION: 98 % | HEIGHT: 63 IN | DIASTOLIC BLOOD PRESSURE: 79 MMHG | HEART RATE: 93 BPM | RESPIRATION RATE: 16 BRPM | WEIGHT: 218 LBS

## 2024-06-27 DIAGNOSIS — C56.2 MALIGNANT NEOPLASM OF LEFT OVARY (HCC): Primary | ICD-10-CM

## 2024-06-27 PROCEDURE — 99213 OFFICE O/P EST LOW 20 MIN: CPT | Performed by: OBSTETRICS & GYNECOLOGY

## 2024-06-27 NOTE — PROGRESS NOTES
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  She is now status post SBRT to the isolated lesion.  She tolerated radiation therapy well.  Her performance status is 0.  1.  Repeat inhibin B and CT abdomen pelvis in 3 months prior to her next visit.  2.  Stop Megace therapy.         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      Problem:  Cancer Staging   Malignant neoplasm of left ovary (HCC)  Staging form: Ovary, Fallopian Tube, Primary Peritoneal, AJCC 8th Edition  - Clinical stage from 8/26/2019: FIGO Stage IC1, calculated as Stage IA (cT1a, cN0, cM0) - Signed by Trevin Bonilla MD on 9/11/2019        Previous therapy:  Oncology History   Malignant neoplasm of left ovary (HCC)   8/26/2019 Initial Diagnosis    Malignant neoplasm of left ovary (HCC)     8/26/2019 -  Cancer Staged    Staging form: Ovary, Fallopian Tube, Primary Peritoneal, AJCC 8th Edition  - Clinical stage from 8/26/2019: FIGO Stage IC1, calculated as Stage IA (cT1a, cN0, cM0) - Signed by Trevin Bonilla MD on 9/11/2019        Chemotherapy    Taxol 175 mg/m2 and carboplatin AUC 6 every 21 days. She received 5 cycles and is scheduled for cycle 6.      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      12/2022 - 6/27/2024 Hormone Therapy    Megace 80 mg PO BID (starting inhibin B, 9.2 pg/ml)      - 6/18/2024 Radiation    SBRT to right pelvic recurrence               Patient ID: Eli Cobos is a 64 y.o. female  Returns after completion of SBRT for evaluation.  She tolerated the radiation therapy well.  No diarrhea.  She did have an episode of vertigo near the completion of her treatment.  This required transfer to the ED.  She was seen by physical therapy as well.  She has known [facility].  She is able to perform her actives of daily living without difficulty.  No other interval change in medications or medical history since her last visit.        The following portions of the patient's history were reviewed and updated as appropriate: allergies, current medications, past family history, past medical history, past social history, past surgical history, and problem list.    Review of Systems   Constitutional:  Negative for activity change and unexpected weight change.   HENT: Negative.     Eyes: Negative.    Respiratory: Negative.     Cardiovascular: Negative.    Gastrointestinal:  Negative for abdominal distention and abdominal pain.   Endocrine: Negative.    Genitourinary:  Negative for pelvic pain and vaginal bleeding.   Musculoskeletal: Negative.    Skin: Negative.    Allergic/Immunologic: Negative.    Neurological: Negative.    Hematological: Negative.    Psychiatric/Behavioral: Negative.         Current Outpatient Medications   Medication Sig Dispense Refill    amLODIPine (NORVASC) 5 mg tablet Take 5 mg by mouth daily   4    Cholecalciferol (VITAMIN D-3) 1000 units CAPS Take by mouth daily       clobetasol (TEMOVATE) 0.05 % ointment Apply to the  "affected area 1-3x/week 30 g 1    escitalopram (LEXAPRO) 20 mg tablet 20 mg daily   0    meclizine (ANTIVERT) 25 mg tablet Take 1 tablet (25 mg total) by mouth 3 (three) times a day as needed for dizziness 30 tablet 0    metFORMIN (GLUCOPHAGE) 500 mg tablet 500 mg 2 (two) times a day with meals   0    polyethylene glycol (MiraLax) 17 GM/SCOOP powder Mix with 64 oz Gatorade, begin 4 PM day before surgery per bowel prep instructions. 238 g 0    scopolamine (TRANSDERM-SCOP) 1.5 mg/3 days TD 72 hr patch Place 1 patch on the skin every third day 2 patch 1    simvastatin (ZOCOR) 20 mg tablet TK 1 T PO QD IN THE KIMBERLYN  5    SUPER B COMPLEX/C PO Take by mouth daily       valsartan-hydrochlorothiazide (DIOVAN-HCT) 320-25 MG per tablet daily   0    ondansetron (ZOFRAN-ODT) 4 mg disintegrating tablet Take 1 tablet (4 mg total) by mouth every 6 (six) hours as needed for nausea or vomiting 20 tablet 0     No current facility-administered medications for this visit.           Objective:    Blood pressure 130/79, pulse 93, temperature 98 °F (36.7 °C), temperature source Temporal, resp. rate 16, height 5' 3\" (1.6 m), weight 98.9 kg (218 lb), SpO2 98%.  Body mass index is 38.62 kg/m².  Body surface area is 2.01 meters squared.    Physical Exam  Vitals reviewed.   Constitutional:       General: She is not in acute distress.     Appearance: Normal appearance. She is not ill-appearing.   HENT:      Head: Normocephalic and atraumatic.      Mouth/Throat:      Mouth: Mucous membranes are moist.   Eyes:      General: No scleral icterus.        Right eye: No discharge.         Left eye: No discharge.      Conjunctiva/sclera: Conjunctivae normal.   Pulmonary:      Effort: Pulmonary effort is normal.   Musculoskeletal:      Right lower leg: No edema.      Left lower leg: No edema.   Skin:     General: Skin is warm and dry.      Coloration: Skin is not jaundiced.      Findings: No rash.   Neurological:      General: No focal deficit present.     "  Mental Status: She is alert and oriented to person, place, and time.      Cranial Nerves: No cranial nerve deficit.      Motor: No weakness.      Gait: Gait normal.   Psychiatric:         Mood and Affect: Mood normal.         Behavior: Behavior normal.         Thought Content: Thought content normal.         Judgment: Judgment normal.         Lab Results   Component Value Date     42.7 (H) 08/16/2019     Lab Results   Component Value Date    K 3.1 (L) 06/13/2024    CL 96 06/13/2024    CO2 22 06/13/2024    BUN 11 06/13/2024    CREATININE 0.66 06/13/2024    GLUCOSE 188 (H) 08/26/2019    GLUF 168 (H) 12/22/2021    CALCIUM 10.1 06/13/2024    AST 11 (L) 06/13/2024    ALT 10 06/13/2024    ALKPHOS 54 06/13/2024    EGFR 92 06/13/2024     Lab Results   Component Value Date    WBC 11.79 (H) 06/13/2024    HGB 13.9 06/13/2024    HCT 38.7 06/13/2024    MCV 82 06/13/2024     06/13/2024     Lab Results   Component Value Date    NEUTROABS 8.82 (H) 06/13/2024        Trend:  Lab Results   Component Value Date     42.7 (H) 08/16/2019

## 2024-06-27 NOTE — ASSESSMENT & PLAN NOTE
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  She is now status post SBRT to the isolated lesion.  She tolerated radiation therapy well.  Her performance status is 0.  1.  Repeat inhibin B and CT abdomen pelvis in 3 months prior to her next visit.  2.  Stop Megace therapy.

## 2024-07-31 ENCOUNTER — TELEPHONE (OUTPATIENT)
Age: 64
End: 2024-07-31

## 2024-07-31 NOTE — TELEPHONE ENCOUNTER
Patient calling from [phone]     HIPAA verified    Must work tomorrow, needs appmt to be a phone call.    Only saw message now making it in-person appmt.    Appointment was to be 8am tomorrow.    Please advise.

## 2024-09-18 ENCOUNTER — APPOINTMENT (OUTPATIENT)
Dept: LAB | Facility: HOSPITAL | Age: 64
End: 2024-09-18
Payer: MEDICARE

## 2024-09-18 DIAGNOSIS — C56.2 MALIGNANT NEOPLASM OF LEFT OVARY (HCC): ICD-10-CM

## 2024-09-18 LAB
BUN SERPL-MCNC: 13 MG/DL (ref 5–25)
CREAT SERPL-MCNC: 0.65 MG/DL (ref 0.6–1.3)
GFR SERPL CREATININE-BSD FRML MDRD: 92 ML/MIN/1.73SQ M

## 2024-09-18 PROCEDURE — 36415 COLL VENOUS BLD VENIPUNCTURE: CPT

## 2024-09-18 PROCEDURE — 83520 IMMUNOASSAY QUANT NOS NONAB: CPT

## 2024-09-18 PROCEDURE — 84520 ASSAY OF UREA NITROGEN: CPT

## 2024-09-18 PROCEDURE — 82565 ASSAY OF CREATININE: CPT

## 2024-09-19 DIAGNOSIS — N90.4 LICHEN SCLEROSUS ET ATROPHICUS OF THE VULVA: ICD-10-CM

## 2024-09-20 ENCOUNTER — HOSPITAL ENCOUNTER (OUTPATIENT)
Dept: CT IMAGING | Facility: HOSPITAL | Age: 64
Discharge: HOME | End: 2024-09-20
Attending: OBSTETRICS & GYNECOLOGY
Payer: MEDICARE

## 2024-09-20 DIAGNOSIS — C56.2 MALIGNANT NEOPLASM OF LEFT OVARY (HCC): ICD-10-CM

## 2024-09-20 PROCEDURE — 74177 CT ABD & PELVIS W/CONTRAST: CPT

## 2024-09-20 RX ORDER — CLOBETASOL PROPIONATE 0.5 MG/G
OINTMENT TOPICAL
Qty: 30 G | Refills: 0 | Status: SHIPPED | OUTPATIENT
Start: 2024-09-20

## 2024-09-20 RX ADMIN — IOHEXOL 75 ML: 350 INJECTION, SOLUTION INTRAVENOUS at 10:39

## 2024-09-20 RX ADMIN — IOHEXOL 50 ML: 240 INJECTION, SOLUTION INTRATHECAL; INTRAVASCULAR; INTRAVENOUS; ORAL at 10:39

## 2024-09-21 LAB — INHIBIN B SERPL-MCNC: <7 PG/ML (ref 0–16.9)

## 2024-09-26 ENCOUNTER — OFFICE VISIT (OUTPATIENT)
Age: 64
End: 2024-09-26
Payer: MEDICARE

## 2024-09-26 VITALS
RESPIRATION RATE: 16 BRPM | HEIGHT: 63 IN | OXYGEN SATURATION: 97 % | HEART RATE: 91 BPM | TEMPERATURE: 97.5 F | SYSTOLIC BLOOD PRESSURE: 126 MMHG | DIASTOLIC BLOOD PRESSURE: 68 MMHG | BODY MASS INDEX: 36.54 KG/M2 | WEIGHT: 206.2 LBS

## 2024-09-26 DIAGNOSIS — C56.2 MALIGNANT NEOPLASM OF LEFT OVARY (HCC): Primary | ICD-10-CM

## 2024-09-26 PROCEDURE — 99213 OFFICE O/P EST LOW 20 MIN: CPT | Performed by: OBSTETRICS & GYNECOLOGY

## 2024-09-26 PROCEDURE — 99459 PELVIC EXAMINATION: CPT | Performed by: OBSTETRICS & GYNECOLOGY

## 2024-09-26 PROCEDURE — G2211 COMPLEX E/M VISIT ADD ON: HCPCS | Performed by: OBSTETRICS & GYNECOLOGY

## 2024-09-26 NOTE — ASSESSMENT & PLAN NOTE
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  I reviewed CMP, CT abdomen pelvis images from 9/20/2024, inhibin B.  She has had a complete response to therapy.  I concur with the radiologist's opinion.  Inhibin B is less than 7.  Her performance status is 0.  1.  Continue inhibin B testing  2.  Return in 3 months for ovarian cancer surveillance.

## 2024-09-26 NOTE — PROGRESS NOTES
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  I reviewed CMP, CT abdomen pelvis images from 9/20/2024, inhibin B.  She has had a complete response to therapy.  I concur with the radiologist's opinion.  Inhibin B is less than 7.  Her performance status is 0.  1.  Continue inhibin B testing  2.  Return in 3 months for ovarian cancer surveillance.         Relevant Orders    Inhibin B         CHIEF COMPLAINT: Ovarian cancer surveillance      Problem:  Cancer Staging   Malignant neoplasm of left ovary (HCC)  Staging form: Ovary, Fallopian Tube, Primary Peritoneal, AJCC 8th Edition  - Clinical stage from 8/26/2019: FIGO Stage IC1, calculated as Stage IA (cT1a, cN0, cM0) - Signed by Trevin Bonilla MD on 9/11/2019        Previous therapy:  Oncology History   Malignant neoplasm of left ovary (HCC)   8/26/2019 Initial Diagnosis    Malignant neoplasm of left ovary (HCC)     8/26/2019 -  Cancer Staged    Staging form: Ovary, Fallopian Tube, Primary Peritoneal, AJCC 8th Edition  - Clinical stage from 8/26/2019: FIGO Stage IC1, calculated as Stage IA (cT1a, cN0, cM0) - Signed by Trevin Bonilla MD on 9/11/2019        Chemotherapy    Taxol 175 mg/m2 and carboplatin AUC 6 every 21 days. She received 5 cycles and is scheduled for cycle 6.      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      12/2022 - 6/27/2024 Hormone Therapy    Megace 80 mg PO BID (starting inhibin B, 9.2 pg/ml)     6/4/2024 - 6/21/2024 Radiation      Plan ID Energy Fractions Dose per Fraction (cGy) Dose Correction (cGy) Total Dose Delivered (cGy) Elapsed Days   SBRT MT 6X-FFF 5 / 5 600 0 3,000 17              Patient ID: Eli Cobos is a 64 y.o. female  Who returns for ovarian cancer surveillance.  She has no new complaints.  She does not have any diarrhea, hematochezia, melena.  Appetite is normal.  She has started Mounjaro.  Labs from 9/23/2024 revealed normal CMP with the exception of a glucose of 157 mg/dL.  Hemoglobin A1c was 6.1%.  She had a CT scan of the abdomen pelvis on 9/20/2024, 3 months after completion of SBRT to the right hemipelvis and there was a complete response to therapy.  I reviewed the images and concur with the radiologist's opinion.  Inhibin B was less than 7 as of 9/18/2024.  She is otherwise able to perform her actives of daily living without difficulty.  Quality of life is good.      The following portions of the patient's history were reviewed and updated as appropriate: allergies, current medications, past family history, past medical history, past social history, past surgical history, and problem list.    Review of Systems   Constitutional:  Negative for activity change and unexpected weight change.   HENT: Negative.     Eyes: Negative.    Respiratory: Negative.     Cardiovascular: Negative.    Gastrointestinal:  Negative for abdominal distention and abdominal pain.   Endocrine: Negative.    Genitourinary:  Negative for pelvic pain and vaginal bleeding.   Musculoskeletal: Negative.    Skin: Negative.    Allergic/Immunologic: Negative.    Neurological: Negative.   "  Hematological: Negative.    Psychiatric/Behavioral: Negative.         Current Outpatient Medications   Medication Sig Dispense Refill    amLODIPine (NORVASC) 5 mg tablet Take 5 mg by mouth daily   4    Cholecalciferol (VITAMIN D-3) 1000 units CAPS Take by mouth daily       clobetasol (TEMOVATE) 0.05 % ointment Apply to the affected area 1-3x/week 30 g 0    escitalopram (LEXAPRO) 20 mg tablet 20 mg daily   0    meclizine (ANTIVERT) 25 mg tablet Take 1 tablet (25 mg total) by mouth 3 (three) times a day as needed for dizziness 30 tablet 0    metFORMIN (GLUCOPHAGE) 500 mg tablet 500 mg 2 (two) times a day with meals   0    Mounjaro 7.5 MG/0.5ML Inject 7.5 mg under the skin every 7 days      ondansetron (ZOFRAN-ODT) 4 mg disintegrating tablet Take 1 tablet (4 mg total) by mouth every 6 (six) hours as needed for nausea or vomiting 20 tablet 0    scopolamine (TRANSDERM-SCOP) 1.5 mg/3 days TD 72 hr patch Place 1 patch on the skin every third day 2 patch 1    simvastatin (ZOCOR) 20 mg tablet TK 1 T PO QD IN THE KIMBERLYN  5    SUPER B COMPLEX/C PO Take by mouth daily       valsartan-hydrochlorothiazide (DIOVAN-HCT) 320-25 MG per tablet daily   0    polyethylene glycol (MiraLax) 17 GM/SCOOP powder Mix with 64 oz Gatorade, begin 4 PM day before surgery per bowel prep instructions. 238 g 0     No current facility-administered medications for this visit.           Objective:    Blood pressure 126/68, pulse 91, temperature 97.5 °F (36.4 °C), temperature source Temporal, resp. rate 16, height 5' 3\" (1.6 m), weight 93.5 kg (206 lb 3.2 oz), SpO2 97%.  Body mass index is 36.53 kg/m².  Body surface area is 1.96 meters squared.    Physical Exam  Vitals reviewed. Exam conducted with a chaperone present.   Constitutional:       General: She is not in acute distress.     Appearance: Normal appearance. She is well-developed. She is not ill-appearing, toxic-appearing or diaphoretic.   HENT:      Head: Normocephalic and atraumatic.   Eyes:      " General: No scleral icterus.     Extraocular Movements: Extraocular movements intact.      Conjunctiva/sclera: Conjunctivae normal.   Neck:      Thyroid: No thyromegaly.   Pulmonary:      Effort: Pulmonary effort is normal.   Abdominal:      General: There is no distension.      Palpations: Abdomen is soft. There is no mass.      Tenderness: There is no abdominal tenderness. There is no guarding or rebound.      Hernia: No hernia is present.   Genitourinary:     Comments: The external female genitalia is normal. The bartholin's, uretheral and skenes glands are normal. The urethral meatus is normal (midline with no lesions). Anus without fissure or lesion. Speculum exam reveals a grossly normal vagina. No masses, lesions,discharge or bleeding. No significant cystocele or rectocele noted. Bimanual exam notes a surgical absent cervix, uterus and adnexal structures. No masses or fullness.  There is thickening of the right vaginal apex consistent with prior radiation therapy.  Bladder is without fullness, mass or tenderness.    Musculoskeletal:         General: No swelling or tenderness.      Cervical back: Normal range of motion and neck supple.   Lymphadenopathy:      Cervical: No cervical adenopathy.   Skin:     General: Skin is warm and dry.      Coloration: Skin is not jaundiced or pale.      Findings: No lesion or rash.   Neurological:      General: No focal deficit present.      Mental Status: She is alert and oriented to person, place, and time. Mental status is at baseline.      Cranial Nerves: No cranial nerve deficit.      Motor: No weakness.      Gait: Gait normal.   Psychiatric:         Mood and Affect: Mood normal.         Behavior: Behavior normal.         Thought Content: Thought content normal.         Judgment: Judgment normal.         Lab Results   Component Value Date     42.7 (H) 08/16/2019     Lab Results   Component Value Date    K 3.1 (L) 06/13/2024    CL 96 06/13/2024    CO2 22 06/13/2024     BUN 13 09/18/2024    CREATININE 0.65 09/18/2024    GLUCOSE 188 (H) 08/26/2019    GLUF 168 (H) 12/22/2021    CALCIUM 10.1 06/13/2024    AST 11 (L) 06/13/2024    ALT 10 06/13/2024    ALKPHOS 54 06/13/2024    EGFR 92 09/18/2024     Lab Results   Component Value Date    WBC 11.79 (H) 06/13/2024    HGB 13.9 06/13/2024    HCT 38.7 06/13/2024    MCV 82 06/13/2024     06/13/2024     Lab Results   Component Value Date    NEUTROABS 8.82 (H) 06/13/2024     CT abdomen pelvis w contrast  Narrative: CT ABDOMEN AND PELVIS WITH IV CONTRAST    INDICATION: C56.2: Malignant neoplasm of left ovary. . Reassessment study    COMPARISON: 4/4/2024    TECHNIQUE: CT examination of the abdomen and pelvis was performed. Multiplanar 2D reformatted images were created from the source data.    This examination, like all CT scans performed in the [facility], was performed utilizing techniques to minimize radiation dose exposure, including the use of iterative reconstruction and automated exposure control. Radiation dose length   product (DLP) for this visit: 2056.23 mGy-cm    IV Contrast: 50 mL of iohexol (OMNIPAQUE) 75 mL of iohexol (OMNIPAQUE)  Enteric Contrast: Administered.    FINDINGS:    ABDOMEN    LOWER CHEST: No clinically significant abnormality in the visualized lower chest.    LIVER/BILIARY TREE: Unremarkable.    GALLBLADDER: Post cholecystectomy.    SPLEEN: Unremarkable.    PANCREAS: Unremarkable.    ADRENAL GLANDS: Unremarkable.    KIDNEYS/URETERS: Unremarkable. No hydronephrosis.    STOMACH AND BOWEL: Unremarkable.    APPENDIX: No findings to suggest appendicitis.    ABDOMINOPELVIC CAVITY: Previously seen dumbbell shaped mass within the right posterior pelvis has improved status post radiation. No definite asymmetry compared to the left side is seen.    Again noted are prominent lymph nodes and soft tissue stranding in the mesentery. There is no change.    Soft tissue density in the left retroperitoneum of the  iliac fossa (image 301/106) is unchanged from 8/21/2023. Also noted is some asymmetry with respect to the vaginal cuff, on the right more pronounced than on the left. See image 301/141. This however   is also unchanged from prior studies.    VESSELS: Unremarkable for patient's age.    PELVIS    REPRODUCTIVE ORGANS: Post hysterectomy.    URINARY BLADDER: Unremarkable.    ABDOMINAL WALL/INGUINAL REGIONS: Small midline ventral adipose containing hernia.    BONES: No acute fracture or suspicious osseous lesion.  Impression: 1. Status post radiation, previously seen dumbbell lesion in the right pelvis is no longer visualized.  2. No new intraperitoneal lesions are appreciated.    Workstation performed: [id number]

## 2024-12-13 ENCOUNTER — APPOINTMENT (OUTPATIENT)
Dept: LAB | Facility: HOSPITAL | Age: 64
End: 2024-12-13
Payer: MEDICARE

## 2024-12-13 DIAGNOSIS — C56.2 MALIGNANT NEOPLASM OF LEFT OVARY (HCC): ICD-10-CM

## 2024-12-13 PROCEDURE — 83520 IMMUNOASSAY QUANT NOS NONAB: CPT

## 2024-12-16 LAB — INHIBIN B SERPL-MCNC: 147.4 PG/ML (ref 0–16.9)

## 2024-12-19 ENCOUNTER — OFFICE VISIT (OUTPATIENT)
Age: 64
End: 2024-12-19
Payer: MEDICARE

## 2024-12-19 ENCOUNTER — APPOINTMENT (OUTPATIENT)
Dept: LAB | Facility: HOSPITAL | Age: 64
End: 2024-12-19
Payer: MEDICARE

## 2024-12-19 VITALS
HEART RATE: 90 BPM | RESPIRATION RATE: 16 BRPM | OXYGEN SATURATION: 98 % | TEMPERATURE: 97.3 F | BODY MASS INDEX: 34.48 KG/M2 | SYSTOLIC BLOOD PRESSURE: 128 MMHG | WEIGHT: 194.6 LBS | HEIGHT: 63 IN | DIASTOLIC BLOOD PRESSURE: 68 MMHG

## 2024-12-19 DIAGNOSIS — C56.2 MALIGNANT NEOPLASM OF LEFT OVARY (HCC): Primary | ICD-10-CM

## 2024-12-19 DIAGNOSIS — C56.2 MALIGNANT NEOPLASM OF LEFT OVARY (HCC): ICD-10-CM

## 2024-12-19 LAB
BUN SERPL-MCNC: 16 MG/DL (ref 5–25)
CREAT SERPL-MCNC: 0.64 MG/DL (ref 0.6–1.3)
GFR SERPL CREATININE-BSD FRML MDRD: 93 ML/MIN/1.73SQ M

## 2024-12-19 PROCEDURE — 99214 OFFICE O/P EST MOD 30 MIN: CPT | Performed by: OBSTETRICS & GYNECOLOGY

## 2024-12-19 PROCEDURE — 82565 ASSAY OF CREATININE: CPT

## 2024-12-19 PROCEDURE — 99459 PELVIC EXAMINATION: CPT | Performed by: OBSTETRICS & GYNECOLOGY

## 2024-12-19 PROCEDURE — 84520 ASSAY OF UREA NITROGEN: CPT

## 2024-12-19 PROCEDURE — 36415 COLL VENOUS BLD VENIPUNCTURE: CPT

## 2024-12-19 PROCEDURE — G2211 COMPLEX E/M VISIT ADD ON: HCPCS | Performed by: OBSTETRICS & GYNECOLOGY

## 2024-12-19 NOTE — ASSESSMENT & PLAN NOTE
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  Inhibin B is currently 147.4 from prior value of less than 7.  She is asymptomatic.  I reviewed genomic testing results, CT report from 9/20/2024.  Her performance status is 0.  1.  CT chest abdomen pelvis to evaluate for measurable disease.  2.  We discussed possible treatment options in the event that she has measurable recurrence including chemotherapy likely with bleomycin etoposide and cisplatin, hormone therapy, additional radiation therapy, or surgical resection.  She understands that if the recurrence is at the top of the vagina/base of bladder, exenterative surgery may be necessary.  Orders:    CT chest abdomen pelvis w contrast; Future

## 2024-12-19 NOTE — PROGRESS NOTES
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  Inhibin B is currently 147.4 from prior value of less than 7.  She is asymptomatic.  I reviewed genomic testing results, CT report from 2024.  Her performance status is 0.  1.  CT chest abdomen pelvis to evaluate for measurable disease.  2.  We discussed possible treatment options in the event that she has measurable recurrence including chemotherapy likely with bleomycin etoposide and cisplatin, hormone therapy, additional radiation therapy, or surgical resection.  She understands that if the recurrence is at the top of the vagina/base of bladder, exenterative surgery may be necessary.  Orders:    CT chest abdomen pelvis w contrast; Future            History of Present Illness   Reason for Visit / CC: Ovarian cancer surveillance   Eli Cobos is a 64 y.o. female   Who returns for ovarian cancer surveillance.  She has no complaints.  She is able to perform her actives of daily living without difficulty.  Her last CT on 2024 revealed resolution of the mass adjacent to the sigmoid colon treated with SBRT.  Inhibin B in September was less than 7.  Recent inhibin B12 1324 was 147.4.  She does not have any vaginal bleeding.  No other interval change in medications or medical history since her last visit to the office.  She has been losing weight intentionally.      Pertinent Medical History      Oncology History   Oncology History   Malignant neoplasm of left ovary (HCC)   2019  Initial Diagnosis    Malignant neoplasm of left ovary (HCC)     8/26/2019 -  Cancer Staged    Staging form: Ovary, Fallopian Tube, Primary Peritoneal, AJCC 8th Edition  - Clinical stage from 8/26/2019: FIGO Stage IC1, calculated as Stage IA (cT1a, cN0, cM0) - Signed by Trevin Bonilla MD on 9/11/2019        Chemotherapy    Taxol 175 mg/m2 and carboplatin AUC 6 every 21 days. She received 5 cycles and is scheduled for cycle 6.      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      12/2022 - 6/27/2024 Hormone Therapy    Megace 80 mg PO BID (starting inhibin B, 9.2 pg/ml)     6/4/2024 - 6/21/2024 Radiation      Plan ID Energy Fractions Dose per Fraction (cGy) Dose Correction (cGy) Total Dose Delivered (cGy) Elapsed Days   SBRT MT 6X-FFF 5 / 5 600 0 3,000 17           Review of Systems   Constitutional:  Negative for activity change and unexpected weight change.   HENT: Negative.     Eyes: Negative.    Respiratory: Negative.     Cardiovascular: Negative.    Gastrointestinal:  Negative for abdominal distention and abdominal pain.   Endocrine: Negative.    Genitourinary:  Negative for pelvic pain and vaginal bleeding.   Musculoskeletal: Negative.    Skin: Negative.    Allergic/Immunologic: Negative.    Neurological: Negative.    Hematological: Negative.    Psychiatric/Behavioral: Negative.      A complete review of systems is negative other than that noted above in the  "HPI.  Current Outpatient Medications on File Prior to Visit   Medication Sig Dispense Refill    amLODIPine (NORVASC) 5 mg tablet Take 5 mg by mouth daily   4    Cholecalciferol (VITAMIN D-3) 1000 units CAPS Take by mouth daily       clobetasol (TEMOVATE) 0.05 % ointment Apply to the affected area 1-3x/week 30 g 0    escitalopram (LEXAPRO) 20 mg tablet 20 mg daily   0    meclizine (ANTIVERT) 25 mg tablet Take 1 tablet (25 mg total) by mouth 3 (three) times a day as needed for dizziness 30 tablet 0    metFORMIN (GLUCOPHAGE) 500 mg tablet 500 mg 2 (two) times a day with meals   0    Mounjaro 7.5 MG/0.5ML Inject 7.5 mg under the skin every 7 days      ondansetron (ZOFRAN-ODT) 4 mg disintegrating tablet Take 1 tablet (4 mg total) by mouth every 6 (six) hours as needed for nausea or vomiting 20 tablet 0    scopolamine (TRANSDERM-SCOP) 1.5 mg/3 days TD 72 hr patch Place 1 patch on the skin every third day (Patient taking differently: Place 1 patch on the skin as needed) 2 patch 1    simvastatin (ZOCOR) 20 mg tablet TK 1 T PO QD IN THE KIMBERLYN  5    SUPER B COMPLEX/C PO Take by mouth daily       valsartan-hydrochlorothiazide (DIOVAN-HCT) 320-25 MG per tablet daily   0    polyethylene glycol (MiraLax) 17 GM/SCOOP powder Mix with 64 oz Gatorade, begin 4 PM day before surgery per bowel prep instructions. 238 g 0    [DISCONTINUED] LORazepam (ATIVAN) 1 mg tablet Take 1 tablet (1 mg total) by mouth every 8 (eight) hours as needed (nausea or anxiety) (Patient not taking: Reported on 12/9/2020) 20 tablet 1    [DISCONTINUED] ondansetron (ZOFRAN) 8 mg tablet Take 1 tablet (8 mg total) by mouth every 8 (eight) hours as needed for nausea or vomiting 20 tablet 1     No current facility-administered medications on file prior to visit.         Objective   /68 (BP Location: Left arm, Patient Position: Sitting, Cuff Size: Large)   Pulse 90   Temp (!) 97.3 °F (36.3 °C) (Temporal)   Resp 16   Ht 5' 3\" (1.6 m)   Wt 88.3 kg (194 lb 9.6 " oz)   SpO2 98%   BMI 34.47 kg/m²     Body mass index is 34.47 kg/m².  ECOG   0  Physical Exam  Vitals reviewed. Exam conducted with a chaperone present.   Constitutional:       General: She is not in acute distress.     Appearance: Normal appearance. She is well-developed. She is not ill-appearing, toxic-appearing or diaphoretic.   HENT:      Head: Normocephalic and atraumatic.   Eyes:      General: No scleral icterus.     Extraocular Movements: Extraocular movements intact.      Conjunctiva/sclera: Conjunctivae normal.   Neck:      Thyroid: No thyromegaly.   Pulmonary:      Effort: Pulmonary effort is normal.   Abdominal:      General: There is no distension.      Palpations: Abdomen is soft. There is no mass.      Tenderness: There is no abdominal tenderness. There is no guarding or rebound.   Genitourinary:     Comments: External female genitalia normal.  No evidence of mass or lesion.  Speculum examination reveals an atrophic vagina without evidence of mass or lesion.  No bleeding.  Bimanual examination reveals thickening of the vaginal apex without discrete lesion.  Musculoskeletal:         General: No swelling or tenderness.      Cervical back: Normal range of motion and neck supple.      Right lower leg: No edema.      Left lower leg: No edema.   Lymphadenopathy:      Cervical: No cervical adenopathy.   Skin:     General: Skin is warm and dry.      Coloration: Skin is not jaundiced or pale.      Findings: No lesion or rash.   Neurological:      General: No focal deficit present.      Mental Status: She is alert and oriented to person, place, and time. Mental status is at baseline.      Cranial Nerves: No cranial nerve deficit.      Motor: No weakness.      Gait: Gait normal.   Psychiatric:         Mood and Affect: Mood normal.         Behavior: Behavior normal.         Thought Content: Thought content normal.         Judgment: Judgment normal.          Labs: I have reviewed pertinent labs. Inhibin A/B:  "  Lab Results   Component Value Date/Time    Inhibin B 147.4 (H) 12/13/2024 03:56 PM      No results found for: \"\"  Lab Results   Component Value Date/Time    Potassium 3.1 (L) 06/13/2024 03:55 PM    Chloride 96 06/13/2024 03:55 PM    CO2 22 06/13/2024 03:55 PM    BUN 13 09/18/2024 02:31 PM    Creatinine 0.65 09/18/2024 02:31 PM    Calcium 10.1 06/13/2024 03:55 PM    AST 11 (L) 06/13/2024 03:55 PM    ALT 10 06/13/2024 03:55 PM    Alkaline Phosphatase 54 06/13/2024 03:55 PM    eGFR 92 09/18/2024 02:31 PM     Lab Results   Component Value Date/Time    WBC 11.79 (H) 06/13/2024 03:55 PM    Hemoglobin 13.9 06/13/2024 03:55 PM    Hematocrit 38.7 06/13/2024 03:55 PM    MCV 82 06/13/2024 03:55 PM    Platelets 345 06/13/2024 03:55 PM     Lab Results   Component Value Date/Time    Absolute Neutrophils 8.82 (H) 06/13/2024 03:55 PM        Trend:  Lab Results   Component Value Date     42.7 (H) 08/16/2019         Other Study Results Review : Other studies reviewed include: Genomic testing results from 2022      "

## 2024-12-27 ENCOUNTER — HOSPITAL ENCOUNTER (OUTPATIENT)
Dept: CT IMAGING | Facility: HOSPITAL | Age: 64
Discharge: HOME | End: 2024-12-27
Attending: OBSTETRICS & GYNECOLOGY
Payer: MEDICARE

## 2024-12-27 DIAGNOSIS — C56.2 MALIGNANT NEOPLASM OF LEFT OVARY (HCC): ICD-10-CM

## 2024-12-27 PROCEDURE — 74177 CT ABD & PELVIS W/CONTRAST: CPT

## 2024-12-27 PROCEDURE — 71260 CT THORAX DX C+: CPT

## 2024-12-27 RX ADMIN — IOHEXOL 100 ML: 350 INJECTION, SOLUTION INTRAVENOUS at 15:32

## 2025-01-07 ENCOUNTER — TELEPHONE (OUTPATIENT)
Age: 65
End: 2025-01-07

## 2025-01-07 NOTE — TELEPHONE ENCOUNTER
Called and spoke to patient about coming in to see provider for a treatment discussion. I have scheduled the patient for a date, time and location that works best for her.

## 2025-01-07 NOTE — TELEPHONE ENCOUNTER
----- Message from Trevin Bonilla MD sent at 1/6/2025  4:47 PM EST -----  Please bring her into the office ASAP for a 30 minute treatment discussion. Thanks!

## 2025-01-09 ENCOUNTER — OFFICE VISIT (OUTPATIENT)
Age: 65
End: 2025-01-09
Payer: MEDICARE

## 2025-01-09 ENCOUNTER — PREP FOR PROCEDURE (OUTPATIENT)
Dept: INTERVENTIONAL RADIOLOGY/VASCULAR | Facility: CLINIC | Age: 65
End: 2025-01-09

## 2025-01-09 VITALS
HEIGHT: 63 IN | BODY MASS INDEX: 34.55 KG/M2 | TEMPERATURE: 97.7 F | WEIGHT: 195 LBS | HEART RATE: 80 BPM | DIASTOLIC BLOOD PRESSURE: 60 MMHG | RESPIRATION RATE: 18 BRPM | SYSTOLIC BLOOD PRESSURE: 115 MMHG | OXYGEN SATURATION: 96 %

## 2025-01-09 DIAGNOSIS — C56.2 MALIGNANT NEOPLASM OF LEFT OVARY (HCC): Primary | ICD-10-CM

## 2025-01-09 DIAGNOSIS — E66.01 MORBID OBESITY WITH BMI OF 40.0-44.9, ADULT (HCC): ICD-10-CM

## 2025-01-09 DIAGNOSIS — E11.65 TYPE 2 DIABETES MELLITUS WITH HYPERGLYCEMIA, WITHOUT LONG-TERM CURRENT USE OF INSULIN (HCC): ICD-10-CM

## 2025-01-09 DIAGNOSIS — C7A.8 NEUROENDOCRINE CARCINOMA OF SMALL BOWEL (HCC): ICD-10-CM

## 2025-01-09 PROCEDURE — 99215 OFFICE O/P EST HI 40 MIN: CPT | Performed by: OBSTETRICS & GYNECOLOGY

## 2025-01-09 RX ORDER — SODIUM CHLORIDE 9 MG/ML
30 INJECTION, SOLUTION INTRAVENOUS CONTINUOUS
OUTPATIENT
Start: 2025-01-09

## 2025-01-09 RX ORDER — ONDANSETRON 4 MG/1
4 TABLET, FILM COATED ORAL EVERY 8 HOURS PRN
Qty: 30 TABLET | Refills: 2 | Status: SHIPPED | OUTPATIENT
Start: 2025-01-09 | End: 2025-01-13

## 2025-01-09 NOTE — ASSESSMENT & PLAN NOTE
Ms Cobos is a 65yo with recurrent Sertoli-Leydig cell tumor with clinical evidence of the peritoneal mass and mass above the liver/under the right diaphragm.  Additionally inhibin B level is now elevated at 147 and was previously normal.  We discussed treatment with carboplatin AUC 6, paclitaxel 175 mg/m², and bevacizumab 15 mg/kg q21d.  Risks were discussed including fatigue, nausea and vomiting, bone marrow suppression, neuropathy, blood clot, hypertension, and possible bowel perforation.  We will plan to hold bevacizumab with first 2 cycles for placement of port and cataract surgery.  Additionally we will plan for a repeat CT after 3 cycles to evaluate treatment effect.  We could consider an additional 3 cycles at that time followed by possible radiation depending on the extent of remaining disease.  We will also obtain a biopsy to send for updated Caris testing to evaluate for any additional therapeutic targets that could be acted upon in the future.     - IR port placement  - IR biopsy  - Start carbo/Taxol/Avastin  - CT after C3  - Follow up in clinic after C1/prior to C2  - Add onto treatment planning conference       Orders:    CBC and differential; Future    Comprehensive metabolic panel; Future    Magnesium; Future    Protein / creatinine ratio, urine; Future    Ambulatory referral to Interventional Radiology; Future    Ambulatory referral to Interventional Radiology; Future    CBC and differential; Standing    Comprehensive metabolic panel; Standing    Protein / creatinine ratio, urine; Standing    ondansetron (ZOFRAN) 4 mg tablet; Take 1 tablet (4 mg total) by mouth every 8 (eight) hours as needed for nausea or vomiting    Infusion Calculated Appointment Request; Future    Infusion Calculated Appointment Request; Future    Infusion Calculated Appointment Request; Future    Infusion Calculated Appointment Request; Future    Magnesium; Standing    ; Standing    Inhibin B; Standing

## 2025-01-09 NOTE — PROGRESS NOTES
Name: Eli Cobos      : 1960      MRN: [id number]  Encounter Provider: Trevin Bonilla MD  Encounter Date: 2025   Encounter department: [facility] GYNECOLOGY ONCOLOGY [facility]  :  Assessment & Plan  Malignant neoplasm of left ovary (HCC)  Ms Cobos is a 63yo with recurrent Sertoli-Leydig cell tumor with clinical evidence of the peritoneal mass and mass above the liver/under the right diaphragm.  Additionally inhibin B level is now elevated at 147 and was previously normal.  We discussed treatment with carboplatin AUC 6, paclitaxel 175 mg/m², and bevacizumab 15 mg/kg q21d.  Risks were discussed including fatigue, nausea and vomiting, bone marrow suppression, neuropathy, blood clot, hypertension, and possible bowel perforation.  We will plan to hold bevacizumab with first 2 cycles for placement of port and cataract surgery.  Additionally we will plan for a repeat CT after 3 cycles to evaluate treatment effect.  We could consider an additional 3 cycles at that time followed by possible radiation depending on the extent of remaining disease.  We will also obtain a biopsy to send for updated Caris testing to evaluate for any additional therapeutic targets that could be acted upon in the future.     - IR port placement  - IR biopsy  - Start carbo/Taxol/Avastin  - CT after C3  - Follow up in clinic after C1/prior to C2  - Add onto treatment planning conference       Orders:    CBC and differential; Future    Comprehensive metabolic panel; Future    Magnesium; Future    Protein / creatinine ratio, urine; Future    Ambulatory referral to Interventional Radiology; Future    Ambulatory referral to Interventional Radiology; Future    CBC and differential; Standing    Comprehensive metabolic panel; Standing    Protein / creatinine ratio, urine; Standing    ondansetron (ZOFRAN) 4 mg tablet; Take 1 tablet (4 mg total) by mouth every 8 (eight) hours as needed for  nausea or vomiting    Infusion Calculated Appointment Request; Future    Infusion Calculated Appointment Request; Future    Infusion Calculated Appointment Request; Future    Infusion Calculated Appointment Request; Future    Magnesium; Standing    ; Standing    Inhibin B; Standing      History of Present Illness   Reason for Visit / CC: treatment discussion  Eli Cobos is a 64 y.o. female   Ms Cobos is a 65yo with a history of recurrent Sertoli-Leydig who presents for a treatment discussion.  She reports that overall she feels well and is not having any symptoms. No change appetite or bowel/bladder habits. No abdominal pain. No vaginal bleeding. No nausea or vomiting.     She notes that she will be having cataract surgery on 9/20/2025 on her left eye and will be having her right eye done in February.      Pertinent Medical History    Oncology History   Cancer Staging   Malignant neoplasm of left ovary (HCC)  Staging form: Ovary, Fallopian Tube, Primary Peritoneal, AJCC 8th Edition  - Clinical stage from 8/26/2019: FIGO Stage IC1, calculated as Stage IA (cT1a, cN0, cM0) - Signed by Trevin Bonilla MD on 9/11/2019  Oncology History   Malignant neoplasm of left ovary (HCC)   8/26/2019 Initial Diagnosis    Malignant neoplasm of left ovary (HCC)     8/26/2019 -  Cancer Staged    Staging form: Ovary, Fallopian Tube, Primary Peritoneal, AJCC 8th Edition  - Clinical stage from 8/26/2019: FIGO Stage IC1, calculated as Stage IA (cT1a, cN0, cM0) - Signed by Trevin Bonilla MD on 9/11/2019        Chemotherapy    Taxol 175 mg/m2 and carboplatin AUC 6 every 21 days. She received 5 cycles and is scheduled for cycle 6.      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      12/2022 - 6/27/2024 Hormone Therapy    Megace 80 mg PO BID (starting inhibin B, 9.2 pg/ml)     6/4/2024 - 6/21/2024 Radiation      Plan ID Energy Fractions Dose per Fraction (cGy) Dose Correction (cGy) Total Dose Delivered (cGy) Elapsed Days   SBRT MT 6X-FFF 5 / 5 600 0 3,000 17        1/28/2025 -  Chemotherapy    alteplase (CATHFLO), 2 mg, Intracatheter, Every 1 Minute as needed, 0 of 15 cycles  palonosetron (ALOXI), 0.25 mg, Intravenous, Once, 0 of 6 cycles  fosaprepitant (EMEND) IVPB, 150 mg, Intravenous, Once, 0 of 6 cycles  CARBOplatin (PARAPLATIN) IVPB (GOG AUC DOSING), , Intravenous, Once, 0 of 6 cycles  bevacizumab (AVASTIN) IVPB, 15 mg/kg = 1,327.5 mg, Intravenous, Once, 0 of 13 cycles  PACLItaxel (TAXOL) chemo IVPB, 175 mg/m2 = 334.2 mg, Intravenous, Once, 0 of 6 cycles        Review of Systems   Constitutional:  Negative for chills and fever.   HENT:  Negative for ear pain and sore throat.    Eyes:  Negative for pain and visual disturbance.   Respiratory:  Negative for cough and shortness of breath.    Cardiovascular:  Negative for chest pain and palpitations.   Gastrointestinal:  Negative for abdominal pain and vomiting.   Genitourinary:  Negative for dysuria and hematuria.   Musculoskeletal:  Negative for arthralgias and back pain.   Skin:  Negative for color change and rash.   Neurological:  Negative for seizures and syncope.   All other systems reviewed and are negative.   A complete review of systems is negative other than that noted above in the HPI.    Objective   /60 (BP Location: Left arm, Patient Position: Sitting, Cuff Size: Adult)   Pulse 80   Temp 97.7 °F (36.5 °C)   Resp 18    "Ht 5' 3\" (1.6 m)   Wt 88.5 kg (195 lb)   SpO2 96%   BMI 34.54 kg/m²     Body mass index is 34.54 kg/m².  Pain Screening:  Pain Score: 0-No pain  ECOG   1  Physical Exam  Vitals and nursing note reviewed.   Constitutional:       Appearance: Normal appearance.   HENT:      Head: Normocephalic and atraumatic.   Cardiovascular:      Rate and Rhythm: Normal rate and regular rhythm.   Pulmonary:      Effort: Pulmonary effort is normal. No respiratory distress.   Abdominal:      General: There is no distension.      Palpations: Abdomen is soft. There is no mass.      Tenderness: There is no abdominal tenderness. There is no guarding or rebound.   Skin:     General: Skin is warm and dry.   Neurological:      Mental Status: She is alert.        Labs: I have reviewed pertinent labs.   No results found for: \"\"  Lab Results   Component Value Date/Time    Potassium 3.1 (L) 06/13/2024 03:55 PM    Chloride 96 06/13/2024 03:55 PM    CO2 22 06/13/2024 03:55 PM    BUN 16 12/19/2024 12:42 PM    Creatinine 0.64 12/19/2024 12:42 PM    Calcium 10.1 06/13/2024 03:55 PM    AST 11 (L) 06/13/2024 03:55 PM    ALT 10 06/13/2024 03:55 PM    Alkaline Phosphatase 54 06/13/2024 03:55 PM    eGFR 93 12/19/2024 12:42 PM     Lab Results   Component Value Date/Time    WBC 11.79 (H) 06/13/2024 03:55 PM    Hemoglobin 13.9 06/13/2024 03:55 PM    Hematocrit 38.7 06/13/2024 03:55 PM    MCV 82 06/13/2024 03:55 PM    Platelets 345 06/13/2024 03:55 PM     Lab Results   Component Value Date/Time    Absolute Neutrophils 8.82 (H) 06/13/2024 03:55 PM        Trend:  Lab Results   Component Value Date     42.7 (H) 08/16/2019       Radiology Results Review: I have reviewed radiology reports from   including: CT abdomen/pelvis.    Patient seen and discussed with Dr. Bonilla.    Radha Rivera  Gynecologic Oncology Fellow    "

## 2025-01-09 NOTE — H&P (VIEW-ONLY)
Name: Eli Cobos      : 1960      MRN: [id number]  Encounter Provider: Trevin Bonilla MD  Encounter Date: 2025   Encounter department: [facility] GYNECOLOGY ONCOLOGY [facility]  :  Assessment & Plan  Malignant neoplasm of left ovary (HCC)  Ms Cobos is a 65yo with recurrent Sertoli-Leydig cell tumor with clinical evidence of the peritoneal mass and mass above the liver/under the right diaphragm.  Additionally inhibin B level is now elevated at 147 and was previously normal.  We discussed treatment with carboplatin AUC 6, paclitaxel 175 mg/m², and bevacizumab 15 mg/kg q21d.  Risks were discussed including fatigue, nausea and vomiting, bone marrow suppression, neuropathy, blood clot, hypertension, and possible bowel perforation.  We will plan to hold bevacizumab with first 2 cycles for placement of port and cataract surgery.  Additionally we will plan for a repeat CT after 3 cycles to evaluate treatment effect.  We could consider an additional 3 cycles at that time followed by possible radiation depending on the extent of remaining disease.  We will also obtain a biopsy to send for updated Caris testing to evaluate for any additional therapeutic targets that could be acted upon in the future.     - IR port placement  - IR biopsy  - Start carbo/Taxol/Avastin  - CT after C3  - Follow up in clinic after C1/prior to C2  - Add onto treatment planning conference       Orders:    CBC and differential; Future    Comprehensive metabolic panel; Future    Magnesium; Future    Protein / creatinine ratio, urine; Future    Ambulatory referral to Interventional Radiology; Future    Ambulatory referral to Interventional Radiology; Future    CBC and differential; Standing    Comprehensive metabolic panel; Standing    Protein / creatinine ratio, urine; Standing    ondansetron (ZOFRAN) 4 mg tablet; Take 1 tablet (4 mg total) by mouth every 8 (eight) hours as needed for  nausea or vomiting    Infusion Calculated Appointment Request; Future    Infusion Calculated Appointment Request; Future    Infusion Calculated Appointment Request; Future    Infusion Calculated Appointment Request; Future    Magnesium; Standing    ; Standing    Inhibin B; Standing      History of Present Illness   Reason for Visit / CC: treatment discussion  Eli Cobos is a 64 y.o. female   Ms Cobos is a 65yo with a history of recurrent Sertoli-Leydig who presents for a treatment discussion.  She reports that overall she feels well and is not having any symptoms. No change appetite or bowel/bladder habits. No abdominal pain. No vaginal bleeding. No nausea or vomiting.     She notes that she will be having cataract surgery on 9/20/2025 on her left eye and will be having her right eye done in February.      Pertinent Medical History    Oncology History   Cancer Staging   Malignant neoplasm of left ovary (HCC)  Staging form: Ovary, Fallopian Tube, Primary Peritoneal, AJCC 8th Edition  - Clinical stage from 8/26/2019: FIGO Stage IC1, calculated as Stage IA (cT1a, cN0, cM0) - Signed by Trevin Bonilla MD on 9/11/2019  Oncology History   Malignant neoplasm of left ovary (HCC)   8/26/2019 Initial Diagnosis    Malignant neoplasm of left ovary (HCC)     8/26/2019 -  Cancer Staged    Staging form: Ovary, Fallopian Tube, Primary Peritoneal, AJCC 8th Edition  - Clinical stage from 8/26/2019: FIGO Stage IC1, calculated as Stage IA (cT1a, cN0, cM0) - Signed by Trevin Bonilla MD on 9/11/2019        Chemotherapy    Taxol 175 mg/m2 and carboplatin AUC 6 every 21 days. She received 5 cycles and is scheduled for cycle 6.      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      12/2022 - 6/27/2024 Hormone Therapy    Megace 80 mg PO BID (starting inhibin B, 9.2 pg/ml)     6/4/2024 - 6/21/2024 Radiation      Plan ID Energy Fractions Dose per Fraction (cGy) Dose Correction (cGy) Total Dose Delivered (cGy) Elapsed Days   SBRT MT 6X-FFF 5 / 5 600 0 3,000 17        1/28/2025 -  Chemotherapy    alteplase (CATHFLO), 2 mg, Intracatheter, Every 1 Minute as needed, 0 of 15 cycles  palonosetron (ALOXI), 0.25 mg, Intravenous, Once, 0 of 6 cycles  fosaprepitant (EMEND) IVPB, 150 mg, Intravenous, Once, 0 of 6 cycles  CARBOplatin (PARAPLATIN) IVPB (GOG AUC DOSING), , Intravenous, Once, 0 of 6 cycles  bevacizumab (AVASTIN) IVPB, 15 mg/kg = 1,327.5 mg, Intravenous, Once, 0 of 13 cycles  PACLItaxel (TAXOL) chemo IVPB, 175 mg/m2 = 334.2 mg, Intravenous, Once, 0 of 6 cycles        Review of Systems   Constitutional:  Negative for chills and fever.   HENT:  Negative for ear pain and sore throat.    Eyes:  Negative for pain and visual disturbance.   Respiratory:  Negative for cough and shortness of breath.    Cardiovascular:  Negative for chest pain and palpitations.   Gastrointestinal:  Negative for abdominal pain and vomiting.   Genitourinary:  Negative for dysuria and hematuria.   Musculoskeletal:  Negative for arthralgias and back pain.   Skin:  Negative for color change and rash.   Neurological:  Negative for seizures and syncope.   All other systems reviewed and are negative.   A complete review of systems is negative other than that noted above in the HPI.    Objective   /60 (BP Location: Left arm, Patient Position: Sitting, Cuff Size: Adult)   Pulse 80   Temp 97.7 °F (36.5 °C)   Resp 18    "Ht 5' 3\" (1.6 m)   Wt 88.5 kg (195 lb)   SpO2 96%   BMI 34.54 kg/m²     Body mass index is 34.54 kg/m².  Pain Screening:  Pain Score: 0-No pain  ECOG   1  Physical Exam  Vitals and nursing note reviewed.   Constitutional:       Appearance: Normal appearance.   HENT:      Head: Normocephalic and atraumatic.   Cardiovascular:      Rate and Rhythm: Normal rate and regular rhythm.   Pulmonary:      Effort: Pulmonary effort is normal. No respiratory distress.   Abdominal:      General: There is no distension.      Palpations: Abdomen is soft. There is no mass.      Tenderness: There is no abdominal tenderness. There is no guarding or rebound.   Skin:     General: Skin is warm and dry.   Neurological:      Mental Status: She is alert.        Labs: I have reviewed pertinent labs.   No results found for: \"\"  Lab Results   Component Value Date/Time    Potassium 3.1 (L) 06/13/2024 03:55 PM    Chloride 96 06/13/2024 03:55 PM    CO2 22 06/13/2024 03:55 PM    BUN 16 12/19/2024 12:42 PM    Creatinine 0.64 12/19/2024 12:42 PM    Calcium 10.1 06/13/2024 03:55 PM    AST 11 (L) 06/13/2024 03:55 PM    ALT 10 06/13/2024 03:55 PM    Alkaline Phosphatase 54 06/13/2024 03:55 PM    eGFR 93 12/19/2024 12:42 PM     Lab Results   Component Value Date/Time    WBC 11.79 (H) 06/13/2024 03:55 PM    Hemoglobin 13.9 06/13/2024 03:55 PM    Hematocrit 38.7 06/13/2024 03:55 PM    MCV 82 06/13/2024 03:55 PM    Platelets 345 06/13/2024 03:55 PM     Lab Results   Component Value Date/Time    Absolute Neutrophils 8.82 (H) 06/13/2024 03:55 PM        Trend:  Lab Results   Component Value Date     42.7 (H) 08/16/2019       Radiology Results Review: I have reviewed radiology reports from   including: CT abdomen/pelvis.    Patient seen and discussed with Dr. Bonilla.    Radha Rivera  Gynecologic Oncology Fellow    "

## 2025-01-13 ENCOUNTER — TELEPHONE (OUTPATIENT)
Age: 65
End: 2025-01-13

## 2025-01-13 DIAGNOSIS — C56.2 MALIGNANT NEOPLASM OF LEFT OVARY (HCC): Primary | ICD-10-CM

## 2025-01-13 RX ORDER — ONDANSETRON 8 MG/1
8 TABLET, FILM COATED ORAL EVERY 8 HOURS PRN
Qty: 30 TABLET | Refills: 1 | Status: SHIPPED | OUTPATIENT
Start: 2025-01-13

## 2025-01-13 RX ORDER — LORAZEPAM 1 MG/1
1 TABLET ORAL EVERY 8 HOURS PRN
Qty: 20 TABLET | Refills: 0 | Status: SHIPPED | OUTPATIENT
Start: 2025-01-13

## 2025-01-13 NOTE — TELEPHONE ENCOUNTER
I spoke with the patient and informed her that the port placement is scheduled for 1/29/2025 at 8:30AM in [facility]. I informed the patient she needs to arrive at 7:30AM. I also informed the patient that the IR biopsy of the abdomen is scheduled for 2/10/2025 at 8:45AM in [facility].

## 2025-01-13 NOTE — TELEPHONE ENCOUNTER
Patient has a question for Luisana, states she believed she was supposed to have a port placement and has not received a call regarding this appointments. Please call patient back at [phone] to discuss

## 2025-01-17 RX ORDER — SODIUM CHLORIDE 9 MG/ML
30 INJECTION, SOLUTION INTRAVENOUS CONTINUOUS
OUTPATIENT
Start: 2025-01-17

## 2025-01-17 RX ORDER — CEFAZOLIN SODIUM 2 G/50ML
2000 SOLUTION INTRAVENOUS ONCE
OUTPATIENT
Start: 2025-01-17 | End: 2025-01-17

## 2025-01-22 ENCOUNTER — TELEPHONE (OUTPATIENT)
Dept: INTERVENTIONAL RADIOLOGY/VASCULAR | Facility: HOSPITAL | Age: 65
End: 2025-01-22

## 2025-01-23 ENCOUNTER — TELEPHONE (OUTPATIENT)
Dept: GYNECOLOGIC ONCOLOGY | Facility: CLINIC | Age: 65
End: 2025-01-23

## 2025-01-23 NOTE — TELEPHONE ENCOUNTER
Called and informed patient the 3/6 appointment with Luisana will be a Virtual appointment.  Patient confirmed.

## 2025-01-24 ENCOUNTER — APPOINTMENT (OUTPATIENT)
Dept: LAB | Facility: HOSPITAL | Age: 65
End: 2025-01-24
Payer: MEDICARE

## 2025-01-24 DIAGNOSIS — C56.2 MALIGNANT NEOPLASM OF LEFT OVARY (HCC): ICD-10-CM

## 2025-01-24 LAB
ALBUMIN SERPL BCG-MCNC: 4.2 G/DL (ref 3.5–5)
ALP SERPL-CCNC: 65 U/L (ref 34–104)
ALT SERPL W P-5'-P-CCNC: 23 U/L (ref 7–52)
ANION GAP SERPL CALCULATED.3IONS-SCNC: 8 MMOL/L (ref 4–13)
AST SERPL W P-5'-P-CCNC: 23 U/L (ref 13–39)
BASOPHILS # BLD AUTO: 0.06 THOUSANDS/ΜL (ref 0–0.1)
BASOPHILS NFR BLD AUTO: 1 % (ref 0–1)
BILIRUB SERPL-MCNC: 0.41 MG/DL (ref 0.2–1)
BUN SERPL-MCNC: 14 MG/DL (ref 5–25)
CALCIUM SERPL-MCNC: 9.4 MG/DL (ref 8.4–10.2)
CHLORIDE SERPL-SCNC: 101 MMOL/L (ref 96–108)
CO2 SERPL-SCNC: 30 MMOL/L (ref 21–32)
CREAT SERPL-MCNC: 0.57 MG/DL (ref 0.6–1.3)
CREAT UR-MCNC: 94.9 MG/DL
EOSINOPHIL # BLD AUTO: 0.2 THOUSAND/ΜL (ref 0–0.61)
EOSINOPHIL NFR BLD AUTO: 2 % (ref 0–6)
ERYTHROCYTE [DISTWIDTH] IN BLOOD BY AUTOMATED COUNT: 12.9 % (ref 11.6–15.1)
GFR SERPL CREATININE-BSD FRML MDRD: 96 ML/MIN/1.73SQ M
GLUCOSE SERPL-MCNC: 128 MG/DL (ref 65–140)
HCT VFR BLD AUTO: 39 % (ref 34.8–46.1)
HGB BLD-MCNC: 12.9 G/DL (ref 11.5–15.4)
IMM GRANULOCYTES # BLD AUTO: 0.05 THOUSAND/UL (ref 0–0.2)
IMM GRANULOCYTES NFR BLD AUTO: 1 % (ref 0–2)
LYMPHOCYTES # BLD AUTO: 1.41 THOUSANDS/ΜL (ref 0.6–4.47)
LYMPHOCYTES NFR BLD AUTO: 15 % (ref 14–44)
MAGNESIUM SERPL-MCNC: 1.7 MG/DL (ref 1.9–2.7)
MCH RBC QN AUTO: 28.6 PG (ref 26.8–34.3)
MCHC RBC AUTO-ENTMCNC: 33.1 G/DL (ref 31.4–37.4)
MCV RBC AUTO: 87 FL (ref 82–98)
MONOCYTES # BLD AUTO: 0.5 THOUSAND/ΜL (ref 0.17–1.22)
MONOCYTES NFR BLD AUTO: 6 % (ref 4–12)
NEUTROPHILS # BLD AUTO: 6.94 THOUSANDS/ΜL (ref 1.85–7.62)
NEUTS SEG NFR BLD AUTO: 75 % (ref 43–75)
NRBC BLD AUTO-RTO: 0 /100 WBCS
PLATELET # BLD AUTO: 360 THOUSANDS/UL (ref 149–390)
PMV BLD AUTO: 8.8 FL (ref 8.9–12.7)
POTASSIUM SERPL-SCNC: 3.8 MMOL/L (ref 3.5–5.3)
PROT SERPL-MCNC: 7.1 G/DL (ref 6.4–8.4)
PROT UR-MCNC: 9.8 MG/DL
PROT/CREAT UR: 0.1 MG/G{CREAT} (ref 0–0.1)
RBC # BLD AUTO: 4.51 MILLION/UL (ref 3.81–5.12)
SODIUM SERPL-SCNC: 139 MMOL/L (ref 135–147)
WBC # BLD AUTO: 9.16 THOUSAND/UL (ref 4.31–10.16)

## 2025-01-24 PROCEDURE — 80053 COMPREHEN METABOLIC PANEL: CPT

## 2025-01-24 PROCEDURE — 84156 ASSAY OF PROTEIN URINE: CPT

## 2025-01-24 PROCEDURE — 85025 COMPLETE CBC W/AUTO DIFF WBC: CPT

## 2025-01-24 PROCEDURE — 36415 COLL VENOUS BLD VENIPUNCTURE: CPT

## 2025-01-24 PROCEDURE — 82570 ASSAY OF URINE CREATININE: CPT

## 2025-01-24 PROCEDURE — 83520 IMMUNOASSAY QUANT NOS NONAB: CPT

## 2025-01-24 PROCEDURE — 83735 ASSAY OF MAGNESIUM: CPT

## 2025-01-27 LAB — INHIBIN B SERPL-MCNC: 405.8 PG/ML (ref 0–16.9)

## 2025-01-27 RX ORDER — SODIUM CHLORIDE 9 MG/ML
20 INJECTION, SOLUTION INTRAVENOUS ONCE
Status: CANCELLED | OUTPATIENT
Start: 2025-01-28

## 2025-01-27 RX ORDER — PALONOSETRON 0.05 MG/ML
0.25 INJECTION, SOLUTION INTRAVENOUS ONCE
Status: CANCELLED | OUTPATIENT
Start: 2025-01-28

## 2025-01-28 ENCOUNTER — HOSPITAL ENCOUNTER (OUTPATIENT)
Dept: INFUSION CENTER | Facility: HOSPITAL | Age: 65
Discharge: HOME | End: 2025-01-28
Attending: OBSTETRICS & GYNECOLOGY
Payer: MEDICARE

## 2025-01-28 VITALS
SYSTOLIC BLOOD PRESSURE: 131 MMHG | DIASTOLIC BLOOD PRESSURE: 77 MMHG | TEMPERATURE: 97.3 F | RESPIRATION RATE: 18 BRPM | WEIGHT: 193 LBS | HEART RATE: 83 BPM | HEIGHT: 63 IN | BODY MASS INDEX: 34.2 KG/M2 | OXYGEN SATURATION: 98 %

## 2025-01-28 DIAGNOSIS — C56.2 MALIGNANT NEOPLASM OF LEFT OVARY (HCC): Primary | ICD-10-CM

## 2025-01-28 PROCEDURE — 96375 TX/PRO/DX INJ NEW DRUG ADDON: CPT

## 2025-01-28 PROCEDURE — 96367 TX/PROPH/DG ADDL SEQ IV INF: CPT

## 2025-01-28 PROCEDURE — 96413 CHEMO IV INFUSION 1 HR: CPT

## 2025-01-28 RX ORDER — PALONOSETRON 0.05 MG/ML
0.25 INJECTION, SOLUTION INTRAVENOUS ONCE
Status: COMPLETED | OUTPATIENT
Start: 2025-01-28 | End: 2025-01-28

## 2025-01-28 RX ORDER — SODIUM CHLORIDE 9 MG/ML
20 INJECTION, SOLUTION INTRAVENOUS ONCE
Status: COMPLETED | OUTPATIENT
Start: 2025-01-28 | End: 2025-01-28

## 2025-01-28 RX ORDER — SODIUM CHLORIDE 9 MG/ML
20 INJECTION, SOLUTION INTRAVENOUS ONCE
Status: CANCELLED | OUTPATIENT
Start: 2025-01-29

## 2025-01-28 RX ADMIN — SODIUM CHLORIDE 20 ML/HR: 0.9 INJECTION, SOLUTION INTRAVENOUS at 07:50

## 2025-01-28 RX ADMIN — PALONOSETRON HYDROCHLORIDE 0.25 MG: 0.25 INJECTION INTRAVENOUS at 07:50

## 2025-01-28 RX ADMIN — FOSAPREPITANT 150 MG: 150 INJECTION, POWDER, LYOPHILIZED, FOR SOLUTION INTRAVENOUS at 08:58

## 2025-01-28 RX ADMIN — DIPHENHYDRAMINE HYDROCHLORIDE 25 MG: 50 INJECTION, SOLUTION INTRAMUSCULAR; INTRAVENOUS at 08:13

## 2025-01-28 RX ADMIN — DEXAMETHASONE SODIUM PHOSPHATE 20 MG: 10 INJECTION, SOLUTION INTRAMUSCULAR; INTRAVENOUS at 07:50

## 2025-01-28 RX ADMIN — FAMOTIDINE 20 MG: 10 INJECTION INTRAVENOUS at 08:35

## 2025-01-28 RX ADMIN — PACLITAXEL 334.2 MG: 6 INJECTION, SOLUTION INTRAVENOUS at 09:43

## 2025-01-28 NOTE — PROGRESS NOTES
This RN noticed puffiness above pt.'s IV site. IV site flushed and pt. Noted no complaints except for the puffiness. IV site removed (with noted blood in IV catheter still) and Pt. educated on signs & symptoms to monitor for IV infiltrate.Approximately 153 mL's of Taxol infused. Luisana Freedman PA-C, was notified of IV site infiltration to Pt.'s L arm.

## 2025-01-28 NOTE — PROGRESS NOTES
Attempted IV access x5 by multiple staff members, Pt. Prefers not to be stuck for IV access anymore for the rest of her treatment today. Luisana Freedman PA-C, updated on Pt.'s request and if treatment can be delayed until tomorrow. Received ok to defer Pt.'s treatment till tomorrow after port placement per Luisana LIV[place]STON Infusion manger aware and pharmacy updated. Pt. Received all of her pre-medications and approximately 153 mL's of total dose of Taxol infusion for today's treatment prior to IV site infiltration.    Pt. To have port placement by  IR dept. @ 08:30 AM tomorrow. Confirmed with JACKY Martínez RN, that Pt.'s port will be left accessed tomorrow for [facility] infusion center. Pt. Aware of 09:30 AM appointment tomorrow in infusion center and port will be left accessed by IR, Pt. Acknowledged.      Eli Cobos is aware of future appt on 01/29/2025 at 09:30 AM.     AVS printed and given to Eli Cobos:  No (Declined by Eli Cobos)

## 2025-01-29 ENCOUNTER — HOSPITAL ENCOUNTER (OUTPATIENT)
Dept: INFUSION CENTER | Facility: HOSPITAL | Age: 65
Discharge: HOME | End: 2025-01-29
Attending: OBSTETRICS & GYNECOLOGY
Payer: MEDICARE

## 2025-01-29 ENCOUNTER — HOSPITAL ENCOUNTER (OUTPATIENT)
Dept: INTERVENTIONAL RADIOLOGY/VASCULAR | Facility: HOSPITAL | Age: 65
Discharge: HOME | End: 2025-01-29
Attending: OBSTETRICS & GYNECOLOGY
Payer: MEDICARE

## 2025-01-29 VITALS
RESPIRATION RATE: 18 BRPM | WEIGHT: 192 LBS | BODY MASS INDEX: 34.02 KG/M2 | HEART RATE: 95 BPM | DIASTOLIC BLOOD PRESSURE: 70 MMHG | TEMPERATURE: 96.5 F | SYSTOLIC BLOOD PRESSURE: 127 MMHG | OXYGEN SATURATION: 96 % | HEIGHT: 63 IN

## 2025-01-29 VITALS
HEART RATE: 85 BPM | SYSTOLIC BLOOD PRESSURE: 108 MMHG | HEIGHT: 63 IN | BODY MASS INDEX: 34.02 KG/M2 | WEIGHT: 192 LBS | OXYGEN SATURATION: 92 % | TEMPERATURE: 96.2 F | RESPIRATION RATE: 16 BRPM | DIASTOLIC BLOOD PRESSURE: 56 MMHG

## 2025-01-29 DIAGNOSIS — C56.2 MALIGNANT NEOPLASM OF LEFT OVARY (HCC): ICD-10-CM

## 2025-01-29 DIAGNOSIS — C56.2 MALIGNANT NEOPLASM OF LEFT OVARY (HCC): Primary | ICD-10-CM

## 2025-01-29 LAB — GLUCOSE SERPL-MCNC: 170 MG/DL (ref 65–140)

## 2025-01-29 PROCEDURE — 82948 REAGENT STRIP/BLOOD GLUCOSE: CPT

## 2025-01-29 PROCEDURE — 96367 TX/PROPH/DG ADDL SEQ IV INF: CPT

## 2025-01-29 PROCEDURE — 96413 CHEMO IV INFUSION 1 HR: CPT

## 2025-01-29 PROCEDURE — 77001 FLUOROGUIDE FOR VEIN DEVICE: CPT | Performed by: RADIOLOGY

## 2025-01-29 PROCEDURE — 36561 INSERT TUNNELED CV CATH: CPT | Performed by: RADIOLOGY

## 2025-01-29 PROCEDURE — 77001 FLUOROGUIDE FOR VEIN DEVICE: CPT

## 2025-01-29 PROCEDURE — 96415 CHEMO IV INFUSION ADDL HR: CPT

## 2025-01-29 PROCEDURE — 99152 MOD SED SAME PHYS/QHP 5/>YRS: CPT | Performed by: RADIOLOGY

## 2025-01-29 PROCEDURE — 76937 US GUIDE VASCULAR ACCESS: CPT

## 2025-01-29 PROCEDURE — 76937 US GUIDE VASCULAR ACCESS: CPT | Performed by: RADIOLOGY

## 2025-01-29 PROCEDURE — 36561 INSERT TUNNELED CV CATH: CPT

## 2025-01-29 PROCEDURE — 96417 CHEMO IV INFUS EACH ADDL SEQ: CPT

## 2025-01-29 PROCEDURE — C1788 PORT, INDWELLING, IMP: HCPCS

## 2025-01-29 RX ORDER — ONDANSETRON 2 MG/ML
INJECTION INTRAMUSCULAR; INTRAVENOUS AS NEEDED
Status: COMPLETED | OUTPATIENT
Start: 2025-01-29 | End: 2025-01-29

## 2025-01-29 RX ORDER — SODIUM CHLORIDE 9 MG/ML
20 INJECTION, SOLUTION INTRAVENOUS ONCE
Status: COMPLETED | OUTPATIENT
Start: 2025-01-29 | End: 2025-01-29

## 2025-01-29 RX ORDER — SODIUM CHLORIDE 9 MG/ML
30 INJECTION, SOLUTION INTRAVENOUS CONTINUOUS
Status: DISCONTINUED | OUTPATIENT
Start: 2025-01-29 | End: 2025-01-30 | Stop reason: HOSPADM

## 2025-01-29 RX ORDER — FENTANYL CITRATE 50 UG/ML
INJECTION, SOLUTION INTRAMUSCULAR; INTRAVENOUS AS NEEDED
Status: COMPLETED | OUTPATIENT
Start: 2025-01-29 | End: 2025-01-29

## 2025-01-29 RX ORDER — MIDAZOLAM HYDROCHLORIDE 2 MG/2ML
INJECTION, SOLUTION INTRAMUSCULAR; INTRAVENOUS AS NEEDED
Status: COMPLETED | OUTPATIENT
Start: 2025-01-29 | End: 2025-01-29

## 2025-01-29 RX ORDER — CEFAZOLIN SODIUM 2 G/50ML
2000 SOLUTION INTRAVENOUS ONCE
Status: COMPLETED | OUTPATIENT
Start: 2025-01-29 | End: 2025-01-29

## 2025-01-29 RX ADMIN — FAMOTIDINE 20 MG: 10 INJECTION INTRAVENOUS at 12:00

## 2025-01-29 RX ADMIN — MIDAZOLAM 1 MG: 1 INJECTION INTRAMUSCULAR; INTRAVENOUS at 09:12

## 2025-01-29 RX ADMIN — FENTANYL CITRATE 25 MCG: 50 INJECTION, SOLUTION INTRAMUSCULAR; INTRAVENOUS at 09:14

## 2025-01-29 RX ADMIN — FENTANYL CITRATE 50 MCG: 50 INJECTION, SOLUTION INTRAMUSCULAR; INTRAVENOUS at 08:56

## 2025-01-29 RX ADMIN — SODIUM CHLORIDE 20 ML/HR: 0.9 INJECTION, SOLUTION INTRAVENOUS at 11:07

## 2025-01-29 RX ADMIN — FENTANYL CITRATE 50 MCG: 50 INJECTION, SOLUTION INTRAMUSCULAR; INTRAVENOUS at 09:12

## 2025-01-29 RX ADMIN — ONDANSETRON 4 MG: 2 INJECTION INTRAMUSCULAR; INTRAVENOUS at 08:56

## 2025-01-29 RX ADMIN — MIDAZOLAM 0.5 MG: 1 INJECTION INTRAMUSCULAR; INTRAVENOUS at 09:14

## 2025-01-29 RX ADMIN — DIPHENHYDRAMINE HYDROCHLORIDE 25 MG: 50 INJECTION, SOLUTION INTRAMUSCULAR; INTRAVENOUS at 11:33

## 2025-01-29 RX ADMIN — CEFAZOLIN SODIUM 2000 MG: 2 SOLUTION INTRAVENOUS at 08:54

## 2025-01-29 RX ADMIN — CARBOPLATIN 650.4 MG: 600 INJECTION, SOLUTION INTRAVENOUS at 14:31

## 2025-01-29 RX ADMIN — PACLITAXEL 242 MG: 6 INJECTION, SOLUTION INTRAVENOUS at 12:27

## 2025-01-29 RX ADMIN — DEXAMETHASONE SODIUM PHOSPHATE 20 MG: 10 INJECTION, SOLUTION INTRAMUSCULAR; INTRAVENOUS at 11:07

## 2025-01-29 RX ADMIN — MIDAZOLAM 1 MG: 1 INJECTION INTRAMUSCULAR; INTRAVENOUS at 08:56

## 2025-01-29 NOTE — PROGRESS NOTES
Eli Cobos  tolerated treatment well with no complications.      Eli Cobos is aware of future appt on 01/31/2025 at 09:00 AM.     AVS printed and given to Eli Cobos:  Yes

## 2025-01-29 NOTE — BRIEF OP NOTE (RAD/CATH)
IR PORT PLACEMENT  Procedure Note    PATIENT NAME: Eli Cobos  : 1960  MRN: [id number]     Pre-op Diagnosis:   1. Malignant neoplasm of left ovary (HCC)      Post-op Diagnosis:   1. Malignant neoplasm of left ovary (HCC)        Surgeon:   Agustin Umanzor DO  Assistants:     No qualified resident was available.    Estimated Blood Loss: none  Findings: L IJ chest port placed    Specimens: none    Complications:  none    Anesthesia: conscious sedation and local    Agustin Umanzor DO     Date: 2025  Time: 9:46 AM

## 2025-01-29 NOTE — INTERVAL H&P NOTE
"H&P reviewed. After examining the patient, I find no changed to the H&P since it had been written.    /66   Pulse 96   Temp (!) 96.2 °F (35.7 °C) (Temporal)   Resp 16   Ht 5' 3\" (1.6 m)   Wt 87.1 kg (192 lb)   SpO2 94%   BMI 34.01 kg/m²     Patient re-evaluated. Accept as history and physical.    Agustin Umanzor, DO/January 29, 2025/8:52 AM  "

## 2025-01-29 NOTE — DISCHARGE INSTRUCTIONS
Implanted Venous Access Port     WHAT YOU NEED TO KNOW:   An implanted venous access port is a device used to give treatments and take blood. It may also be called a central venous access device (CVAD). The port is a small container that is placed under your skin, usually in your upper chest. The port is attached to a catheter that enters a large vein.   DISCHARGE INSTRUCTIONS:   Resume your normal diet. Small sips of flat soda will help with mild nausea.  Prevent an infection:   Wash your hands often.  Use soap and water. Clean your hands before and after you care for your port. Remind everyone who cares for your port to wash their hands.   Check your skin for infection every day.  Look for redness, swelling, or fluid oozing from the port site.  Care for your port:   1. You may shower beginning 48 hours after procedure.     2.  Leave glue in place.    3. It is normal for some bruising to occur.    4. Use Tylenol for pain.    5. Limit use of arm on the side that your port was placed. Lift nothing heavier than 5 pounds for 1 week, and then gradually increase activity as tolerated.    6. DO NOT apply ointment, lotion or cream to port site until incision is healed. Allow glue to fall off. DO NOT attempt to peel glue from skin even it it begins to flake.     7. After the port incision is healed you may swim, bathe.  Notify the Interventional Radiologist if you have any of the followin. Fever above 101 F    2. Increased redness or swelling after 1st day.     3. Increased pain after 1st day.    4. Any sign of infection (drainage from port site, skin separation, hot to touch).    5. Persistent nausea or vomiting.    Contact Interventional Radiology at [phone]

## 2025-01-31 ENCOUNTER — HOSPITAL ENCOUNTER (OUTPATIENT)
Dept: INFUSION CENTER | Facility: HOSPITAL | Age: 65
End: 2025-01-31
Payer: MEDICARE

## 2025-01-31 DIAGNOSIS — C56.2 MALIGNANT NEOPLASM OF LEFT OVARY (HCC): ICD-10-CM

## 2025-01-31 DIAGNOSIS — Z45.2 ENCOUNTER FOR CENTRAL LINE CARE: Primary | ICD-10-CM

## 2025-01-31 LAB
ALBUMIN SERPL BCG-MCNC: 4 G/DL (ref 3.5–5)
ALP SERPL-CCNC: 60 U/L (ref 34–104)
ALT SERPL W P-5'-P-CCNC: 13 U/L (ref 7–52)
ANION GAP SERPL CALCULATED.3IONS-SCNC: 9 MMOL/L (ref 4–13)
AST SERPL W P-5'-P-CCNC: 16 U/L (ref 13–39)
BASOPHILS # BLD AUTO: 0.02 THOUSANDS/ΜL (ref 0–0.1)
BASOPHILS NFR BLD AUTO: 0 % (ref 0–1)
BILIRUB SERPL-MCNC: 0.48 MG/DL (ref 0.2–1)
BUN SERPL-MCNC: 18 MG/DL (ref 5–25)
CALCIUM SERPL-MCNC: 8.9 MG/DL (ref 8.4–10.2)
CHLORIDE SERPL-SCNC: 100 MMOL/L (ref 96–108)
CO2 SERPL-SCNC: 29 MMOL/L (ref 21–32)
CREAT SERPL-MCNC: 0.52 MG/DL (ref 0.6–1.3)
EOSINOPHIL # BLD AUTO: 0.01 THOUSAND/ΜL (ref 0–0.61)
EOSINOPHIL NFR BLD AUTO: 0 % (ref 0–6)
ERYTHROCYTE [DISTWIDTH] IN BLOOD BY AUTOMATED COUNT: 12.2 % (ref 11.6–15.1)
GFR SERPL CREATININE-BSD FRML MDRD: 99 ML/MIN/1.73SQ M
GLUCOSE SERPL-MCNC: 148 MG/DL (ref 65–140)
HCT VFR BLD AUTO: 38.9 % (ref 34.8–46.1)
HGB BLD-MCNC: 13.2 G/DL (ref 11.5–15.4)
IMM GRANULOCYTES # BLD AUTO: 0.05 THOUSAND/UL (ref 0–0.2)
IMM GRANULOCYTES NFR BLD AUTO: 1 % (ref 0–2)
LYMPHOCYTES # BLD AUTO: 1.98 THOUSANDS/ΜL (ref 0.6–4.47)
LYMPHOCYTES NFR BLD AUTO: 23 % (ref 14–44)
MAGNESIUM SERPL-MCNC: 1.7 MG/DL (ref 1.9–2.7)
MCH RBC QN AUTO: 28.6 PG (ref 26.8–34.3)
MCHC RBC AUTO-ENTMCNC: 33.9 G/DL (ref 31.4–37.4)
MCV RBC AUTO: 84 FL (ref 82–98)
MONOCYTES # BLD AUTO: 0.19 THOUSAND/ΜL (ref 0.17–1.22)
MONOCYTES NFR BLD AUTO: 2 % (ref 4–12)
NEUTROPHILS # BLD AUTO: 6.45 THOUSANDS/ΜL (ref 1.85–7.62)
NEUTS SEG NFR BLD AUTO: 74 % (ref 43–75)
NRBC BLD AUTO-RTO: 0 /100 WBCS
PLATELET # BLD AUTO: 363 THOUSANDS/UL (ref 149–390)
PMV BLD AUTO: 8.8 FL (ref 8.9–12.7)
POTASSIUM SERPL-SCNC: 3.5 MMOL/L (ref 3.5–5.3)
PROT SERPL-MCNC: 6.3 G/DL (ref 6.4–8.4)
RBC # BLD AUTO: 4.61 MILLION/UL (ref 3.81–5.12)
SODIUM SERPL-SCNC: 138 MMOL/L (ref 135–147)
WBC # BLD AUTO: 8.7 THOUSAND/UL (ref 4.31–10.16)

## 2025-01-31 PROCEDURE — 80053 COMPREHEN METABOLIC PANEL: CPT

## 2025-01-31 PROCEDURE — 83735 ASSAY OF MAGNESIUM: CPT

## 2025-01-31 PROCEDURE — 85025 COMPLETE CBC W/AUTO DIFF WBC: CPT

## 2025-01-31 NOTE — PROGRESS NOTES
Eli Cobos  tolerated treatment well with no complications.      Eli Cobos is aware of future appt on 02/07/2025 at 09:0AM.     AVS printed and given to Eli Cobos:  No (Declined by Eli Cobos)

## 2025-02-03 ENCOUNTER — DOCUMENTATION (OUTPATIENT)
Dept: GYNECOLOGIC ONCOLOGY | Facility: CLINIC | Age: 65
End: 2025-02-03

## 2025-02-04 NOTE — PROGRESS NOTES
Gynecologic Oncology Treatment Planning Conference Case Review     Primary Gynecologic Oncologist: Dr. 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 Please see EMR for full history, imaging, and laboratory studies, but briefly, her oncology history is as follows:    08/2019: TWAN/BSO/debulking/staging  2019: Adjuvant carbo/taxol x6 cycles  01/2022: Recurrence, ex-lap, resection of pelvic mass  CARIS from that specimen: [place]+, PD-L1 neg, MMR intact  12/2022-05/2024: Megace   06/2024: RT x5 fractions to pelvic disease  12/2024: Peritoneal disease with a 7.1 cm lesion over right hemidiaphragm, residual omental disease     Recommendations:   Systemic therapy, consider carboplatin, paclitaxel, and bevacizumab   If there is persistent residual disease, then could consider radiation or ablation  Proceed with biopsy and repeat genomic testing to identify any potential treatment targets      DISCLAIMERS:    TO THE TREATING PHYSICIAN:  This conference is a meeting of clinicians from various specialty areas who evaluate and discuss patients for whom a multidisciplinary treatment approach is being considered. Please note that the above opinion was a consensus of the conference attendees and is intended only to assist in quality care of the discussed patient.  The responsibility for follow up on the input given during the conference, along with any final decisions regarding plan of care, is that of the patient and the patient's provider. Accordingly, appointments have only been recommended based on this information and have NOT been scheduled unless otherwise noted.      TO THE PATIENT:  This summary is a brief record of major aspects of your cancer treatment. You may choose to share a copy with any of your doctors or nurses. However, this is not a detailed or comprehensive record of your care.    Radha Vásquez  Miguel  Gynecologic Oncology Fellow

## 2025-02-04 NOTE — H&P (VIEW-ONLY)
Gynecologic Oncology Treatment Planning Conference Case Review     Primary Gynecologic Oncologist: Dr. 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 Please see EMR for full history, imaging, and laboratory studies, but briefly, her oncology history is as follows:    08/2019: TWAN/BSO/debulking/staging  2019: Adjuvant carbo/taxol x6 cycles  01/2022: Recurrence, ex-lap, resection of pelvic mass  CARIS from that specimen: [place]+, PD-L1 neg, MMR intact  12/2022-05/2024: Megace   06/2024: RT x5 fractions to pelvic disease  12/2024: Peritoneal disease with a 7.1 cm lesion over right hemidiaphragm, residual omental disease     Recommendations:   Systemic therapy, consider carboplatin, paclitaxel, and bevacizumab   If there is persistent residual disease, then could consider radiation or ablation  Proceed with biopsy and repeat genomic testing to identify any potential treatment targets      DISCLAIMERS:    TO THE TREATING PHYSICIAN:  This conference is a meeting of clinicians from various specialty areas who evaluate and discuss patients for whom a multidisciplinary treatment approach is being considered. Please note that the above opinion was a consensus of the conference attendees and is intended only to assist in quality care of the discussed patient.  The responsibility for follow up on the input given during the conference, along with any final decisions regarding plan of care, is that of the patient and the patient's provider. Accordingly, appointments have only been recommended based on this information and have NOT been scheduled unless otherwise noted.      TO THE PATIENT:  This summary is a brief record of major aspects of your cancer treatment. You may choose to share a copy with any of your doctors or nurses. However, this is not a detailed or comprehensive record of your care.    Radha Vásquez  Miguel  Gynecologic Oncology Fellow

## 2025-02-05 NOTE — PLAN OF CARE
[facility]  [phone]  2/5/2025     Karla Wanggg  2015  [id number]     Karla is here today with his mother.  He comes in for evaluation of the following concerns: TOF s/p surgical intervention. I have reviewed notes from his previous Cardiologist, scanned into Media and summarized here. Repair with  at [facility] on 02/03/2014. His latest ECHO notes normal atrial sizes, no ASD, patch closure of a large malalignment VSD without residual shunt, trivial to mild TR and no MR. Moderate RV enlargement. Pulmonary valve is s/p transannular patch and the MPA measured 1.87 cm. 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 It was noted at that visit that his RV size seemed to be worsening and mentioned a cardiac MRI of which noted borderline global systolic function, severe dilation and severe [place] on the report. Mild LPA hypoplasia and mildly dilated aorti root and ascending aorta.  RV diastolic volume 142 ml/m2 and RVES volume 75 ml/m2.    Telephone conversation 11/19/24:  Spoke with mom about the equivalence cardiac MRI results as well as his  MRI.  Let her know that the cardiac portion of the MRI is fairly stable and that he does not meet criteria for intervention at this time and we will continue to monitor.  His follow up appointment is set for August of this year with EKG and echo.  Further discussed that he does have variable flow to his branch pulmonary arteries, not to the level of intervention but that will need to be monitored moving forward.    We further discussed that the  MRI showed that there was mild heterogeneous enhancement of the liver with no focal liver lesion and we would move forward with an abdominal ultrasound to [place] to look further at his liver.  Depending on those results we will then decide if he needs to be followed by either GI or hepatology.  Mother agreed and no further  Problem: Knowledge Deficit  Goal: Patient/family/caregiver demonstrates understanding of disease process, treatment plan, medications, and discharge instructions  Description: Complete learning assessment and assess knowledge base    Interventions:  - Provide teaching at level of understanding  - Provide teaching via preferred learning methods  Outcome: Progressing "questions.    Interim History:  Presents today with Mom.   Patient presents today for follow up visit, S/P TOF repair.  Patient previously followed by Dr. Nicole and then Dr. Ibrahim.  Mom states she is looking to transition care.   Surgery done 2/2014 by Dr. Peterson at [facility].     Denies chest pain, shortness of breath, palpitations, headaches, dizziness, syncope, activity intolerance.   Mom states still experiences occasional nose bleeds "once in a blue moon."  Patient is very active and able to keep up with children his age.   Reports adequate appetite (very picky eater- Mom states she has previously tried Pediasure)   Reports good hydration (drinks mainly water, milk and occas juice)  UTD on immunizations.   Denies concerns at present, doing great overall.      Review of Systems:   Neuro:   Normal development. No seizures. No chronic headaches.  Psych: No known ADD or ADHD.  No known learning disabilities.  RESP:  No recurrent pneumonias or asthma.  GI:  No history of reflux. No change in bowel habits.  :  No history of urinary tract infection or renal structural abnormalities.  MS:  No muscle or joint swelling or apparent tenderness.  SKIN:  No history of rashes.  Heme/lymphatic: No history of anemia, excessive bruising or bleeding.  Allergic/Immunologic: No history of environmental allergies or immune compromise.  ENT: No hearing loss, no recurring ear infections.  Eyes:No visual disturbance or need for glasses.     History reviewed. No pertinent past medical history.  Past Surgical History:   Procedure Laterality Date    CIRCUMCISION      MAGNETIC RESONANCE IMAGING N/A 3/7/2024    Procedure: MRI (Magnetic Resonance Imagine);  Surgeon: Rosanna Bello;  Location: [facility];  Service: Anesthesiology;  Laterality: N/A;    TETRALOGY OF FALLOT REPAIR  02/2014       FAMILY HISTORY:   Family History   Problem Relation Name Age of Onset    No Known Problems Mother      No Known Problems Father      No Known Problems " "Sister      No Known Problems Maternal Grandmother      No Known Problems Maternal Grandfather         Social History     Socioeconomic History    Marital status: Single   Social History Narrative    Lives with Mom and sister. No pets and Mom vapes occasionally outside.     Currently in 5th grade.         MEDICATIONS:   Current Outpatient Medications on File Prior to Visit   Medication Sig Dispense Refill    loratadine (CLARITIN) 10 mg tablet Take 10 mg by mouth.      MULTI-VITAMIN WITH FLUORIDE 0.25 mg Chew Take 1 tablet by mouth.      triamcinolone acetonide 0.1% (KENALOG) 0.1 % cream Apply topically.       No current facility-administered medications on file prior to visit.       Review of patient's allergies indicates:  No Known Allergies    Immunization status: up to date and documented.      PHYSICAL EXAM:  /63 (BP Location: Right arm, Patient Position: Sitting)   Pulse (!) 53   Resp 18   Ht 4' 7.51" (1.41 m)   Wt 27.1 kg (59 lb 12.8 oz)   SpO2 100%   BMI 13.64 kg/m²   Blood pressure %katelynn are 83% systolic and 55% diastolic based on the 2017 AAP Clinical Practice Guideline. Blood pressure %ile targets: 90%: 112/75, 95%: 116/78, 95% + 12 mmH/90. This reading is in the normal blood pressure range.  Body mass index is 13.64 kg/m².    General appearance: The patient appears well-developed, well-nourished, in no distress.  HEET: Normocephalic. No dysmorphic features. Pink, moist, mucous membranes.   Neck: No jugular venous distention. No carotid bruits.  Chest: The chest is symmetrically developed.   Lungs: The lungs are clear to auscultation bilaterally, without rales rhonchi or wheezing. Symmetric air entry.  Cardiac: Quiet precordium with normal PMI in the fifth intercostal space, midclavicular line. Normal rate and irregular rhythm. Normal intensity S1. Physiologically split S2. No clicks rubs gallops. II-III/VI to and fro murmur heard throughout the anterior thorax, most prominent along the " pulmonary line.    Abdomen: Soft, nontender. No hepatosplenomegaly. Normal bowel sounds.  Extremities: Warm and well perfused. No clubbing, cyanosis, or edema.   Pulses: Normal (2+), symmetric, pulses in right and left upper and lower extremities.   Neuro: The patient interacts appropriately for age with the examiner. The patient  moves all extremities. Normal muscle tone.  Skin: No rashes. No excessive bruising.      TESTS:  I personally evaluated the following studies today:    EKG:  Sinus bradycardia with occasional PVCs  Left axis deviation  RBBB vs RVH    ECHOCARDIOGRAM:   History of Tetralogy of Fallot s/p complete repair with a transannular patch at 3 months of age (Dr.Caspi BJ).     1.  No residual intracardiac shunting.  2.  Dilated aortic valve annulus, aortic root. Dilated Ao STJ and ascending aorta by history, not well visualized on current study.  3.  Severe pulmonary valve insufficiency without stenosis.  4.  Moderate to severely dilated right sided heart chambers.  TAPSE 1.8 cm. RV strain -21 to -23%.  5. Trivial aortic valve insufficiency.  6.  Normal left ventricular systolic function.  (Full report is in electronic medical record)    Cardiac MRI 09/2021:  Unobstructed right ventricular outflow tract to mildly dilated main and right pulmonary artery (MPA 1.8 x 2.1 cm, RPA 1.2 x 1.5 cm). Mild acute angle and relative small caliber of the origin of the left pulmonary artery with no significant focal stenosis (proximal LPA 1.1 x 1.3 cm, distal LPA 1.3 x 1.4 cm.     Differential pulmonary blood flow: 54% right, 46% left.     Severely dilated right ventricle (indexed end-diastolic volume 143 ml/m2, z-score 4.9) with borderline global systolic function (ejection fraction 47 %). The anterior infundibular wall at the presumed location of the right ventricular outflow tract patch is thin and dyskinetic.     Normal left ventricular size (indexed end-diastolic volume 73 ml/m2, z-score -0.6) with normal  global systolic function (ejection fraction 60.5%). No regional wall motion abnormalities.     Cardiac MRI 01/10/24:  Conclusion: 8 yo with history of tetralogy of Fallot status post transannular patch repair.      Qualitatively mild right atrial enlargement.   Increased right ventricular volumes. (RVEDV 124 cc/m2 for BSA, RVESV 57 cc/m2 for BSA).  RVEF 46 %. RVEDV is 1.9 times that of the LV and RVESV is 1/8 times that of the LV.   Normal left ventricular volumes with LVEF 52 %.  There is minimal nonfunctioning pulmonary valve tissue noted in the RVOT. Free pulmonary insufficiency with regurgitant fraction of 45% and regurgitation volume of 27cc. Normal velocity.   Normal size of the main pulmonary artery, dalila-cross origin of the PAs with mild narrowing at the origin of the LPA and mildly dilated distal PAs. There is mild flow discrepancy with increased flow to the RPA (63%)  The aortic root is mild-moderately enlarged. The ascending aorta is mildly enlarged (measurements listed above).     ASSESSMENT and PLAN:  Karla is a 9 y.o. male with Tetralogy of Fallot s/p complete repair at 3 months of age with a transannular patch. He is left with aortic dilation, no residual shunting, severe RV dilation with grossly normal systolic function although a TAPSE of 1.8 and global strain -21 to -23%. He is clinically doing very well without concerns, and his indexed RV size improved on his MRI from 2024 as compared to 2021. His mother and I have previously discussed what warrants surgical intervention and that if an open heart procedure would be needed, they would set him up for success for percutaneous valves in the future should they be needed as he continues to grow.     Continue with [facility], including immunizations.   Cleared for anesthesia if needed from a cardiac standpoint.   Given PVCs seen on his EKG and irregular rhythm heard on his exam, I have placed a 48 hour Holter.  Cardiac MRI likely every 2 years, next due  to Spring 2026 unless clinical or ECHO concerns sooner.   Lung Perfusion Scan between 6-12 months from now, prior to our next appt.   Cleared for [facility] if he would like to go.     Activity:Normal for age.    Endocarditis prophylaxis is not recommended in this circumstance.     FOLLOW UP:  Follow-Up clinic visit in a year with the following tests: EKG and ECHO.  If abnormal or concerning Holter results, we will need to see him back sooner.    I spent a total of 35 minutes on the day of the visit.This includes face to face time and non-face to face time preparing to see the patient (eg, review of tests), obtaining and/or reviewing separately obtained history, documenting clinical information in the electronic or other health record, independently interpreting results and communicating results to the patient/family/caregiver, or care coordinator.      Maia Adkins MD  Pediatric Cardiologist

## 2025-02-07 ENCOUNTER — HOSPITAL ENCOUNTER (OUTPATIENT)
Dept: INFUSION CENTER | Facility: HOSPITAL | Age: 65
End: 2025-02-07
Payer: MEDICARE

## 2025-02-07 DIAGNOSIS — C56.2 MALIGNANT NEOPLASM OF LEFT OVARY (HCC): ICD-10-CM

## 2025-02-07 DIAGNOSIS — Z45.2 ENCOUNTER FOR CENTRAL LINE CARE: Primary | ICD-10-CM

## 2025-02-07 LAB
ALBUMIN SERPL BCG-MCNC: 4.2 G/DL (ref 3.5–5)
ALP SERPL-CCNC: 61 U/L (ref 34–104)
ALT SERPL W P-5'-P-CCNC: 23 U/L (ref 7–52)
ANION GAP SERPL CALCULATED.3IONS-SCNC: 7 MMOL/L (ref 4–13)
AST SERPL W P-5'-P-CCNC: 18 U/L (ref 13–39)
BASOPHILS # BLD AUTO: 0.04 THOUSANDS/ΜL (ref 0–0.1)
BASOPHILS NFR BLD AUTO: 1 % (ref 0–1)
BILIRUB SERPL-MCNC: 0.34 MG/DL (ref 0.2–1)
BUN SERPL-MCNC: 15 MG/DL (ref 5–25)
CALCIUM SERPL-MCNC: 9.4 MG/DL (ref 8.4–10.2)
CHLORIDE SERPL-SCNC: 99 MMOL/L (ref 96–108)
CO2 SERPL-SCNC: 30 MMOL/L (ref 21–32)
CREAT SERPL-MCNC: 0.57 MG/DL (ref 0.6–1.3)
EOSINOPHIL # BLD AUTO: 0.04 THOUSAND/ΜL (ref 0–0.61)
EOSINOPHIL NFR BLD AUTO: 1 % (ref 0–6)
ERYTHROCYTE [DISTWIDTH] IN BLOOD BY AUTOMATED COUNT: 11.9 % (ref 11.6–15.1)
GFR SERPL CREATININE-BSD FRML MDRD: 96 ML/MIN/1.73SQ M
GLUCOSE SERPL-MCNC: 128 MG/DL (ref 65–140)
HCT VFR BLD AUTO: 34.9 % (ref 34.8–46.1)
HGB BLD-MCNC: 11.7 G/DL (ref 11.5–15.4)
IMM GRANULOCYTES # BLD AUTO: 0.01 THOUSAND/UL (ref 0–0.2)
IMM GRANULOCYTES NFR BLD AUTO: 0 % (ref 0–2)
LYMPHOCYTES # BLD AUTO: 0.98 THOUSANDS/ΜL (ref 0.6–4.47)
LYMPHOCYTES NFR BLD AUTO: 30 % (ref 14–44)
MAGNESIUM SERPL-MCNC: 1.6 MG/DL (ref 1.9–2.7)
MCH RBC QN AUTO: 28.1 PG (ref 26.8–34.3)
MCHC RBC AUTO-ENTMCNC: 33.5 G/DL (ref 31.4–37.4)
MCV RBC AUTO: 84 FL (ref 82–98)
MONOCYTES # BLD AUTO: 0.35 THOUSAND/ΜL (ref 0.17–1.22)
MONOCYTES NFR BLD AUTO: 11 % (ref 4–12)
NEUTROPHILS # BLD AUTO: 1.8 THOUSANDS/ΜL (ref 1.85–7.62)
NEUTS SEG NFR BLD AUTO: 57 % (ref 43–75)
NRBC BLD AUTO-RTO: 0 /100 WBCS
PLATELET # BLD AUTO: 276 THOUSANDS/UL (ref 149–390)
PMV BLD AUTO: 8.6 FL (ref 8.9–12.7)
POTASSIUM SERPL-SCNC: 3.5 MMOL/L (ref 3.5–5.3)
PROT SERPL-MCNC: 6.8 G/DL (ref 6.4–8.4)
RBC # BLD AUTO: 4.16 MILLION/UL (ref 3.81–5.12)
SODIUM SERPL-SCNC: 136 MMOL/L (ref 135–147)
WBC # BLD AUTO: 3.22 THOUSAND/UL (ref 4.31–10.16)

## 2025-02-07 PROCEDURE — 85025 COMPLETE CBC W/AUTO DIFF WBC: CPT

## 2025-02-07 PROCEDURE — 80053 COMPREHEN METABOLIC PANEL: CPT

## 2025-02-07 PROCEDURE — 83735 ASSAY OF MAGNESIUM: CPT

## 2025-02-07 NOTE — PROGRESS NOTES
Pt here for port labs; labs obtained without difficulty; pt aware of next appt 2/14 at 1130; left unit ambulatory with steady gait.

## 2025-02-10 ENCOUNTER — HOSPITAL ENCOUNTER (OUTPATIENT)
Dept: CT IMAGING | Facility: HOSPITAL | Age: 65
Discharge: HOME | End: 2025-02-10
Attending: RADIOLOGY
Payer: MEDICARE

## 2025-02-10 VITALS
HEIGHT: 63 IN | HEART RATE: 74 BPM | OXYGEN SATURATION: 95 % | DIASTOLIC BLOOD PRESSURE: 66 MMHG | TEMPERATURE: 97.9 F | RESPIRATION RATE: 12 BRPM | BODY MASS INDEX: 33.98 KG/M2 | WEIGHT: 191.8 LBS | SYSTOLIC BLOOD PRESSURE: 118 MMHG

## 2025-02-10 DIAGNOSIS — C56.2 MALIGNANT NEOPLASM OF LEFT OVARY (HCC): ICD-10-CM

## 2025-02-10 LAB
BASOPHILS # BLD AUTO: 0.06 THOUSANDS/ΜL (ref 0–0.1)
BASOPHILS NFR BLD AUTO: 2 % (ref 0–1)
EOSINOPHIL # BLD AUTO: 0.11 THOUSAND/ΜL (ref 0–0.61)
EOSINOPHIL NFR BLD AUTO: 4 % (ref 0–6)
ERYTHROCYTE [DISTWIDTH] IN BLOOD BY AUTOMATED COUNT: 12.7 % (ref 11.6–15.1)
HCT VFR BLD AUTO: 33.4 % (ref 34.8–46.1)
HGB BLD-MCNC: 11.2 G/DL (ref 11.5–15.4)
IMM GRANULOCYTES # BLD AUTO: 0.01 THOUSAND/UL (ref 0–0.2)
IMM GRANULOCYTES NFR BLD AUTO: 0 % (ref 0–2)
INR PPP: 1 (ref 0.85–1.19)
LYMPHOCYTES # BLD AUTO: 1.09 THOUSANDS/ΜL (ref 0.6–4.47)
LYMPHOCYTES NFR BLD AUTO: 42 % (ref 14–44)
MCH RBC QN AUTO: 28.3 PG (ref 26.8–34.3)
MCHC RBC AUTO-ENTMCNC: 33.5 G/DL (ref 31.4–37.4)
MCV RBC AUTO: 84 FL (ref 82–98)
MONOCYTES # BLD AUTO: 0.51 THOUSAND/ΜL (ref 0.17–1.22)
MONOCYTES NFR BLD AUTO: 19 % (ref 4–12)
NEUTROPHILS # BLD AUTO: 0.88 THOUSANDS/ΜL (ref 1.85–7.62)
NEUTS SEG NFR BLD AUTO: 33 % (ref 43–75)
NRBC BLD AUTO-RTO: 0 /100 WBCS
PLATELET # BLD AUTO: 258 THOUSANDS/UL (ref 149–390)
PMV BLD AUTO: 8.6 FL (ref 8.9–12.7)
PROTHROMBIN TIME: 13.7 SECONDS (ref 12.3–15)
RBC # BLD AUTO: 3.96 MILLION/UL (ref 3.81–5.12)
WBC # BLD AUTO: 2.66 THOUSAND/UL (ref 4.31–10.16)

## 2025-02-10 PROCEDURE — 88184 FLOWCYTOMETRY/ TC 1 MARKER: CPT | Performed by: OBSTETRICS & GYNECOLOGY

## 2025-02-10 PROCEDURE — 76942 ECHO GUIDE FOR BIOPSY: CPT | Performed by: INTERNAL MEDICINE

## 2025-02-10 PROCEDURE — 88341 IMHCHEM/IMCYTCHM EA ADD ANTB: CPT | Performed by: PATHOLOGY

## 2025-02-10 PROCEDURE — 49180 BIOPSY ABDOMINAL MASS: CPT | Performed by: INTERNAL MEDICINE

## 2025-02-10 PROCEDURE — 76942 ECHO GUIDE FOR BIOPSY: CPT

## 2025-02-10 PROCEDURE — 88185 FLOWCYTOMETRY/TC ADD-ON: CPT

## 2025-02-10 PROCEDURE — 49180 BIOPSY ABDOMINAL MASS: CPT

## 2025-02-10 PROCEDURE — 85025 COMPLETE CBC W/AUTO DIFF WBC: CPT | Performed by: RADIOLOGY

## 2025-02-10 PROCEDURE — 85610 PROTHROMBIN TIME: CPT | Performed by: RADIOLOGY

## 2025-02-10 PROCEDURE — 88360 TUMOR IMMUNOHISTOCHEM/MANUAL: CPT | Performed by: PATHOLOGY

## 2025-02-10 PROCEDURE — 99152 MOD SED SAME PHYS/QHP 5/>YRS: CPT | Performed by: INTERNAL MEDICINE

## 2025-02-10 PROCEDURE — 88342 IMHCHEM/IMCYTCHM 1ST ANTB: CPT | Performed by: PATHOLOGY

## 2025-02-10 PROCEDURE — 99153 MOD SED SAME PHYS/QHP EA: CPT

## 2025-02-10 PROCEDURE — 88305 TISSUE EXAM BY PATHOLOGIST: CPT | Performed by: PATHOLOGY

## 2025-02-10 PROCEDURE — 88333 PATH CONSLTJ SURG CYTO XM 1: CPT | Performed by: PATHOLOGY

## 2025-02-10 PROCEDURE — 99152 MOD SED SAME PHYS/QHP 5/>YRS: CPT

## 2025-02-10 RX ORDER — ONDANSETRON 2 MG/ML
INJECTION INTRAMUSCULAR; INTRAVENOUS AS NEEDED
Status: DISCONTINUED | OUTPATIENT
Start: 2025-02-10 | End: 2025-02-11 | Stop reason: HOSPADM

## 2025-02-10 RX ORDER — SODIUM CHLORIDE 9 MG/ML
30 INJECTION, SOLUTION INTRAVENOUS CONTINUOUS
Status: DISCONTINUED | OUTPATIENT
Start: 2025-02-10 | End: 2025-02-11 | Stop reason: HOSPADM

## 2025-02-10 RX ORDER — FENTANYL CITRATE 50 UG/ML
INJECTION, SOLUTION INTRAMUSCULAR; INTRAVENOUS AS NEEDED
Status: DISCONTINUED | OUTPATIENT
Start: 2025-02-10 | End: 2025-02-11 | Stop reason: HOSPADM

## 2025-02-10 RX ORDER — MIDAZOLAM HYDROCHLORIDE 2 MG/2ML
INJECTION, SOLUTION INTRAMUSCULAR; INTRAVENOUS AS NEEDED
Status: DISCONTINUED | OUTPATIENT
Start: 2025-02-10 | End: 2025-02-11 | Stop reason: HOSPADM

## 2025-02-10 RX ADMIN — FENTANYL CITRATE 25 MCG: 50 INJECTION, SOLUTION INTRAMUSCULAR; INTRAVENOUS at 09:23

## 2025-02-10 RX ADMIN — FENTANYL CITRATE 50 MCG: 50 INJECTION, SOLUTION INTRAMUSCULAR; INTRAVENOUS at 09:09

## 2025-02-10 RX ADMIN — MIDAZOLAM 0.5 MG: 1 INJECTION INTRAMUSCULAR; INTRAVENOUS at 09:16

## 2025-02-10 RX ADMIN — FENTANYL CITRATE 50 MCG: 50 INJECTION, SOLUTION INTRAMUSCULAR; INTRAVENOUS at 09:15

## 2025-02-10 RX ADMIN — SODIUM CHLORIDE 30 ML/HR: 0.9 INJECTION, SOLUTION INTRAVENOUS at 08:28

## 2025-02-10 RX ADMIN — ONDANSETRON 4 MG: 2 INJECTION INTRAMUSCULAR; INTRAVENOUS at 09:00

## 2025-02-10 RX ADMIN — MIDAZOLAM 0.5 MG: 1 INJECTION INTRAMUSCULAR; INTRAVENOUS at 09:23

## 2025-02-10 RX ADMIN — MIDAZOLAM 1 MG: 1 INJECTION INTRAMUSCULAR; INTRAVENOUS at 09:09

## 2025-02-10 RX ADMIN — MIDAZOLAM 0.5 MG: 1 INJECTION INTRAMUSCULAR; INTRAVENOUS at 09:15

## 2025-02-10 NOTE — INTERVAL H&P NOTE
"Update: (This section must be completed if the H&P was completed greater than 24 hrs to procedure or admission)    H&P reviewed. After examining the patient, I find no changed to the H&P since it had been written.    /64   Pulse 81   Temp 97.6 °F (36.4 °C) (Temporal)   Resp 18   Ht 5' 3\" (1.6 m)   Wt 87 kg (191 lb 12.8 oz)   SpO2 96%   BMI 33.98 kg/m²     Patient re-evaluated. Accept as history and physical.    We will proceed with biopsy of the peritoneal mass today.    Dave Negrete MD/February 10, 2025/9:06 AM  "

## 2025-02-10 NOTE — DISCHARGE INSTRUCTIONS
POST BIOPSY    Care after your procedure:    1. Limit your activities for 24 hours after your biopsy.    2. No driving day of biopsy.    3. Return to your normal diet.Small sips of flat soda will help with mild nausea.    4. Remove band-aid or dressing 24 hours after procedure.     Contact Interventional Radiology at [phone]  if:    1. Difficulty breathing, nausea or vomiting.    2. Chills or fever above 101 degrees F.     3. Pain at biopsy site not relieved by medication.     4. Develop any redness, swelling, heat, unusual drainage, heavy bruising or bleeding from biopsy site.

## 2025-02-10 NOTE — BRIEF OP NOTE (RAD/CATH)
INTERVENTIONAL RADIOLOGY PROCEDURE NOTE    Date: 2/10/2025    Procedure:   Procedure Summary       Date: 02/10/25 Room / Location: [facility] CAT Scan    Anesthesia Start:  Anesthesia Stop:     Procedure: IR BIOPSY ABDOMEN Diagnosis:       Malignant neoplasm of left ovary (HCC)      (ovarian cancer, molecvular testing)    Scheduled Providers:  Responsible Provider:     Anesthesia Type: Not recorded ASA Status: Not recorded            Preoperative diagnosis:   1. Malignant neoplasm of left ovary (HCC)         Postoperative diagnosis: Same.    Surgeon: Dave Negrete MD     Assistant: None. No qualified resident was available.    Blood loss: 5 mL    Specimens: Multiple core biopsy specimen     Findings: Ultrasound-guided biopsy of a peritoneal nodule anterior to lower left hepatic lobe with 9 passes performed.    Complications: None immediate.    Anesthesia: conscious sedation and local

## 2025-02-13 ENCOUNTER — OFFICE VISIT (OUTPATIENT)
Age: 65
End: 2025-02-13
Payer: MEDICARE

## 2025-02-13 ENCOUNTER — RESULTS FOLLOW-UP (OUTPATIENT)
Age: 65
End: 2025-02-13

## 2025-02-13 VITALS
BODY MASS INDEX: 34.38 KG/M2 | HEIGHT: 63 IN | DIASTOLIC BLOOD PRESSURE: 68 MMHG | TEMPERATURE: 97.6 F | OXYGEN SATURATION: 99 % | RESPIRATION RATE: 18 BRPM | HEART RATE: 88 BPM | WEIGHT: 194 LBS | SYSTOLIC BLOOD PRESSURE: 118 MMHG

## 2025-02-13 DIAGNOSIS — C56.2 MALIGNANT NEOPLASM OF LEFT OVARY (HCC): Primary | ICD-10-CM

## 2025-02-13 LAB — SCAN RESULT: NORMAL

## 2025-02-13 PROCEDURE — 88360 TUMOR IMMUNOHISTOCHEM/MANUAL: CPT | Performed by: PATHOLOGY

## 2025-02-13 PROCEDURE — 88333 PATH CONSLTJ SURG CYTO XM 1: CPT | Performed by: PATHOLOGY

## 2025-02-13 PROCEDURE — 88342 IMHCHEM/IMCYTCHM 1ST ANTB: CPT | Performed by: PATHOLOGY

## 2025-02-13 PROCEDURE — 99215 OFFICE O/P EST HI 40 MIN: CPT | Performed by: PHYSICIAN ASSISTANT

## 2025-02-13 PROCEDURE — 88341 IMHCHEM/IMCYTCHM EA ADD ANTB: CPT | Performed by: PATHOLOGY

## 2025-02-13 PROCEDURE — G2211 COMPLEX E/M VISIT ADD ON: HCPCS | Performed by: PHYSICIAN ASSISTANT

## 2025-02-13 PROCEDURE — 88305 TISSUE EXAM BY PATHOLOGIST: CPT | Performed by: PATHOLOGY

## 2025-02-13 NOTE — ASSESSMENT & PLAN NOTE
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 Avastin 15 mg/kg IV every 21 days is planned, but held for cycle 1 and 2 pending cataract surgery and now dental extraction. Overall, she tolerated cycle 1 of treatment with minimal toxicity.     Continue with cycle 2 of treatment as planned as long as her metabolic and hematologic parameters are adequate.     Return to the office as per her chemotherapy calendar. Consider addition of avastin with cycle 3 pending ongoing procedural planning.     Continue to trend Inihibin B.   Orders:  •  [facility] CANCER SEEK + ADDITIONAL TESTS

## 2025-02-13 NOTE — PROGRESS NOTES
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 Avastin 15 mg/kg IV every 21 days is planned, but held for cycle 1 and 2 pending cataract surgery and now dental extraction. Overall, she tolerated cycle 1 of treatment with minimal toxicity.     Continue with cycle 2 of treatment as planned as long as her metabolic and hematologic parameters are adequate.     Return to the office as per her chemotherapy calendar. Consider addition of avastin with cycle 3 pending ongoing procedural planning.     Continue to trend Inihibin B.   Orders:  •  [facility] CANCER SEEK + ADDITIONAL TESTS            History of Present Illness     Reason for Visit / CC:  Pre-Chemo Visit    Eli Cobos is a 64 y.o. female   who presents to the office for pre-chemotherapy evaluation. Overall, she tolerated her first cycle of treatment well with minimal toxicity. She has been afebrile. Denies n/v/abdominal pain. Appetite is appropriate. Normal bladder function. Constipation after treatment, but managed with colace. Notes intermittent peripheral neuropathy following treatment which has now resolved. This did not affect her ADLs. Additionally, patient notes she was started on antibiotics for an infected tooth. She is discussing extraction with her dentist.          Oncology History   Cancer Staging   Malignant neoplasm of left ovary (HCC)  Staging form: Ovary, Fallopian Tube, Primary Peritoneal, AJCC 8th Edition  - Clinical stage from 2019:  FIGO Stage IC1, calculated as Stage IA (cT1a, cN0, cM0) - Signed by Trevin Bonilla MD on 9/11/2019  Oncology History   Malignant neoplasm of left ovary (HCC)   8/26/2019 Initial Diagnosis    Malignant neoplasm of left ovary (HCC)     8/26/2019 -  Cancer Staged    Staging form: Ovary, Fallopian Tube, Primary Peritoneal, AJCC 8th Edition  - Clinical stage from 8/26/2019: FIGO Stage IC1, calculated as Stage IA (cT1a, cN0, cM0) - Signed by Trevin Bonilla MD on 9/11/2019        Chemotherapy    Taxol 175 mg/m2 and carboplatin AUC 6 every 21 days. She received 5 cycles and is scheduled for cycle 6.      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      12/2022 - 6/27/2024 Hormone Therapy    Megace 80 mg PO BID (starting inhibin B, 9.2 pg/ml)     6/4/2024 - 6/21/2024 Radiation      Plan ID Energy Fractions Dose per Fraction (cGy) Dose Correction (cGy) Total Dose Delivered (cGy) Elapsed Days   SBRT MT 6X-FFF 5 / 5 600 0 3,000 17      1/28/2025 -  Chemotherapy    Taxol 175 mg/m2 and carboplatin AUC 6 IV every 21 days.           Review of Systems   Constitutional: Negative.    HENT:  Positive for dental problem (see HPI).    Eyes: Negative.    Respiratory: Negative.     Cardiovascular: Negative.    Gastrointestinal: Negative.    Genitourinary: Negative.    Musculoskeletal: Negative.    Skin: Negative.    Neurological:  Positive for numbness (intermittent, see HPI).  "  Psychiatric/Behavioral: Negative.      A complete review of systems is negative other than that noted above in the HPI.  Medical History Reviewed by provider this encounter:  Tobacco  Allergies  Meds  Problems  Med Hx  Surg Hx  Fam Hx     .  Current Outpatient Medications on File Prior to Visit   Medication Sig Dispense Refill   • amLODIPine (NORVASC) 5 mg tablet Take 5 mg by mouth daily   4   • Cholecalciferol (VITAMIN D-3) 1000 units CAPS Take by mouth daily      • clobetasol (TEMOVATE) 0.05 % ointment Apply to the affected area 1-3x/week 30 g 0   • escitalopram (LEXAPRO) 20 mg tablet 20 mg daily   0   • LORazepam (ATIVAN) 1 mg tablet Take 1 tablet (1 mg total) by mouth every 8 (eight) hours as needed (nausea or anxiety) 20 tablet 0   • meclizine (ANTIVERT) 25 mg tablet Take 1 tablet (25 mg total) by mouth 3 (three) times a day as needed for dizziness 30 tablet 0   • metFORMIN (GLUCOPHAGE) 500 mg tablet 500 mg 2 (two) times a day with meals   0   • Mounjaro 7.5 MG/0.5ML Inject 7.5 mg under the skin every 7 days     • ondansetron (ZOFRAN) 8 mg tablet Take 1 tablet (8 mg total) by mouth every 8 (eight) hours as needed for nausea or vomiting 30 tablet 1   • scopolamine (TRANSDERM-SCOP) 1.5 mg/3 days TD 72 hr patch Place 1 patch on the skin every third day 2 patch 1   • simvastatin (ZOCOR) 20 mg tablet TK 1 T PO QD IN THE KIMBERLYN  5   • SUPER B COMPLEX/C PO Take by mouth daily      • valsartan-hydrochlorothiazide (DIOVAN-HCT) 320-25 MG per tablet daily   0     No current facility-administered medications on file prior to visit.         Objective   /68 (BP Location: Left arm, Patient Position: Sitting, Cuff Size: Adult)   Pulse 88   Temp 97.6 °F (36.4 °C)   Resp 18   Ht 5' 3\" (1.6 m)   Wt 88 kg (194 lb)   SpO2 99%   BMI 34.37 kg/m²     Body mass index is 34.37 kg/m².  Pain Screening:  Pain Score: 0-No pain  ECOG ECOG Performance Status: 0 - Fully active, able to carry on all pre-disease performance " without restriction   Physical Exam  Constitutional:       Appearance: She is well-developed.   Pulmonary:      Effort: Pulmonary effort is normal.   Skin:     General: Skin is warm and dry.      Findings: No rash.   Neurological:      Mental Status: She is alert and oriented to person, place, and time.   Psychiatric:         Behavior: Behavior normal.         Thought Content: Thought content normal.         Judgment: Judgment normal.          Labs: I have reviewed pertinent labs.   Lab Results   Component Value Date/Time    WBC 2.66 (L) 02/10/2025 08:25 AM    RBC 3.96 02/10/2025 08:25 AM    Hemoglobin 11.2 (L) 02/10/2025 08:25 AM    Hematocrit 33.4 (L) 02/10/2025 08:25 AM    MCV 84 02/10/2025 08:25 AM    MCH 28.3 02/10/2025 08:25 AM    RDW 12.7 02/10/2025 08:25 AM    Platelets 258 02/10/2025 08:25 AM    Segmented % 33 (L) 02/10/2025 08:25 AM    Lymphocytes % 42 02/10/2025 08:25 AM    Monocytes % 19 (H) 02/10/2025 08:25 AM    Eosinophils Relative 4 02/10/2025 08:25 AM    Basophils Relative 2 (H) 02/10/2025 08:25 AM    Immature Grans % 0 02/10/2025 08:25 AM    Absolute Neutrophils 0.88 (L) 02/10/2025 08:25 AM      Lab Results   Component Value Date/Time    Sodium 136 02/07/2025 09:21 AM    Potassium 3.5 02/07/2025 09:21 AM    Chloride 99 02/07/2025 09:21 AM    CO2 30 02/07/2025 09:21 AM    ANION GAP 7 02/07/2025 09:21 AM    BUN 15 02/07/2025 09:21 AM    Creatinine 0.57 (L) 02/07/2025 09:21 AM    Glucose 128 02/07/2025 09:21 AM    Calcium 9.4 02/07/2025 09:21 AM    AST 18 02/07/2025 09:21 AM    ALT 23 02/07/2025 09:21 AM    Alkaline Phosphatase 61 02/07/2025 09:21 AM    Total Protein 6.8 02/07/2025 09:21 AM    Albumin 4.2 02/07/2025 09:21 AM    Total Bilirubin 0.34 02/07/2025 09:21 AM    eGFR 96 02/07/2025 09:21 AM      Lab Results   Component Value Date    INHBB 405.8 (H) 01/24/2025    INHBB 147.4 (H) 12/13/2024    INHBB <7.0 09/18/2024

## 2025-02-14 ENCOUNTER — HOSPITAL ENCOUNTER (OUTPATIENT)
Dept: INFUSION CENTER | Facility: HOSPITAL | Age: 65
End: 2025-02-14
Payer: MEDICARE

## 2025-02-14 DIAGNOSIS — Z45.2 ENCOUNTER FOR CENTRAL LINE CARE: Primary | ICD-10-CM

## 2025-02-14 DIAGNOSIS — C56.2 MALIGNANT NEOPLASM OF LEFT OVARY (HCC): ICD-10-CM

## 2025-02-14 LAB
ALBUMIN SERPL BCG-MCNC: 4.2 G/DL (ref 3.5–5)
ALP SERPL-CCNC: 73 U/L (ref 34–104)
ALT SERPL W P-5'-P-CCNC: 14 U/L (ref 7–52)
ANION GAP SERPL CALCULATED.3IONS-SCNC: 7 MMOL/L (ref 4–13)
AST SERPL W P-5'-P-CCNC: 16 U/L (ref 13–39)
BASOPHILS # BLD AUTO: 0.06 THOUSANDS/ÂΜL (ref 0–0.1)
BASOPHILS NFR BLD AUTO: 1 % (ref 0–1)
BILIRUB SERPL-MCNC: 0.3 MG/DL (ref 0.2–1)
BUN SERPL-MCNC: 10 MG/DL (ref 5–25)
CALCIUM SERPL-MCNC: 9.5 MG/DL (ref 8.4–10.2)
CHLORIDE SERPL-SCNC: 102 MMOL/L (ref 96–108)
CO2 SERPL-SCNC: 30 MMOL/L (ref 21–32)
CREAT SERPL-MCNC: 0.5 MG/DL (ref 0.6–1.3)
CREAT UR-MCNC: 46.8 MG/DL
EOSINOPHIL # BLD AUTO: 0.08 THOUSAND/ÂΜL (ref 0–0.61)
EOSINOPHIL NFR BLD AUTO: 2 % (ref 0–6)
ERYTHROCYTE [DISTWIDTH] IN BLOOD BY AUTOMATED COUNT: 13 % (ref 11.6–15.1)
GFR SERPL CREATININE-BSD FRML MDRD: 101 ML/MIN/1.73SQ M
GLUCOSE SERPL-MCNC: 124 MG/DL (ref 65–140)
HCT VFR BLD AUTO: 36.2 % (ref 34.8–46.1)
HGB BLD-MCNC: 12.1 G/DL (ref 11.5–15.4)
IMM GRANULOCYTES # BLD AUTO: 0.08 THOUSAND/UL (ref 0–0.2)
IMM GRANULOCYTES NFR BLD AUTO: 2 % (ref 0–2)
LYMPHOCYTES # BLD AUTO: 1.34 THOUSANDS/ÂΜL (ref 0.6–4.47)
LYMPHOCYTES NFR BLD AUTO: 29 % (ref 14–44)
MAGNESIUM SERPL-MCNC: 1.6 MG/DL (ref 1.9–2.7)
MCH RBC QN AUTO: 28.5 PG (ref 26.8–34.3)
MCHC RBC AUTO-ENTMCNC: 33.4 G/DL (ref 31.4–37.4)
MCV RBC AUTO: 85 FL (ref 82–98)
MONOCYTES # BLD AUTO: 0.45 THOUSAND/ÂΜL (ref 0.17–1.22)
MONOCYTES NFR BLD AUTO: 10 % (ref 4–12)
NEUTROPHILS # BLD AUTO: 2.66 THOUSANDS/ÂΜL (ref 1.85–7.62)
NEUTS SEG NFR BLD AUTO: 56 % (ref 43–75)
NRBC BLD AUTO-RTO: 0 /100 WBCS
PLATELET # BLD AUTO: 284 THOUSANDS/UL (ref 149–390)
PMV BLD AUTO: 8.4 FL (ref 8.9–12.7)
POTASSIUM SERPL-SCNC: 3.9 MMOL/L (ref 3.5–5.3)
PROT SERPL-MCNC: 6.7 G/DL (ref 6.4–8.4)
PROT UR-MCNC: 5.2 MG/DL
PROT/CREAT UR: 0.1 MG/G{CREAT} (ref 0–0.1)
RBC # BLD AUTO: 4.25 MILLION/UL (ref 3.81–5.12)
SODIUM SERPL-SCNC: 139 MMOL/L (ref 135–147)
WBC # BLD AUTO: 4.67 THOUSAND/UL (ref 4.31–10.16)

## 2025-02-14 PROCEDURE — 83735 ASSAY OF MAGNESIUM: CPT

## 2025-02-14 PROCEDURE — 84156 ASSAY OF PROTEIN URINE: CPT

## 2025-02-14 PROCEDURE — 82570 ASSAY OF URINE CREATININE: CPT

## 2025-02-14 PROCEDURE — 80053 COMPREHEN METABOLIC PANEL: CPT

## 2025-02-14 PROCEDURE — 85025 COMPLETE CBC W/AUTO DIFF WBC: CPT

## 2025-02-14 PROCEDURE — 83520 IMMUNOASSAY QUANT NOS NONAB: CPT

## 2025-02-14 NOTE — PROGRESS NOTES
Eli Cobos  tolerated port flush with labs well with no complications.      Eli Cobos is aware of future appt on 2/18 at 0730.     AVS printed and given to Eli Cobos:    No (Declined by Eli Cobos)

## 2025-02-17 ENCOUNTER — PATIENT MESSAGE (OUTPATIENT)
Age: 65
End: 2025-02-17

## 2025-02-17 LAB — INHIBIN B SERPL-MCNC: 45 PG/ML (ref 0–16.9)

## 2025-02-17 RX ORDER — SODIUM CHLORIDE 9 MG/ML
20 INJECTION, SOLUTION INTRAVENOUS ONCE
Status: CANCELLED | OUTPATIENT
Start: 2025-02-18

## 2025-02-17 RX ORDER — PALONOSETRON 0.05 MG/ML
0.25 INJECTION, SOLUTION INTRAVENOUS ONCE
Status: CANCELLED | OUTPATIENT
Start: 2025-02-18

## 2025-02-18 ENCOUNTER — HOSPITAL ENCOUNTER (OUTPATIENT)
Dept: INFUSION CENTER | Facility: HOSPITAL | Age: 65
Discharge: HOME | End: 2025-02-18
Attending: OBSTETRICS & GYNECOLOGY
Payer: MEDICARE

## 2025-02-18 VITALS
HEIGHT: 63 IN | OXYGEN SATURATION: 98 % | SYSTOLIC BLOOD PRESSURE: 138 MMHG | DIASTOLIC BLOOD PRESSURE: 65 MMHG | WEIGHT: 191.36 LBS | BODY MASS INDEX: 33.91 KG/M2 | HEART RATE: 93 BPM | RESPIRATION RATE: 18 BRPM | TEMPERATURE: 96 F

## 2025-02-18 DIAGNOSIS — C56.2 MALIGNANT NEOPLASM OF LEFT OVARY (HCC): Primary | ICD-10-CM

## 2025-02-18 PROCEDURE — 96367 TX/PROPH/DG ADDL SEQ IV INF: CPT

## 2025-02-18 PROCEDURE — 96415 CHEMO IV INFUSION ADDL HR: CPT

## 2025-02-18 PROCEDURE — 96375 TX/PRO/DX INJ NEW DRUG ADDON: CPT

## 2025-02-18 PROCEDURE — 96417 CHEMO IV INFUS EACH ADDL SEQ: CPT

## 2025-02-18 PROCEDURE — 96413 CHEMO IV INFUSION 1 HR: CPT

## 2025-02-18 RX ORDER — SODIUM CHLORIDE 9 MG/ML
20 INJECTION, SOLUTION INTRAVENOUS ONCE
Status: COMPLETED | OUTPATIENT
Start: 2025-02-18 | End: 2025-02-18

## 2025-02-18 RX ORDER — PALONOSETRON 0.05 MG/ML
0.25 INJECTION, SOLUTION INTRAVENOUS ONCE
Status: COMPLETED | OUTPATIENT
Start: 2025-02-18 | End: 2025-02-18

## 2025-02-18 RX ADMIN — FAMOTIDINE 20 MG: 10 INJECTION INTRAVENOUS at 08:43

## 2025-02-18 RX ADMIN — SODIUM CHLORIDE 20 ML/HR: 9 INJECTION, SOLUTION INTRAVENOUS at 07:54

## 2025-02-18 RX ADMIN — PACLITAXEL 334.2 MG: 6 INJECTION, SOLUTION INTRAVENOUS at 09:48

## 2025-02-18 RX ADMIN — DEXAMETHASONE SODIUM PHOSPHATE 20 MG: 10 INJECTION, SOLUTION INTRAMUSCULAR; INTRAVENOUS at 07:54

## 2025-02-18 RX ADMIN — PALONOSETRON HYDROCHLORIDE 0.25 MG: 0.25 INJECTION INTRAVENOUS at 08:18

## 2025-02-18 RX ADMIN — CARBOPLATIN 650.4 MG: 600 INJECTION, SOLUTION INTRAVENOUS at 13:21

## 2025-02-18 RX ADMIN — DIPHENHYDRAMINE HYDROCHLORIDE 25 MG: 50 INJECTION, SOLUTION INTRAMUSCULAR; INTRAVENOUS at 08:18

## 2025-02-18 RX ADMIN — FOSAPREPITANT 150 MG: 150 INJECTION, POWDER, LYOPHILIZED, FOR SOLUTION INTRAVENOUS at 09:06

## 2025-02-18 NOTE — PROGRESS NOTES
Eli Cobos  tolerated treatment well with no complications.      Eli Cobos is aware of future appt on 2/21 at 1130.     AVS printed and given to Eli Cobos:  No (Declined by Eli Cobos)

## 2025-02-21 ENCOUNTER — HOSPITAL ENCOUNTER (OUTPATIENT)
Dept: INFUSION CENTER | Facility: HOSPITAL | Age: 65
End: 2025-02-21
Payer: MEDICARE

## 2025-02-21 DIAGNOSIS — C56.2 MALIGNANT NEOPLASM OF LEFT OVARY (HCC): ICD-10-CM

## 2025-02-21 DIAGNOSIS — Z45.2 ENCOUNTER FOR CENTRAL LINE CARE: Primary | ICD-10-CM

## 2025-02-21 LAB
ALBUMIN SERPL BCG-MCNC: 4.3 G/DL (ref 3.5–5)
ALP SERPL-CCNC: 74 U/L (ref 34–104)
ALT SERPL W P-5'-P-CCNC: 16 U/L (ref 7–52)
ANION GAP SERPL CALCULATED.3IONS-SCNC: 8 MMOL/L (ref 4–13)
AST SERPL W P-5'-P-CCNC: 16 U/L (ref 13–39)
BASOPHILS # BLD AUTO: 0.03 THOUSANDS/ΜL (ref 0–0.1)
BASOPHILS NFR BLD AUTO: 0 % (ref 0–1)
BILIRUB SERPL-MCNC: 0.47 MG/DL (ref 0.2–1)
BUN SERPL-MCNC: 16 MG/DL (ref 5–25)
CALCIUM SERPL-MCNC: 9.5 MG/DL (ref 8.4–10.2)
CHLORIDE SERPL-SCNC: 98 MMOL/L (ref 96–108)
CO2 SERPL-SCNC: 30 MMOL/L (ref 21–32)
CREAT SERPL-MCNC: 0.61 MG/DL (ref 0.6–1.3)
EOSINOPHIL # BLD AUTO: 0.03 THOUSAND/ΜL (ref 0–0.61)
EOSINOPHIL NFR BLD AUTO: 0 % (ref 0–6)
ERYTHROCYTE [DISTWIDTH] IN BLOOD BY AUTOMATED COUNT: 12.8 % (ref 11.6–15.1)
GFR SERPL CREATININE-BSD FRML MDRD: 94 ML/MIN/1.73SQ M
GLUCOSE SERPL-MCNC: 177 MG/DL (ref 65–140)
HCT VFR BLD AUTO: 37.3 % (ref 34.8–46.1)
HGB BLD-MCNC: 12.5 G/DL (ref 11.5–15.4)
IMM GRANULOCYTES # BLD AUTO: 0.04 THOUSAND/UL (ref 0–0.2)
IMM GRANULOCYTES NFR BLD AUTO: 1 % (ref 0–2)
LYMPHOCYTES # BLD AUTO: 1.26 THOUSANDS/ΜL (ref 0.6–4.47)
LYMPHOCYTES NFR BLD AUTO: 18 % (ref 14–44)
MAGNESIUM SERPL-MCNC: 1.6 MG/DL (ref 1.9–2.7)
MCH RBC QN AUTO: 27.8 PG (ref 26.8–34.3)
MCHC RBC AUTO-ENTMCNC: 33.5 G/DL (ref 31.4–37.4)
MCV RBC AUTO: 83 FL (ref 82–98)
MONOCYTES # BLD AUTO: 0.14 THOUSAND/ΜL (ref 0.17–1.22)
MONOCYTES NFR BLD AUTO: 2 % (ref 4–12)
NEUTROPHILS # BLD AUTO: 5.53 THOUSANDS/ΜL (ref 1.85–7.62)
NEUTS SEG NFR BLD AUTO: 79 % (ref 43–75)
NRBC BLD AUTO-RTO: 0 /100 WBCS
PLATELET # BLD AUTO: 229 THOUSANDS/UL (ref 149–390)
PMV BLD AUTO: 8.7 FL (ref 8.9–12.7)
POTASSIUM SERPL-SCNC: 3.6 MMOL/L (ref 3.5–5.3)
PROT SERPL-MCNC: 6.9 G/DL (ref 6.4–8.4)
RBC # BLD AUTO: 4.49 MILLION/UL (ref 3.81–5.12)
SODIUM SERPL-SCNC: 136 MMOL/L (ref 135–147)
WBC # BLD AUTO: 7.03 THOUSAND/UL (ref 4.31–10.16)

## 2025-02-21 PROCEDURE — 80053 COMPREHEN METABOLIC PANEL: CPT

## 2025-02-21 PROCEDURE — 85025 COMPLETE CBC W/AUTO DIFF WBC: CPT

## 2025-02-21 PROCEDURE — 83735 ASSAY OF MAGNESIUM: CPT

## 2025-02-21 NOTE — PROGRESS NOTES
Eli Cobos  tolerated treatment well with no complications.      Eli Cobos is aware of future appt on 2/28 at 10 am.     AVS printed and given to Eli Cobos:  No (Declined by Eli Cobos)

## 2025-02-28 ENCOUNTER — HOSPITAL ENCOUNTER (OUTPATIENT)
Dept: INFUSION CENTER | Facility: HOSPITAL | Age: 65
End: 2025-02-28
Payer: MEDICARE

## 2025-02-28 DIAGNOSIS — Z45.2 ENCOUNTER FOR CENTRAL LINE CARE: Primary | ICD-10-CM

## 2025-02-28 DIAGNOSIS — C56.2 MALIGNANT NEOPLASM OF LEFT OVARY (HCC): ICD-10-CM

## 2025-02-28 LAB
ALBUMIN SERPL BCG-MCNC: 4.3 G/DL (ref 3.5–5)
ALP SERPL-CCNC: 76 U/L (ref 34–104)
ALT SERPL W P-5'-P-CCNC: 16 U/L (ref 7–52)
ANION GAP SERPL CALCULATED.3IONS-SCNC: 6 MMOL/L (ref 4–13)
AST SERPL W P-5'-P-CCNC: 15 U/L (ref 13–39)
BASOPHILS # BLD AUTO: 0.03 THOUSANDS/ÂΜL (ref 0–0.1)
BASOPHILS NFR BLD AUTO: 1 % (ref 0–1)
BILIRUB SERPL-MCNC: 0.33 MG/DL (ref 0.2–1)
BUN SERPL-MCNC: 10 MG/DL (ref 5–25)
CALCIUM SERPL-MCNC: 9.7 MG/DL (ref 8.4–10.2)
CHLORIDE SERPL-SCNC: 99 MMOL/L (ref 96–108)
CO2 SERPL-SCNC: 32 MMOL/L (ref 21–32)
CREAT SERPL-MCNC: 0.55 MG/DL (ref 0.6–1.3)
EOSINOPHIL # BLD AUTO: 0.04 THOUSAND/ÂΜL (ref 0–0.61)
EOSINOPHIL NFR BLD AUTO: 1 % (ref 0–6)
ERYTHROCYTE [DISTWIDTH] IN BLOOD BY AUTOMATED COUNT: 12.8 % (ref 11.6–15.1)
GFR SERPL CREATININE-BSD FRML MDRD: 98 ML/MIN/1.73SQ M
GLUCOSE SERPL-MCNC: 135 MG/DL (ref 65–140)
HCT VFR BLD AUTO: 35.5 % (ref 34.8–46.1)
HGB BLD-MCNC: 11.8 G/DL (ref 11.5–15.4)
IMM GRANULOCYTES # BLD AUTO: 0.02 THOUSAND/UL (ref 0–0.2)
IMM GRANULOCYTES NFR BLD AUTO: 0 % (ref 0–2)
LYMPHOCYTES # BLD AUTO: 1.38 THOUSANDS/ÂΜL (ref 0.6–4.47)
LYMPHOCYTES NFR BLD AUTO: 30 % (ref 14–44)
MAGNESIUM SERPL-MCNC: 1.6 MG/DL (ref 1.9–2.7)
MCH RBC QN AUTO: 28 PG (ref 26.8–34.3)
MCHC RBC AUTO-ENTMCNC: 33.2 G/DL (ref 31.4–37.4)
MCV RBC AUTO: 84 FL (ref 82–98)
MONOCYTES # BLD AUTO: 0.4 THOUSAND/ÂΜL (ref 0.17–1.22)
MONOCYTES NFR BLD AUTO: 9 % (ref 4–12)
NEUTROPHILS # BLD AUTO: 2.79 THOUSANDS/ÂΜL (ref 1.85–7.62)
NEUTS SEG NFR BLD AUTO: 59 % (ref 43–75)
NRBC BLD AUTO-RTO: 0 /100 WBCS
PLATELET # BLD AUTO: 267 THOUSANDS/UL (ref 149–390)
PMV BLD AUTO: 8.7 FL (ref 8.9–12.7)
POTASSIUM SERPL-SCNC: 3.8 MMOL/L (ref 3.5–5.3)
PROT SERPL-MCNC: 6.7 G/DL (ref 6.4–8.4)
RBC # BLD AUTO: 4.21 MILLION/UL (ref 3.81–5.12)
SODIUM SERPL-SCNC: 137 MMOL/L (ref 135–147)
WBC # BLD AUTO: 4.66 THOUSAND/UL (ref 4.31–10.16)

## 2025-02-28 PROCEDURE — 85025 COMPLETE CBC W/AUTO DIFF WBC: CPT

## 2025-02-28 PROCEDURE — 80053 COMPREHEN METABOLIC PANEL: CPT

## 2025-02-28 PROCEDURE — 83735 ASSAY OF MAGNESIUM: CPT

## 2025-02-28 NOTE — PROGRESS NOTES
Eli Cobos  tolerated treatment well with no complications.      Eli Cobos is aware of future appt on 3/7/2025 at 1000.     AVS printed and given to Eli Cobos:    No (Declined by Eli Cobos)

## 2025-03-02 LAB
CARIS ER: NEGATIVE
CARIS GENOMIC LOH - EXOME: NORMAL
CARIS HER2/NEU: NEGATIVE
CARIS HLA-A: NORMAL
CARIS HLA-B: NORMAL
CARIS HLA-C: NORMAL
CARIS MSI - EXOME: NORMAL
CARIS PD-L1 (22C3): POSITIVE
CARIS PR: POSITIVE
CARIS TMB - EXOME: NORMAL

## 2025-03-06 ENCOUNTER — TELEMEDICINE (OUTPATIENT)
Age: 65
End: 2025-03-06
Payer: MEDICARE

## 2025-03-06 ENCOUNTER — TELEPHONE (OUTPATIENT)
Age: 65
End: 2025-03-06

## 2025-03-06 DIAGNOSIS — C56.2 MALIGNANT NEOPLASM OF LEFT OVARY (HCC): Primary | ICD-10-CM

## 2025-03-06 PROCEDURE — 99213 OFFICE O/P EST LOW 20 MIN: CPT | Performed by: PHYSICIAN ASSISTANT

## 2025-03-06 PROCEDURE — G2211 COMPLEX E/M VISIT ADD ON: HCPCS | Performed by: PHYSICIAN ASSISTANT

## 2025-03-06 NOTE — TELEPHONE ENCOUNTER
Called to screen patient for CT scan with omnipaque.  Patient would like afternoon appt, port access if possible at [facility].

## 2025-03-06 NOTE — PROGRESS NOTES
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 Avastin 15 mg/kg IV every 21 days is planned, but held due to planning dental extraction. Overall, she is tolerating treatment well. Her inhibin B is declining.     Lab Results   Component Value Date    INHBB 45.0 (H) 2025    INHBB 405.8 (H) 2025    INHBB 147.4 (H) 2024    INHBB <7.0 2024       Continue with cycle 3 of treatment as planned as long as her metabolic and hematologic parameters are adequate. Avastin will continue to be held. Dental extraction planned 25.    Plan for CT imaging after completion of cycle 3. Return to the office as per her chemotherapy calendar.     Continue to trend inhibin B.   Consider ctDNA counseling at next visit.   Orders:  •  CT chest abdomen pelvis w contrast; Future                 History of Present Illness     64-year-old who presents virtually for pre-chemotherapy evaluation. She is tolerating treatment well. She has been afebrile. Denies n/v/abdominal pain. Appetite is appropriate. Normal bowel/bladder function. She denies chemotherapy-induced neuropathy. She notes ongoing planning for dental extraction following treated dental abscess.      Review of Systems   Constitutional: Negative.    HENT:  Positive for dental problem.    Eyes: Negative.    Respiratory: Negative.     Cardiovascular: Negative.    Gastrointestinal: Negative.    Genitourinary: Negative.    Musculoskeletal:  Negative.    Skin: Negative.    Neurological: Negative.    Psychiatric/Behavioral: Negative.         Oncology History   Malignant neoplasm of left ovary (HCC)   8/26/2019 Initial Diagnosis    Malignant neoplasm of left ovary (HCC)     8/26/2019 -  Cancer Staged    Staging form: Ovary, Fallopian Tube, Primary Peritoneal, AJCC 8th Edition  - Clinical stage from 8/26/2019: FIGO Stage IC1, calculated as Stage IA (cT1a, cN0, cM0) - Signed by Trevin Bonilla MD on 9/11/2019        Chemotherapy    Taxol 175 mg/m2 and carboplatin AUC 6 every 21 days. She received 5 cycles and is scheduled for cycle 6.      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      12/2022 - 6/27/2024 Hormone Therapy    Megace 80 mg PO BID (starting inhibin B, 9.2 pg/ml)     6/4/2024 - 6/21/2024 Radiation      Plan ID Energy Fractions Dose per Fraction (cGy) Dose Correction (cGy) Total Dose Delivered (cGy) Elapsed Days   SBRT MT 6X-FFF 5 / 5 600 0 3,000 17      1/28/2025 -  Chemotherapy    Taxol 175 mg/m2 and carboplatin AUC 6 IV every 21 days.              Objective   There were no vitals taken for this visit.    General: Patient appears well-developed. Patient is adequately nourished. Patient is not diaphoretic. Patient is not in distress.  Neck: Visualization of the neck demonstrates no grossly visible masses. Neck mobility is not compromised, neck appears supple.   HEENT: Oral mucosa appears moist.  Patient does not identify palpable neck masses. Patient reports no oral tenderness or readily identifiable masses.  Eyes: Conjunctivae appear normal bilaterally. Right eye with no discharge. Left eye with no discharge. No evidence of scleral icterus. No evidence of strabismus.  Respiratory: Respiratory effort appears normal. There is no respiratory distress. Patient able to speak in full sentences. There was no audible stridor or cough.  Abdomen: Patient states her abdomen is soft. States abdomen is non-tender. States abdomen is non-distended. Patient denies visible or palpable bulges to suggest hernias.  Musculoskeletal: Patient reports and I can confirm no visible deformities in 4 extremities. Patient reports and I can confirm full mobility in 4 extremities. There is no grossly visible limb edema. There is no evidence of clubbing or peripheral cyanosis.  Neurologic: Patient is fully alert and responsive. Patient is oriented to time, place and person. Gross evaluation of CNs III-IV-VI-VII-VIII and XI demonstrates no deficits. Patient reports normal gait and balance.  Skin: My evaluation of exposed skin areas reveals no evidence of pallor. My evaluation of exposed skin areas reveals no obvious rashes. My evaluation of exposed skin areas reveals no grossly visible lesions. My evaluation of exposed skin areas reveals no evidence of erythema.  Psychiatric / Behavioral: Patient's mood and affect appears normal. Patient's judgement is preserved. Patient is coherent and thought content appears directionally and contextually appropriate for age and health status.    Performance status is zero.    Administrative Statements   Encounter provider Luisana Brannon Synnamon, [place]    The Patient is located at Home and in the following state in which I hold an active license [place].    The patient was identified by name and date of birth. Eli Cobos was informed that this is a telemedicine visit and that the visit is being  conducted through the Epic Embedded platform. She agrees to proceed..  My office door was closed. No one else was in the room.  She acknowledged consent and understanding of privacy and security of the video platform. The patient has agreed to participate and understands they can discontinue the visit at any time.    I have spent a total time of 20 minutes in caring for this patient on the day of the visit/encounter including Counseling / Coordination of care, Documenting in the medical record, Reviewing/placing orders in the medical record (including tests, medications, and/or procedures), and Obtaining or reviewing history  , not including the time spent for establishing the audio/video connection.

## 2025-03-06 NOTE — TELEPHONE ENCOUNTER
Titi awad to patient.  Advised patient that CT with omnipaque and port access is scheduled for 3/21 scan at 12 pm, arrival at 1030 am for omnipaque.  Date and time is fine with patient.

## 2025-03-06 NOTE — ASSESSMENT & PLAN NOTE
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 Avastin 15 mg/kg IV every 21 days is planned, but held due to planning dental extraction. Overall, she is tolerating treatment well. Her inhibin B is declining.     Lab Results   Component Value Date    INHBB 45.0 (H) 02/14/2025    INHBB 405.8 (H) 01/24/2025    INHBB 147.4 (H) 12/13/2024    INHBB <7.0 09/18/2024       Continue with cycle 3 of treatment as planned as long as her metabolic and hematologic parameters are adequate. Avastin will continue to be held. Dental extraction planned 4/9/25.    Plan for CT imaging after completion of cycle 3. Return to the office as per her chemotherapy calendar.     Continue to trend inhibin B.   Consider ctDNA counseling at next visit.   Orders:  •  CT chest abdomen pelvis w contrast; Future

## 2025-03-07 ENCOUNTER — HOSPITAL ENCOUNTER (OUTPATIENT)
Dept: INFUSION CENTER | Facility: HOSPITAL | Age: 65
End: 2025-03-07
Payer: MEDICARE

## 2025-03-07 DIAGNOSIS — C56.2 MALIGNANT NEOPLASM OF LEFT OVARY (HCC): ICD-10-CM

## 2025-03-07 DIAGNOSIS — Z45.2 ENCOUNTER FOR CENTRAL LINE CARE: Primary | ICD-10-CM

## 2025-03-07 LAB
ALBUMIN SERPL BCG-MCNC: 4.2 G/DL (ref 3.5–5)
ALP SERPL-CCNC: 67 U/L (ref 34–104)
ALT SERPL W P-5'-P-CCNC: 14 U/L (ref 7–52)
ANION GAP SERPL CALCULATED.3IONS-SCNC: 10 MMOL/L (ref 4–13)
AST SERPL W P-5'-P-CCNC: 14 U/L (ref 13–39)
BASOPHILS # BLD AUTO: 0.03 THOUSANDS/ÂΜL (ref 0–0.1)
BASOPHILS NFR BLD AUTO: 1 % (ref 0–1)
BILIRUB SERPL-MCNC: 0.36 MG/DL (ref 0.2–1)
BUN SERPL-MCNC: 10 MG/DL (ref 5–25)
CALCIUM SERPL-MCNC: 9.3 MG/DL (ref 8.4–10.2)
CHLORIDE SERPL-SCNC: 101 MMOL/L (ref 96–108)
CO2 SERPL-SCNC: 28 MMOL/L (ref 21–32)
CREAT SERPL-MCNC: 0.56 MG/DL (ref 0.6–1.3)
CREAT UR-MCNC: 43.6 MG/DL
EOSINOPHIL # BLD AUTO: 0.06 THOUSAND/ÂΜL (ref 0–0.61)
EOSINOPHIL NFR BLD AUTO: 1 % (ref 0–6)
ERYTHROCYTE [DISTWIDTH] IN BLOOD BY AUTOMATED COUNT: 13.9 % (ref 11.6–15.1)
GFR SERPL CREATININE-BSD FRML MDRD: 97 ML/MIN/1.73SQ M
GLUCOSE SERPL-MCNC: 185 MG/DL (ref 65–140)
HCT VFR BLD AUTO: 35.4 % (ref 34.8–46.1)
HGB BLD-MCNC: 11.6 G/DL (ref 11.5–15.4)
IMM GRANULOCYTES # BLD AUTO: 0.04 THOUSAND/UL (ref 0–0.2)
IMM GRANULOCYTES NFR BLD AUTO: 1 % (ref 0–2)
LYMPHOCYTES # BLD AUTO: 1.13 THOUSANDS/ÂΜL (ref 0.6–4.47)
LYMPHOCYTES NFR BLD AUTO: 26 % (ref 14–44)
MAGNESIUM SERPL-MCNC: 1.5 MG/DL (ref 1.9–2.7)
MCH RBC QN AUTO: 28.1 PG (ref 26.8–34.3)
MCHC RBC AUTO-ENTMCNC: 32.8 G/DL (ref 31.4–37.4)
MCV RBC AUTO: 86 FL (ref 82–98)
MONOCYTES # BLD AUTO: 0.4 THOUSAND/ÂΜL (ref 0.17–1.22)
MONOCYTES NFR BLD AUTO: 9 % (ref 4–12)
NEUTROPHILS # BLD AUTO: 2.74 THOUSANDS/ÂΜL (ref 1.85–7.62)
NEUTS SEG NFR BLD AUTO: 62 % (ref 43–75)
NRBC BLD AUTO-RTO: 0 /100 WBCS
PLATELET # BLD AUTO: 271 THOUSANDS/UL (ref 149–390)
PMV BLD AUTO: 8.3 FL (ref 8.9–12.7)
POTASSIUM SERPL-SCNC: 3.6 MMOL/L (ref 3.5–5.3)
PROT SERPL-MCNC: 6.7 G/DL (ref 6.4–8.4)
PROT UR-MCNC: 4.4 MG/DL
PROT/CREAT UR: 0.1 MG/G{CREAT} (ref 0–0.1)
RBC # BLD AUTO: 4.13 MILLION/UL (ref 3.81–5.12)
SODIUM SERPL-SCNC: 139 MMOL/L (ref 135–147)
WBC # BLD AUTO: 4.4 THOUSAND/UL (ref 4.31–10.16)

## 2025-03-07 PROCEDURE — 84156 ASSAY OF PROTEIN URINE: CPT

## 2025-03-07 PROCEDURE — 85025 COMPLETE CBC W/AUTO DIFF WBC: CPT

## 2025-03-07 PROCEDURE — 80053 COMPREHEN METABOLIC PANEL: CPT

## 2025-03-07 PROCEDURE — 83735 ASSAY OF MAGNESIUM: CPT

## 2025-03-07 PROCEDURE — 82570 ASSAY OF URINE CREATININE: CPT

## 2025-03-07 PROCEDURE — 83520 IMMUNOASSAY QUANT NOS NONAB: CPT

## 2025-03-07 NOTE — PROGRESS NOTES
..Eli Cobos  tolerated treatment well with no complications.      Eli Cobos is aware of future appt on 3/11 at 7:30.     AVS printed and given to Eli Cobos:  No (Declined by Eli Cobos)

## 2025-03-10 DIAGNOSIS — E83.42 HYPOMAGNESEMIA: Primary | ICD-10-CM

## 2025-03-10 LAB — INHIBIN B SERPL-MCNC: 42 PG/ML (ref 0–16.9)

## 2025-03-10 RX ORDER — PALONOSETRON 0.05 MG/ML
0.25 INJECTION, SOLUTION INTRAVENOUS ONCE
Status: CANCELLED | OUTPATIENT
Start: 2025-03-11

## 2025-03-10 RX ORDER — SODIUM CHLORIDE 9 MG/ML
20 INJECTION, SOLUTION INTRAVENOUS ONCE
Status: CANCELLED | OUTPATIENT
Start: 2025-03-11

## 2025-03-10 RX ORDER — MAGNESIUM SULFATE HEPTAHYDRATE 40 MG/ML
4 INJECTION, SOLUTION INTRAVENOUS ONCE
Status: CANCELLED | OUTPATIENT
Start: 2025-03-11

## 2025-03-11 ENCOUNTER — HOSPITAL ENCOUNTER (OUTPATIENT)
Dept: INFUSION CENTER | Facility: HOSPITAL | Age: 65
Discharge: HOME | End: 2025-03-11
Attending: OBSTETRICS & GYNECOLOGY
Payer: MEDICARE

## 2025-03-11 VITALS
OXYGEN SATURATION: 95 % | DIASTOLIC BLOOD PRESSURE: 64 MMHG | TEMPERATURE: 96.5 F | RESPIRATION RATE: 18 BRPM | HEIGHT: 63 IN | WEIGHT: 193.78 LBS | BODY MASS INDEX: 34.34 KG/M2 | SYSTOLIC BLOOD PRESSURE: 125 MMHG | HEART RATE: 85 BPM

## 2025-03-11 DIAGNOSIS — E83.42 HYPOMAGNESEMIA: Primary | ICD-10-CM

## 2025-03-11 DIAGNOSIS — C56.2 MALIGNANT NEOPLASM OF LEFT OVARY (HCC): ICD-10-CM

## 2025-03-11 PROCEDURE — 96375 TX/PRO/DX INJ NEW DRUG ADDON: CPT

## 2025-03-11 PROCEDURE — 96413 CHEMO IV INFUSION 1 HR: CPT

## 2025-03-11 PROCEDURE — 96417 CHEMO IV INFUS EACH ADDL SEQ: CPT

## 2025-03-11 PROCEDURE — 96367 TX/PROPH/DG ADDL SEQ IV INF: CPT

## 2025-03-11 PROCEDURE — 96368 THER/DIAG CONCURRENT INF: CPT

## 2025-03-11 PROCEDURE — 96415 CHEMO IV INFUSION ADDL HR: CPT

## 2025-03-11 RX ORDER — SODIUM CHLORIDE 9 MG/ML
20 INJECTION, SOLUTION INTRAVENOUS ONCE
Status: COMPLETED | OUTPATIENT
Start: 2025-03-11 | End: 2025-03-11

## 2025-03-11 RX ORDER — MAGNESIUM SULFATE HEPTAHYDRATE 40 MG/ML
4 INJECTION, SOLUTION INTRAVENOUS ONCE
Status: COMPLETED | OUTPATIENT
Start: 2025-03-11 | End: 2025-03-11

## 2025-03-11 RX ORDER — PALONOSETRON 0.05 MG/ML
0.25 INJECTION, SOLUTION INTRAVENOUS ONCE
Status: COMPLETED | OUTPATIENT
Start: 2025-03-11 | End: 2025-03-11

## 2025-03-11 RX ADMIN — CARBOPLATIN 650.4 MG: 600 INJECTION, SOLUTION INTRAVENOUS at 12:48

## 2025-03-11 RX ADMIN — FOSAPREPITANT 150 MG: 150 INJECTION, POWDER, LYOPHILIZED, FOR SOLUTION INTRAVENOUS at 09:05

## 2025-03-11 RX ADMIN — FAMOTIDINE 20 MG: 10 INJECTION INTRAVENOUS at 08:39

## 2025-03-11 RX ADMIN — DIPHENHYDRAMINE HYDROCHLORIDE 25 MG: 50 INJECTION, SOLUTION INTRAMUSCULAR; INTRAVENOUS at 08:15

## 2025-03-11 RX ADMIN — PACLITAXEL 334.2 MG: 6 INJECTION, SOLUTION INTRAVENOUS at 09:50

## 2025-03-11 RX ADMIN — PALONOSETRON 0.25 MG: 0.05 INJECTION, SOLUTION INTRAVENOUS at 07:51

## 2025-03-11 RX ADMIN — SODIUM CHLORIDE 20 ML/HR: 0.9 INJECTION, SOLUTION INTRAVENOUS at 07:52

## 2025-03-11 RX ADMIN — DEXAMETHASONE SODIUM PHOSPHATE 20 MG: 10 INJECTION, SOLUTION INTRAMUSCULAR; INTRAVENOUS at 07:52

## 2025-03-11 RX ADMIN — MAGNESIUM SULFATE IN WATER 4 G: 40 INJECTION, SOLUTION INTRAVENOUS at 09:45

## 2025-03-11 NOTE — PROGRESS NOTES
Eli Cobos  tolerated treatment well with no complications.      Eli Cobos is aware of future appt on 3/14 at 10:30.     AVS printed and given to Eli Cobos:  No (Declined by Eli Cobos)

## 2025-03-14 ENCOUNTER — HOSPITAL ENCOUNTER (OUTPATIENT)
Dept: INFUSION CENTER | Facility: HOSPITAL | Age: 65
End: 2025-03-14
Payer: MEDICARE

## 2025-03-14 DIAGNOSIS — Z45.2 ENCOUNTER FOR CENTRAL LINE CARE: Primary | ICD-10-CM

## 2025-03-14 DIAGNOSIS — C56.2 MALIGNANT NEOPLASM OF LEFT OVARY (HCC): ICD-10-CM

## 2025-03-14 LAB
ALBUMIN SERPL BCG-MCNC: 4.3 G/DL (ref 3.5–5)
ALP SERPL-CCNC: 72 U/L (ref 34–104)
ALT SERPL W P-5'-P-CCNC: 18 U/L (ref 7–52)
ANION GAP SERPL CALCULATED.3IONS-SCNC: 7 MMOL/L (ref 4–13)
AST SERPL W P-5'-P-CCNC: 20 U/L (ref 13–39)
BASOPHILS # BLD AUTO: 0.03 THOUSANDS/ÂΜL (ref 0–0.1)
BASOPHILS NFR BLD AUTO: 1 % (ref 0–1)
BILIRUB SERPL-MCNC: 0.52 MG/DL (ref 0.2–1)
BUN SERPL-MCNC: 14 MG/DL (ref 5–25)
CALCIUM SERPL-MCNC: 9.6 MG/DL (ref 8.4–10.2)
CHLORIDE SERPL-SCNC: 99 MMOL/L (ref 96–108)
CO2 SERPL-SCNC: 30 MMOL/L (ref 21–32)
CREAT SERPL-MCNC: 0.54 MG/DL (ref 0.6–1.3)
EOSINOPHIL # BLD AUTO: 0.02 THOUSAND/ÂΜL (ref 0–0.61)
EOSINOPHIL NFR BLD AUTO: 0 % (ref 0–6)
ERYTHROCYTE [DISTWIDTH] IN BLOOD BY AUTOMATED COUNT: 13.6 % (ref 11.6–15.1)
GFR SERPL CREATININE-BSD FRML MDRD: 98 ML/MIN/1.73SQ M
GLUCOSE SERPL-MCNC: 198 MG/DL (ref 65–140)
HCT VFR BLD AUTO: 37.1 % (ref 34.8–46.1)
HGB BLD-MCNC: 12.4 G/DL (ref 11.5–15.4)
IMM GRANULOCYTES # BLD AUTO: 0.03 THOUSAND/UL (ref 0–0.2)
IMM GRANULOCYTES NFR BLD AUTO: 1 % (ref 0–2)
LYMPHOCYTES # BLD AUTO: 0.94 THOUSANDS/ÂΜL (ref 0.6–4.47)
LYMPHOCYTES NFR BLD AUTO: 17 % (ref 14–44)
MAGNESIUM SERPL-MCNC: 1.5 MG/DL (ref 1.9–2.7)
MCH RBC QN AUTO: 28.4 PG (ref 26.8–34.3)
MCHC RBC AUTO-ENTMCNC: 33.4 G/DL (ref 31.4–37.4)
MCV RBC AUTO: 85 FL (ref 82–98)
MONOCYTES # BLD AUTO: 0.1 THOUSAND/ÂΜL (ref 0.17–1.22)
MONOCYTES NFR BLD AUTO: 2 % (ref 4–12)
NEUTROPHILS # BLD AUTO: 4.38 THOUSANDS/ÂΜL (ref 1.85–7.62)
NEUTS SEG NFR BLD AUTO: 79 % (ref 43–75)
NRBC BLD AUTO-RTO: 0 /100 WBCS
PLATELET # BLD AUTO: 217 THOUSANDS/UL (ref 149–390)
PMV BLD AUTO: 8.6 FL (ref 8.9–12.7)
POTASSIUM SERPL-SCNC: 3.7 MMOL/L (ref 3.5–5.3)
PROT SERPL-MCNC: 6.6 G/DL (ref 6.4–8.4)
RBC # BLD AUTO: 4.37 MILLION/UL (ref 3.81–5.12)
SODIUM SERPL-SCNC: 136 MMOL/L (ref 135–147)
WBC # BLD AUTO: 5.5 THOUSAND/UL (ref 4.31–10.16)

## 2025-03-14 PROCEDURE — 85025 COMPLETE CBC W/AUTO DIFF WBC: CPT

## 2025-03-14 PROCEDURE — 83735 ASSAY OF MAGNESIUM: CPT

## 2025-03-14 PROCEDURE — 80053 COMPREHEN METABOLIC PANEL: CPT

## 2025-03-14 NOTE — PROGRESS NOTES
Eli Cobos  tolerated treatment well with no complications.      Eli Cobos is aware of future appt on 3/21/2025 at 1300.     AVS printed and given to Eli Cobos:    No (Declined by Eli Cobos)

## 2025-03-21 ENCOUNTER — HOSPITAL ENCOUNTER (OUTPATIENT)
Dept: CT IMAGING | Facility: HOSPITAL | Age: 65
Discharge: HOME | End: 2025-03-21
Payer: MEDICARE

## 2025-03-21 ENCOUNTER — HOSPITAL ENCOUNTER (OUTPATIENT)
Dept: INFUSION CENTER | Facility: HOSPITAL | Age: 65
Discharge: HOME | End: 2025-03-21
Payer: MEDICARE

## 2025-03-21 DIAGNOSIS — C56.2 MALIGNANT NEOPLASM OF LEFT OVARY (HCC): ICD-10-CM

## 2025-03-21 LAB
ALBUMIN SERPL BCG-MCNC: 4.1 G/DL (ref 3.5–5)
ALP SERPL-CCNC: 71 U/L (ref 34–104)
ALT SERPL W P-5'-P-CCNC: 15 U/L (ref 7–52)
ANION GAP SERPL CALCULATED.3IONS-SCNC: 8 MMOL/L (ref 4–13)
AST SERPL W P-5'-P-CCNC: 15 U/L (ref 13–39)
BASOPHILS # BLD AUTO: 0.02 THOUSANDS/ÂΜL (ref 0–0.1)
BASOPHILS NFR BLD AUTO: 1 % (ref 0–1)
BILIRUB SERPL-MCNC: 0.32 MG/DL (ref 0.2–1)
BUN SERPL-MCNC: 10 MG/DL (ref 5–25)
CALCIUM SERPL-MCNC: 9 MG/DL (ref 8.4–10.2)
CHLORIDE SERPL-SCNC: 99 MMOL/L (ref 96–108)
CO2 SERPL-SCNC: 29 MMOL/L (ref 21–32)
CREAT SERPL-MCNC: 0.5 MG/DL (ref 0.6–1.3)
EOSINOPHIL # BLD AUTO: 0.04 THOUSAND/ÂΜL (ref 0–0.61)
EOSINOPHIL NFR BLD AUTO: 1 % (ref 0–6)
ERYTHROCYTE [DISTWIDTH] IN BLOOD BY AUTOMATED COUNT: 13.6 % (ref 11.6–15.1)
GFR SERPL CREATININE-BSD FRML MDRD: 101 ML/MIN/1.73SQ M
GLUCOSE SERPL-MCNC: 106 MG/DL (ref 65–140)
HCT VFR BLD AUTO: 32.1 % (ref 34.8–46.1)
HGB BLD-MCNC: 10.8 G/DL (ref 11.5–15.4)
IMM GRANULOCYTES # BLD AUTO: 0.02 THOUSAND/UL (ref 0–0.2)
IMM GRANULOCYTES NFR BLD AUTO: 1 % (ref 0–2)
LYMPHOCYTES # BLD AUTO: 1.28 THOUSANDS/ÂΜL (ref 0.6–4.47)
LYMPHOCYTES NFR BLD AUTO: 31 % (ref 14–44)
MAGNESIUM SERPL-MCNC: 1.5 MG/DL (ref 1.9–2.7)
MCH RBC QN AUTO: 28.9 PG (ref 26.8–34.3)
MCHC RBC AUTO-ENTMCNC: 33.6 G/DL (ref 31.4–37.4)
MCV RBC AUTO: 86 FL (ref 82–98)
MONOCYTES # BLD AUTO: 0.33 THOUSAND/ÂΜL (ref 0.17–1.22)
MONOCYTES NFR BLD AUTO: 8 % (ref 4–12)
NEUTROPHILS # BLD AUTO: 2.42 THOUSANDS/ÂΜL (ref 1.85–7.62)
NEUTS SEG NFR BLD AUTO: 58 % (ref 43–75)
NRBC BLD AUTO-RTO: 0 /100 WBCS
PLATELET # BLD AUTO: 217 THOUSANDS/UL (ref 149–390)
PMV BLD AUTO: 8.7 FL (ref 8.9–12.7)
POTASSIUM SERPL-SCNC: 3.8 MMOL/L (ref 3.5–5.3)
PROT SERPL-MCNC: 6.5 G/DL (ref 6.4–8.4)
RBC # BLD AUTO: 3.74 MILLION/UL (ref 3.81–5.12)
SODIUM SERPL-SCNC: 136 MMOL/L (ref 135–147)
WBC # BLD AUTO: 4.11 THOUSAND/UL (ref 4.31–10.16)

## 2025-03-21 PROCEDURE — 74177 CT ABD & PELVIS W/CONTRAST: CPT

## 2025-03-21 PROCEDURE — 83735 ASSAY OF MAGNESIUM: CPT

## 2025-03-21 PROCEDURE — 71260 CT THORAX DX C+: CPT

## 2025-03-21 PROCEDURE — 85025 COMPLETE CBC W/AUTO DIFF WBC: CPT

## 2025-03-21 PROCEDURE — 80053 COMPREHEN METABOLIC PANEL: CPT

## 2025-03-21 RX ADMIN — IOHEXOL 75 ML: 350 INJECTION, SOLUTION INTRAVENOUS at 12:10

## 2025-03-21 NOTE — PROGRESS NOTES
Eli Cobos  tolerated treatment well with no complications.      Eli Cobos is aware of future appt on 3/28/25 at 1000.     AVS printed and given to Eli Cobos:    No (Declined by Eli Cobos)

## 2025-03-27 ENCOUNTER — DOCUMENTATION (OUTPATIENT)
Age: 65
End: 2025-03-27

## 2025-03-27 ENCOUNTER — OFFICE VISIT (OUTPATIENT)
Age: 65
End: 2025-03-27
Payer: MEDICARE

## 2025-03-27 VITALS
DIASTOLIC BLOOD PRESSURE: 70 MMHG | OXYGEN SATURATION: 95 % | SYSTOLIC BLOOD PRESSURE: 144 MMHG | WEIGHT: 200.4 LBS | TEMPERATURE: 97.7 F | BODY MASS INDEX: 35.51 KG/M2 | RESPIRATION RATE: 18 BRPM | HEART RATE: 97 BPM | HEIGHT: 63 IN

## 2025-03-27 DIAGNOSIS — E83.42 HYPOMAGNESEMIA: ICD-10-CM

## 2025-03-27 DIAGNOSIS — C56.2 MALIGNANT NEOPLASM OF LEFT OVARY (HCC): Primary | ICD-10-CM

## 2025-03-27 DIAGNOSIS — G62.0 NEUROPATHY DUE TO DRUG (HCC): ICD-10-CM

## 2025-03-27 PROCEDURE — 99215 OFFICE O/P EST HI 40 MIN: CPT | Performed by: OBSTETRICS & GYNECOLOGY

## 2025-03-27 PROCEDURE — G2211 COMPLEX E/M VISIT ADD ON: HCPCS | Performed by: OBSTETRICS & GYNECOLOGY

## 2025-03-27 RX ORDER — UBIDECARENONE 30 MG
100 CAPSULE ORAL DAILY
COMMUNITY

## 2025-03-27 NOTE — ASSESSMENT & PLAN NOTE
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  She has received 3 cycles of treatment.  I reviewed CT chest abdomen pelvis images, CBC, CMP, magnesium.  She is responding to treatment.  Inhibin B has declined to 42 from a pretreatment of 405.8.  I also reviewed genomic testing.  She is tolerating treatment well with the exception of a grade 1 peripheral neuropathy.  Her performance status is 0.  1.  Plan to continue palliative carboplatin at AUC 6 every 21 days.  Will plan to dose reduce the Taxol to 135 mg/m² to the development of a grade 1 peripheral neuropathy.  2.  After review of genomic testing, there is a somatic BRCA2 mutation and the tumor is positive for PD-L1.  I therefore discussed modifying her treatment regimen to be analogous to the DUO-E regimen.  This would mean not administering bevacizumab and starting durvalumab 1120 mg every 3 weeks for the remainder of treatment followed by durvalumab at 1500 mg every 4 weeks in addition to olaparib 300 mg twice daily depending on hematologic parameters.  She understands the risks of adding durvalumab to carboplatin and Taxol including the additional risks of thyroiditis, pneumonitis, colitis, pancreatitis, arthritis, pruritus.  She was provided with written information regarding potential side effects as well and she agreed to proceed as outlined.  Consent for treatment obtained by me in the office.  Will also discuss at the gynecologic treatment conference.  3.  We also discussed the possibility of continuing with carboplatin and Taxol she is having an excellent response and utilizing a  rib as maintenance therapy.  She is interested in adding immunotherapy for the next cycle if feasible.   Orders:    Ambulatory Referral to Oncology Genetics; Future

## 2025-03-27 NOTE — PROGRESS NOTES
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  She has received 3 cycles of treatment.  I reviewed CT chest abdomen pelvis images, CBC, CMP, magnesium.  She is responding to treatment.  Inhibin B has declined to 42 from a pretreatment of 405.8.  I also reviewed genomic testing.  She is tolerating treatment well with the exception of a grade 1 peripheral neuropathy.  Her performance status is 0.  1.  Plan to continue palliative carboplatin at AUC 6 every 21 days.  Will plan to dose reduce the Taxol to 135 mg/m² to the development of a grade 1 peripheral neuropathy.  2.  After review of genomic testing, there is a somatic BRCA2 mutation and the tumor is positive for PD-L1.  I therefore discussed modifying her treatment regimen to be analogous to the DUO-E regimen.  This would mean not administering bevacizumab and starting durvalumab 1120 mg every 3 weeks for the remainder of treatment followed by durvalumab at 1500 mg every 4 weeks in addition to olaparib 300 mg twice daily depending on hematologic parameters.  She understands the risks of adding durvalumab to carboplatin and Taxol including the additional risks of thyroiditis, pneumonitis, colitis, pancreatitis, arthritis, pruritus.  She was provided with written information regarding potential side effects as well and she agreed to proceed as outlined.  Consent for treatment obtained by me in the office.  Will also discuss at the gynecologic treatment conference.  3.  We also discussed the possibility of continuing  with carboplatin and Taxol she is having an excellent response and utilizing a  rib as maintenance therapy.  She is interested in adding immunotherapy for the next cycle if feasible.   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 female   Who presents for treatment discussion.  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  CT chest abdomen pelvis performed on 3/21/2025 revealed significant treatment response particularly at the right hemidiaphragm/liver surface.  I reviewed the images and concur with the radiologist's opinion.  Inhibin B is also declined significantly from pretreatment value.  She has been tolerating treatment well with the exception of development of numbness on the bottom of her foot as well as a deep ache in her lower extremities after chemotherapy.  Genomic testing was performed and revealed a somatic BRCA2 mutation as well as testing positive for PD-L1.  She is otherwise able to perform her actives of daily living without difficulty.  She is still working.  No other interval change in medications or medical history since her last visit to the office.  Pertinent Medical History        Oncology History   Cancer Staging   Malignant neoplasm of left ovary (HCC)  Staging form: Ovary, Fallopian Tube, Primary Peritoneal, AJCC 8th Edition  - Clinical stage from 8/26/2019: FIGO Stage IC1, calculated as Stage IA (cT1a, cN0, cM0) - Signed by Trevin Bonilla MD on 9/11/2019  Oncology History   Malignant neoplasm of left ovary (HCC)   8/26/2019 Initial Diagnosis    Malignant neoplasm of left ovary (HCC)     8/26/2019 -  Cancer Staged    Staging form: Ovary, Fallopian Tube, Primary Peritoneal,  AJCC 8th Edition  - Clinical stage from 8/26/2019: FIGO Stage IC1, calculated as Stage IA (cT1a, cN0, cM0) - Signed by Trevin Bonilla MD on 9/11/2019        Chemotherapy    Taxol 175 mg/m2 and carboplatin AUC 6 every 21 days. She received 5 cycles and is scheduled for cycle 6.      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      12/2022 - 6/27/2024 Hormone Therapy    Megace 80 mg PO BID (starting inhibin B, 9.2 pg/ml)     6/4/2024 - 6/21/2024 Radiation      Plan ID Energy Fractions Dose per Fraction (cGy) Dose Correction (cGy) Total Dose Delivered (cGy) Elapsed Days   SBRT MT 6X-FFF 5 / 5 600 0 3,000 17        1/28/2025 -  Chemotherapy    Taxol 175 mg/m2 and carboplatin AUC 6 IV every 21 days.           Review of Systems   Constitutional:  Negative for activity change and unexpected weight change.   HENT: Negative.     Eyes: Negative.    Respiratory: Negative.     Cardiovascular: Negative.    Gastrointestinal:  Negative for abdominal distention and abdominal pain.   Endocrine: Negative.    Genitourinary:  Negative for pelvic pain and vaginal bleeding.   Musculoskeletal:  Positive for myalgias.   Skin: Negative.    Allergic/Immunologic: Negative.    Neurological:  Positive for numbness.   Hematological: Negative.    Psychiatric/Behavioral: Negative.     All other systems reviewed and are negative.   A complete review of systems is negative other than that noted above  "in the HPI.  Current Outpatient Medications on File Prior to Visit   Medication Sig Dispense Refill    amLODIPine (NORVASC) 5 mg tablet Take 5 mg by mouth daily   4    Cholecalciferol (VITAMIN D-3) 1000 units CAPS Take by mouth daily       clobetasol (TEMOVATE) 0.05 % ointment Apply to the affected area 1-3x/week 30 g 0    co-enzyme Q-10 100 mg capsule Take 100 mg by mouth daily      escitalopram (LEXAPRO) 20 mg tablet 20 mg daily   0    LORazepam (ATIVAN) 1 mg tablet Take 1 tablet (1 mg total) by mouth every 8 (eight) hours as needed (nausea or anxiety) 20 tablet 0    meclizine (ANTIVERT) 25 mg tablet Take 1 tablet (25 mg total) by mouth 3 (three) times a day as needed for dizziness 30 tablet 0    metFORMIN (GLUCOPHAGE) 500 mg tablet 500 mg 2 (two) times a day with meals   0    Mounjaro 7.5 MG/0.5ML Inject 7.5 mg under the skin every 7 days      ondansetron (ZOFRAN) 8 mg tablet Take 1 tablet (8 mg total) by mouth every 8 (eight) hours as needed for nausea or vomiting 30 tablet 1    scopolamine (TRANSDERM-SCOP) 1.5 mg/3 days TD 72 hr patch Place 1 patch on the skin every third day 2 patch 1    simvastatin (ZOCOR) 20 mg tablet TK 1 T PO QD IN THE KIMBERLYN  5    SUPER B COMPLEX/C PO Take by mouth daily       valsartan-hydrochlorothiazide (DIOVAN-HCT) 320-25 MG per tablet daily   0     No current facility-administered medications on file prior to visit.         Objective   /70 (BP Location: Left arm, Patient Position: Sitting, Cuff Size: Large)   Pulse 97   Temp 97.7 °F (36.5 °C) (Temporal)   Resp 18   Ht 5' 2.99\" (1.6 m)   Wt 90.9 kg (200 lb 6.4 oz)   SpO2 95%   BMI 35.51 kg/m²     Body mass index is 35.51 kg/m².  Pain Screening:  Pain Score: 0-No pain  ECOG   0  Physical Exam  Vitals reviewed.   Constitutional:       General: She is not in acute distress.     Appearance: Normal appearance. She is not ill-appearing.   HENT:      Head: Normocephalic and atraumatic.      Mouth/Throat:      Mouth: Mucous " "membranes are moist.   Eyes:      General: No scleral icterus.        Right eye: No discharge.         Left eye: No discharge.      Conjunctiva/sclera: Conjunctivae normal.   Pulmonary:      Effort: Pulmonary effort is normal.   Musculoskeletal:      Right lower leg: No edema.      Left lower leg: No edema.   Skin:     General: Skin is warm and dry.      Coloration: Skin is not jaundiced.      Findings: No rash.   Neurological:      General: No focal deficit present.      Mental Status: She is alert and oriented to person, place, and time.      Cranial Nerves: No cranial nerve deficit.      Motor: No weakness.      Gait: Gait normal.   Psychiatric:         Mood and Affect: Mood normal.         Behavior: Behavior normal.         Thought Content: Thought content normal.         Judgment: Judgment normal.          Labs: I have reviewed pertinent labs. Inhibin B per HPI  No results found for: \"\"  Lab Results   Component Value Date/Time    Potassium 3.8 03/21/2025 12:31 PM    Chloride 99 03/21/2025 12:31 PM    CO2 29 03/21/2025 12:31 PM    BUN 10 03/21/2025 12:31 PM    Creatinine 0.50 (L) 03/21/2025 12:31 PM    Calcium 9.0 03/21/2025 12:31 PM    AST 15 03/21/2025 12:31 PM    ALT 15 03/21/2025 12:31 PM    Alkaline Phosphatase 71 03/21/2025 12:31 PM    eGFR 101 03/21/2025 12:31 PM     Lab Results   Component Value Date/Time    WBC 4.11 (L) 03/21/2025 12:31 PM    Hemoglobin 10.8 (L) 03/21/2025 12:31 PM    Hematocrit 32.1 (L) 03/21/2025 12:31 PM    MCV 86 03/21/2025 12:31 PM    Platelets 217 03/21/2025 12:31 PM     Lab Results   Component Value Date/Time    Absolute Neutrophils 2.42 03/21/2025 12:31 PM        Trend:  Lab Results   Component Value Date     42.7 (H) 08/16/2019       Radiology Results Review: I personally reviewed the following image studies in PACS and associated radiology reports: CT chest and CT abdomen/pelvis. My interpretation of the radiology images/reports is: Significant treatment " response particularly at the right hemidiaphragm.  Decrease in size of omental metastatic disease in the anterior peritoneum.  No new disease..  Other Study Results Review : Pathology reports reviewed.  Genomic testing reviewed

## 2025-03-28 ENCOUNTER — HOSPITAL ENCOUNTER (OUTPATIENT)
Dept: INFUSION CENTER | Facility: HOSPITAL | Age: 65
End: 2025-03-28
Payer: MEDICARE

## 2025-03-28 DIAGNOSIS — C56.2 MALIGNANT NEOPLASM OF LEFT OVARY (HCC): ICD-10-CM

## 2025-03-28 LAB
ALBUMIN SERPL BCG-MCNC: 4.1 G/DL (ref 3.5–5)
ALP SERPL-CCNC: 67 U/L (ref 34–104)
ALT SERPL W P-5'-P-CCNC: 19 U/L (ref 7–52)
AMYLASE SERPL-CCNC: 50 IU/L (ref 29–103)
ANION GAP SERPL CALCULATED.3IONS-SCNC: 8 MMOL/L (ref 4–13)
AST SERPL W P-5'-P-CCNC: 20 U/L (ref 13–39)
BASOPHILS # BLD AUTO: 0.03 THOUSANDS/ÂΜL (ref 0–0.1)
BASOPHILS NFR BLD AUTO: 1 % (ref 0–1)
BILIRUB SERPL-MCNC: 0.36 MG/DL (ref 0.2–1)
BUN SERPL-MCNC: 11 MG/DL (ref 5–25)
CALCIUM SERPL-MCNC: 9.3 MG/DL (ref 8.4–10.2)
CHLORIDE SERPL-SCNC: 101 MMOL/L (ref 96–108)
CO2 SERPL-SCNC: 31 MMOL/L (ref 21–32)
CREAT SERPL-MCNC: 0.52 MG/DL (ref 0.6–1.3)
CREAT UR-MCNC: 43.4 MG/DL
EOSINOPHIL # BLD AUTO: 0.07 THOUSAND/ÂΜL (ref 0–0.61)
EOSINOPHIL NFR BLD AUTO: 2 % (ref 0–6)
ERYTHROCYTE [DISTWIDTH] IN BLOOD BY AUTOMATED COUNT: 14.6 % (ref 11.6–15.1)
GFR SERPL CREATININE-BSD FRML MDRD: 99 ML/MIN/1.73SQ M
GLUCOSE SERPL-MCNC: 163 MG/DL (ref 65–140)
HCT VFR BLD AUTO: 34.2 % (ref 34.8–46.1)
HGB BLD-MCNC: 11.3 G/DL (ref 11.5–15.4)
IMM GRANULOCYTES # BLD AUTO: 0.03 THOUSAND/UL (ref 0–0.2)
IMM GRANULOCYTES NFR BLD AUTO: 1 % (ref 0–2)
LIPASE SERPL-CCNC: 29 U/L (ref 11–82)
LYMPHOCYTES # BLD AUTO: 1.01 THOUSANDS/ÂΜL (ref 0.6–4.47)
LYMPHOCYTES NFR BLD AUTO: 27 % (ref 14–44)
MAGNESIUM SERPL-MCNC: 1.6 MG/DL (ref 1.9–2.7)
MCH RBC QN AUTO: 28.8 PG (ref 26.8–34.3)
MCHC RBC AUTO-ENTMCNC: 33 G/DL (ref 31.4–37.4)
MCV RBC AUTO: 87 FL (ref 82–98)
MONOCYTES # BLD AUTO: 0.36 THOUSAND/ÂΜL (ref 0.17–1.22)
MONOCYTES NFR BLD AUTO: 10 % (ref 4–12)
NEUTROPHILS # BLD AUTO: 2.24 THOUSANDS/ÂΜL (ref 1.85–7.62)
NEUTS SEG NFR BLD AUTO: 59 % (ref 43–75)
NRBC BLD AUTO-RTO: 0 /100 WBCS
PLATELET # BLD AUTO: 295 THOUSANDS/UL (ref 149–390)
PMV BLD AUTO: 8.2 FL (ref 8.9–12.7)
POTASSIUM SERPL-SCNC: 3.7 MMOL/L (ref 3.5–5.3)
PROT SERPL-MCNC: 6.6 G/DL (ref 6.4–8.4)
PROT UR-MCNC: 4.3 MG/DL
PROT/CREAT UR: 0.1 MG/G{CREAT} (ref 0–0.1)
RBC # BLD AUTO: 3.92 MILLION/UL (ref 3.81–5.12)
SODIUM SERPL-SCNC: 140 MMOL/L (ref 135–147)
TSH SERPL DL<=0.05 MIU/L-ACNC: 3.78 UIU/ML (ref 0.45–4.5)
WBC # BLD AUTO: 3.74 THOUSAND/UL (ref 4.31–10.16)

## 2025-03-28 PROCEDURE — 83690 ASSAY OF LIPASE: CPT

## 2025-03-28 PROCEDURE — 82570 ASSAY OF URINE CREATININE: CPT

## 2025-03-28 PROCEDURE — 83520 IMMUNOASSAY QUANT NOS NONAB: CPT

## 2025-03-28 PROCEDURE — 84156 ASSAY OF PROTEIN URINE: CPT

## 2025-03-28 PROCEDURE — 83735 ASSAY OF MAGNESIUM: CPT

## 2025-03-28 PROCEDURE — 80053 COMPREHEN METABOLIC PANEL: CPT

## 2025-03-28 PROCEDURE — 82150 ASSAY OF AMYLASE: CPT

## 2025-03-28 PROCEDURE — 85025 COMPLETE CBC W/AUTO DIFF WBC: CPT

## 2025-03-28 PROCEDURE — 84443 ASSAY THYROID STIM HORMONE: CPT

## 2025-03-28 NOTE — PROGRESS NOTES
Eli Cobos  tolerated treatment well with no complications.      Eli Cobos is aware of future appt on 4/1 at 0730.     AVS printed and given to Eli Cobos:  No (Declined by Eli Cobos)

## 2025-03-31 RX ORDER — SODIUM CHLORIDE 9 MG/ML
20 INJECTION, SOLUTION INTRAVENOUS ONCE
Status: CANCELLED | OUTPATIENT
Start: 2025-04-01

## 2025-03-31 RX ORDER — PALONOSETRON 0.05 MG/ML
0.25 INJECTION, SOLUTION INTRAVENOUS ONCE
Status: CANCELLED | OUTPATIENT
Start: 2025-04-01

## 2025-04-01 ENCOUNTER — HOSPITAL ENCOUNTER (OUTPATIENT)
Dept: INFUSION CENTER | Facility: HOSPITAL | Age: 65
Discharge: HOME | End: 2025-04-01
Attending: OBSTETRICS & GYNECOLOGY
Payer: MEDICARE

## 2025-04-01 ENCOUNTER — TELEPHONE (OUTPATIENT)
Dept: INFUSION CENTER | Facility: HOSPITAL | Age: 65
End: 2025-04-01

## 2025-04-01 VITALS
WEIGHT: 196.6 LBS | HEART RATE: 88 BPM | RESPIRATION RATE: 18 BRPM | OXYGEN SATURATION: 97 % | DIASTOLIC BLOOD PRESSURE: 75 MMHG | HEIGHT: 63 IN | TEMPERATURE: 96 F | BODY MASS INDEX: 34.84 KG/M2 | SYSTOLIC BLOOD PRESSURE: 124 MMHG

## 2025-04-01 DIAGNOSIS — C56.2 MALIGNANT NEOPLASM OF LEFT OVARY (HCC): ICD-10-CM

## 2025-04-01 DIAGNOSIS — E83.42 HYPOMAGNESEMIA: Primary | ICD-10-CM

## 2025-04-01 LAB — INHIBIN B SERPL-MCNC: 36.6 PG/ML (ref 0–16.9)

## 2025-04-01 PROCEDURE — 96415 CHEMO IV INFUSION ADDL HR: CPT

## 2025-04-01 PROCEDURE — 96413 CHEMO IV INFUSION 1 HR: CPT

## 2025-04-01 PROCEDURE — 96375 TX/PRO/DX INJ NEW DRUG ADDON: CPT

## 2025-04-01 PROCEDURE — 96367 TX/PROPH/DG ADDL SEQ IV INF: CPT

## 2025-04-01 PROCEDURE — 96417 CHEMO IV INFUS EACH ADDL SEQ: CPT

## 2025-04-01 RX ORDER — SODIUM CHLORIDE 9 MG/ML
20 INJECTION, SOLUTION INTRAVENOUS ONCE
Status: COMPLETED | OUTPATIENT
Start: 2025-04-01 | End: 2025-04-01

## 2025-04-01 RX ORDER — PALONOSETRON 0.05 MG/ML
0.25 INJECTION, SOLUTION INTRAVENOUS ONCE
Status: COMPLETED | OUTPATIENT
Start: 2025-04-01 | End: 2025-04-01

## 2025-04-01 RX ADMIN — FAMOTIDINE 20 MG: 10 INJECTION INTRAVENOUS at 08:34

## 2025-04-01 RX ADMIN — SODIUM CHLORIDE 20 ML/HR: 9 INJECTION, SOLUTION INTRAVENOUS at 07:48

## 2025-04-01 RX ADMIN — DEXAMETHASONE SODIUM PHOSPHATE 20 MG: 10 INJECTION, SOLUTION INTRAMUSCULAR; INTRAVENOUS at 07:48

## 2025-04-01 RX ADMIN — PALONOSETRON 0.25 MG: 0.05 INJECTION, SOLUTION INTRAVENOUS at 09:33

## 2025-04-01 RX ADMIN — DURVALUMAB 1120 MG: 500 INJECTION, SOLUTION INTRAVENOUS at 13:59

## 2025-04-01 RX ADMIN — FOSAPREPITANT 150 MG: 150 INJECTION, POWDER, LYOPHILIZED, FOR SOLUTION INTRAVENOUS at 08:58

## 2025-04-01 RX ADMIN — CARBOPLATIN 650.4 MG: 10 INJECTION, SOLUTION INTRAVENOUS at 12:42

## 2025-04-01 RX ADMIN — DIPHENHYDRAMINE HYDROCHLORIDE 25 MG: 50 INJECTION INTRAMUSCULAR; INTRAVENOUS at 08:10

## 2025-04-01 RX ADMIN — PACLITAXEL 258 MG: 6 INJECTION, SOLUTION INTRAVENOUS at 09:39

## 2025-04-01 NOTE — PROGRESS NOTES
Pt tolerated chemotherapy infusion without any adverse reactions. Port flushed and de-accessed. Pt ambulated out of unit with a steady gait. Declined AVS     Aware of next appt 04/08/25 @ 1230 pm

## 2025-04-08 ENCOUNTER — HOSPITAL ENCOUNTER (OUTPATIENT)
Dept: INFUSION CENTER | Facility: HOSPITAL | Age: 65
Discharge: HOME | End: 2025-04-08
Payer: MEDICARE

## 2025-04-08 ENCOUNTER — DOCUMENTATION (OUTPATIENT)
Dept: GYNECOLOGIC ONCOLOGY | Facility: CLINIC | Age: 65
End: 2025-04-08

## 2025-04-08 DIAGNOSIS — C56.2 MALIGNANT NEOPLASM OF LEFT OVARY (HCC): ICD-10-CM

## 2025-04-08 DIAGNOSIS — Z45.2 ENCOUNTER FOR CENTRAL LINE CARE: Primary | ICD-10-CM

## 2025-04-08 LAB
ALBUMIN SERPL BCG-MCNC: 4.1 G/DL (ref 3.5–5)
ALP SERPL-CCNC: 69 U/L (ref 34–104)
ALT SERPL W P-5'-P-CCNC: 16 U/L (ref 7–52)
ANION GAP SERPL CALCULATED.3IONS-SCNC: 7 MMOL/L (ref 4–13)
AST SERPL W P-5'-P-CCNC: 15 U/L (ref 13–39)
BASOPHILS # BLD AUTO: 0.03 THOUSANDS/ÂΜL (ref 0–0.1)
BASOPHILS NFR BLD AUTO: 1 % (ref 0–1)
BILIRUB SERPL-MCNC: 0.3 MG/DL (ref 0.2–1)
BUN SERPL-MCNC: 21 MG/DL (ref 5–25)
CALCIUM SERPL-MCNC: 9.2 MG/DL (ref 8.4–10.2)
CHLORIDE SERPL-SCNC: 100 MMOL/L (ref 96–108)
CO2 SERPL-SCNC: 29 MMOL/L (ref 21–32)
CREAT SERPL-MCNC: 0.5 MG/DL (ref 0.6–1.3)
EOSINOPHIL # BLD AUTO: 0.07 THOUSAND/ÂΜL (ref 0–0.61)
EOSINOPHIL NFR BLD AUTO: 2 % (ref 0–6)
ERYTHROCYTE [DISTWIDTH] IN BLOOD BY AUTOMATED COUNT: 13.7 % (ref 11.6–15.1)
GFR SERPL CREATININE-BSD FRML MDRD: 101 ML/MIN/1.73SQ M
GLUCOSE SERPL-MCNC: 217 MG/DL (ref 65–140)
HCT VFR BLD AUTO: 31.3 % (ref 34.8–46.1)
HGB BLD-MCNC: 10.7 G/DL (ref 11.5–15.4)
IMM GRANULOCYTES # BLD AUTO: 0.03 THOUSAND/UL (ref 0–0.2)
IMM GRANULOCYTES NFR BLD AUTO: 1 % (ref 0–2)
LYMPHOCYTES # BLD AUTO: 1.27 THOUSANDS/ÂΜL (ref 0.6–4.47)
LYMPHOCYTES NFR BLD AUTO: 34 % (ref 14–44)
MAGNESIUM SERPL-MCNC: 1.4 MG/DL (ref 1.9–2.7)
MCH RBC QN AUTO: 29.2 PG (ref 26.8–34.3)
MCHC RBC AUTO-ENTMCNC: 34.2 G/DL (ref 31.4–37.4)
MCV RBC AUTO: 86 FL (ref 82–98)
MONOCYTES # BLD AUTO: 0.23 THOUSAND/ÂΜL (ref 0.17–1.22)
MONOCYTES NFR BLD AUTO: 6 % (ref 4–12)
NEUTROPHILS # BLD AUTO: 2.16 THOUSANDS/ÂΜL (ref 1.85–7.62)
NEUTS SEG NFR BLD AUTO: 56 % (ref 43–75)
NRBC BLD AUTO-RTO: 0 /100 WBCS
PLATELET # BLD AUTO: 190 THOUSANDS/UL (ref 149–390)
PMV BLD AUTO: 9.1 FL (ref 8.9–12.7)
POTASSIUM SERPL-SCNC: 3.4 MMOL/L (ref 3.5–5.3)
PROT SERPL-MCNC: 6.4 G/DL (ref 6.4–8.4)
RBC # BLD AUTO: 3.66 MILLION/UL (ref 3.81–5.12)
SODIUM SERPL-SCNC: 136 MMOL/L (ref 135–147)
WBC # BLD AUTO: 3.79 THOUSAND/UL (ref 4.31–10.16)

## 2025-04-08 PROCEDURE — 80053 COMPREHEN METABOLIC PANEL: CPT

## 2025-04-08 PROCEDURE — 83735 ASSAY OF MAGNESIUM: CPT

## 2025-04-08 PROCEDURE — 85025 COMPLETE CBC W/AUTO DIFF WBC: CPT

## 2025-04-08 NOTE — PROGRESS NOTES
Eli Cobos  tolerated treatment well with no complications.      Eli Cobos is aware of future appt on 04/18/2025 at 10:00 AM.     AVS printed and given to Eli Cobos:  No (Declined by Eli Cobos)

## 2025-04-08 NOTE — PROGRESS NOTES
Gynecologic Oncology Treatment Planning Conference Case Review     Primary Gynecologic Oncologist: Dr. Bonilla    Clinical Summary: Eli Cobos is a 64 y.o. 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 Please see EMR for full history, imaging, and laboratory studies.     Recommendations:   Given good response to carboplatin/paclitaxel, consider continuation of present management   Consider adding PARPi for maintenance after treatment with the above, extrapolated from epithelial ovarian carcinoma data  Consider adding durvalamab/olaparib to current regimen as per DUO-O abstract, extrapolated from epithelial ovarian carcinoma data     DISCLAIMERS:    TO THE TREATING PHYSICIAN:  This conference is a meeting of clinicians from various specialty areas who evaluate and discuss patients for whom a multidisciplinary treatment approach is being considered. Please note that the above opinion was a consensus of the conference attendees and is intended only to assist in quality care of the discussed patient.  The responsibility for follow up on the input given during the conference, along with any final decisions regarding plan of care, is that of the patient and the patient's provider. Accordingly, appointments have only been recommended based on this information and have NOT been scheduled unless otherwise noted.      TO THE PATIENT:  This summary is a brief record of major aspects of your cancer treatment. You may choose to share a copy with any of your doctors or nurses. However, this is not a detailed or comprehensive record of your care.

## 2025-04-17 ENCOUNTER — OFFICE VISIT (OUTPATIENT)
Age: 65
End: 2025-04-17
Payer: MEDICARE

## 2025-04-17 VITALS
OXYGEN SATURATION: 98 % | BODY MASS INDEX: 35.75 KG/M2 | WEIGHT: 201.8 LBS | HEIGHT: 63 IN | HEART RATE: 95 BPM | DIASTOLIC BLOOD PRESSURE: 84 MMHG | RESPIRATION RATE: 18 BRPM | SYSTOLIC BLOOD PRESSURE: 178 MMHG | TEMPERATURE: 97.5 F

## 2025-04-17 DIAGNOSIS — G62.0 DRUG-INDUCED PERIPHERAL NEUROPATHY (HCC): ICD-10-CM

## 2025-04-17 DIAGNOSIS — C56.2 MALIGNANT NEOPLASM OF LEFT OVARY (HCC): Primary | ICD-10-CM

## 2025-04-17 PROCEDURE — 99215 OFFICE O/P EST HI 40 MIN: CPT | Performed by: PHYSICIAN ASSISTANT

## 2025-04-17 PROCEDURE — G2211 COMPLEX E/M VISIT ADD ON: HCPCS | Performed by: PHYSICIAN ASSISTANT

## 2025-04-17 RX ORDER — AMOXICILLIN 500 MG/1
CAPSULE ORAL
COMMUNITY
Start: 2025-04-09

## 2025-04-17 NOTE — ASSESSMENT & PLAN NOTE
Nearly resolved, grade 1.   Taxol previously dose-reduced to 135 mg/m2.   Continue to closely monitor.

## 2025-04-17 NOTE — ASSESSMENT & PLAN NOTE
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 Overall, she is tolerating treatment well. Her inhibin B is declining with disease response noted on CT imaging after completion of cycle 3.     Lab Results   Component Value Date    INHBB 36.6 (H) 03/28/2025    INHBB 42.0 (H) 03/07/2025    INHBB 45.0 (H) 02/14/2025    INHBB 405.8 (H) 01/24/2025         Continue next cycle of treatment as planned as long as her metabolic and hematologic parameters are adequate.     Return to the office as per her chemotherapy calendar.   Plan for repeat imaging after completion of cycle 6 and consideration of transition to maintenance durvalmab and olaparib.

## 2025-04-17 NOTE — PROGRESS NOTES
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 Overall, she is tolerating treatment well. Her inhibin B is declining with disease response noted on CT imaging after completion of cycle 3.     Lab Results   Component Value Date    INHBB 36.6 (H) 2025    INHBB 42.0 (H) 2025    INHBB 45.0 (H) 2025    INHBB 405.8 (H) 2025         Continue next cycle of treatment as planned as long as her metabolic and hematologic parameters are adequate.     Return to the office as per her chemotherapy calendar.   Plan for repeat imaging after completion of cycle 6 and consideration of transition to maintenance durvalmab and olaparib.            Drug-induced peripheral neuropathy (HCC)  Nearly resolved, grade 1.   Taxol previously dose-reduced to 135 mg/m2.   Continue to closely monitor.               History of Present Illness     Reason for Visit / CC:  Pre-Chemo Visit    Eli Cobos is a 65 y.o. female   who presents to the office for pre-chemotherapy. Overall, she continues to tolerate treatment well. She has been afebrile. Her fatigue is minimal. She denies n/v/abdominal pain. Normal bowel/bladder function. Denies diarrhea or blood in her stool. No new skin rash, cough or SOB. She notes occasional neuropathy in her feet, but this is intermittent and very minimal.         Oncology History   Cancer Staging   Malignant neoplasm of left ovary (HCC)  Staging form: Ovary, Fallopian Tube,  Primary Peritoneal, AJCC 8th Edition  - Clinical stage from 8/26/2019: FIGO Stage IC1, calculated as Stage IA (cT1a, cN0, cM0) - Signed by Trevin Bonilla MD on 9/11/2019  Oncology History   Malignant neoplasm of left ovary (HCC)   8/26/2019 Initial Diagnosis    Malignant neoplasm of left ovary (HCC)     8/26/2019 -  Cancer Staged    Staging form: Ovary, Fallopian Tube, Primary Peritoneal, AJCC 8th Edition  - Clinical stage from 8/26/2019: FIGO Stage IC1, calculated as Stage IA (cT1a, cN0, cM0) - Signed by Trevin Bonilla MD on 9/11/2019        Chemotherapy    Taxol 175 mg/m2 and carboplatin AUC 6 every 21 days. She received 5 cycles and is scheduled for cycle 6.      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      12/2022 - 6/27/2024 Hormone Therapy    Megace 80 mg PO BID (starting inhibin B, 9.2 pg/ml)     6/4/2024 - 6/21/2024 Radiation      Plan ID Energy Fractions Dose per Fraction (cGy) Dose Correction (cGy) Total Dose Delivered (cGy) Elapsed Days   SBRT MT 6X-FFF 5 / 5 600 0 3,000 17      1/28/2025 -  Chemotherapy    Taxol 175 mg/m2 and carboplatin AUC 6 IV every 21 days.     Cycle 4, taxol dose-reduced to 135 mg/m2 and durvalumab 1120 mg IV every 21 days added to plan.           Review of Systems   Constitutional:  Positive for fatigue. Negative for fever.   HENT: Negative.     Eyes: Negative.    Respiratory: Negative.     Cardiovascular: Negative.     Gastrointestinal: Negative.    Genitourinary: Negative.    Musculoskeletal: Negative.    Skin: Negative.    Neurological: Negative.    Psychiatric/Behavioral: Negative.      A complete review of systems is negative other than that noted above in the HPI.  Medical History Reviewed by provider this encounter:  Tobacco  Allergies  Meds  Problems  Med Hx  Surg Hx  Fam Hx     .  Current Outpatient Medications on File Prior to Visit   Medication Sig Dispense Refill   • amLODIPine (NORVASC) 5 mg tablet Take 5 mg by mouth daily   4   • amoxicillin (AMOXIL) 500 mg capsule TAKE 2 CAPSULES RIGHT AWAY, THEN 1 CAPSULE EVERY 8 HOURS UNTIL FINISHED     • Cholecalciferol (VITAMIN D-3) 1000 units CAPS Take by mouth daily      • clobetasol (TEMOVATE) 0.05 % ointment Apply to the affected area 1-3x/week 30 g 0   • co-enzyme Q-10 100 mg capsule Take 100 mg by mouth daily     • escitalopram (LEXAPRO) 20 mg tablet 20 mg daily   0   • LORazepam (ATIVAN) 1 mg tablet Take 1 tablet (1 mg total) by mouth every 8 (eight) hours as needed (nausea or anxiety) 20 tablet 0   • meclizine (ANTIVERT) 25 mg tablet Take 1 tablet (25 mg total) by mouth 3 (three) times a day as needed for dizziness 30 tablet 0   • metFORMIN (GLUCOPHAGE) 500 mg tablet 500 mg 2 (two) times a day with meals   0   • Mounjaro 7.5 MG/0.5ML Inject 7.5 mg under the skin every 7 days     • ondansetron (ZOFRAN) 8 mg tablet Take 1 tablet (8 mg total) by mouth every 8 (eight) hours as needed for nausea or vomiting 30 tablet 1   • scopolamine (TRANSDERM-SCOP) 1.5 mg/3 days TD 72 hr patch Place 1 patch on the skin every third day 2 patch 1   • simvastatin (ZOCOR) 20 mg tablet TK 1 T PO QD IN THE KIMBERLYN  5   • SUPER B COMPLEX/C PO Take by mouth daily      • valsartan-hydrochlorothiazide (DIOVAN-HCT) 320-25 MG per tablet daily   0     No current facility-administered medications on file prior to visit.         Objective   BP (!) 178/84 (BP Location: Left arm, Patient Position:  "Sitting, Cuff Size: Large)   Pulse 95   Temp 97.5 °F (36.4 °C) (Temporal)   Resp 18   Ht 5' 2.99\" (1.6 m)   Wt 91.5 kg (201 lb 12.8 oz)   SpO2 98%   BMI 35.76 kg/m²     Body mass index is 35.76 kg/m².  Pain Screening:  Pain Score: 0-No pain  ECOG ECOG Performance Status: 0 - Fully active, able to carry on all pre-disease performance without restriction     Physical Exam  Constitutional:       Appearance: She is well-developed.   Pulmonary:      Effort: Pulmonary effort is normal.   Skin:     General: Skin is warm and dry.      Findings: No rash.   Neurological:      Mental Status: She is alert and oriented to person, place, and time.   Psychiatric:         Behavior: Behavior normal.         Thought Content: Thought content normal.         Judgment: Judgment normal.          Labs: I have reviewed pertinent labs.   Lab Results   Component Value Date/Time    WBC 3.79 (L) 04/08/2025 12:33 PM    RBC 3.66 (L) 04/08/2025 12:33 PM    Hemoglobin 10.7 (L) 04/08/2025 12:33 PM    Hematocrit 31.3 (L) 04/08/2025 12:33 PM    MCV 86 04/08/2025 12:33 PM    MCH 29.2 04/08/2025 12:33 PM    RDW 13.7 04/08/2025 12:33 PM    Platelets 190 04/08/2025 12:33 PM    Segmented % 56 04/08/2025 12:33 PM    Lymphocytes % 34 04/08/2025 12:33 PM    Monocytes % 6 04/08/2025 12:33 PM    Eosinophils Relative 2 04/08/2025 12:33 PM    Basophils Relative 1 04/08/2025 12:33 PM    Immature Grans % 1 04/08/2025 12:33 PM    Absolute Neutrophils 2.16 04/08/2025 12:33 PM      Lab Results   Component Value Date/Time    Sodium 136 04/08/2025 12:33 PM    Potassium 3.4 (L) 04/08/2025 12:33 PM    Chloride 100 04/08/2025 12:33 PM    CO2 29 04/08/2025 12:33 PM    ANION GAP 7 04/08/2025 12:33 PM    BUN 21 04/08/2025 12:33 PM    Creatinine 0.50 (L) 04/08/2025 12:33 PM    Glucose 217 (H) 04/08/2025 12:33 PM    Calcium 9.2 04/08/2025 12:33 PM    AST 15 04/08/2025 12:33 PM    ALT 16 04/08/2025 12:33 PM    Alkaline Phosphatase 69 04/08/2025 12:33 PM    Total Protein " 6.4 04/08/2025 12:33 PM    Albumin 4.1 04/08/2025 12:33 PM    Total Bilirubin 0.30 04/08/2025 12:33 PM    eGFR 101 04/08/2025 12:33 PM      Inhibin A/B:   Lab Results   Component Value Date/Time    Inhibin B 36.6 (H) 03/28/2025 10:14 AM

## 2025-04-18 ENCOUNTER — HOSPITAL ENCOUNTER (OUTPATIENT)
Dept: INFUSION CENTER | Facility: HOSPITAL | Age: 65
End: 2025-04-18
Payer: MEDICARE

## 2025-04-18 DIAGNOSIS — C56.2 MALIGNANT NEOPLASM OF LEFT OVARY (HCC): ICD-10-CM

## 2025-04-18 DIAGNOSIS — Z45.2 ENCOUNTER FOR CENTRAL LINE CARE: Primary | ICD-10-CM

## 2025-04-18 LAB
AMYLASE SERPL-CCNC: 44 IU/L (ref 29–103)
BASOPHILS # BLD AUTO: 0.03 THOUSANDS/ÂΜL (ref 0–0.1)
BASOPHILS NFR BLD AUTO: 1 % (ref 0–1)
CREAT UR-MCNC: 31.8 MG/DL
EOSINOPHIL # BLD AUTO: 0.04 THOUSAND/ÂΜL (ref 0–0.61)
EOSINOPHIL NFR BLD AUTO: 1 % (ref 0–6)
ERYTHROCYTE [DISTWIDTH] IN BLOOD BY AUTOMATED COUNT: 14.9 % (ref 11.6–15.1)
HCT VFR BLD AUTO: 32.7 % (ref 34.8–46.1)
HGB BLD-MCNC: 11 G/DL (ref 11.5–15.4)
IMM GRANULOCYTES # BLD AUTO: 0.04 THOUSAND/UL (ref 0–0.2)
IMM GRANULOCYTES NFR BLD AUTO: 1 % (ref 0–2)
LIPASE SERPL-CCNC: 33 U/L (ref 11–82)
LYMPHOCYTES # BLD AUTO: 0.86 THOUSANDS/ÂΜL (ref 0.6–4.47)
LYMPHOCYTES NFR BLD AUTO: 29 % (ref 14–44)
MAGNESIUM SERPL-MCNC: 1.5 MG/DL (ref 1.9–2.7)
MCH RBC QN AUTO: 29.5 PG (ref 26.8–34.3)
MCHC RBC AUTO-ENTMCNC: 33.6 G/DL (ref 31.4–37.4)
MCV RBC AUTO: 88 FL (ref 82–98)
MONOCYTES # BLD AUTO: 0.35 THOUSAND/ÂΜL (ref 0.17–1.22)
MONOCYTES NFR BLD AUTO: 12 % (ref 4–12)
NEUTROPHILS # BLD AUTO: 1.67 THOUSANDS/ÂΜL (ref 1.85–7.62)
NEUTS SEG NFR BLD AUTO: 56 % (ref 43–75)
NRBC BLD AUTO-RTO: 0 /100 WBCS
PLATELET # BLD AUTO: 249 THOUSANDS/UL (ref 149–390)
PMV BLD AUTO: 8.2 FL (ref 8.9–12.7)
PROT UR-MCNC: 4 MG/DL
PROT/CREAT UR: 0.1 MG/G{CREAT}
RBC # BLD AUTO: 3.73 MILLION/UL (ref 3.81–5.12)
TSH SERPL DL<=0.05 MIU/L-ACNC: 2.49 UIU/ML (ref 0.45–4.5)
WBC # BLD AUTO: 2.99 THOUSAND/UL (ref 4.31–10.16)

## 2025-04-18 PROCEDURE — 82570 ASSAY OF URINE CREATININE: CPT

## 2025-04-18 PROCEDURE — 83735 ASSAY OF MAGNESIUM: CPT

## 2025-04-18 PROCEDURE — 84443 ASSAY THYROID STIM HORMONE: CPT

## 2025-04-18 PROCEDURE — 85025 COMPLETE CBC W/AUTO DIFF WBC: CPT

## 2025-04-18 PROCEDURE — 83690 ASSAY OF LIPASE: CPT

## 2025-04-18 PROCEDURE — 82150 ASSAY OF AMYLASE: CPT

## 2025-04-18 PROCEDURE — 84156 ASSAY OF PROTEIN URINE: CPT

## 2025-04-18 PROCEDURE — 83520 IMMUNOASSAY QUANT NOS NONAB: CPT

## 2025-04-18 NOTE — PROGRESS NOTES
Port labs drawn , Urine collected. No problems with port encountered.   AVS declined. 4/22 appt confirmed.

## 2025-04-20 DIAGNOSIS — Z51.11 ENCOUNTER FOR ANTINEOPLASTIC CHEMOTHERAPY: Primary | ICD-10-CM

## 2025-04-20 DIAGNOSIS — C56.2 MALIGNANT NEOPLASM OF LEFT OVARY (HCC): ICD-10-CM

## 2025-04-20 DIAGNOSIS — E83.42 HYPOMAGNESEMIA: ICD-10-CM

## 2025-04-20 RX ORDER — PALONOSETRON 0.05 MG/ML
0.25 INJECTION, SOLUTION INTRAVENOUS ONCE
Status: CANCELLED | OUTPATIENT
Start: 2025-04-22

## 2025-04-20 RX ORDER — SODIUM CHLORIDE 9 MG/ML
20 INJECTION, SOLUTION INTRAVENOUS ONCE
Status: CANCELLED | OUTPATIENT
Start: 2025-04-22

## 2025-04-22 ENCOUNTER — HOSPITAL ENCOUNTER (OUTPATIENT)
Dept: INFUSION CENTER | Facility: HOSPITAL | Age: 65
Discharge: HOME | End: 2025-04-22
Attending: OBSTETRICS & GYNECOLOGY
Payer: MEDICARE

## 2025-04-22 VITALS
WEIGHT: 198.41 LBS | HEART RATE: 81 BPM | HEIGHT: 63 IN | TEMPERATURE: 98.2 F | BODY MASS INDEX: 35.16 KG/M2 | DIASTOLIC BLOOD PRESSURE: 76 MMHG | SYSTOLIC BLOOD PRESSURE: 127 MMHG | RESPIRATION RATE: 18 BRPM

## 2025-04-22 DIAGNOSIS — C56.2 MALIGNANT NEOPLASM OF LEFT OVARY (HCC): ICD-10-CM

## 2025-04-22 DIAGNOSIS — C56.2 MALIGNANT NEOPLASM OF LEFT OVARY (HCC): Primary | ICD-10-CM

## 2025-04-22 DIAGNOSIS — E83.42 HYPOMAGNESEMIA: Primary | ICD-10-CM

## 2025-04-22 LAB
ALBUMIN SERPL BCG-MCNC: 4.1 G/DL (ref 3.5–5)
ALP SERPL-CCNC: 71 U/L (ref 34–104)
ALT SERPL W P-5'-P-CCNC: 14 U/L (ref 7–52)
ANION GAP SERPL CALCULATED.3IONS-SCNC: 8 MMOL/L (ref 4–13)
AST SERPL W P-5'-P-CCNC: 15 U/L (ref 13–39)
BILIRUB SERPL-MCNC: 0.41 MG/DL (ref 0.2–1)
BUN SERPL-MCNC: 15 MG/DL (ref 5–25)
CALCIUM SERPL-MCNC: 9.5 MG/DL (ref 8.4–10.2)
CHLORIDE SERPL-SCNC: 102 MMOL/L (ref 96–108)
CO2 SERPL-SCNC: 29 MMOL/L (ref 21–32)
CREAT SERPL-MCNC: 0.54 MG/DL (ref 0.6–1.3)
GFR SERPL CREATININE-BSD FRML MDRD: 97 ML/MIN/1.73SQ M
GLUCOSE SERPL-MCNC: 204 MG/DL (ref 65–140)
INHIBIN B SERPL-MCNC: 29.5 PG/ML (ref 0–16.9)
POTASSIUM SERPL-SCNC: 3.4 MMOL/L (ref 3.5–5.3)
PROT SERPL-MCNC: 6.5 G/DL (ref 6.4–8.4)
SODIUM SERPL-SCNC: 139 MMOL/L (ref 135–147)

## 2025-04-22 PROCEDURE — 96413 CHEMO IV INFUSION 1 HR: CPT

## 2025-04-22 PROCEDURE — 96375 TX/PRO/DX INJ NEW DRUG ADDON: CPT

## 2025-04-22 PROCEDURE — 80053 COMPREHEN METABOLIC PANEL: CPT

## 2025-04-22 PROCEDURE — 96417 CHEMO IV INFUS EACH ADDL SEQ: CPT

## 2025-04-22 PROCEDURE — 96415 CHEMO IV INFUSION ADDL HR: CPT

## 2025-04-22 PROCEDURE — 96367 TX/PROPH/DG ADDL SEQ IV INF: CPT

## 2025-04-22 RX ORDER — PALONOSETRON 0.05 MG/ML
0.25 INJECTION, SOLUTION INTRAVENOUS ONCE
Status: COMPLETED | OUTPATIENT
Start: 2025-04-22 | End: 2025-04-22

## 2025-04-22 RX ORDER — SODIUM CHLORIDE 9 MG/ML
20 INJECTION, SOLUTION INTRAVENOUS ONCE
Status: COMPLETED | OUTPATIENT
Start: 2025-04-22 | End: 2025-04-22

## 2025-04-22 RX ADMIN — CARBOPLATIN 643.2 MG: 10 INJECTION, SOLUTION INTRAVENOUS at 12:56

## 2025-04-22 RX ADMIN — FOSAPREPITANT 150 MG: 150 INJECTION, POWDER, LYOPHILIZED, FOR SOLUTION INTRAVENOUS at 09:03

## 2025-04-22 RX ADMIN — DEXAMETHASONE SODIUM PHOSPHATE 20 MG: 10 INJECTION, SOLUTION INTRAMUSCULAR; INTRAVENOUS at 07:50

## 2025-04-22 RX ADMIN — DIPHENHYDRAMINE HYDROCHLORIDE 25 MG: 50 INJECTION INTRAMUSCULAR; INTRAVENOUS at 08:17

## 2025-04-22 RX ADMIN — SODIUM CHLORIDE 20 ML/HR: 9 INJECTION, SOLUTION INTRAVENOUS at 07:50

## 2025-04-22 RX ADMIN — PALONOSETRON 0.25 MG: 0.05 INJECTION, SOLUTION INTRAVENOUS at 07:54

## 2025-04-22 RX ADMIN — FAMOTIDINE 20 MG: 10 INJECTION INTRAVENOUS at 08:42

## 2025-04-22 RX ADMIN — PACLITAXEL 258 MG: 6 INJECTION, SOLUTION INTRAVENOUS at 09:57

## 2025-04-22 RX ADMIN — DURVALUMAB 1120 MG: 500 INJECTION, SOLUTION INTRAVENOUS at 14:22

## 2025-04-22 NOTE — PROGRESS NOTES
Eli Cobos  tolerated treatment well with no complications.      Eli Cobos is aware of future appt on 4/25 at 11am.     AVS printed and given to Eli Cobos:  No (Declined by Eli Cobos)

## 2025-04-25 ENCOUNTER — HOSPITAL ENCOUNTER (OUTPATIENT)
Dept: INFUSION CENTER | Facility: HOSPITAL | Age: 65
End: 2025-04-25
Payer: MEDICARE

## 2025-04-25 DIAGNOSIS — C56.2 MALIGNANT NEOPLASM OF LEFT OVARY (HCC): ICD-10-CM

## 2025-04-25 DIAGNOSIS — Z45.2 ENCOUNTER FOR CENTRAL LINE CARE: Primary | ICD-10-CM

## 2025-04-25 LAB
ALBUMIN SERPL BCG-MCNC: 4.3 G/DL (ref 3.5–5)
ALP SERPL-CCNC: 69 U/L (ref 34–104)
ALT SERPL W P-5'-P-CCNC: 17 U/L (ref 7–52)
ANION GAP SERPL CALCULATED.3IONS-SCNC: 8 MMOL/L (ref 4–13)
AST SERPL W P-5'-P-CCNC: 18 U/L (ref 13–39)
BASOPHILS # BLD AUTO: 0.02 THOUSANDS/ÂΜL (ref 0–0.1)
BASOPHILS NFR BLD AUTO: 0 % (ref 0–1)
BILIRUB SERPL-MCNC: 0.46 MG/DL (ref 0.2–1)
BUN SERPL-MCNC: 13 MG/DL (ref 5–25)
CALCIUM SERPL-MCNC: 9.6 MG/DL (ref 8.4–10.2)
CHLORIDE SERPL-SCNC: 98 MMOL/L (ref 96–108)
CO2 SERPL-SCNC: 30 MMOL/L (ref 21–32)
CREAT SERPL-MCNC: 0.55 MG/DL (ref 0.6–1.3)
EOSINOPHIL # BLD AUTO: 0.02 THOUSAND/ÂΜL (ref 0–0.61)
EOSINOPHIL NFR BLD AUTO: 0 % (ref 0–6)
ERYTHROCYTE [DISTWIDTH] IN BLOOD BY AUTOMATED COUNT: 14.4 % (ref 11.6–15.1)
GFR SERPL CREATININE-BSD FRML MDRD: 97 ML/MIN/1.73SQ M
GLUCOSE SERPL-MCNC: 239 MG/DL (ref 65–140)
HCT VFR BLD AUTO: 35.8 % (ref 34.8–46.1)
HGB BLD-MCNC: 12.2 G/DL (ref 11.5–15.4)
IMM GRANULOCYTES # BLD AUTO: 0.02 THOUSAND/UL (ref 0–0.2)
IMM GRANULOCYTES NFR BLD AUTO: 0 % (ref 0–2)
LYMPHOCYTES # BLD AUTO: 0.91 THOUSANDS/ÂΜL (ref 0.6–4.47)
LYMPHOCYTES NFR BLD AUTO: 16 % (ref 14–44)
MAGNESIUM SERPL-MCNC: 1.5 MG/DL (ref 1.9–2.7)
MCH RBC QN AUTO: 30 PG (ref 26.8–34.3)
MCHC RBC AUTO-ENTMCNC: 34.1 G/DL (ref 31.4–37.4)
MCV RBC AUTO: 88 FL (ref 82–98)
MONOCYTES # BLD AUTO: 0.14 THOUSAND/ÂΜL (ref 0.17–1.22)
MONOCYTES NFR BLD AUTO: 2 % (ref 4–12)
NEUTROPHILS # BLD AUTO: 4.62 THOUSANDS/ÂΜL (ref 1.85–7.62)
NEUTS SEG NFR BLD AUTO: 82 % (ref 43–75)
NRBC BLD AUTO-RTO: 0 /100 WBCS
PLATELET # BLD AUTO: 214 THOUSANDS/UL (ref 149–390)
PMV BLD AUTO: 8.8 FL (ref 8.9–12.7)
POTASSIUM SERPL-SCNC: 3.8 MMOL/L (ref 3.5–5.3)
PROT SERPL-MCNC: 6.8 G/DL (ref 6.4–8.4)
RBC # BLD AUTO: 4.07 MILLION/UL (ref 3.81–5.12)
SODIUM SERPL-SCNC: 136 MMOL/L (ref 135–147)
WBC # BLD AUTO: 5.73 THOUSAND/UL (ref 4.31–10.16)

## 2025-04-25 PROCEDURE — 83735 ASSAY OF MAGNESIUM: CPT

## 2025-04-25 PROCEDURE — 85025 COMPLETE CBC W/AUTO DIFF WBC: CPT

## 2025-04-25 PROCEDURE — 80053 COMPREHEN METABOLIC PANEL: CPT

## 2025-04-25 NOTE — PROGRESS NOTES
Eli Cobos  tolerated treatment well with no complications.      Eli Cobos is aware of future appt on 5/2 at 1300.     AVS printed and given to Eli Cobos:  No (Declined by Eli Cobos)

## 2025-05-02 ENCOUNTER — HOSPITAL ENCOUNTER (OUTPATIENT)
Dept: INFUSION CENTER | Facility: HOSPITAL | Age: 65
End: 2025-05-02
Payer: MEDICARE

## 2025-05-02 DIAGNOSIS — Z45.2 ENCOUNTER FOR CENTRAL LINE CARE: Primary | ICD-10-CM

## 2025-05-02 DIAGNOSIS — C56.2 MALIGNANT NEOPLASM OF LEFT OVARY (HCC): ICD-10-CM

## 2025-05-02 LAB
ALBUMIN SERPL BCG-MCNC: 4.1 G/DL (ref 3.5–5)
ALP SERPL-CCNC: 69 U/L (ref 34–104)
ALT SERPL W P-5'-P-CCNC: 14 U/L (ref 7–52)
AMYLASE SERPL-CCNC: 56 IU/L (ref 29–103)
ANION GAP SERPL CALCULATED.3IONS-SCNC: 7 MMOL/L (ref 4–13)
AST SERPL W P-5'-P-CCNC: 16 U/L (ref 13–39)
BASOPHILS # BLD AUTO: 0.02 THOUSANDS/ÂΜL (ref 0–0.1)
BASOPHILS NFR BLD AUTO: 1 % (ref 0–1)
BILIRUB SERPL-MCNC: 0.29 MG/DL (ref 0.2–1)
BUN SERPL-MCNC: 14 MG/DL (ref 5–25)
CALCIUM SERPL-MCNC: 9.3 MG/DL (ref 8.4–10.2)
CHLORIDE SERPL-SCNC: 101 MMOL/L (ref 96–108)
CO2 SERPL-SCNC: 31 MMOL/L (ref 21–32)
CREAT SERPL-MCNC: 0.57 MG/DL (ref 0.6–1.3)
CREAT UR-MCNC: 33.6 MG/DL
EOSINOPHIL # BLD AUTO: 0.02 THOUSAND/ÂΜL (ref 0–0.61)
EOSINOPHIL NFR BLD AUTO: 1 % (ref 0–6)
ERYTHROCYTE [DISTWIDTH] IN BLOOD BY AUTOMATED COUNT: 13.6 % (ref 11.6–15.1)
GFR SERPL CREATININE-BSD FRML MDRD: 96 ML/MIN/1.73SQ M
GLUCOSE SERPL-MCNC: 117 MG/DL (ref 65–140)
HCT VFR BLD AUTO: 30.4 % (ref 34.8–46.1)
HGB BLD-MCNC: 10.4 G/DL (ref 11.5–15.4)
IMM GRANULOCYTES # BLD AUTO: 0.01 THOUSAND/UL (ref 0–0.2)
IMM GRANULOCYTES NFR BLD AUTO: 0 % (ref 0–2)
LIPASE SERPL-CCNC: 33 U/L (ref 11–82)
LYMPHOCYTES # BLD AUTO: 1.19 THOUSANDS/ÂΜL (ref 0.6–4.47)
LYMPHOCYTES NFR BLD AUTO: 28 % (ref 14–44)
MAGNESIUM SERPL-MCNC: 1.5 MG/DL (ref 1.9–2.7)
MCH RBC QN AUTO: 30 PG (ref 26.8–34.3)
MCHC RBC AUTO-ENTMCNC: 34.2 G/DL (ref 31.4–37.4)
MCV RBC AUTO: 88 FL (ref 82–98)
MONOCYTES # BLD AUTO: 0.35 THOUSAND/ÂΜL (ref 0.17–1.22)
MONOCYTES NFR BLD AUTO: 8 % (ref 4–12)
NEUTROPHILS # BLD AUTO: 2.73 THOUSANDS/ÂΜL (ref 1.85–7.62)
NEUTS SEG NFR BLD AUTO: 62 % (ref 43–75)
NRBC BLD AUTO-RTO: 0 /100 WBCS
PLATELET # BLD AUTO: 203 THOUSANDS/UL (ref 149–390)
PMV BLD AUTO: 9.1 FL (ref 8.9–12.7)
POTASSIUM SERPL-SCNC: 3.6 MMOL/L (ref 3.5–5.3)
PROT SERPL-MCNC: 6.5 G/DL (ref 6.4–8.4)
PROT UR-MCNC: 5.7 MG/DL
PROT/CREAT UR: 0.2 MG/G{CREAT}
RBC # BLD AUTO: 3.47 MILLION/UL (ref 3.81–5.12)
SODIUM SERPL-SCNC: 139 MMOL/L (ref 135–147)
TSH SERPL DL<=0.05 MIU/L-ACNC: 2.88 UIU/ML (ref 0.45–4.5)
WBC # BLD AUTO: 4.32 THOUSAND/UL (ref 4.31–10.16)

## 2025-05-02 PROCEDURE — 83690 ASSAY OF LIPASE: CPT

## 2025-05-02 PROCEDURE — 80053 COMPREHEN METABOLIC PANEL: CPT

## 2025-05-02 PROCEDURE — 84443 ASSAY THYROID STIM HORMONE: CPT

## 2025-05-02 PROCEDURE — 83520 IMMUNOASSAY QUANT NOS NONAB: CPT

## 2025-05-02 PROCEDURE — 82570 ASSAY OF URINE CREATININE: CPT

## 2025-05-02 PROCEDURE — 85025 COMPLETE CBC W/AUTO DIFF WBC: CPT

## 2025-05-02 PROCEDURE — 84156 ASSAY OF PROTEIN URINE: CPT

## 2025-05-02 PROCEDURE — 83735 ASSAY OF MAGNESIUM: CPT

## 2025-05-02 PROCEDURE — 82150 ASSAY OF AMYLASE: CPT

## 2025-05-02 NOTE — PROGRESS NOTES
Eli Cobos  tolerated treatment well with no complications.      Eli Cobos is aware of future appt on 05/08/2025 at 10:00 AM.     AVS printed and given to Eli Cobos:  No (Declined by Eli Cobos)

## 2025-05-06 LAB — INHIBIN B SERPL-MCNC: 18 PG/ML (ref 0–16.9)

## 2025-05-08 ENCOUNTER — HOSPITAL ENCOUNTER (OUTPATIENT)
Dept: INFUSION CENTER | Facility: HOSPITAL | Age: 65
Discharge: HOME | End: 2025-05-08
Payer: MEDICARE

## 2025-05-08 ENCOUNTER — OFFICE VISIT (OUTPATIENT)
Age: 65
End: 2025-05-08
Payer: MEDICARE

## 2025-05-08 VITALS
BODY MASS INDEX: 35.61 KG/M2 | RESPIRATION RATE: 18 BRPM | OXYGEN SATURATION: 98 % | HEART RATE: 89 BPM | WEIGHT: 201 LBS | SYSTOLIC BLOOD PRESSURE: 140 MMHG | DIASTOLIC BLOOD PRESSURE: 78 MMHG | TEMPERATURE: 98 F | HEIGHT: 63 IN

## 2025-05-08 DIAGNOSIS — C56.2 MALIGNANT NEOPLASM OF LEFT OVARY (HCC): ICD-10-CM

## 2025-05-08 DIAGNOSIS — G62.0 DRUG-INDUCED PERIPHERAL NEUROPATHY (HCC): ICD-10-CM

## 2025-05-08 DIAGNOSIS — C56.2 MALIGNANT NEOPLASM OF LEFT OVARY (HCC): Primary | ICD-10-CM

## 2025-05-08 LAB
ALBUMIN SERPL BCG-MCNC: 4.1 G/DL (ref 3.5–5)
ALP SERPL-CCNC: 65 U/L (ref 34–104)
ALT SERPL W P-5'-P-CCNC: 12 U/L (ref 7–52)
ANION GAP SERPL CALCULATED.3IONS-SCNC: 7 MMOL/L (ref 4–13)
AST SERPL W P-5'-P-CCNC: 15 U/L (ref 13–39)
BASOPHILS # BLD AUTO: 0.02 THOUSANDS/ÂΜL (ref 0–0.1)
BASOPHILS NFR BLD AUTO: 1 % (ref 0–1)
BILIRUB SERPL-MCNC: 0.36 MG/DL (ref 0.2–1)
BUN SERPL-MCNC: 14 MG/DL (ref 5–25)
CALCIUM SERPL-MCNC: 9.4 MG/DL (ref 8.4–10.2)
CHLORIDE SERPL-SCNC: 102 MMOL/L (ref 96–108)
CO2 SERPL-SCNC: 30 MMOL/L (ref 21–32)
CREAT SERPL-MCNC: 0.57 MG/DL (ref 0.6–1.3)
EOSINOPHIL # BLD AUTO: 0.05 THOUSAND/ÂΜL (ref 0–0.61)
EOSINOPHIL NFR BLD AUTO: 2 % (ref 0–6)
ERYTHROCYTE [DISTWIDTH] IN BLOOD BY AUTOMATED COUNT: 14.4 % (ref 11.6–15.1)
GFR SERPL CREATININE-BSD FRML MDRD: 96 ML/MIN/1.73SQ M
GLUCOSE SERPL-MCNC: 151 MG/DL (ref 65–140)
HCT VFR BLD AUTO: 32.6 % (ref 34.8–46.1)
HGB BLD-MCNC: 11 G/DL (ref 11.5–15.4)
IMM GRANULOCYTES # BLD AUTO: 0.02 THOUSAND/UL (ref 0–0.2)
IMM GRANULOCYTES NFR BLD AUTO: 1 % (ref 0–2)
LYMPHOCYTES # BLD AUTO: 0.92 THOUSANDS/ÂΜL (ref 0.6–4.47)
LYMPHOCYTES NFR BLD AUTO: 30 % (ref 14–44)
MAGNESIUM SERPL-MCNC: 1.6 MG/DL (ref 1.9–2.7)
MCH RBC QN AUTO: 30.2 PG (ref 26.8–34.3)
MCHC RBC AUTO-ENTMCNC: 33.7 G/DL (ref 31.4–37.4)
MCV RBC AUTO: 90 FL (ref 82–98)
MONOCYTES # BLD AUTO: 0.39 THOUSAND/ÂΜL (ref 0.17–1.22)
MONOCYTES NFR BLD AUTO: 13 % (ref 4–12)
NEUTROPHILS # BLD AUTO: 1.7 THOUSANDS/ÂΜL (ref 1.85–7.62)
NEUTS SEG NFR BLD AUTO: 53 % (ref 43–75)
NRBC BLD AUTO-RTO: 0 /100 WBCS
PLATELET # BLD AUTO: 288 THOUSANDS/UL (ref 149–390)
PMV BLD AUTO: 8.4 FL (ref 8.9–12.7)
POTASSIUM SERPL-SCNC: 3.7 MMOL/L (ref 3.5–5.3)
PROT SERPL-MCNC: 6.5 G/DL (ref 6.4–8.4)
RBC # BLD AUTO: 3.64 MILLION/UL (ref 3.81–5.12)
SODIUM SERPL-SCNC: 139 MMOL/L (ref 135–147)
WBC # BLD AUTO: 3.1 THOUSAND/UL (ref 4.31–10.16)

## 2025-05-08 PROCEDURE — 85025 COMPLETE CBC W/AUTO DIFF WBC: CPT

## 2025-05-08 PROCEDURE — G2211 COMPLEX E/M VISIT ADD ON: HCPCS | Performed by: PHYSICIAN ASSISTANT

## 2025-05-08 PROCEDURE — 83735 ASSAY OF MAGNESIUM: CPT

## 2025-05-08 PROCEDURE — 80053 COMPREHEN METABOLIC PANEL: CPT

## 2025-05-08 PROCEDURE — 99215 OFFICE O/P EST HI 40 MIN: CPT | Performed by: PHYSICIAN ASSISTANT

## 2025-05-08 NOTE — PROGRESS NOTES
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 She has a somatic BRCA2 mutation. She is tolerating treatment well with minimal toxicity. Her inhibin B continues to decline.     Proceed with cycle 6 of treatment as long as her metabolic and hematologic parameters are adequate.     Plan for CT chest/abdomen/pelvis imaging after completion of cycle 6. Return to the office as per her chemotherapy calendar.   Orders:  •  CT chest abdomen pelvis w contrast; Future    Drug-induced peripheral neuropathy (HCC)  Resolved.   Taxol previously dose-reduced to 135 mg/m2.                History of Present Illness     Reason for Visit / CC:  Pre-Chemo Visit    Eli Cobos is a 65 y.o. female   who presents to the office for pre-chemotherapy evaluation. She is tolerating treatment well. She has been afebrile. Denies vomiting or abdominal pain. Normal bowel/bladder function currently. She notes intermittent diarrhea for 2-3 days following treatment. Denies blood in her stool, skin rashes, new cough/SOB. She denies chemotherapy-induced neuropathy.             Oncology History   Cancer Staging   Malignant neoplasm of left ovary (HCC)  Staging form: Ovary, Fallopian Tube, Primary Peritoneal, AJCC 8th Edition  - Clinical stage from 2019: FIGO Stage IC1, calculated as Stage IA (cT1a, cN0, cM0) - Signed by Trevin Bonilla MD on 2019  Oncology History   Malignant neoplasm of left ovary (HCC)   2019  Initial Diagnosis    Malignant neoplasm of left ovary (HCC)     8/26/2019 -  Cancer Staged    Staging form: Ovary, Fallopian Tube, Primary Peritoneal, AJCC 8th Edition  - Clinical stage from 8/26/2019: FIGO Stage IC1, calculated as Stage IA (cT1a, cN0, cM0) - Signed by Trevin Bonilla MD on 9/11/2019        Chemotherapy    Taxol 175 mg/m2 and carboplatin AUC 6 every 21 days. She received 5 cycles and is scheduled for cycle 6.      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      12/2022 - 6/27/2024 Hormone Therapy    Megace 80 mg PO BID (starting inhibin B, 9.2 pg/ml)     6/4/2024 - 6/21/2024 Radiation      Plan ID Energy Fractions Dose per Fraction (cGy) Dose Correction (cGy) Total Dose Delivered (cGy) Elapsed Days   SBRT MT 6X-FFF 5 / 5 600 0 3,000 17      1/28/2025 -  Chemotherapy    Taxol 175 mg/m2 and carboplatin AUC 6 IV every 21 days.     Cycle 4, taxol dose-reduced to 135 mg/m2 and durvalumab 1120 mg IV every 21 days added to plan.           Review of Systems   Constitutional:  Positive for fatigue (minimal). Negative for fever.   HENT: Negative.     Eyes: Negative.    Respiratory: Negative.     Cardiovascular: Negative.    Gastrointestinal:  Positive for diarrhea (occasional, immediately following treatment). Negative for abdominal pain and vomiting.   Genitourinary: Negative.    Musculoskeletal: Negative.    Skin: Negative.    Neurological: Negative.   "  Psychiatric/Behavioral: Negative.      A complete review of systems is negative other than that noted above in the HPI.  Medical History Reviewed by provider this encounter:  Tobacco  Allergies  Meds  Problems  Med Hx  Surg Hx  Fam Hx     .  Current Outpatient Medications on File Prior to Visit   Medication Sig Dispense Refill   • amLODIPine (NORVASC) 5 mg tablet Take 5 mg by mouth daily   4   • amoxicillin (AMOXIL) 500 mg capsule TAKE 2 CAPSULES RIGHT AWAY, THEN 1 CAPSULE EVERY 8 HOURS UNTIL FINISHED     • Cholecalciferol (VITAMIN D-3) 1000 units CAPS Take by mouth daily      • clobetasol (TEMOVATE) 0.05 % ointment Apply to the affected area 1-3x/week 30 g 0   • co-enzyme Q-10 100 mg capsule Take 100 mg by mouth daily     • escitalopram (LEXAPRO) 20 mg tablet 20 mg daily   0   • LORazepam (ATIVAN) 1 mg tablet Take 1 tablet (1 mg total) by mouth every 8 (eight) hours as needed (nausea or anxiety) 20 tablet 0   • meclizine (ANTIVERT) 25 mg tablet Take 1 tablet (25 mg total) by mouth 3 (three) times a day as needed for dizziness 30 tablet 0   • metFORMIN (GLUCOPHAGE) 500 mg tablet 500 mg 2 (two) times a day with meals   0   • Mounjaro 7.5 MG/0.5ML Inject 7.5 mg under the skin every 7 days     • ondansetron (ZOFRAN) 8 mg tablet Take 1 tablet (8 mg total) by mouth every 8 (eight) hours as needed for nausea or vomiting 30 tablet 1   • scopolamine (TRANSDERM-SCOP) 1.5 mg/3 days TD 72 hr patch Place 1 patch on the skin every third day 2 patch 1   • simvastatin (ZOCOR) 20 mg tablet TK 1 T PO QD IN THE KIMBERLYN  5   • SUPER B COMPLEX/C PO Take by mouth daily      • valsartan-hydrochlorothiazide (DIOVAN-HCT) 320-25 MG per tablet daily   0     No current facility-administered medications on file prior to visit.         Objective   /78 (BP Location: Left arm, Patient Position: Sitting, Cuff Size: Adult)   Pulse 89   Temp 98 °F (36.7 °C)   Resp 18   Ht 5' 3\" (1.6 m)   Wt 91.2 kg (201 lb)   SpO2 98%   BMI 35.61 " kg/m²     Body mass index is 35.61 kg/m².  Pain Screening:  Pain Score: 0-No pain  ECOG ECOG Performance Status: 0 - Fully active, able to carry on all pre-disease performance without restriction     Physical Exam  Vitals reviewed.   Constitutional:       General: She is not in acute distress.     Appearance: Normal appearance. She is not ill-appearing.   HENT:      Head: Normocephalic and atraumatic.      Mouth/Throat:      Mouth: Mucous membranes are moist.   Eyes:      General: No scleral icterus.        Right eye: No discharge.         Left eye: No discharge.      Conjunctiva/sclera: Conjunctivae normal.   Pulmonary:      Effort: Pulmonary effort is normal.   Musculoskeletal:      Right lower leg: No edema.      Left lower leg: No edema.   Skin:     General: Skin is warm and dry.      Coloration: Skin is not jaundiced.      Findings: No rash.   Neurological:      General: No focal deficit present.      Mental Status: She is alert and oriented to person, place, and time.      Cranial Nerves: No cranial nerve deficit.      Sensory: No sensory deficit.      Motor: No weakness.      Gait: Gait normal.   Psychiatric:         Mood and Affect: Mood normal.         Behavior: Behavior normal.         Thought Content: Thought content normal.         Judgment: Judgment normal.          Labs: I have reviewed pertinent labs.   Lab Results   Component Value Date/Time    WBC 3.10 (L) 05/08/2025 10:09 AM    RBC 3.64 (L) 05/08/2025 10:09 AM    Hemoglobin 11.0 (L) 05/08/2025 10:09 AM    Hematocrit 32.6 (L) 05/08/2025 10:09 AM    MCV 90 05/08/2025 10:09 AM    MCH 30.2 05/08/2025 10:09 AM    RDW 14.4 05/08/2025 10:09 AM    Platelets 288 05/08/2025 10:09 AM    Segmented % 53 05/08/2025 10:09 AM    Lymphocytes % 30 05/08/2025 10:09 AM    Monocytes % 13 (H) 05/08/2025 10:09 AM    Eosinophils Relative 2 05/08/2025 10:09 AM    Basophils Relative 1 05/08/2025 10:09 AM    Immature Grans % 1 05/08/2025 10:09 AM    Absolute Neutrophils 1.70  (L) 05/08/2025 10:09 AM      Lab Results   Component Value Date/Time    Sodium 139 05/08/2025 10:09 AM    Potassium 3.7 05/08/2025 10:09 AM    Chloride 102 05/08/2025 10:09 AM    CO2 30 05/08/2025 10:09 AM    ANION GAP 7 05/08/2025 10:09 AM    BUN 14 05/08/2025 10:09 AM    Creatinine 0.57 (L) 05/08/2025 10:09 AM    Glucose 151 (H) 05/08/2025 10:09 AM    Calcium 9.4 05/08/2025 10:09 AM    AST 15 05/08/2025 10:09 AM    ALT 12 05/08/2025 10:09 AM    Alkaline Phosphatase 65 05/08/2025 10:09 AM    Total Protein 6.5 05/08/2025 10:09 AM    Albumin 4.1 05/08/2025 10:09 AM    Total Bilirubin 0.36 05/08/2025 10:09 AM    eGFR 96 05/08/2025 10:09 AM      TSH:   Results from last 7 days   Lab Units 05/02/25  1311   TSH 3RD GENERATON uIU/mL 2.882       Amylase/Lipase:   Lab Results   Component Value Date/Time    Amylase 56 05/02/2025 01:11 PM    Lipase 33 05/02/2025 01:11 PM     Inhibin A/B:   Lab Results   Component Value Date/Time    Inhibin B 18.0 (H) 05/02/2025 01:11 PM      Lab Results   Component Value Date    INHBB 18.0 (H) 05/02/2025    INHBB 29.5 (H) 04/18/2025    INHBB 36.6 (H) 03/28/2025    INHBB 42.0 (H) 03/07/2025    INHBB 45.0 (H) 02/14/2025    INHBB 405.8 (H) 01/24/2025    INHBB 147.4 (H) 12/13/2024

## 2025-05-08 NOTE — ASSESSMENT & PLAN NOTE
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 She has a somatic BRCA2 mutation. She is tolerating treatment well with minimal toxicity. Her inhibin B continues to decline.     Proceed with cycle 6 of treatment as long as her metabolic and hematologic parameters are adequate.     Plan for CT chest/abdomen/pelvis imaging after completion of cycle 6. Return to the office as per her chemotherapy calendar.   Orders:  •  CT chest abdomen pelvis w contrast; Future

## 2025-05-12 DIAGNOSIS — E83.42 HYPOMAGNESEMIA: Primary | ICD-10-CM

## 2025-05-12 DIAGNOSIS — C56.2 MALIGNANT NEOPLASM OF LEFT OVARY (HCC): ICD-10-CM

## 2025-05-12 RX ORDER — SODIUM CHLORIDE 9 MG/ML
20 INJECTION, SOLUTION INTRAVENOUS ONCE
Status: CANCELLED | OUTPATIENT
Start: 2025-05-13

## 2025-05-12 RX ORDER — PALONOSETRON 0.05 MG/ML
0.25 INJECTION, SOLUTION INTRAVENOUS ONCE
Status: CANCELLED | OUTPATIENT
Start: 2025-05-13

## 2025-05-12 RX ORDER — MAGNESIUM SULFATE HEPTAHYDRATE 40 MG/ML
4 INJECTION, SOLUTION INTRAVENOUS ONCE
Status: CANCELLED
Start: 2025-05-13

## 2025-05-12 NOTE — PROGRESS NOTES
GYN ONC FELLOW BRIEF NOTE    Reviewed labs from 5/8, given downtrending WBC/ANC (although still at 1700), will plan for repeat CBC w diff on arrival to treatment tomorrow.     D/w Luisana Freedman PA-C

## 2025-05-13 ENCOUNTER — HOSPITAL ENCOUNTER (OUTPATIENT)
Dept: INFUSION CENTER | Facility: HOSPITAL | Age: 65
Discharge: HOME | End: 2025-05-13
Attending: OBSTETRICS & GYNECOLOGY
Payer: MEDICARE

## 2025-05-13 VITALS
SYSTOLIC BLOOD PRESSURE: 136 MMHG | DIASTOLIC BLOOD PRESSURE: 83 MMHG | BODY MASS INDEX: 35.22 KG/M2 | HEART RATE: 91 BPM | WEIGHT: 198.8 LBS | OXYGEN SATURATION: 97 % | RESPIRATION RATE: 18 BRPM | TEMPERATURE: 97 F | HEIGHT: 63 IN

## 2025-05-13 DIAGNOSIS — E83.42 HYPOMAGNESEMIA: Primary | ICD-10-CM

## 2025-05-13 DIAGNOSIS — C56.2 MALIGNANT NEOPLASM OF LEFT OVARY (HCC): ICD-10-CM

## 2025-05-13 LAB
BASOPHILS # BLD AUTO: 0.04 THOUSANDS/ÂΜL (ref 0–0.1)
BASOPHILS NFR BLD AUTO: 1 % (ref 0–1)
EOSINOPHIL # BLD AUTO: 0.04 THOUSAND/ÂΜL (ref 0–0.61)
EOSINOPHIL NFR BLD AUTO: 1 % (ref 0–6)
ERYTHROCYTE [DISTWIDTH] IN BLOOD BY AUTOMATED COUNT: 14 % (ref 11.6–15.1)
HCT VFR BLD AUTO: 33.3 % (ref 34.8–46.1)
HGB BLD-MCNC: 11.2 G/DL (ref 11.5–15.4)
IMM GRANULOCYTES # BLD AUTO: 0.03 THOUSAND/UL (ref 0–0.2)
IMM GRANULOCYTES NFR BLD AUTO: 1 % (ref 0–2)
LYMPHOCYTES # BLD AUTO: 1.03 THOUSANDS/ÂΜL (ref 0.6–4.47)
LYMPHOCYTES NFR BLD AUTO: 24 % (ref 14–44)
MCH RBC QN AUTO: 29.8 PG (ref 26.8–34.3)
MCHC RBC AUTO-ENTMCNC: 33.6 G/DL (ref 31.4–37.4)
MCV RBC AUTO: 89 FL (ref 82–98)
MONOCYTES # BLD AUTO: 0.4 THOUSAND/ÂΜL (ref 0.17–1.22)
MONOCYTES NFR BLD AUTO: 9 % (ref 4–12)
NEUTROPHILS # BLD AUTO: 2.84 THOUSANDS/ÂΜL (ref 1.85–7.62)
NEUTS SEG NFR BLD AUTO: 64 % (ref 43–75)
NRBC BLD AUTO-RTO: 0 /100 WBCS
PLATELET # BLD AUTO: 219 THOUSANDS/UL (ref 149–390)
PMV BLD AUTO: 8.1 FL (ref 8.9–12.7)
RBC # BLD AUTO: 3.76 MILLION/UL (ref 3.81–5.12)
WBC # BLD AUTO: 4.38 THOUSAND/UL (ref 4.31–10.16)

## 2025-05-13 PROCEDURE — 96375 TX/PRO/DX INJ NEW DRUG ADDON: CPT

## 2025-05-13 PROCEDURE — 96367 TX/PROPH/DG ADDL SEQ IV INF: CPT

## 2025-05-13 PROCEDURE — 96368 THER/DIAG CONCURRENT INF: CPT

## 2025-05-13 PROCEDURE — 85025 COMPLETE CBC W/AUTO DIFF WBC: CPT | Performed by: OBSTETRICS & GYNECOLOGY

## 2025-05-13 PROCEDURE — 96413 CHEMO IV INFUSION 1 HR: CPT

## 2025-05-13 PROCEDURE — 96415 CHEMO IV INFUSION ADDL HR: CPT

## 2025-05-13 PROCEDURE — 96417 CHEMO IV INFUS EACH ADDL SEQ: CPT

## 2025-05-13 RX ORDER — PALONOSETRON 0.05 MG/ML
0.25 INJECTION, SOLUTION INTRAVENOUS ONCE
Status: COMPLETED | OUTPATIENT
Start: 2025-05-13 | End: 2025-05-13

## 2025-05-13 RX ORDER — MAGNESIUM SULFATE HEPTAHYDRATE 40 MG/ML
4 INJECTION, SOLUTION INTRAVENOUS ONCE
Status: COMPLETED | OUTPATIENT
Start: 2025-05-13 | End: 2025-05-13

## 2025-05-13 RX ORDER — SODIUM CHLORIDE 9 MG/ML
20 INJECTION, SOLUTION INTRAVENOUS ONCE
Status: COMPLETED | OUTPATIENT
Start: 2025-05-13 | End: 2025-05-13

## 2025-05-13 RX ADMIN — FOSAPREPITANT 150 MG: 150 INJECTION, POWDER, LYOPHILIZED, FOR SOLUTION INTRAVENOUS at 09:12

## 2025-05-13 RX ADMIN — DEXAMETHASONE SODIUM PHOSPHATE 20 MG: 10 INJECTION, SOLUTION INTRAMUSCULAR; INTRAVENOUS at 07:57

## 2025-05-13 RX ADMIN — DIPHENHYDRAMINE HYDROCHLORIDE 25 MG: 50 INJECTION INTRAMUSCULAR; INTRAVENOUS at 08:21

## 2025-05-13 RX ADMIN — SODIUM CHLORIDE 20 ML/HR: 0.9 INJECTION, SOLUTION INTRAVENOUS at 07:56

## 2025-05-13 RX ADMIN — FAMOTIDINE 20 MG: 10 INJECTION INTRAVENOUS at 08:49

## 2025-05-13 RX ADMIN — PALONOSETRON 0.25 MG: 0.05 INJECTION, SOLUTION INTRAVENOUS at 07:58

## 2025-05-13 RX ADMIN — CARBOPLATIN 643.2 MG: 10 INJECTION, SOLUTION INTRAVENOUS at 12:53

## 2025-05-13 RX ADMIN — DURVALUMAB 1120 MG: 500 INJECTION, SOLUTION INTRAVENOUS at 14:02

## 2025-05-13 RX ADMIN — PACLITAXEL 258 MG: 6 INJECTION, SOLUTION INTRAVENOUS at 09:51

## 2025-05-13 RX ADMIN — MAGNESIUM SULFATE IN WATER 4 G: 40 INJECTION, SOLUTION INTRAVENOUS at 09:50

## 2025-05-13 NOTE — PROGRESS NOTES
Eli Cobos  tolerated treatment well with no complications.      Eli Cobos is aware of future appt on 05/16/2025 at 12:00 PM.     AVS printed and given to Eli Cobos:  No (Declined by Eli Cobos)

## 2025-05-16 ENCOUNTER — HOSPITAL ENCOUNTER (OUTPATIENT)
Dept: INFUSION CENTER | Facility: HOSPITAL | Age: 65
End: 2025-05-16
Payer: MEDICARE

## 2025-05-16 DIAGNOSIS — Z45.2 ENCOUNTER FOR CENTRAL LINE CARE: Primary | ICD-10-CM

## 2025-05-16 DIAGNOSIS — C56.2 MALIGNANT NEOPLASM OF LEFT OVARY (HCC): ICD-10-CM

## 2025-05-16 LAB
ALBUMIN SERPL BCG-MCNC: 4.1 G/DL (ref 3.5–5)
ALP SERPL-CCNC: 75 U/L (ref 34–104)
ALT SERPL W P-5'-P-CCNC: 16 U/L (ref 7–52)
ANION GAP SERPL CALCULATED.3IONS-SCNC: 9 MMOL/L (ref 4–13)
AST SERPL W P-5'-P-CCNC: 17 U/L (ref 13–39)
BASOPHILS # BLD AUTO: 0.03 THOUSANDS/ÂΜL (ref 0–0.1)
BASOPHILS NFR BLD AUTO: 1 % (ref 0–1)
BILIRUB SERPL-MCNC: 0.45 MG/DL (ref 0.2–1)
BUN SERPL-MCNC: 14 MG/DL (ref 5–25)
CALCIUM SERPL-MCNC: 9.5 MG/DL (ref 8.4–10.2)
CHLORIDE SERPL-SCNC: 97 MMOL/L (ref 96–108)
CO2 SERPL-SCNC: 29 MMOL/L (ref 21–32)
CREAT SERPL-MCNC: 0.56 MG/DL (ref 0.6–1.3)
EOSINOPHIL # BLD AUTO: 0.02 THOUSAND/ÂΜL (ref 0–0.61)
EOSINOPHIL NFR BLD AUTO: 0 % (ref 0–6)
ERYTHROCYTE [DISTWIDTH] IN BLOOD BY AUTOMATED COUNT: 13.2 % (ref 11.6–15.1)
GFR SERPL CREATININE-BSD FRML MDRD: 96 ML/MIN/1.73SQ M
GLUCOSE SERPL-MCNC: 267 MG/DL (ref 65–140)
HCT VFR BLD AUTO: 34.6 % (ref 34.8–46.1)
HGB BLD-MCNC: 11.7 G/DL (ref 11.5–15.4)
IMM GRANULOCYTES # BLD AUTO: 0.04 THOUSAND/UL (ref 0–0.2)
IMM GRANULOCYTES NFR BLD AUTO: 1 % (ref 0–2)
LYMPHOCYTES # BLD AUTO: 0.97 THOUSANDS/ÂΜL (ref 0.6–4.47)
LYMPHOCYTES NFR BLD AUTO: 17 % (ref 14–44)
MAGNESIUM SERPL-MCNC: 1.4 MG/DL (ref 1.9–2.7)
MCH RBC QN AUTO: 29.7 PG (ref 26.8–34.3)
MCHC RBC AUTO-ENTMCNC: 33.8 G/DL (ref 31.4–37.4)
MCV RBC AUTO: 88 FL (ref 82–98)
MONOCYTES # BLD AUTO: 0.14 THOUSAND/ÂΜL (ref 0.17–1.22)
MONOCYTES NFR BLD AUTO: 3 % (ref 4–12)
NEUTROPHILS # BLD AUTO: 4.49 THOUSANDS/ÂΜL (ref 1.85–7.62)
NEUTS SEG NFR BLD AUTO: 78 % (ref 43–75)
NRBC BLD AUTO-RTO: 0 /100 WBCS
PLATELET # BLD AUTO: 187 THOUSANDS/UL (ref 149–390)
PMV BLD AUTO: 8.5 FL (ref 8.9–12.7)
POTASSIUM SERPL-SCNC: 3.8 MMOL/L (ref 3.5–5.3)
PROT SERPL-MCNC: 6.5 G/DL (ref 6.4–8.4)
RBC # BLD AUTO: 3.94 MILLION/UL (ref 3.81–5.12)
SODIUM SERPL-SCNC: 135 MMOL/L (ref 135–147)
WBC # BLD AUTO: 5.69 THOUSAND/UL (ref 4.31–10.16)

## 2025-05-16 PROCEDURE — 85025 COMPLETE CBC W/AUTO DIFF WBC: CPT

## 2025-05-16 PROCEDURE — 80053 COMPREHEN METABOLIC PANEL: CPT

## 2025-05-16 PROCEDURE — 83735 ASSAY OF MAGNESIUM: CPT

## 2025-05-16 NOTE — PROGRESS NOTES
Eli Cobos  tolerated treatment well with no complications.      Eli Cobos is aware of future appt on 5/23 at 1130.     AVS printed and given to Eli Cobos:    No (Declined by Eli Cobos)

## 2025-05-23 ENCOUNTER — HOSPITAL ENCOUNTER (OUTPATIENT)
Dept: CT IMAGING | Facility: HOSPITAL | Age: 65
Discharge: HOME | End: 2025-05-23
Attending: PHYSICIAN ASSISTANT
Payer: MEDICARE

## 2025-05-23 ENCOUNTER — HOSPITAL ENCOUNTER (OUTPATIENT)
Dept: INFUSION CENTER | Facility: HOSPITAL | Age: 65
End: 2025-05-23
Payer: MEDICARE

## 2025-05-23 DIAGNOSIS — C56.2 MALIGNANT NEOPLASM OF LEFT OVARY (HCC): ICD-10-CM

## 2025-05-23 DIAGNOSIS — Z45.2 ENCOUNTER FOR CENTRAL LINE CARE: Primary | ICD-10-CM

## 2025-05-23 LAB
ALBUMIN SERPL BCG-MCNC: 4.1 G/DL (ref 3.5–5)
ALP SERPL-CCNC: 73 U/L (ref 34–104)
ALT SERPL W P-5'-P-CCNC: 14 U/L (ref 7–52)
AMYLASE SERPL-CCNC: 47 IU/L (ref 29–103)
ANION GAP SERPL CALCULATED.3IONS-SCNC: 7 MMOL/L (ref 4–13)
AST SERPL W P-5'-P-CCNC: 14 U/L (ref 13–39)
BASOPHILS # BLD AUTO: 0.02 THOUSANDS/ÂΜL (ref 0–0.1)
BASOPHILS NFR BLD AUTO: 1 % (ref 0–1)
BILIRUB SERPL-MCNC: 0.25 MG/DL (ref 0.2–1)
BUN SERPL-MCNC: 12 MG/DL (ref 5–25)
CALCIUM SERPL-MCNC: 9.4 MG/DL (ref 8.4–10.2)
CHLORIDE SERPL-SCNC: 101 MMOL/L (ref 96–108)
CO2 SERPL-SCNC: 30 MMOL/L (ref 21–32)
CREAT SERPL-MCNC: 0.51 MG/DL (ref 0.6–1.3)
EOSINOPHIL # BLD AUTO: 0.03 THOUSAND/ÂΜL (ref 0–0.61)
EOSINOPHIL NFR BLD AUTO: 1 % (ref 0–6)
ERYTHROCYTE [DISTWIDTH] IN BLOOD BY AUTOMATED COUNT: 13 % (ref 11.6–15.1)
GFR SERPL CREATININE-BSD FRML MDRD: 99 ML/MIN/1.73SQ M
GLUCOSE SERPL-MCNC: 179 MG/DL (ref 65–140)
HCT VFR BLD AUTO: 30.9 % (ref 34.8–46.1)
HGB BLD-MCNC: 10.4 G/DL (ref 11.5–15.4)
IMM GRANULOCYTES # BLD AUTO: 0.03 THOUSAND/UL (ref 0–0.2)
IMM GRANULOCYTES NFR BLD AUTO: 1 % (ref 0–2)
LIPASE SERPL-CCNC: 29 U/L (ref 11–82)
LYMPHOCYTES # BLD AUTO: 0.99 THOUSANDS/ÂΜL (ref 0.6–4.47)
LYMPHOCYTES NFR BLD AUTO: 30 % (ref 14–44)
MAGNESIUM SERPL-MCNC: 1.4 MG/DL (ref 1.9–2.7)
MCH RBC QN AUTO: 30.1 PG (ref 26.8–34.3)
MCHC RBC AUTO-ENTMCNC: 33.7 G/DL (ref 31.4–37.4)
MCV RBC AUTO: 89 FL (ref 82–98)
MONOCYTES # BLD AUTO: 0.31 THOUSAND/ÂΜL (ref 0.17–1.22)
MONOCYTES NFR BLD AUTO: 9 % (ref 4–12)
NEUTROPHILS # BLD AUTO: 1.96 THOUSANDS/ÂΜL (ref 1.85–7.62)
NEUTS SEG NFR BLD AUTO: 58 % (ref 43–75)
NRBC BLD AUTO-RTO: 0 /100 WBCS
PLATELET # BLD AUTO: 206 THOUSANDS/UL (ref 149–390)
PMV BLD AUTO: 8.9 FL (ref 8.9–12.7)
POTASSIUM SERPL-SCNC: 3.9 MMOL/L (ref 3.5–5.3)
PROT SERPL-MCNC: 6.5 G/DL (ref 6.4–8.4)
RBC # BLD AUTO: 3.46 MILLION/UL (ref 3.81–5.12)
SODIUM SERPL-SCNC: 138 MMOL/L (ref 135–147)
TSH SERPL DL<=0.05 MIU/L-ACNC: 2.15 UIU/ML (ref 0.45–4.5)
WBC # BLD AUTO: 3.34 THOUSAND/UL (ref 4.31–10.16)

## 2025-05-23 PROCEDURE — 82150 ASSAY OF AMYLASE: CPT

## 2025-05-23 PROCEDURE — 85025 COMPLETE CBC W/AUTO DIFF WBC: CPT

## 2025-05-23 PROCEDURE — 83690 ASSAY OF LIPASE: CPT

## 2025-05-23 PROCEDURE — 83520 IMMUNOASSAY QUANT NOS NONAB: CPT

## 2025-05-23 PROCEDURE — 84443 ASSAY THYROID STIM HORMONE: CPT

## 2025-05-23 PROCEDURE — 83735 ASSAY OF MAGNESIUM: CPT

## 2025-05-23 PROCEDURE — 74177 CT ABD & PELVIS W/CONTRAST: CPT

## 2025-05-23 PROCEDURE — 71260 CT THORAX DX C+: CPT

## 2025-05-23 PROCEDURE — 80053 COMPREHEN METABOLIC PANEL: CPT

## 2025-05-23 RX ADMIN — IOHEXOL 100 ML: 350 INJECTION, SOLUTION INTRAVENOUS at 13:11

## 2025-05-23 NOTE — PLAN OF CARE
Problem: Knowledge Deficit  Goal: Patient/family/caregiver demonstrates understanding of disease process, treatment plan, medications, and discharge instructions  Description: Complete learning assessment and assess knowledge base.  Interventions:  - Provide teaching at level of understanding  - Provide teaching via preferred learning methods  5/23/2025 1135 by Luly Gastelum, RN  Outcome: Progressing  5/23/2025 1127 by Luly Gastelum, RN  Outcome: Progressing

## 2025-05-23 NOTE — PROGRESS NOTES
Eil Cobos  tolerated port flush with labs well with no complications.      Eli Cobos is aware of future appt on 5/30 at 1130.     AVS printed and given to Eli Cobos:    No (Declined by Eli Cobos)

## 2025-05-28 LAB — INHIBIN B SERPL-MCNC: 24.6 PG/ML (ref 0–16.9)

## 2025-05-29 ENCOUNTER — OFFICE VISIT (OUTPATIENT)
Age: 65
End: 2025-05-29
Payer: MEDICARE

## 2025-05-29 VITALS
HEIGHT: 63 IN | TEMPERATURE: 97.8 F | HEART RATE: 102 BPM | SYSTOLIC BLOOD PRESSURE: 138 MMHG | OXYGEN SATURATION: 98 % | BODY MASS INDEX: 35.79 KG/M2 | WEIGHT: 202 LBS | RESPIRATION RATE: 18 BRPM | DIASTOLIC BLOOD PRESSURE: 68 MMHG

## 2025-05-29 DIAGNOSIS — C56.2 MALIGNANT NEOPLASM OF LEFT OVARY (HCC): ICD-10-CM

## 2025-05-29 DIAGNOSIS — C56.2 MALIGNANT NEOPLASM OF LEFT OVARY (HCC): Primary | ICD-10-CM

## 2025-05-29 DIAGNOSIS — E83.42 HYPOMAGNESEMIA: Primary | ICD-10-CM

## 2025-05-29 DIAGNOSIS — G62.0 DRUG-INDUCED PERIPHERAL NEUROPATHY (HCC): ICD-10-CM

## 2025-05-29 PROCEDURE — 99214 OFFICE O/P EST MOD 30 MIN: CPT | Performed by: OBSTETRICS & GYNECOLOGY

## 2025-05-29 PROCEDURE — G2211 COMPLEX E/M VISIT ADD ON: HCPCS | Performed by: OBSTETRICS & GYNECOLOGY

## 2025-05-29 NOTE — ASSESSMENT & PLAN NOTE
65-year-old with recurrent stage I C1 poorly differentiated stromal cell tumor of the ovary, multifocal peritoneal disease receiving palliative chemotherapy with carboplatin AUC 6, Taxol 135 mg/m², durvalumab 1120 mg IV every 21 days.  There is a somatic BRCA2 mutation.  She received 6 cycles as of 5/13/2025.  I reviewed inhibin B, TSH, CBC, CMP, amylase, lipase, magnesium, CT chest abdomen pelvis images.  There is ongoing response to treatment.  She has treatment related fatigue.  Her performance status is 0.  1.  Given ongoing measurable disease response with the current regimen, overall continued decline in inhibin B, we discussed treatment options including continuing with the current regimen, transitioning to maintenance treatment, surgical cytoreduction.  2.  Plan to review imaging with surgical oncology to assess the feasibility of surgical cytoreduction.  Based on her last surgery in 2022, surgical cytoreduction may have risk of injury to other organs due to extensive intra-abdominal adhesive disease.  3.  Due to the measurable response, I recommended 2 additional cycles of palliative carboplatin AUC 6, Taxol 135 mg/m², durvalumab 1120 mg IV every 21 days.  With a short interval follow-up CT imaging after 2 cycles to reassess.  If the disease is stable, can consider transitioning to maintenance durvalumab and adding olaparib at 300 mg twice daily.

## 2025-05-29 NOTE — PROGRESS NOTES
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  There is a somatic BRCA2 mutation.  She received 6 cycles as of 2025.  I reviewed inhibin B, TSH, CBC, CMP, amylase, lipase, magnesium, CT chest abdomen pelvis images.  There is ongoing response to treatment.  She has treatment related fatigue.  Her performance status is 0.  1.  Given ongoing measurable disease response with the current regimen, overall continued decline in inhibin B, we discussed treatment options including continuing with the current regimen, transitioning to maintenance treatment, surgical cytoreduction.  2.  Plan to review imaging with surgical oncology to assess the feasibility of surgical cytoreduction.  Based on her last surgery in , surgical cytoreduction may have risk of injury to other organs due to extensive intra-abdominal adhesive disease.  3.  Due to the measurable response, I recommended 2 additional cycles of palliative carboplatin AUC 6, Taxol 135 mg/m², durvalumab 1120 mg IV every 21 days.  With a short interval follow-up CT imaging after 2 cycles to reassess.  If the disease is stable, can consider transitioning to maintenance durvalumab and adding olaparib at 300 mg twice daily.       Drug-induced peripheral neuropathy (HCC)  Grade 1 right foot neuropathy.  She feels it more as a fullness of the foot.  No pain.  Will continue to monitor.  Dose of Taxol has been adjusted.         Assessment & Plan            History of Present Illness    Reason for Visit / CC: Pre-chemotherapy evaluation, treatment discussion   Eli Cobos is a 65 y.o. female   History of Present Illness  Who returns for treatment discussion, prechemotherapy evaluation.  Inhibin B has declined to 24.6 from a pretreatment of 405.8.  On 4/18/2025, inhibin B was 29.5.  She has received 6 cycles of carboplatin, Taxol, durvalumab.  The tumor has a somatic BRCA2 mutation.  CT of the chest abdomen pelvis 5/23/2025 revealed ongoing measurable disease at the diaphragm now measuring 3 x 2 cm previously 4.1 x 2.4 cm.  The omental deposit measures 1.2 cm maximal dimension prior 1.5 cm.  Central mesenteric nodule 1.9 cm, stable.  Personally reviewed the images and concur with the radiologist's opinion.  She feels fatigued for several days after receiving chemotherapy treatment.  She is then able to bounce back and recover to her normal level of activity.  She has fallen recently and has elbow pain as well as knee pain.  She is able to perform her actives of daily living.  She notes a feeling of fullness in her foot on the right side.  No pain.  No other interval change in medications or medical history since her last visit.  Pertinent Medical History        Oncology History   Cancer Staging   Malignant neoplasm of left ovary (HCC)  Staging form: Ovary, Fallopian Tube, Primary Peritoneal, AJCC 8th Edition  - Clinical stage from 8/26/2019: FIGO Stage IC1, calculated as Stage IA (cT1a, cN0, cM0) - Signed by Trevin Bonilla MD on 9/11/2019  Oncology History   Malignant neoplasm of left ovary (HCC)   8/26/2019 Initial Diagnosis    Malignant neoplasm of left ovary (HCC)     8/26/2019 -  Cancer Staged    Staging form: Ovary, Fallopian Tube, Primary Peritoneal, AJCC 8th Edition  - Clinical stage from 8/26/2019: FIGO Stage IC1, calculated as Stage IA (cT1a, cN0, cM0) - Signed by Trevin Bonilla MD on 9/11/2019        Chemotherapy    Taxol 175 mg/m2 and carboplatin AUC 6 every 21 days. She  received 5 cycles and is scheduled for cycle 6.      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      12/2022 - 6/27/2024 Hormone Therapy    Megace 80 mg PO BID (starting inhibin B, 9.2 pg/ml)     6/4/2024 - 6/21/2024 Radiation      Plan ID Energy Fractions Dose per Fraction (cGy) Dose Correction (cGy) Total Dose Delivered (cGy) Elapsed Days   SBRT MT 6X-FFF 5 / 5 600 0 3,000 17        1/28/2025 -  Chemotherapy    Taxol 175 mg/m2 and carboplatin AUC 6 IV every 21 days.     Cycle 4, taxol dose-reduced to 135 mg/m2 and durvalumab 1120 mg IV every 21 days added to plan.           Review of Systems   Constitutional:  Negative for activity change and unexpected weight change.   HENT: Negative.     Eyes: Negative.    Respiratory: Negative.     Cardiovascular: Negative.    Gastrointestinal:  Negative for abdominal distention and abdominal pain.   Endocrine: Negative.    Genitourinary:  Negative for pelvic pain and vaginal bleeding.   Musculoskeletal: Negative.    Skin: Negative.    Allergic/Immunologic: Negative.    Neurological:  Positive for numbness.   Hematological: Negative.    Psychiatric/Behavioral: Negative.      A complete review of systems is negative other than that noted above in the HPI.  Medications Ordered Prior to Encounter[1]      Objective   There were no vitals taken for this visit.    There is no height or weight on file to calculate BMI.  Pain Screening:   "   ECOG   0  Physical Exam  Vitals reviewed.   Constitutional:       General: She is not in acute distress.     Appearance: Normal appearance. She is not ill-appearing.   HENT:      Head: Normocephalic and atraumatic.      Mouth/Throat:      Mouth: Mucous membranes are moist.     Eyes:      General: No scleral icterus.        Right eye: No discharge.         Left eye: No discharge.      Conjunctiva/sclera: Conjunctivae normal.     Pulmonary:      Effort: Pulmonary effort is normal.     Musculoskeletal:      Right lower leg: No edema.      Left lower leg: No edema.     Skin:     General: Skin is warm and dry.      Coloration: Skin is not jaundiced.      Findings: No rash.     Neurological:      General: No focal deficit present.      Mental Status: She is alert and oriented to person, place, and time.      Cranial Nerves: No cranial nerve deficit.      Sensory: No sensory deficit.      Motor: No weakness.      Gait: Gait normal.     Psychiatric:         Mood and Affect: Mood normal.         Behavior: Behavior normal.         Thought Content: Thought content normal.         Judgment: Judgment normal.       Physical Exam       Results    Labs: I have reviewed pertinent labs.   No results found for: \"\"  Lab Results   Component Value Date/Time    Potassium 3.9 05/23/2025 11:48 AM    Chloride 101 05/23/2025 11:48 AM    CO2 30 05/23/2025 11:48 AM    BUN 12 05/23/2025 11:48 AM    Creatinine 0.51 (L) 05/23/2025 11:48 AM    Calcium 9.4 05/23/2025 11:48 AM    AST 14 05/23/2025 11:48 AM    ALT 14 05/23/2025 11:48 AM    Alkaline Phosphatase 73 05/23/2025 11:48 AM    eGFR 99 05/23/2025 11:48 AM     Lab Results   Component Value Date/Time    WBC 3.34 (L) 05/23/2025 11:48 AM    Hemoglobin 10.4 (L) 05/23/2025 11:48 AM    Hematocrit 30.9 (L) 05/23/2025 11:48 AM    MCV 89 05/23/2025 11:48 AM    Platelets 206 05/23/2025 11:48 AM     Lab Results   Component Value Date/Time    Absolute Neutrophils 1.96 05/23/2025 11:48 AM "        Trend:  Lab Results   Component Value Date     42.7 (H) 08/16/2019       Radiology Results Review: I personally reviewed the following image studies in PACS and associated radiology reports: CT chest and CT abdomen/pelvis. My interpretation of the radiology images/reports is: Decreased measurable disease at the diaphragm, stable disease elsewhere..             [1]   Current Outpatient Medications on File Prior to Visit   Medication Sig Dispense Refill    amLODIPine (NORVASC) 5 mg tablet Take 5 mg by mouth in the morning.  4    Cholecalciferol (VITAMIN D-3) 1000 units CAPS Take by mouth in the morning.      clobetasol (TEMOVATE) 0.05 % ointment Apply to the affected area 1-3x/week 30 g 0    co-enzyme Q-10 100 mg capsule Take 100 mg by mouth in the morning.      escitalopram (LEXAPRO) 20 mg tablet 20 mg in the morning.  0    meclizine (ANTIVERT) 25 mg tablet Take 1 tablet (25 mg total) by mouth 3 (three) times a day as needed for dizziness 30 tablet 0    metFORMIN (GLUCOPHAGE) 500 mg tablet 500 mg in the morning and 500 mg in the evening. Take with meals.  0    ondansetron (ZOFRAN) 8 mg tablet Take 1 tablet (8 mg total) by mouth every 8 (eight) hours as needed for nausea or vomiting 30 tablet 1    simvastatin (ZOCOR) 20 mg tablet   5    SUPER B COMPLEX/C PO Take by mouth in the morning.      valsartan-hydrochlorothiazide (DIOVAN-HCT) 320-25 MG per tablet in the morning.  0    amoxicillin (AMOXIL) 500 mg capsule TAKE 2 CAPSULES RIGHT AWAY, THEN 1 CAPSULE EVERY 8 HOURS UNTIL FINISHED      LORazepam (ATIVAN) 1 mg tablet Take 1 tablet (1 mg total) by mouth every 8 (eight) hours as needed (nausea or anxiety) 20 tablet 0    Mounjaro 7.5 MG/0.5ML Inject 7.5 mg under the skin every 7 days      scopolamine (TRANSDERM-SCOP) 1.5 mg/3 days TD 72 hr patch Place 1 patch on the skin every third day 2 patch 1     No current facility-administered medications on file prior to visit.

## 2025-05-29 NOTE — ASSESSMENT & PLAN NOTE
Grade 1 right foot neuropathy.  She feels it more as a fullness of the foot.  No pain.  Will continue to monitor.  Dose of Taxol has been adjusted.

## 2025-05-30 ENCOUNTER — HOSPITAL ENCOUNTER (OUTPATIENT)
Dept: INFUSION CENTER | Facility: HOSPITAL | Age: 65
Discharge: HOME | End: 2025-05-30
Payer: MEDICARE

## 2025-05-30 DIAGNOSIS — Z45.2 ENCOUNTER FOR CENTRAL LINE CARE: Primary | ICD-10-CM

## 2025-05-30 DIAGNOSIS — C56.2 MALIGNANT NEOPLASM OF LEFT OVARY (HCC): ICD-10-CM

## 2025-05-30 DIAGNOSIS — C56.2 MALIGNANT NEOPLASM OF LEFT OVARY (HCC): Primary | ICD-10-CM

## 2025-05-30 LAB
ALBUMIN SERPL BCG-MCNC: 4.1 G/DL (ref 3.5–5)
ALP SERPL-CCNC: 77 U/L (ref 34–104)
ALT SERPL W P-5'-P-CCNC: 13 U/L (ref 7–52)
ANION GAP SERPL CALCULATED.3IONS-SCNC: 7 MMOL/L (ref 4–13)
AST SERPL W P-5'-P-CCNC: 17 U/L (ref 13–39)
BASOPHILS # BLD AUTO: 0.03 THOUSANDS/ÂΜL (ref 0–0.1)
BASOPHILS NFR BLD AUTO: 1 % (ref 0–1)
BILIRUB SERPL-MCNC: 0.35 MG/DL (ref 0.2–1)
BUN SERPL-MCNC: 12 MG/DL (ref 5–25)
CALCIUM SERPL-MCNC: 9.2 MG/DL (ref 8.4–10.2)
CHLORIDE SERPL-SCNC: 102 MMOL/L (ref 96–108)
CO2 SERPL-SCNC: 30 MMOL/L (ref 21–32)
CREAT SERPL-MCNC: 0.56 MG/DL (ref 0.6–1.3)
EOSINOPHIL # BLD AUTO: 0.03 THOUSAND/ÂΜL (ref 0–0.61)
EOSINOPHIL NFR BLD AUTO: 1 % (ref 0–6)
ERYTHROCYTE [DISTWIDTH] IN BLOOD BY AUTOMATED COUNT: 13.4 % (ref 11.6–15.1)
GFR SERPL CREATININE-BSD FRML MDRD: 96 ML/MIN/1.73SQ M
GLUCOSE SERPL-MCNC: 195 MG/DL (ref 65–140)
HCT VFR BLD AUTO: 31.3 % (ref 34.8–46.1)
HGB BLD-MCNC: 10.8 G/DL (ref 11.5–15.4)
IMM GRANULOCYTES # BLD AUTO: 0.03 THOUSAND/UL (ref 0–0.2)
IMM GRANULOCYTES NFR BLD AUTO: 1 % (ref 0–2)
LYMPHOCYTES # BLD AUTO: 0.92 THOUSANDS/ÂΜL (ref 0.6–4.47)
LYMPHOCYTES NFR BLD AUTO: 35 % (ref 14–44)
MAGNESIUM SERPL-MCNC: 1.5 MG/DL (ref 1.9–2.7)
MCH RBC QN AUTO: 30.8 PG (ref 26.8–34.3)
MCHC RBC AUTO-ENTMCNC: 34.5 G/DL (ref 31.4–37.4)
MCV RBC AUTO: 89 FL (ref 82–98)
MONOCYTES # BLD AUTO: 0.3 THOUSAND/ÂΜL (ref 0.17–1.22)
MONOCYTES NFR BLD AUTO: 12 % (ref 4–12)
NEUTROPHILS # BLD AUTO: 1.31 THOUSANDS/ÂΜL (ref 1.85–7.62)
NEUTS SEG NFR BLD AUTO: 50 % (ref 43–75)
NRBC BLD AUTO-RTO: 0 /100 WBCS
PLATELET # BLD AUTO: 281 THOUSANDS/UL (ref 149–390)
PMV BLD AUTO: 8.3 FL (ref 8.9–12.7)
POTASSIUM SERPL-SCNC: 3.6 MMOL/L (ref 3.5–5.3)
PROT SERPL-MCNC: 6.5 G/DL (ref 6.4–8.4)
RBC # BLD AUTO: 3.51 MILLION/UL (ref 3.81–5.12)
SODIUM SERPL-SCNC: 139 MMOL/L (ref 135–147)
WBC # BLD AUTO: 2.62 THOUSAND/UL (ref 4.31–10.16)

## 2025-05-30 PROCEDURE — 85025 COMPLETE CBC W/AUTO DIFF WBC: CPT

## 2025-05-30 PROCEDURE — 80053 COMPREHEN METABOLIC PANEL: CPT

## 2025-05-30 PROCEDURE — 83735 ASSAY OF MAGNESIUM: CPT

## 2025-05-30 NOTE — PLAN OF CARE
Problem: Potential for Falls  Goal: Patient will remain free of falls  Description: INTERVENTIONS:  - Educate patient/family on patient safety including physical limitations  - Instruct patient to call for assistance with activity   - Consider consulting OT/PT to assist with strengthening/mobility based on AM PAC & JH-HLM score  - Consult OT/PT to assist with strengthening/mobility   - Keep Call bell within reach  - Keep bed low and locked with side rails adjusted as appropriate  - Keep care items and personal belongings within reach  - Initiate and maintain comfort rounds  - Make Fall Risk Sign visible to staff  Problem: Knowledge Deficit  Goal: Patient/family/caregiver demonstrates understanding of disease process, treatment plan, medications, and discharge instructions  Description: Complete learning assessment and assess knowledge base.  Interventions:  - Provide teaching at level of understanding  - Provide teaching via preferred learning methods  Outcome: Progressing     - Apply yellow socks and bracelet for high fall risk patients  - Consider moving patient to room near nurses station  Outcome: Progressing

## 2025-05-30 NOTE — PROGRESS NOTES
Eli Cobos  tolerated treatment well with no complications.      Eli Cobos is aware of future appt on 6/6/25 at 0900.     AVS printed and given to Eli Cobos:  No (Declined by Eli Cobos)

## 2025-06-02 ENCOUNTER — DOCUMENTATION (OUTPATIENT)
Dept: GYNECOLOGIC ONCOLOGY | Facility: CLINIC | Age: 65
End: 2025-06-02

## 2025-06-02 ENCOUNTER — HOSPITAL ENCOUNTER (OUTPATIENT)
Dept: INFUSION CENTER | Facility: HOSPITAL | Age: 65
Discharge: HOME | End: 2025-06-02
Attending: OBSTETRICS & GYNECOLOGY

## 2025-06-02 NOTE — PROGRESS NOTES
Gynecologic Oncology Treatment Planning Conference Case Review     Primary Gynecologic Oncologist: MD rIene    Clinical Summary: Eli Cobos is a 64 y.o. 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 Please see EMR for full history, imaging, and laboratory studies. Please see EMR for full history, imaging, and laboratory studies.     Recommendations:    Given poorly differentiated histology, multiple locations of disease, extensive adhesive disease, we would not recommend cytoreductive surgery   Continue treat with C/T/durvalumab for up to 9 cycles and transition durvalumab and Olaparib maintenance,   Consider AI and Lupron for progression      DISCLAIMERS:    TO THE TREATING PHYSICIAN:  This conference is a meeting of clinicians from various specialty areas who evaluate and discuss patients for whom a multidisciplinary treatment approach is being considered. Please note that the above opinion was a consensus of the conference attendees and is intended only to assist in quality care of the discussed patient.  The responsibility for follow up on the input given during the conference, along with any final decisions regarding plan of care, is that of the patient and the patient's provider. Accordingly, appointments have only been recommended based on this information and have NOT been scheduled unless otherwise noted.      TO THE PATIENT:  This summary is a brief record of major aspects of your cancer treatment. You may choose to share a copy with any of your doctors or nurses. However, this is not a detailed or comprehensive record of your care.    Ken Aleman MD

## 2025-06-06 ENCOUNTER — HOSPITAL ENCOUNTER (OUTPATIENT)
Dept: INFUSION CENTER | Facility: HOSPITAL | Age: 65
End: 2025-06-06
Payer: MEDICARE

## 2025-06-06 DIAGNOSIS — C56.2 MALIGNANT NEOPLASM OF LEFT OVARY (HCC): ICD-10-CM

## 2025-06-06 LAB
ALBUMIN SERPL BCG-MCNC: 4.1 G/DL (ref 3.5–5)
ALP SERPL-CCNC: 63 U/L (ref 34–104)
ALT SERPL W P-5'-P-CCNC: 11 U/L (ref 7–52)
AMYLASE SERPL-CCNC: 40 IU/L (ref 29–103)
ANION GAP SERPL CALCULATED.3IONS-SCNC: 5 MMOL/L (ref 4–13)
AST SERPL W P-5'-P-CCNC: 16 U/L (ref 13–39)
BASOPHILS # BLD AUTO: 0.03 THOUSANDS/ÂΜL (ref 0–0.1)
BASOPHILS NFR BLD AUTO: 1 % (ref 0–1)
BILIRUB SERPL-MCNC: 0.41 MG/DL (ref 0.2–1)
BUN SERPL-MCNC: 13 MG/DL (ref 5–25)
CALCIUM SERPL-MCNC: 9.4 MG/DL (ref 8.4–10.2)
CHLORIDE SERPL-SCNC: 100 MMOL/L (ref 96–108)
CO2 SERPL-SCNC: 32 MMOL/L (ref 21–32)
CREAT SERPL-MCNC: 0.46 MG/DL (ref 0.6–1.3)
EOSINOPHIL # BLD AUTO: 0.02 THOUSAND/ÂΜL (ref 0–0.61)
EOSINOPHIL NFR BLD AUTO: 1 % (ref 0–6)
ERYTHROCYTE [DISTWIDTH] IN BLOOD BY AUTOMATED COUNT: 13.3 % (ref 11.6–15.1)
GFR SERPL CREATININE-BSD FRML MDRD: 103 ML/MIN/1.73SQ M
GLUCOSE SERPL-MCNC: 129 MG/DL (ref 65–140)
HCT VFR BLD AUTO: 31.8 % (ref 34.8–46.1)
HGB BLD-MCNC: 10.9 G/DL (ref 11.5–15.4)
IMM GRANULOCYTES # BLD AUTO: 0.03 THOUSAND/UL (ref 0–0.2)
IMM GRANULOCYTES NFR BLD AUTO: 1 % (ref 0–2)
LIPASE SERPL-CCNC: 25 U/L (ref 11–82)
LYMPHOCYTES # BLD AUTO: 0.96 THOUSANDS/ÂΜL (ref 0.6–4.47)
LYMPHOCYTES NFR BLD AUTO: 24 % (ref 14–44)
MAGNESIUM SERPL-MCNC: 1.7 MG/DL (ref 1.9–2.7)
MCH RBC QN AUTO: 30.7 PG (ref 26.8–34.3)
MCHC RBC AUTO-ENTMCNC: 34.3 G/DL (ref 31.4–37.4)
MCV RBC AUTO: 90 FL (ref 82–98)
MONOCYTES # BLD AUTO: 0.43 THOUSAND/ÂΜL (ref 0.17–1.22)
MONOCYTES NFR BLD AUTO: 11 % (ref 4–12)
NEUTROPHILS # BLD AUTO: 2.57 THOUSANDS/ÂΜL (ref 1.85–7.62)
NEUTS SEG NFR BLD AUTO: 62 % (ref 43–75)
NRBC BLD AUTO-RTO: 0 /100 WBCS
PLATELET # BLD AUTO: 188 THOUSANDS/UL (ref 149–390)
PMV BLD AUTO: 8.4 FL (ref 8.9–12.7)
POTASSIUM SERPL-SCNC: 3.7 MMOL/L (ref 3.5–5.3)
PROT SERPL-MCNC: 6.6 G/DL (ref 6.4–8.4)
RBC # BLD AUTO: 3.55 MILLION/UL (ref 3.81–5.12)
SODIUM SERPL-SCNC: 137 MMOL/L (ref 135–147)
TSH SERPL DL<=0.05 MIU/L-ACNC: 2.99 UIU/ML (ref 0.45–4.5)
WBC # BLD AUTO: 4.04 THOUSAND/UL (ref 4.31–10.16)

## 2025-06-06 PROCEDURE — 82150 ASSAY OF AMYLASE: CPT

## 2025-06-06 PROCEDURE — 83690 ASSAY OF LIPASE: CPT

## 2025-06-06 PROCEDURE — 80053 COMPREHEN METABOLIC PANEL: CPT

## 2025-06-06 PROCEDURE — 85025 COMPLETE CBC W/AUTO DIFF WBC: CPT

## 2025-06-06 PROCEDURE — 84443 ASSAY THYROID STIM HORMONE: CPT

## 2025-06-06 PROCEDURE — 83735 ASSAY OF MAGNESIUM: CPT

## 2025-06-06 PROCEDURE — 83520 IMMUNOASSAY QUANT NOS NONAB: CPT

## 2025-06-06 NOTE — PROGRESS NOTES
Eli Cobos  tolerated treatment well with no complications.      Eli Cobos is aware of future appt on 6/10/25 at 0830.     AVS printed and given to Eli Cobos:    No (Declined by Eli Cobos)

## 2025-06-09 RX ORDER — SODIUM CHLORIDE 9 MG/ML
20 INJECTION, SOLUTION INTRAVENOUS ONCE
Status: CANCELLED | OUTPATIENT
Start: 2025-06-10

## 2025-06-09 RX ORDER — MAGNESIUM SULFATE HEPTAHYDRATE 40 MG/ML
4 INJECTION, SOLUTION INTRAVENOUS ONCE
Status: CANCELLED | OUTPATIENT
Start: 2025-06-10 | End: 2025-06-10

## 2025-06-09 RX ORDER — PALONOSETRON 0.05 MG/ML
0.25 INJECTION, SOLUTION INTRAVENOUS ONCE
Status: CANCELLED | OUTPATIENT
Start: 2025-06-10

## 2025-06-10 ENCOUNTER — HOSPITAL ENCOUNTER (OUTPATIENT)
Dept: INFUSION CENTER | Facility: HOSPITAL | Age: 65
Discharge: HOME | End: 2025-06-10
Attending: OBSTETRICS & GYNECOLOGY
Payer: MEDICARE

## 2025-06-10 VITALS
OXYGEN SATURATION: 98 % | HEIGHT: 63 IN | BODY MASS INDEX: 35.59 KG/M2 | DIASTOLIC BLOOD PRESSURE: 75 MMHG | RESPIRATION RATE: 18 BRPM | HEART RATE: 78 BPM | TEMPERATURE: 97.8 F | WEIGHT: 200.84 LBS | SYSTOLIC BLOOD PRESSURE: 141 MMHG

## 2025-06-10 DIAGNOSIS — C56.2 MALIGNANT NEOPLASM OF LEFT OVARY (HCC): ICD-10-CM

## 2025-06-10 DIAGNOSIS — E83.42 HYPOMAGNESEMIA: Primary | ICD-10-CM

## 2025-06-10 LAB — INHIBIN B SERPL-MCNC: 44.7 PG/ML (ref 0–16.9)

## 2025-06-10 PROCEDURE — 96413 CHEMO IV INFUSION 1 HR: CPT

## 2025-06-10 PROCEDURE — 96368 THER/DIAG CONCURRENT INF: CPT

## 2025-06-10 PROCEDURE — 96417 CHEMO IV INFUS EACH ADDL SEQ: CPT

## 2025-06-10 PROCEDURE — 96375 TX/PRO/DX INJ NEW DRUG ADDON: CPT

## 2025-06-10 PROCEDURE — 96415 CHEMO IV INFUSION ADDL HR: CPT

## 2025-06-10 PROCEDURE — 96367 TX/PROPH/DG ADDL SEQ IV INF: CPT

## 2025-06-10 RX ORDER — PALONOSETRON 0.05 MG/ML
0.25 INJECTION, SOLUTION INTRAVENOUS ONCE
Status: COMPLETED | OUTPATIENT
Start: 2025-06-10 | End: 2025-06-10

## 2025-06-10 RX ORDER — MAGNESIUM SULFATE HEPTAHYDRATE 40 MG/ML
4 INJECTION, SOLUTION INTRAVENOUS ONCE
Status: COMPLETED | OUTPATIENT
Start: 2025-06-10 | End: 2025-06-10

## 2025-06-10 RX ORDER — SODIUM CHLORIDE 9 MG/ML
20 INJECTION, SOLUTION INTRAVENOUS ONCE
Status: COMPLETED | OUTPATIENT
Start: 2025-06-10 | End: 2025-06-10

## 2025-06-10 RX ADMIN — CARBOPLATIN 643.2 MG: 10 INJECTION, SOLUTION INTRAVENOUS at 14:11

## 2025-06-10 RX ADMIN — DURVALUMAB 1120 MG: 500 INJECTION, SOLUTION INTRAVENOUS at 15:25

## 2025-06-10 RX ADMIN — DEXAMETHASONE SODIUM PHOSPHATE 20 MG: 10 INJECTION, SOLUTION INTRAMUSCULAR; INTRAVENOUS at 08:55

## 2025-06-10 RX ADMIN — MAGNESIUM SULFATE HEPTAHYDRATE 4 G: 40 INJECTION, SOLUTION INTRAVENOUS at 11:04

## 2025-06-10 RX ADMIN — DIPHENHYDRAMINE HYDROCHLORIDE 25 MG: 50 INJECTION INTRAMUSCULAR; INTRAVENOUS at 09:18

## 2025-06-10 RX ADMIN — PACLITAXEL 258 MG: 6 INJECTION, SOLUTION INTRAVENOUS at 11:05

## 2025-06-10 RX ADMIN — PALONOSETRON 0.25 MG: 0.05 INJECTION, SOLUTION INTRAVENOUS at 09:21

## 2025-06-10 RX ADMIN — FAMOTIDINE 20 MG: 10 INJECTION INTRAVENOUS at 09:48

## 2025-06-10 RX ADMIN — SODIUM CHLORIDE 20 ML/HR: 9 INJECTION, SOLUTION INTRAVENOUS at 08:54

## 2025-06-10 RX ADMIN — FOSAPREPITANT 150 MG: 150 INJECTION, POWDER, LYOPHILIZED, FOR SOLUTION INTRAVENOUS at 10:17

## 2025-06-10 NOTE — PROGRESS NOTES
..Eli Cobos  tolerated treatment well with no complications.      Eli Cobos is aware of future appt on 6/13 at 9:00.     AVS printed and given to Eli Cobos:  No (Declined by Eli Cobos)

## 2025-06-11 ENCOUNTER — RESULTS FOLLOW-UP (OUTPATIENT)
Dept: GYNECOLOGIC ONCOLOGY | Facility: CLINIC | Age: 65
End: 2025-06-11

## 2025-06-11 DIAGNOSIS — C56.2 MALIGNANT NEOPLASM OF LEFT OVARY (HCC): Primary | ICD-10-CM

## 2025-06-13 ENCOUNTER — HOSPITAL ENCOUNTER (OUTPATIENT)
Dept: INFUSION CENTER | Facility: HOSPITAL | Age: 65
End: 2025-06-13
Payer: MEDICARE

## 2025-06-13 DIAGNOSIS — C56.2 MALIGNANT NEOPLASM OF LEFT OVARY (HCC): ICD-10-CM

## 2025-06-13 DIAGNOSIS — Z45.2 ENCOUNTER FOR CENTRAL LINE CARE: Primary | ICD-10-CM

## 2025-06-13 LAB
ALBUMIN SERPL BCG-MCNC: 4.3 G/DL (ref 3.5–5)
ALP SERPL-CCNC: 63 U/L (ref 34–104)
ALT SERPL W P-5'-P-CCNC: 18 U/L (ref 7–52)
ANION GAP SERPL CALCULATED.3IONS-SCNC: 9 MMOL/L (ref 4–13)
AST SERPL W P-5'-P-CCNC: 20 U/L (ref 13–39)
BASOPHILS # BLD AUTO: 0.03 THOUSANDS/ÂΜL (ref 0–0.1)
BASOPHILS NFR BLD AUTO: 1 % (ref 0–1)
BILIRUB SERPL-MCNC: 0.47 MG/DL (ref 0.2–1)
BUN SERPL-MCNC: 19 MG/DL (ref 5–25)
CALCIUM SERPL-MCNC: 9.4 MG/DL (ref 8.4–10.2)
CHLORIDE SERPL-SCNC: 99 MMOL/L (ref 96–108)
CO2 SERPL-SCNC: 30 MMOL/L (ref 21–32)
CREAT SERPL-MCNC: 0.49 MG/DL (ref 0.6–1.3)
EOSINOPHIL # BLD AUTO: 0.05 THOUSAND/ÂΜL (ref 0–0.61)
EOSINOPHIL NFR BLD AUTO: 1 % (ref 0–6)
ERYTHROCYTE [DISTWIDTH] IN BLOOD BY AUTOMATED COUNT: 12.7 % (ref 11.6–15.1)
GFR SERPL CREATININE-BSD FRML MDRD: 101 ML/MIN/1.73SQ M
GLUCOSE SERPL-MCNC: 198 MG/DL (ref 65–140)
HCT VFR BLD AUTO: 36.4 % (ref 34.8–46.1)
HGB BLD-MCNC: 12.5 G/DL (ref 11.5–15.4)
IMM GRANULOCYTES # BLD AUTO: 0.02 THOUSAND/UL (ref 0–0.2)
IMM GRANULOCYTES NFR BLD AUTO: 0 % (ref 0–2)
LYMPHOCYTES # BLD AUTO: 0.98 THOUSANDS/ÂΜL (ref 0.6–4.47)
LYMPHOCYTES NFR BLD AUTO: 19 % (ref 14–44)
MAGNESIUM SERPL-MCNC: 1.7 MG/DL (ref 1.9–2.7)
MCH RBC QN AUTO: 30.7 PG (ref 26.8–34.3)
MCHC RBC AUTO-ENTMCNC: 34.3 G/DL (ref 31.4–37.4)
MCV RBC AUTO: 89 FL (ref 82–98)
MONOCYTES # BLD AUTO: 0.13 THOUSAND/ÂΜL (ref 0.17–1.22)
MONOCYTES NFR BLD AUTO: 3 % (ref 4–12)
NEUTROPHILS # BLD AUTO: 3.89 THOUSANDS/ÂΜL (ref 1.85–7.62)
NEUTS SEG NFR BLD AUTO: 76 % (ref 43–75)
NRBC BLD AUTO-RTO: 0 /100 WBCS
PLATELET # BLD AUTO: 215 THOUSANDS/UL (ref 149–390)
PMV BLD AUTO: 9.1 FL (ref 8.9–12.7)
POTASSIUM SERPL-SCNC: 3.9 MMOL/L (ref 3.5–5.3)
PROT SERPL-MCNC: 6.9 G/DL (ref 6.4–8.4)
RBC # BLD AUTO: 4.07 MILLION/UL (ref 3.81–5.12)
SODIUM SERPL-SCNC: 138 MMOL/L (ref 135–147)
WBC # BLD AUTO: 5.1 THOUSAND/UL (ref 4.31–10.16)

## 2025-06-13 PROCEDURE — 80053 COMPREHEN METABOLIC PANEL: CPT

## 2025-06-13 PROCEDURE — 85025 COMPLETE CBC W/AUTO DIFF WBC: CPT

## 2025-06-13 PROCEDURE — 83735 ASSAY OF MAGNESIUM: CPT

## 2025-06-13 NOTE — PROGRESS NOTES
Eli Cobos  tolerated treatment well with no complications.      Eli Cobos is aware of future appt on 6/20/25 at 1000.     AVS printed and given to Eli Cobos:    No (Declined by Eli Cobos)

## 2025-06-19 NOTE — RESTORATIVE TECHNICIAN NOTE
OCCUPATIONAL THERAPY EVALUATION - INPATIENT     Room Number: 224/224-A  Evaluation Date: 6/19/2025  Type of Evaluation: Initial  Presenting Problem: acute on chronic respiratory failure    Physician Order: IP Consult to Occupational Therapy  Reason for Therapy: ADL/IADL Dysfunction and Discharge Planning    OCCUPATIONAL THERAPY ASSESSMENT   Patient is a 72 year old male admitted 6/15/2025 for acute on chronic respiratory failure.  Prior to admission, patient's baseline is I with BADLs and mod I /I with household distances. Pt was admitted on 5/19 and discharged to [facility]. Pt indicates he had returned to his baseline once back home and denies using a R/W in the house . At baseline pt was using 4LO2. Pt lives with his wife, remains on main floor. Wife works days. Patient is currently functioning below baseline .  Patient is requiring maximum assist as a result of the following impairments: decreased functional strength, decreased functional reach, decreased endurance, impaired stand balance, decreased muscular endurance, cognitive deficits (poor safety, impaired orientation, impaired STM), medical status, increased O2 needs from baseline, decreased insight to deficits, and decreased safety awareness. Occupational Therapy will continue to follow for duration of hospitalization.    Patient will benefit from continued skilled OT Services to promote return to prior level of function and safety with continuous assistance and gradual rehabilitative therapy.    PLAN DURING HOSPITALIZATION     OT Treatment Plan: Balance activities, Energy conservation/work simplification techniques, ADL training, Functional transfer training, UE strengthening/ROM, Endurance training, Cognitive reorientation, Patient/Family education, Patient/Family training, Equipment eval/education, Compensatory technique education     OCCUPATIONAL THERAPY MEDICAL/SOCIAL HISTORY   Problem List  Principal Problem:    Acute on chronic respiratory failure with  Restorative Specialist Mobility Note       Activity: Ambulate in will, Ambulate in room, Bathroom privileges, Chair, Dangle, Stand at bedside(Educated/encouraged pt to ambulate with assistance 3-4 x's/day   Pt callbell, PCA button, phone/tray within reach )     Assistive Device: None, Other (Comment)(NC O2 on 2L)             Samson Payan BS, Restorative Technician, [employer] hypoxia and hypercapnia (HCC)  Active Problems:    Acute on chronic respiratory failure (HCC)    HOME SITUATION  Type of Home: House  Home Layout: One level (1 MOLLY)  Lives With: Spouse (wife works F/T days)  Occupation/Status: retired  Hand Dominance: Right  Drives: No  Patient Regularly Uses: Reading glasses; Home O2 (sleeps in recliner)    Stairs in Home: 2 MOLLY  Use of Assistive Device(s): pt denies using R/W    Prior Level of [place]: Pt lives in a one story house with his wife- she works days, Pt reports since recently returning home from [facility] he had returned to  with ADls and household mobility. Prior to recent admission, pt was using LHAE for LE self care and R/W in community, 4L home O2. Pt is returned and does not drive. Pt sleeps in a recliner.     SUBJECTIVE  \"I was trying to stand\"    OCCUPATIONAL THERAPY EXAMINATION      OBJECTIVE  Fall Risk: High fall risk      PAIN ASSESSMENT  Ratin      ACTIVITY TOLERANCE  Pulse: 95     Resp: (!) 30           Patient Position: Sitting    O2 SATURATIONS  Oxygen Therapy  SPO2% on Oxygen at Rest: 98  At rest oxygen flow (liters per minute): 10  SPO2% Ambulation on Oxygen: 91  Ambulation oxygen flow (liters per minute): 10    COGNITION  Pt is awake, confused able to provide name/, place, time grossly. Pt does not recall events leading to admission and his subjective history is questionable    Communication: able to express his needs    Behavioral/Emotional/Social: confused, cooperative    RANGE OF MOTION   Upper extremity ROM is within functional limits     STRENGTH ASSESSMENT  Upper extremity strength is within functional limits     ACTIVITIES OF DAILY LIVING ASSESSMENT  AM-PAC ‘6-Clicks’ Inpatient Daily Activity Short Form  How much help from another person does the patient currently need…  -   Putting on and taking off regular lower body clothing?: A Lot  -   Bathing (including washing, rinsing, drying)?: A Lot  -   Toileting, which includes using toilet,  bedpan or urinal? : A Lot  -   Putting on and taking off regular upper body clothing?: A Lot  -   Taking care of personal grooming such as brushing teeth?: A Little  -   Eating meals?: A Little    AM-PAC Score:  Score: 14  Approx Degree of Impairment: 59.67%  Standardized Score (AM-PAC Scale): 33.39  CMS Modifier (G-Code): CK    FUNCTIONAL TRANSFER ASSESSMENT  Sit to Stand: Edge of Bed  Edge of Bed: -- (Mod A x2)    BED MOBILITY  Rolling: Dependent  Supine to Sit : Dependent  Sit to Supine (OT): Not Tested    BALANCE ASSESSMENT  Pt demo poor stand balance with significant tremor ing    FUNCTIONAL ADL ASSESSMENT  Provide Min A for seated grooming, feeding tasks and max A for LE self care, max/total assist for toileting    THERAPEUTIC EXERCISE  Pt instructed in and encouraged to perform AROM KENIA UE/LEs as well as postural exercises. Pt requires cues for proper technique/to recall    Skilled Therapy Provided: Pt received resting in bed agreeable to OOB activity.  Pt repeatedly requesting water- currently NPO.  Provide A x2 for rolling, supine to sit, sit to reinier, and bed to chair xfer using R/W.   Pt demo impaired strength, balance, cognition, and activity tolerance on 10 LO2 with cues for rest breaks and PLB.  No family present this session.   Pt demo poor insight, STM.  Upon sit to stand, pt with significant tremoring. Pt benefits from KENIA knees blocked     EDUCATION PROVIDED  Patient Education : Role of Occupational Therapy; Plan of Care; Discharge Recommendations; Functional Transfer Techniques; Fall Prevention (HEP)  Patient's Response to Education: Verbalized Understanding; Requires Further Education    The patient's Approx Degree of Impairment: 59.67% has been calculated based on documentation in the [facility] '6 clicks' Inpatient Daily Activity Short Form.  Research supports that patients with this level of impairment may benefit from [place].  Final disposition will be made by interdisciplinary medical team.      Patient End of Session: Up in chair, Needs met, Call light within reach, All patient questions and concerns addressed, RN aware of session/findings    OT Goals  Patients self stated goal is: to stand, walk     Patient will complete functional transfer with Min A  Comment:    Pt eugenie perform bed mobiltiy with mod A for pressure relief and supine ADLs   Comment:     Patient will tolerate standing for 3 minutes in prep for adls with CGA   Comment:    Patient will completeLE dressing using LHAE with MIn A  Comment:          Goals  on: 7/3/25  Frequency: 3-5x/week    Patient Evaluation Complexity Level:   Occupational Profile/Medical History MODERATE - Expanded review of history including review of medical or therapy record   Specific performance deficits impacting engagement in ADL/IADL HIGH  5+ performance deficits    Client Assessment/Performance Deficits MODERATE - Comorbidities and min to mod modifications of tasks    Clinical Decision Making MODERATE - Analysis of occupational profile, detailed assessments, several treatment options    Overall Complexity MODERATE     Self-Care Home Management: 30 minutes     bilateral

## 2025-06-20 ENCOUNTER — HOSPITAL ENCOUNTER (OUTPATIENT)
Dept: INFUSION CENTER | Facility: HOSPITAL | Age: 65
End: 2025-06-20
Payer: MEDICARE

## 2025-06-20 DIAGNOSIS — Z45.2 ENCOUNTER FOR CENTRAL LINE CARE: Primary | ICD-10-CM

## 2025-06-20 DIAGNOSIS — C56.2 MALIGNANT NEOPLASM OF LEFT OVARY (HCC): ICD-10-CM

## 2025-06-20 LAB
ALBUMIN SERPL BCG-MCNC: 4.1 G/DL (ref 3.5–5)
ALP SERPL-CCNC: 73 U/L (ref 34–104)
ALT SERPL W P-5'-P-CCNC: 17 U/L (ref 7–52)
ANION GAP SERPL CALCULATED.3IONS-SCNC: 7 MMOL/L (ref 4–13)
AST SERPL W P-5'-P-CCNC: 16 U/L (ref 13–39)
BASOPHILS # BLD AUTO: 0.02 THOUSANDS/ÂΜL (ref 0–0.1)
BASOPHILS NFR BLD AUTO: 1 % (ref 0–1)
BILIRUB SERPL-MCNC: 0.29 MG/DL (ref 0.2–1)
BUN SERPL-MCNC: 11 MG/DL (ref 5–25)
CALCIUM SERPL-MCNC: 9.5 MG/DL (ref 8.4–10.2)
CHLORIDE SERPL-SCNC: 101 MMOL/L (ref 96–108)
CO2 SERPL-SCNC: 29 MMOL/L (ref 21–32)
CREAT SERPL-MCNC: 0.51 MG/DL (ref 0.6–1.3)
EOSINOPHIL # BLD AUTO: 0.04 THOUSAND/ÂΜL (ref 0–0.61)
EOSINOPHIL NFR BLD AUTO: 1 % (ref 0–6)
ERYTHROCYTE [DISTWIDTH] IN BLOOD BY AUTOMATED COUNT: 12.4 % (ref 11.6–15.1)
GFR SERPL CREATININE-BSD FRML MDRD: 99 ML/MIN/1.73SQ M
GLUCOSE SERPL-MCNC: 134 MG/DL (ref 65–140)
HCT VFR BLD AUTO: 30.8 % (ref 34.8–46.1)
HGB BLD-MCNC: 10.7 G/DL (ref 11.5–15.4)
IMM GRANULOCYTES # BLD AUTO: 0.03 THOUSAND/UL (ref 0–0.2)
IMM GRANULOCYTES NFR BLD AUTO: 1 % (ref 0–2)
LYMPHOCYTES # BLD AUTO: 1.19 THOUSANDS/ÂΜL (ref 0.6–4.47)
LYMPHOCYTES NFR BLD AUTO: 33 % (ref 14–44)
MAGNESIUM SERPL-MCNC: 1.5 MG/DL (ref 1.9–2.7)
MCH RBC QN AUTO: 30.7 PG (ref 26.8–34.3)
MCHC RBC AUTO-ENTMCNC: 34.7 G/DL (ref 31.4–37.4)
MCV RBC AUTO: 89 FL (ref 82–98)
MONOCYTES # BLD AUTO: 0.33 THOUSAND/ÂΜL (ref 0.17–1.22)
MONOCYTES NFR BLD AUTO: 9 % (ref 4–12)
NEUTROPHILS # BLD AUTO: 1.99 THOUSANDS/ÂΜL (ref 1.85–7.62)
NEUTS SEG NFR BLD AUTO: 55 % (ref 43–75)
NRBC BLD AUTO-RTO: 0 /100 WBCS
PLATELET # BLD AUTO: 244 THOUSANDS/UL (ref 149–390)
PMV BLD AUTO: 8.7 FL (ref 8.9–12.7)
POTASSIUM SERPL-SCNC: 3.9 MMOL/L (ref 3.5–5.3)
PROT SERPL-MCNC: 6.5 G/DL (ref 6.4–8.4)
RBC # BLD AUTO: 3.48 MILLION/UL (ref 3.81–5.12)
SODIUM SERPL-SCNC: 137 MMOL/L (ref 135–147)
WBC # BLD AUTO: 3.6 THOUSAND/UL (ref 4.31–10.16)

## 2025-06-20 PROCEDURE — 83735 ASSAY OF MAGNESIUM: CPT

## 2025-06-20 PROCEDURE — 85025 COMPLETE CBC W/AUTO DIFF WBC: CPT

## 2025-06-20 PROCEDURE — 80053 COMPREHEN METABOLIC PANEL: CPT

## 2025-06-20 PROCEDURE — 83520 IMMUNOASSAY QUANT NOS NONAB: CPT

## 2025-06-20 NOTE — PROGRESS NOTES
Eli Cobos  tolerated treatment well with no complications.      Eli Cobos is aware of future appt on 6/27 at 11:30 am.     AVS printed and given to Eli Cobos:  No (Declined by Eli Cobos)

## 2025-06-24 LAB — INHIBIN B SERPL-MCNC: 32.2 PG/ML (ref 0–16.9)

## 2025-06-26 ENCOUNTER — OFFICE VISIT (OUTPATIENT)
Age: 65
End: 2025-06-26
Payer: MEDICARE

## 2025-06-26 VITALS
RESPIRATION RATE: 18 BRPM | HEIGHT: 63 IN | WEIGHT: 200.2 LBS | OXYGEN SATURATION: 96 % | HEART RATE: 95 BPM | BODY MASS INDEX: 35.47 KG/M2 | SYSTOLIC BLOOD PRESSURE: 132 MMHG | TEMPERATURE: 97.8 F | DIASTOLIC BLOOD PRESSURE: 74 MMHG

## 2025-06-26 DIAGNOSIS — G62.0 DRUG-INDUCED PERIPHERAL NEUROPATHY (HCC): ICD-10-CM

## 2025-06-26 DIAGNOSIS — C56.2 MALIGNANT NEOPLASM OF LEFT OVARY (HCC): Primary | ICD-10-CM

## 2025-06-26 PROCEDURE — 99215 OFFICE O/P EST HI 40 MIN: CPT | Performed by: PHYSICIAN ASSISTANT

## 2025-06-26 PROCEDURE — G2211 COMPLEX E/M VISIT ADD ON: HCPCS | Performed by: PHYSICIAN ASSISTANT

## 2025-06-26 NOTE — ASSESSMENT & PLAN NOTE
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 She has a somatic BRCA2 mutation. Continue disease response was noted on CT imaging after completion of cycle 6.     Proceed with cycle 8 of treatment as planned as long as her metabolic and hematologic parameters are adequate.     Plan for repeat CT imaging after completion of cycle 8. Return to the office as per her chemotherapy calendar.     Continue to trend inhibin B.   Recent rise, now with most recent decline.     Lab Results   Component Value Date    INHBB 32.2 (H) 06/20/2025    INHBB 44.7 (H) 06/06/2025    INHBB 24.6 (H) 05/23/2025       Orders:  •  CT chest abdomen pelvis w contrast; Future

## 2025-06-26 NOTE — PROGRESS NOTES
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 She has a somatic BRCA2 mutation. Continue disease response was noted on CT imaging after completion of cycle 6.     Proceed with cycle 8 of treatment as planned as long as her metabolic and hematologic parameters are adequate.     Plan for repeat CT imaging after completion of cycle 8. Return to the office as per her chemotherapy calendar.     Continue to trend inhibin B.   Recent rise, now with most recent decline.     Lab Results   Component Value Date    INHBB 32.2 (H) 2025    INHBB 44.7 (H) 2025    INHBB 24.6 (H) 2025       Orders:  •  CT chest abdomen pelvis w contrast; Future    Drug-induced peripheral neuropathy (HCC)  Grade 1, improved from prior cycle.   Intermittent.   Continue to closely monitor.                History of Present Illness     Reason for Visit / CC:  Survivorship / Surveillance Visit    Eli Cobos is a 65 y.o. female   who presents to the office for pre-chemotherapy evaluation. Overall, she continues to tolerate treatment well. She has been afebrile. The patient notes fatigue and occasional nausea for appropriately 1 week following treatment. Denies diarrhea or blood in her stool. Normal bladder function. She notes her neuropathy is improved. She notes 'tightness' when bending her toes, but no constant numbness or tingling. The patient denies new cough, SOB or skin rashes.              Oncology  History   Cancer Staging   Malignant neoplasm of left ovary (HCC)  Staging form: Ovary, Fallopian Tube, Primary Peritoneal, AJCC 8th Edition  - Clinical stage from 8/26/2019: FIGO Stage IC1, calculated as Stage IA (cT1a, cN0, cM0) - Signed by Trevin Bonilla MD on 9/11/2019  Oncology History   Malignant neoplasm of left ovary (HCC)   8/26/2019 Initial Diagnosis    Malignant neoplasm of left ovary (HCC)     8/26/2019 -  Cancer Staged    Staging form: Ovary, Fallopian Tube, Primary Peritoneal, AJCC 8th Edition  - Clinical stage from 8/26/2019: FIGO Stage IC1, calculated as Stage IA (cT1a, cN0, cM0) - Signed by Trevin Bonilla MD on 9/11/2019        Chemotherapy    Taxol 175 mg/m2 and carboplatin AUC 6 every 21 days. She received 5 cycles and is scheduled for cycle 6.      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      12/2022 - 6/27/2024 Hormone Therapy    Megace 80 mg PO BID (starting inhibin B, 9.2 pg/ml)     6/4/2024 - 6/21/2024 Radiation      Plan ID Energy Fractions Dose per Fraction (cGy) Dose Correction (cGy) Total Dose Delivered (cGy) Elapsed Days   SBRT MT 6X-FFF 5 / 5 600 0 3,000 17      1/28/2025 -  Chemotherapy    Taxol 175 mg/m2 and carboplatin AUC 6 IV every 21 days.     Cycle 4, taxol dose-reduced to 135 mg/m2 and durvalumab 1120 mg IV every 21 days added to plan.           Review of Systems   Constitutional: Negative.    HENT: Negative.     Eyes:  "Negative.    Respiratory: Negative.     Cardiovascular: Negative.    Gastrointestinal: Negative.    Genitourinary: Negative.    Musculoskeletal: Negative.    Skin: Negative.    Neurological:  Positive for numbness (improved, feet).   Psychiatric/Behavioral: Negative.      A complete review of systems is negative other than that noted above in the HPI.  Medical History Reviewed by provider this encounter:  Tobacco  Allergies  Meds  Problems  Med Hx  Surg Hx  Fam Hx     .  Medications Ordered Prior to Encounter[1]      Objective   /74 (BP Location: Left arm, Patient Position: Sitting, Cuff Size: Adult)   Pulse 95   Temp 97.8 °F (36.6 °C) (Temporal)   Resp 18   Ht 5' 2.99\" (1.6 m)   Wt 90.8 kg (200 lb 3.2 oz)   SpO2 96%   BMI 35.48 kg/m²     Body mass index is 35.48 kg/m².  Pain Screening:  Pain Score: 0-No pain  ECOG ECOG Performance Status: 0 - Fully active, able to carry on all pre-disease performance without restriction     Physical Exam  Constitutional:       Appearance: She is well-developed.   Pulmonary:      Effort: Pulmonary effort is normal.     Skin:     General: Skin is warm and dry.      Findings: No rash.     Neurological:      Mental Status: She is alert and oriented to person, place, and time.     Psychiatric:         Behavior: Behavior normal.         Thought Content: Thought content normal.         Judgment: Judgment normal.          Labs: I have reviewed pertinent labs.   Lab Results   Component Value Date/Time    WBC 3.60 (L) 06/20/2025 10:15 AM    RBC 3.48 (L) 06/20/2025 10:15 AM    Hemoglobin 10.7 (L) 06/20/2025 10:15 AM    Hematocrit 30.8 (L) 06/20/2025 10:15 AM    MCV 89 06/20/2025 10:15 AM    MCH 30.7 06/20/2025 10:15 AM    RDW 12.4 06/20/2025 10:15 AM    Platelets 244 06/20/2025 10:15 AM    Segmented % 55 06/20/2025 10:15 AM    Lymphocytes % 33 06/20/2025 10:15 AM    Monocytes % 9 06/20/2025 10:15 AM    Eosinophils Relative 1 06/20/2025 10:15 AM    Basophils Relative 1 " 06/20/2025 10:15 AM    Immature Grans % 1 06/20/2025 10:15 AM    Absolute Neutrophils 1.99 06/20/2025 10:15 AM      Lab Results   Component Value Date/Time    Sodium 137 06/20/2025 10:15 AM    Potassium 3.9 06/20/2025 10:15 AM    Chloride 101 06/20/2025 10:15 AM    CO2 29 06/20/2025 10:15 AM    ANION GAP 7 06/20/2025 10:15 AM    BUN 11 06/20/2025 10:15 AM    Creatinine 0.51 (L) 06/20/2025 10:15 AM    Glucose 134 06/20/2025 10:15 AM    Calcium 9.5 06/20/2025 10:15 AM    AST 16 06/20/2025 10:15 AM    ALT 17 06/20/2025 10:15 AM    Alkaline Phosphatase 73 06/20/2025 10:15 AM    Total Protein 6.5 06/20/2025 10:15 AM    Albumin 4.1 06/20/2025 10:15 AM    Total Bilirubin 0.29 06/20/2025 10:15 AM    eGFR 99 06/20/2025 10:15 AM      TSH:     Amylase/Lipase:   Lab Results   Component Value Date/Time    Amylase 40 06/06/2025 09:10 AM    Lipase 25 06/06/2025 09:10 AM     Inhibin A/B:   Lab Results   Component Value Date/Time    Inhibin B 32.2 (H) 06/20/2025 10:15 AM      Lab Results   Component Value Date    INHBB 32.2 (H) 06/20/2025    INHBB 44.7 (H) 06/06/2025    INHBB 24.6 (H) 05/23/2025    INHBB 18.0 (H) 05/02/2025                          [1]  Current Outpatient Medications on File Prior to Visit   Medication Sig Dispense Refill   • amLODIPine (NORVASC) 5 mg tablet Take 5 mg by mouth in the morning.  4   • amoxicillin (AMOXIL) 500 mg capsule TAKE 2 CAPSULES RIGHT AWAY, THEN 1 CAPSULE EVERY 8 HOURS UNTIL FINISHED     • Cholecalciferol (VITAMIN D-3) 1000 units CAPS Take by mouth in the morning.     • clobetasol (TEMOVATE) 0.05 % ointment Apply to the affected area 1-3x/week 30 g 0   • co-enzyme Q-10 100 mg capsule Take 100 mg by mouth in the morning.     • escitalopram (LEXAPRO) 20 mg tablet 20 mg in the morning.  0   • LORazepam (ATIVAN) 1 mg tablet Take 1 tablet (1 mg total) by mouth every 8 (eight) hours as needed (nausea or anxiety) 20 tablet 0   • meclizine (ANTIVERT) 25 mg tablet Take 1 tablet (25 mg total) by mouth 3  (three) times a day as needed for dizziness 30 tablet 0   • metFORMIN (GLUCOPHAGE) 500 mg tablet 500 mg in the morning and 500 mg in the evening. Take with meals.  0   • Mounjaro 7.5 MG/0.5ML Inject 7.5 mg under the skin every 7 days     • ondansetron (ZOFRAN) 8 mg tablet Take 1 tablet (8 mg total) by mouth every 8 (eight) hours as needed for nausea or vomiting 30 tablet 1   • scopolamine (TRANSDERM-SCOP) 1.5 mg/3 days TD 72 hr patch Place 1 patch on the skin every third day 2 patch 1   • simvastatin (ZOCOR) 20 mg tablet   5   • SUPER B COMPLEX/C PO Take by mouth in the morning.     • valsartan-hydrochlorothiazide (DIOVAN-HCT) 320-25 MG per tablet in the morning.  0     No current facility-administered medications on file prior to visit.

## 2025-06-27 ENCOUNTER — HOSPITAL ENCOUNTER (OUTPATIENT)
Dept: INFUSION CENTER | Facility: HOSPITAL | Age: 65
End: 2025-06-27
Payer: MEDICARE

## 2025-06-27 DIAGNOSIS — Z45.2 ENCOUNTER FOR CENTRAL LINE CARE: Primary | ICD-10-CM

## 2025-06-27 DIAGNOSIS — C56.2 MALIGNANT NEOPLASM OF LEFT OVARY (HCC): ICD-10-CM

## 2025-06-27 LAB
ALBUMIN SERPL BCG-MCNC: 4.3 G/DL (ref 3.5–5)
ALP SERPL-CCNC: 62 U/L (ref 34–104)
ALT SERPL W P-5'-P-CCNC: 13 U/L (ref 7–52)
AMYLASE SERPL-CCNC: 47 IU/L (ref 29–103)
ANION GAP SERPL CALCULATED.3IONS-SCNC: 5 MMOL/L (ref 4–13)
AST SERPL W P-5'-P-CCNC: 16 U/L (ref 13–39)
BASOPHILS # BLD AUTO: 0.03 THOUSANDS/ÂΜL (ref 0–0.1)
BASOPHILS NFR BLD AUTO: 1 % (ref 0–1)
BILIRUB SERPL-MCNC: 0.34 MG/DL (ref 0.2–1)
BUN SERPL-MCNC: 12 MG/DL (ref 5–25)
CALCIUM SERPL-MCNC: 9.3 MG/DL (ref 8.4–10.2)
CHLORIDE SERPL-SCNC: 103 MMOL/L (ref 96–108)
CO2 SERPL-SCNC: 30 MMOL/L (ref 21–32)
CREAT SERPL-MCNC: 0.5 MG/DL (ref 0.6–1.3)
EOSINOPHIL # BLD AUTO: 0.03 THOUSAND/ÂΜL (ref 0–0.61)
EOSINOPHIL NFR BLD AUTO: 1 % (ref 0–6)
ERYTHROCYTE [DISTWIDTH] IN BLOOD BY AUTOMATED COUNT: 13.2 % (ref 11.6–15.1)
GFR SERPL CREATININE-BSD FRML MDRD: 100 ML/MIN/1.73SQ M
GLUCOSE SERPL-MCNC: 115 MG/DL (ref 65–140)
HCT VFR BLD AUTO: 31.6 % (ref 34.8–46.1)
HGB BLD-MCNC: 10.9 G/DL (ref 11.5–15.4)
IMM GRANULOCYTES # BLD AUTO: 0.02 THOUSAND/UL (ref 0–0.2)
IMM GRANULOCYTES NFR BLD AUTO: 1 % (ref 0–2)
LIPASE SERPL-CCNC: 30 U/L (ref 11–82)
LYMPHOCYTES # BLD AUTO: 1.1 THOUSANDS/ÂΜL (ref 0.6–4.47)
LYMPHOCYTES NFR BLD AUTO: 32 % (ref 14–44)
MAGNESIUM SERPL-MCNC: 1.5 MG/DL (ref 1.9–2.7)
MCH RBC QN AUTO: 31.2 PG (ref 26.8–34.3)
MCHC RBC AUTO-ENTMCNC: 34.5 G/DL (ref 31.4–37.4)
MCV RBC AUTO: 91 FL (ref 82–98)
MONOCYTES # BLD AUTO: 0.43 THOUSAND/ÂΜL (ref 0.17–1.22)
MONOCYTES NFR BLD AUTO: 13 % (ref 4–12)
NEUTROPHILS # BLD AUTO: 1.79 THOUSANDS/ÂΜL (ref 1.85–7.62)
NEUTS SEG NFR BLD AUTO: 52 % (ref 43–75)
NRBC BLD AUTO-RTO: 0 /100 WBCS
PLATELET # BLD AUTO: 200 THOUSANDS/UL (ref 149–390)
PMV BLD AUTO: 8.3 FL (ref 8.9–12.7)
POTASSIUM SERPL-SCNC: 3.6 MMOL/L (ref 3.5–5.3)
PROT SERPL-MCNC: 6.4 G/DL (ref 6.4–8.4)
RBC # BLD AUTO: 3.49 MILLION/UL (ref 3.81–5.12)
SODIUM SERPL-SCNC: 138 MMOL/L (ref 135–147)
TSH SERPL DL<=0.05 MIU/L-ACNC: 2.44 UIU/ML (ref 0.45–4.5)
WBC # BLD AUTO: 3.4 THOUSAND/UL (ref 4.31–10.16)

## 2025-06-27 PROCEDURE — 80053 COMPREHEN METABOLIC PANEL: CPT

## 2025-06-27 PROCEDURE — 83690 ASSAY OF LIPASE: CPT

## 2025-06-27 PROCEDURE — 83520 IMMUNOASSAY QUANT NOS NONAB: CPT

## 2025-06-27 PROCEDURE — 82150 ASSAY OF AMYLASE: CPT

## 2025-06-27 PROCEDURE — 83735 ASSAY OF MAGNESIUM: CPT

## 2025-06-27 PROCEDURE — 85025 COMPLETE CBC W/AUTO DIFF WBC: CPT

## 2025-06-27 PROCEDURE — 84443 ASSAY THYROID STIM HORMONE: CPT

## 2025-06-27 NOTE — PROGRESS NOTES
Pt tolerated treatment with no adv reactions; next appt 7/2 at 0800; pt left unit ambulatory with steady gait.

## 2025-07-01 DIAGNOSIS — C56.2 MALIGNANT NEOPLASM OF LEFT OVARY (HCC): Primary | ICD-10-CM

## 2025-07-01 LAB — INHIBIN B SERPL-MCNC: 41.7 PG/ML (ref 0–16.9)

## 2025-07-01 RX ORDER — PALONOSETRON 0.05 MG/ML
0.25 INJECTION, SOLUTION INTRAVENOUS ONCE
Status: CANCELLED | OUTPATIENT
Start: 2025-07-02

## 2025-07-01 RX ORDER — MAGNESIUM SULFATE HEPTAHYDRATE 40 MG/ML
2 INJECTION, SOLUTION INTRAVENOUS ONCE
Status: CANCELLED | OUTPATIENT
Start: 2025-07-02 | End: 2025-07-02

## 2025-07-01 RX ORDER — SODIUM CHLORIDE 9 MG/ML
20 INJECTION, SOLUTION INTRAVENOUS ONCE
Status: CANCELLED | OUTPATIENT
Start: 2025-07-02

## 2025-07-02 ENCOUNTER — HOSPITAL ENCOUNTER (OUTPATIENT)
Dept: INFUSION CENTER | Facility: HOSPITAL | Age: 65
Discharge: HOME | End: 2025-07-02
Attending: OBSTETRICS & GYNECOLOGY
Payer: MEDICARE

## 2025-07-02 ENCOUNTER — HOSPITAL ENCOUNTER (EMERGENCY)
Facility: HOSPITAL | Age: 65
Discharge: HOME | End: 2025-07-02
Attending: EMERGENCY MEDICINE
Payer: MEDICARE

## 2025-07-02 VITALS
TEMPERATURE: 97.7 F | OXYGEN SATURATION: 93 % | SYSTOLIC BLOOD PRESSURE: 133 MMHG | RESPIRATION RATE: 18 BRPM | DIASTOLIC BLOOD PRESSURE: 63 MMHG | HEART RATE: 89 BPM

## 2025-07-02 VITALS
HEIGHT: 63 IN | TEMPERATURE: 97 F | HEART RATE: 84 BPM | OXYGEN SATURATION: 96 % | BODY MASS INDEX: 35.51 KG/M2 | RESPIRATION RATE: 18 BRPM | SYSTOLIC BLOOD PRESSURE: 154 MMHG | WEIGHT: 200.4 LBS | DIASTOLIC BLOOD PRESSURE: 82 MMHG

## 2025-07-02 DIAGNOSIS — T78.40XA ACUTE ALLERGIC REACTION: Primary | ICD-10-CM

## 2025-07-02 DIAGNOSIS — C56.2 MALIGNANT NEOPLASM OF LEFT OVARY (HCC): ICD-10-CM

## 2025-07-02 DIAGNOSIS — E83.42 HYPOMAGNESEMIA: Primary | ICD-10-CM

## 2025-07-02 LAB
BASOPHILS # BLD AUTO: 0.03 THOUSANDS/ÂΜL (ref 0–0.1)
BASOPHILS NFR BLD AUTO: 1 % (ref 0–1)
EOSINOPHIL # BLD AUTO: 0.04 THOUSAND/ÂΜL (ref 0–0.61)
EOSINOPHIL NFR BLD AUTO: 1 % (ref 0–6)
ERYTHROCYTE [DISTWIDTH] IN BLOOD BY AUTOMATED COUNT: 13.2 % (ref 11.6–15.1)
HCT VFR BLD AUTO: 32.4 % (ref 34.8–46.1)
HGB BLD-MCNC: 11.3 G/DL (ref 11.5–15.4)
IMM GRANULOCYTES # BLD AUTO: 0.02 THOUSAND/UL (ref 0–0.2)
IMM GRANULOCYTES NFR BLD AUTO: 1 % (ref 0–2)
LYMPHOCYTES # BLD AUTO: 1.04 THOUSANDS/ÂΜL (ref 0.6–4.47)
LYMPHOCYTES NFR BLD AUTO: 24 % (ref 14–44)
MCH RBC QN AUTO: 31.3 PG (ref 26.8–34.3)
MCHC RBC AUTO-ENTMCNC: 34.9 G/DL (ref 31.4–37.4)
MCV RBC AUTO: 90 FL (ref 82–98)
MONOCYTES # BLD AUTO: 0.44 THOUSAND/ÂΜL (ref 0.17–1.22)
MONOCYTES NFR BLD AUTO: 10 % (ref 4–12)
NEUTROPHILS # BLD AUTO: 2.7 THOUSANDS/ÂΜL (ref 1.85–7.62)
NEUTS SEG NFR BLD AUTO: 63 % (ref 43–75)
NRBC BLD AUTO-RTO: 0 /100 WBCS
PLATELET # BLD AUTO: 191 THOUSANDS/UL (ref 149–390)
PMV BLD AUTO: 8.5 FL (ref 8.9–12.7)
RBC # BLD AUTO: 3.61 MILLION/UL (ref 3.81–5.12)
WBC # BLD AUTO: 4.27 THOUSAND/UL (ref 4.31–10.16)

## 2025-07-02 PROCEDURE — 96372 THER/PROPH/DIAG INJ SC/IM: CPT

## 2025-07-02 PROCEDURE — 85025 COMPLETE CBC W/AUTO DIFF WBC: CPT | Performed by: OBSTETRICS & GYNECOLOGY

## 2025-07-02 PROCEDURE — 93005 ELECTROCARDIOGRAM TRACING: CPT

## 2025-07-02 PROCEDURE — 99285 EMERGENCY DEPT VISIT HI MDM: CPT | Performed by: PHYSICIAN ASSISTANT

## 2025-07-02 PROCEDURE — 99283 EMERGENCY DEPT VISIT LOW MDM: CPT

## 2025-07-02 RX ORDER — MAGNESIUM SULFATE HEPTAHYDRATE 40 MG/ML
2 INJECTION, SOLUTION INTRAVENOUS ONCE
Status: COMPLETED | OUTPATIENT
Start: 2025-07-02 | End: 2025-07-02

## 2025-07-02 RX ORDER — METHYLPREDNISOLONE 4 MG/1
TABLET ORAL
Qty: 21 TABLET | Refills: 0 | Status: SHIPPED | OUTPATIENT
Start: 2025-07-02

## 2025-07-02 RX ORDER — HYDROCORTISONE SODIUM SUCCINATE 100 MG/2ML
50 INJECTION INTRAMUSCULAR; INTRAVENOUS
Status: ACTIVE | OUTPATIENT
Start: 2025-07-02 | End: 2025-07-02

## 2025-07-02 RX ORDER — FAMOTIDINE 10 MG/ML
20 INJECTION, SOLUTION INTRAVENOUS ONCE
Status: COMPLETED | OUTPATIENT
Start: 2025-07-02 | End: 2025-07-02

## 2025-07-02 RX ORDER — PALONOSETRON 0.05 MG/ML
0.25 INJECTION, SOLUTION INTRAVENOUS ONCE
Status: COMPLETED | OUTPATIENT
Start: 2025-07-02 | End: 2025-07-02

## 2025-07-02 RX ORDER — EPINEPHRINE 1 MG/ML
0.3 INJECTION, SOLUTION, CONCENTRATE INTRAVENOUS ONCE
Status: COMPLETED | OUTPATIENT
Start: 2025-07-02 | End: 2025-07-02

## 2025-07-02 RX ORDER — DIPHENHYDRAMINE HYDROCHLORIDE 50 MG/ML
25 INJECTION, SOLUTION INTRAMUSCULAR; INTRAVENOUS
Status: ACTIVE | OUTPATIENT
Start: 2025-07-02 | End: 2025-07-02

## 2025-07-02 RX ORDER — SODIUM CHLORIDE 9 MG/ML
20 INJECTION, SOLUTION INTRAVENOUS ONCE
Status: COMPLETED | OUTPATIENT
Start: 2025-07-02 | End: 2025-07-02

## 2025-07-02 RX ORDER — EPINEPHRINE 0.3 MG/.3ML
0.3 INJECTION SUBCUTANEOUS ONCE
Qty: 0.6 ML | Refills: 0 | Status: SHIPPED | OUTPATIENT
Start: 2025-07-02 | End: 2025-07-02

## 2025-07-02 RX ADMIN — DEXAMETHASONE SODIUM PHOSPHATE 20 MG: 10 INJECTION, SOLUTION INTRAMUSCULAR; INTRAVENOUS at 08:18

## 2025-07-02 RX ADMIN — FAMOTIDINE 20 MG: 10 INJECTION INTRAVENOUS at 09:07

## 2025-07-02 RX ADMIN — DIPHENHYDRAMINE HYDROCHLORIDE 25 MG: 50 INJECTION INTRAMUSCULAR; INTRAVENOUS at 08:42

## 2025-07-02 RX ADMIN — SODIUM CHLORIDE 500 ML: 0.9 INJECTION, SOLUTION INTRAVENOUS at 13:59

## 2025-07-02 RX ADMIN — SODIUM CHLORIDE 20 ML/HR: 0.9 INJECTION, SOLUTION INTRAVENOUS at 08:18

## 2025-07-02 RX ADMIN — CARBOPLATIN 651 MG: 10 INJECTION, SOLUTION INTRAVENOUS at 13:17

## 2025-07-02 RX ADMIN — FAMOTIDINE 20 MG: 10 INJECTION, SOLUTION INTRAVENOUS at 13:57

## 2025-07-02 RX ADMIN — DIPHENHYDRAMINE HYDROCHLORIDE 25 MG: 50 INJECTION INTRAMUSCULAR; INTRAVENOUS at 13:55

## 2025-07-02 RX ADMIN — PALONOSETRON 0.25 MG: 0.05 INJECTION, SOLUTION INTRAVENOUS at 08:19

## 2025-07-02 RX ADMIN — PACLITAXEL 260.4 MG: 6 INJECTION, SOLUTION INTRAVENOUS at 10:02

## 2025-07-02 RX ADMIN — FOSAPREPITANT 150 MG: 150 INJECTION, POWDER, LYOPHILIZED, FOR SOLUTION INTRAVENOUS at 09:28

## 2025-07-02 RX ADMIN — MAGNESIUM SULFATE HEPTAHYDRATE 2 G: 40 INJECTION, SOLUTION INTRAVENOUS at 10:06

## 2025-07-02 RX ADMIN — EPINEPHRINE 0.3 MG: 1 INJECTION, SOLUTION, CONCENTRATE INTRAVENOUS at 14:50

## 2025-07-02 RX ADMIN — HYDROCORTISONE SODIUM SUCCINATE 50 MG: 100 INJECTION, POWDER, FOR SOLUTION INTRAMUSCULAR; INTRAVENOUS at 13:55

## 2025-07-02 NOTE — ED PROVIDER NOTES
Time reflects when diagnosis was documented in both MDM as applicable and the Disposition within this note       Time User Action Codes Description Comment    7/2/2025  5:34 PM Cherelle Man Add [T78.40XA] Acute allergic reaction           ED Disposition       ED Disposition   Discharge    Condition   Stable    Date/Time   Wed Jul 2, 2025  5:33 PM    Comment   Eli Kerijacqueline discharge to home/self care.                   Assessment & Plan       Medical Decision Making  Patient with allergic reaction from infusion, given benadryl, pepcid, solucortef prior to arrival, will give epi and monitor.  Patient improved with epinephrine, she was monitored for 3 hours and symptoms resolved.  Patient was instructed to continue benadryl over the next 24 hours.  Will also prescribe medrol dose pack and epi.  I called patient to let her know epi was sent to pharmacy on file.  Return precautions given.     Risk  Prescription drug management.        ED Course as of 07/02/25 1804   Wed Jul 02, 2025   1513 Patient improved after epinephrine, no longer has erythema.  States she feels much better, just tired from benadryl, will continue to monitor.    1606 Patient resting comfortably on stretcher, feeling better.    1733 Patient feeling much better, no longer has rash.         Medications   EPINEPHrine PF (ADRENALIN) 1 mg/mL injection 0.3 mg (0.3 mg Intramuscular Given 7/2/25 1450)       ED Risk Strat Scores                    No data recorded        SBIRT 22yo+      Flowsheet Row Most Recent Value   Initial Alcohol Screen: US AUDIT-C     1. How often do you have a drink containing alcohol? 0 Filed at: 07/02/2025 1443   2. How many drinks containing alcohol do you have on a typical day you are drinking?  0 Filed at: 07/02/2025 1443   3a. Male UNDER 65: How often do you have five or more drinks on one occasion? 0 Filed at: 07/02/2025 1443   3b. FEMALE Any Age, or MALE 65+: How often do you have 4 or more drinks on one occassion?  "0 Filed at: 07/02/2025 1443   Audit-C Score 0 Filed at: 07/02/2025 1443   JUAN: How many times in the past year have you...    Used an illegal drug or used a prescription medication for non-medical reasons? Never Filed at: 07/02/2025 1443                            History of Present Illness       Chief Complaint   Patient presents with    Allergic Reaction     Pt presents to ED from the infusion center with redness/hives on her arms and states she has a little difficulty swallowing. Nurse from [facility] states that she got their \"allergic reaction kit\".        Past Medical History[1]   Past Surgical History[2]   Family History[3]   Social History[4]   E-Cigarette/Vaping    E-Cigarette Use Never User       E-Cigarette/Vaping Substances    Nicotine No     THC No     CBD No     Flavoring No     Other No     Unknown No       I have reviewed and agree with the history as documented.     Patient is a 66 y/o F with h/o DM, ovarian cancer that was brought to the ED by [facility] for allergic reaction to carboplatin. This was her 8th infusion and shortly after receiving it she started to become itchy and had swelling of hands and erythema.  She was given 50mg IV benadryl, 50mg solucortef, 20mg Pepcid IV and 500ml Bolus of NSS.  She improved with medications, but then felt like her throat was swelling.  She now has erythema to b/l arms, chest, swelling to b/l hands.  No swelling to tongue or throat.  No hoarse voice.        History provided by:  Patient  Allergic Reaction  Presenting symptoms: rash        Review of Systems   Constitutional:  Negative for chills and fever.   HENT:  Negative for drooling, facial swelling and voice change.    Respiratory:  Negative for cough and shortness of breath.    Skin:  Positive for rash.   Neurological:  Negative for dizziness, weakness and headaches.   Psychiatric/Behavioral:  Negative for confusion.    All other systems reviewed and are negative.          Objective       ED Triage " Vitals   Temperature Pulse Blood Pressure Respirations SpO2 Patient Position - Orthostatic VS   07/02/25 1448 07/02/25 1442 07/02/25 1443 07/02/25 1442 07/02/25 1442 07/02/25 1442   97.7 °F (36.5 °C) 84 170/82 18 95 % Sitting      Temp Source Heart Rate Source BP Location FiO2 (%) Pain Score    07/02/25 1448 07/02/25 1442 07/02/25 1442 -- 07/02/25 1500    Oral Monitor Right arm  No Pain      Vitals      Date and Time Temp Pulse SpO2 Resp BP Pain Score FACES Pain Rating User   07/02/25 1604 -- 89 93 % 18 133/63 No Pain -- [facility]   07/02/25 1530 -- 87 95 % 16 138/65 -- -- AY   07/02/25 1500 -- 84 96 % 16 154/75 No Pain -- [facility]   07/02/25 1448 97.7 °F (36.5 °C) -- -- -- -- -- -- GS   07/02/25 1443 -- -- -- -- 170/82 -- -- AY   07/02/25 1442 -- 84 95 % 18 -- -- -- AY            Physical Exam  Vitals and nursing note reviewed.   Constitutional:       General: She is not in acute distress.     Appearance: Normal appearance. She is well-developed and well-groomed. She is not ill-appearing or diaphoretic.   HENT:      Head: Normocephalic and atraumatic.      Comments: Normal voice.  No facial swelling.  No drooling.      Right Ear: Hearing normal.      Left Ear: Hearing normal.      Nose: Nose normal.      Mouth/Throat:      Mouth: Mucous membranes are moist.      Pharynx: Oropharynx is clear. No pharyngeal swelling or uvula swelling.     Eyes:      Conjunctiva/sclera: Conjunctivae normal.       Cardiovascular:      Rate and Rhythm: Normal rate and regular rhythm.      Heart sounds: Normal heart sounds.   Pulmonary:      Effort: Pulmonary effort is normal.      Breath sounds: Normal breath sounds.     Musculoskeletal:         General: Normal range of motion.      Cervical back: Normal range of motion and neck supple.     Skin:     General: Skin is warm and dry.      Findings: Erythema (b/l arms, chest.) present.     Neurological:      Mental Status: She is alert and oriented to person, place, and time.      Sensory: Sensation  is intact.      Motor: Motor function is intact.     Psychiatric:         Mood and Affect: Mood normal.         Behavior: Behavior is cooperative.         Results Reviewed       None            No orders to display       ECG 12 Lead Documentation Only    Date/Time: 2025 2:52 PM    Performed by: Cherelle Man PA-C  Authorized by: Cherelle Man PA-C    Indications / Diagnosis:  Allergic reaction  ECG reviewed by me, the ED Provider: yes    Patient location:  ED  Previous ECG:     Previous ECG:  Compared to current    Similarity:  No change  Rate:     ECG rate:  82  Rhythm:     Rhythm: sinus rhythm    Conduction:     Conduction: normal    ST segments:     ST segments:  Normal  T waves:     T waves: non-specific        ED Medication and Procedure Management   Prior to Admission Medications   Prescriptions Last Dose Informant Patient Reported? Taking?   Cholecalciferol (VITAMIN D-3) 1000 units CAPS  Self Yes No   Sig: Take by mouth in the morning.   LORazepam (ATIVAN) 1 mg tablet  Self No No   Sig: Take 1 tablet (1 mg total) by mouth every 8 (eight) hours as needed (nausea or anxiety)   Mounjaro 7.5 MG/0.5ML  Self Yes No   Sig: Inject 7.5 mg under the skin every 7 days   SUPER B COMPLEX/C PO  Self Yes No   Sig: Take by mouth in the morning.   amLODIPine (NORVASC) 5 mg tablet  Self Yes No   Sig: Take 5 mg by mouth in the morning.   amoxicillin (AMOXIL) 500 mg capsule  Self Yes No   Sig: TAKE 2 CAPSULES RIGHT AWAY, THEN 1 CAPSULE EVERY 8 HOURS UNTIL FINISHED   clobetasol (TEMOVATE) 0.05 % ointment  Self No No   Sig: Apply to the affected area 1-3x/week   co-enzyme Q-10 100 mg capsule  Self Yes No   Sig: Take 100 mg by mouth in the morning.   escitalopram (LEXAPRO) 20 mg tablet  Self Yes No   Si mg in the morning.   meclizine (ANTIVERT) 25 mg tablet  Self No No   Sig: Take 1 tablet (25 mg total) by mouth 3 (three) times a day as needed for dizziness   metFORMIN (GLUCOPHAGE) 500 mg tablet  Self  Yes No   Si mg in the morning and 500 mg in the evening. Take with meals.   ondansetron (ZOFRAN) 8 mg tablet  Self No No   Sig: Take 1 tablet (8 mg total) by mouth every 8 (eight) hours as needed for nausea or vomiting   scopolamine (TRANSDERM-SCOP) 1.5 mg/3 days TD 72 hr patch  Self No No   Sig: Place 1 patch on the skin every third day   simvastatin (ZOCOR) 20 mg tablet  Self Yes No   valsartan-hydrochlorothiazide (DIOVAN-HCT) 320-25 MG per tablet  Self Yes No   Sig: in the morning.      Facility-Administered Medications: None     Discharge Medication List as of 2025  5:38 PM        START taking these medications    Details   methylprednisolone (MEDROL) 4 mg tablet Medrol dose pack:  take as directed, Normal           CONTINUE these medications which have NOT CHANGED    Details   amLODIPine (NORVASC) 5 mg tablet Take 5 mg by mouth in the morning., Starting 2019, Historical Med      amoxicillin (AMOXIL) 500 mg capsule TAKE 2 CAPSULES RIGHT AWAY, THEN 1 CAPSULE EVERY 8 HOURS UNTIL FINISHED, Historical Med      Cholecalciferol (VITAMIN D-3) 1000 units CAPS Take by mouth in the morning., Historical Med      clobetasol (TEMOVATE) 0.05 % ointment Apply to the affected area 1-3x/week, Normal      co-enzyme Q-10 100 mg capsule Take 100 mg by mouth in the morning., Historical Med      escitalopram (LEXAPRO) 20 mg tablet 20 mg in the morning., Starting 2019, Historical Med      LORazepam (ATIVAN) 1 mg tablet Take 1 tablet (1 mg total) by mouth every 8 (eight) hours as needed (nausea or anxiety), Starting 2025, Normal      meclizine (ANTIVERT) 25 mg tablet Take 1 tablet (25 mg total) by mouth 3 (three) times a day as needed for dizziness, Starting 2024, Normal      metFORMIN (GLUCOPHAGE) 500 mg tablet 500 mg in the morning and 500 mg in the evening. Take with meals., Starting 2019, Historical Med      Mounjaro 7.5 MG/0.5ML Inject 7.5 mg under the skin every 7 days,  Starting Mon 7/1/2024, Historical Med      ondansetron (ZOFRAN) 8 mg tablet Take 1 tablet (8 mg total) by mouth every 8 (eight) hours as needed for nausea or vomiting, Starting Mon 1/13/2025, Normal      scopolamine (TRANSDERM-SCOP) 1.5 mg/3 days TD 72 hr patch Place 1 patch on the skin every third day, Starting Wed 9/11/2019, Normal      simvastatin (ZOCOR) 20 mg tablet Historical Med      SUPER B COMPLEX/C PO Take by mouth in the morning., Historical Med      valsartan-hydrochlorothiazide (DIOVAN-HCT) 320-25 MG per tablet in the morning., Starting Tue 8/13/2019, Historical Med           No discharge procedures on file.  ED SEPSIS DOCUMENTATION   Time reflects when diagnosis was documented in both MDM as applicable and the Disposition within this note       Time User Action Codes Description Comment    7/2/2025  5:34 PM Cherelel Man [T78.40XA] Acute allergic reaction                    [1]   Past Medical History:  Diagnosis Date    Anxiety     Cancer (HCC)     ovarian    CPAP (continuous positive airway pressure) dependence     Diabetes mellitus (HCC)     High cholesterol     Hypertension     Morbid obesity (HCC)     Ovarian cancer (HCC)     Ovarian neoplasm 08/26/2019    PONV (postoperative nausea and vomiting)     Post-menopausal bleeding     Sleep apnea     Vertigo    [2]   Past Surgical History:  Procedure Laterality Date    BLADDER REPAIR N/A 08/26/2019    Procedure: REPAIR BLADDER; uretero yared cystotomy;  Surgeon: Cachorro Olmedo MD;  Location: [facility];  Service: Gynecology Oncology    CHOLECYSTECTOMY      COLONOSCOPY      HYSTERECTOMY      IR BIOPSY ABDOMEN  2/10/2025    IR IMAGE GUIDED ASPIRATION / DRAINAGE  09/27/2019    IR PORT PLACEMENT  09/27/2019    IR PORT PLACEMENT  11/07/2019    IR PORT PLACEMENT  1/29/2025    IR PORT REMOVAL  10/29/2019    IR PORT REMOVAL  11/06/2020    CO CYSTOURETHROSCOPY W/URETERAL CATHETERIZATION Bilateral 01/06/2022    Procedure: CYSTOSCOPY WITH INSERTION STENT  URETERAL;  Surgeon: Noel Baig MD;  Location: [facility];  Service: Urology    DE LAPS FULG/EXC OVARY VISCERA/PERITONEAL SURFACE N/A 2022    Procedure: EXPLORATORY LAPAROTOMY, EXTENSIVE LYSIS OF ADHESIONS, RESECTION PELVIC MASS;  Surgeon: Trevin Bonilla MD;  Location: [facility];  Service: Gynecology Oncology    DE TOTAL ABDOMINAL HYSTERECT W/WO RMVL TUBE OVARY N/A 2019    Procedure: TOTAL ABDOMINAL HYSTERECTOMY, BILATERAL SALPINGO-OOPHORECTOMY, Radical omentectomy, pelvic lymph node sampling, staging biopsy, repair of cystotomy, appendectomy;  Surgeon: Cachorro Olmedo MD;  Location: [facility];  Service: Gynecology Oncology    SMALL INTESTINE SURGERY N/A 2022    Procedure: RESECTION SMALL BOWEL;  Surgeon: Trevin Bonilla MD;  Location: [facility];  Service: Gynecology Oncology   [3]   Family History  Problem Relation Name Age of Onset    Diabetes Mother Sangita Vanrong     Hypertension Mother Sangita Vanrong     Hypertension Father Issac Cobos     Colon cancer Paternal Uncle Mk Cobos         My fathers twin    Breast cancer Cousin     [4]   Social History  Tobacco Use    Smoking status: Former     Current packs/day: 0.00     Average packs/day: 0.5 packs/day for 10.0 years (5.0 ttl pk-yrs)     Types: Cigarettes     Start date: 9/10/1976     Quit date: 9/10/1986     Years since quittin.8    Smokeless tobacco: Never    Tobacco comments:     quit in her 30's   Vaping Use    Vaping status: Never Used   Substance Use Topics    Alcohol use: Not Currently    Drug use: Never        Cherelle Man PA-C  25 1800

## 2025-07-02 NOTE — PROGRESS NOTES
Patient began c/o of itchy, red and swollen hands. Carboplatin stopped immediately and fluids opened wide. Hypersensitivity meds given. Vitals began to normalize and pt started feeling better. Within a few minutes pt began itching again and the redness started moving up her forearms, up the back of her neck, chest and ears. Patient also started c/o some difficulty swallowing. Luisana Tano notified during this process and decision was made to take her to the ED for further evaluation.

## 2025-07-02 NOTE — DISCHARGE INSTRUCTIONS
Continue benadryl every 6 hours for 24 hours.  Start medrol dose pack tomorrow.  Follow with oncologist in 1-2 days for recheck.  Return to ER if symptoms worsen.

## 2025-07-03 LAB
ATRIAL RATE: 82 BPM
P AXIS: 70 DEGREES
PR INTERVAL: 184 MS
QRS AXIS: 34 DEGREES
QRSD INTERVAL: 78 MS
QT INTERVAL: 422 MS
QTC INTERVAL: 493 MS
T WAVE AXIS: 58 DEGREES
VENTRICULAR RATE: 82 BPM

## 2025-07-03 PROCEDURE — 93010 ELECTROCARDIOGRAM REPORT: CPT | Performed by: INTERNAL MEDICINE

## 2025-07-10 ENCOUNTER — HOSPITAL ENCOUNTER (OUTPATIENT)
Dept: CT IMAGING | Facility: HOSPITAL | Age: 65
Discharge: HOME | End: 2025-07-10
Attending: PHYSICIAN ASSISTANT
Payer: MEDICARE

## 2025-07-10 DIAGNOSIS — C56.2 MALIGNANT NEOPLASM OF LEFT OVARY (HCC): ICD-10-CM

## 2025-07-10 PROCEDURE — 74177 CT ABD & PELVIS W/CONTRAST: CPT

## 2025-07-10 PROCEDURE — 71260 CT THORAX DX C+: CPT

## 2025-07-10 RX ADMIN — IOHEXOL 50 ML: 350 INJECTION, SOLUTION INTRAVENOUS at 12:52

## 2025-07-11 ENCOUNTER — HOSPITAL ENCOUNTER (OUTPATIENT)
Dept: INFUSION CENTER | Facility: HOSPITAL | Age: 65
End: 2025-07-11
Payer: MEDICARE

## 2025-07-11 DIAGNOSIS — Z45.2 ENCOUNTER FOR CENTRAL LINE CARE: Primary | ICD-10-CM

## 2025-07-11 DIAGNOSIS — C56.2 MALIGNANT NEOPLASM OF LEFT OVARY (HCC): ICD-10-CM

## 2025-07-11 LAB
ALBUMIN SERPL BCG-MCNC: 4.3 G/DL (ref 3.5–5)
ALP SERPL-CCNC: 64 U/L (ref 34–104)
ALT SERPL W P-5'-P-CCNC: 15 U/L (ref 7–52)
ANION GAP SERPL CALCULATED.3IONS-SCNC: 5 MMOL/L (ref 4–13)
AST SERPL W P-5'-P-CCNC: 16 U/L (ref 13–39)
BASOPHILS # BLD AUTO: 0.02 THOUSANDS/ÂΜL (ref 0–0.1)
BASOPHILS NFR BLD AUTO: 1 % (ref 0–1)
BILIRUB SERPL-MCNC: 0.32 MG/DL (ref 0.2–1)
BUN SERPL-MCNC: 16 MG/DL (ref 5–25)
CALCIUM SERPL-MCNC: 9.5 MG/DL (ref 8.4–10.2)
CHLORIDE SERPL-SCNC: 100 MMOL/L (ref 96–108)
CO2 SERPL-SCNC: 32 MMOL/L (ref 21–32)
CREAT SERPL-MCNC: 0.5 MG/DL (ref 0.6–1.3)
EOSINOPHIL # BLD AUTO: 0.04 THOUSAND/ÂΜL (ref 0–0.61)
EOSINOPHIL NFR BLD AUTO: 1 % (ref 0–6)
ERYTHROCYTE [DISTWIDTH] IN BLOOD BY AUTOMATED COUNT: 12.3 % (ref 11.6–15.1)
GFR SERPL CREATININE-BSD FRML MDRD: 100 ML/MIN/1.73SQ M
GLUCOSE SERPL-MCNC: 126 MG/DL (ref 65–140)
HCT VFR BLD AUTO: 31.4 % (ref 34.8–46.1)
HGB BLD-MCNC: 10.9 G/DL (ref 11.5–15.4)
IMM GRANULOCYTES # BLD AUTO: 0.03 THOUSAND/UL (ref 0–0.2)
IMM GRANULOCYTES NFR BLD AUTO: 1 % (ref 0–2)
LYMPHOCYTES # BLD AUTO: 1.21 THOUSANDS/ÂΜL (ref 0.6–4.47)
LYMPHOCYTES NFR BLD AUTO: 30 % (ref 14–44)
MAGNESIUM SERPL-MCNC: 1.6 MG/DL (ref 1.9–2.7)
MCH RBC QN AUTO: 30.6 PG (ref 26.8–34.3)
MCHC RBC AUTO-ENTMCNC: 34.7 G/DL (ref 31.4–37.4)
MCV RBC AUTO: 88 FL (ref 82–98)
MONOCYTES # BLD AUTO: 0.3 THOUSAND/ÂΜL (ref 0.17–1.22)
MONOCYTES NFR BLD AUTO: 7 % (ref 4–12)
NEUTROPHILS # BLD AUTO: 2.44 THOUSANDS/ÂΜL (ref 1.85–7.62)
NEUTS SEG NFR BLD AUTO: 60 % (ref 43–75)
NRBC BLD AUTO-RTO: 0 /100 WBCS
PLATELET # BLD AUTO: 193 THOUSANDS/UL (ref 149–390)
PMV BLD AUTO: 8.7 FL (ref 8.9–12.7)
POTASSIUM SERPL-SCNC: 3.8 MMOL/L (ref 3.5–5.3)
PROT SERPL-MCNC: 6.6 G/DL (ref 6.4–8.4)
RBC # BLD AUTO: 3.56 MILLION/UL (ref 3.81–5.12)
SODIUM SERPL-SCNC: 137 MMOL/L (ref 135–147)
WBC # BLD AUTO: 4.04 THOUSAND/UL (ref 4.31–10.16)

## 2025-07-11 PROCEDURE — 85025 COMPLETE CBC W/AUTO DIFF WBC: CPT

## 2025-07-11 PROCEDURE — 80053 COMPREHEN METABOLIC PANEL: CPT

## 2025-07-11 PROCEDURE — 83735 ASSAY OF MAGNESIUM: CPT

## 2025-07-11 NOTE — PROGRESS NOTES
..Eli Cobos  tolerated treatment well with no complications.      Eli Cobos is aware of future appt on 7/18 at 1:30.     AVS printed and given to Eli Cobos:  No (Declined by Eli Cobos)

## 2025-07-17 ENCOUNTER — OFFICE VISIT (OUTPATIENT)
Age: 65
End: 2025-07-17
Payer: MEDICARE

## 2025-07-17 VITALS
HEART RATE: 97 BPM | WEIGHT: 203 LBS | BODY MASS INDEX: 35.97 KG/M2 | SYSTOLIC BLOOD PRESSURE: 130 MMHG | TEMPERATURE: 97.4 F | OXYGEN SATURATION: 95 % | DIASTOLIC BLOOD PRESSURE: 72 MMHG

## 2025-07-17 DIAGNOSIS — E83.42 HYPOMAGNESEMIA: Primary | ICD-10-CM

## 2025-07-17 DIAGNOSIS — C56.2 MALIGNANT NEOPLASM OF LEFT OVARY (HCC): Primary | ICD-10-CM

## 2025-07-17 DIAGNOSIS — G62.0 DRUG-INDUCED PERIPHERAL NEUROPATHY (HCC): ICD-10-CM

## 2025-07-17 DIAGNOSIS — C56.2 MALIGNANT NEOPLASM OF LEFT OVARY (HCC): ICD-10-CM

## 2025-07-17 PROCEDURE — G2211 COMPLEX E/M VISIT ADD ON: HCPCS | Performed by: OBSTETRICS & GYNECOLOGY

## 2025-07-17 PROCEDURE — 99215 OFFICE O/P EST HI 40 MIN: CPT | Performed by: OBSTETRICS & GYNECOLOGY

## 2025-07-17 NOTE — ASSESSMENT & PLAN NOTE
65-year-old with recurrent stage I C1 poorly differentiated stromal tumor of the ovary with multifocal peritoneal disease who received 8 cycles of palliative carboplatin AUC 6, Taxol 125 mg meter squared, durvalumab 1120 mg.  She had a carboplatin allergy with cycle 8 of treatment.  Inhibin B is elevated.  There is a known somatic BRCA2 mutation.  Her performance status is 0.  1.  Plan to transition to maintenance durvalumab at 1500 mg IV every 28 days in combination with olaparib at 200 mg twice daily.  I discussed the risks of starting olaparib therapy including the risks of fatigue, nausea, vomiting, diarrhea, pancytopenia, lung problems.  She understands the risks of treatment and agrees to proceed as outlined.  Consent for treatment was obtained by me in the office.  2.  Toxicity monitoring with CBC CMP initially at week 1 and then can decrease frequency to every 2 weeks to assess for hematologic toxicity.  3.  Continue toxicity monitoring for durvalumab including pancreatic enzymes, TSH.  4.  Plan for 3 months of treatment with olaparib and durvalumab followed by repeat CT imaging to assess disease response.  5.  Continue to monitor inhibin B for response as well.

## 2025-07-17 NOTE — PROGRESS NOTES
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  She had a carboplatin allergy with cycle 8 of treatment.  Inhibin B is elevated.  There is a known somatic BRCA2 mutation.  Her performance status is 0.  1.  Plan to transition to maintenance durvalumab at 1500 mg IV every 28 days in combination with olaparib at 200 mg twice daily.  I discussed the risks of starting olaparib therapy including the risks of fatigue, nausea, vomiting, diarrhea, pancytopenia, lung problems.  She understands the risks of treatment and agrees to proceed as outlined.  Consent for treatment was obtained by me in the office.  2.  Toxicity monitoring with CBC CMP initially at week 1 and then can decrease frequency to every 2 weeks to assess for hematologic toxicity.  3.  Continue toxicity monitoring for durvalumab including pancreatic enzymes, TSH.  4.  Plan for 3 months of treatment with olaparib and durvalumab followed by repeat CT imaging to assess disease response.  5.  Continue to monitor inhibin B for response as well.       Drug-induced peripheral neuropathy (HCC)  Currently grade 1.  Continue to monitor.         Assessment & Plan            History of Present Illness   Reason for Visit / CC: Treatment discussion, pretreatment evaluation   Eli Cobos is a 65 y.o. female who returns prior to receiving the first cycle of maintenance durvalumab at 1500 mg every 4 weeks.  She received 8 cycles of carboplatin, Taxol, durvalumab.  Labs from 2025 revealed a  normal CMP, CBC with hemoglobin 10.9 g/dL, normal platelet count, ANC 2.44.  CT chest abdomen pelvis from 7/16/2025 revealed decreasing subhepatic capsular implant with unchanged omental deposit measuring 1.2 x 0.6 cm, unchanged central mesenteric nodule measuring 1.9 x 1 cm.  She had a severe allergic reaction to carboplatin requiring transfer to the emergency department on 7/2/2025.  Inhibin B is elevated at 41.7 from a prior of 32.2.  Her neuropathy has improved.  She occasionally has a numbness sensation of her foot.  She is able to perform her actives of daily living without difficulty.  No other interval change in medications or medical history since her last visit.  She is due to have cataract surgery.  History of Present Illness    Pertinent Medical History          Oncology History   Cancer Staging   Malignant neoplasm of left ovary (HCC)  Staging form: Ovary, Fallopian Tube, Primary Peritoneal, AJCC 8th Edition  - Clinical stage from 8/26/2019: FIGO Stage IC1, calculated as Stage IA (cT1a, cN0, cM0) - Signed by Trevin Bonilla MD on 9/11/2019  Oncology History   Malignant neoplasm of left ovary (HCC)   8/26/2019 Initial Diagnosis    Malignant neoplasm of left ovary (HCC)     8/26/2019 -  Cancer Staged    Staging form: Ovary, Fallopian Tube, Primary Peritoneal, AJCC 8th Edition  - Clinical stage from 8/26/2019: FIGO Stage IC1, calculated as Stage IA (cT1a, cN0, cM0) - Signed by Trevin Bonilla MD on 9/11/2019        Chemotherapy    Taxol 175 mg/m2 and carboplatin AUC 6 every 21 days. She received 5 cycles and is scheduled for cycle 6.      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      12/2022 - 6/27/2024 Hormone Therapy    Megace 80 mg PO BID (starting inhibin B, 9.2 pg/ml)     6/4/2024 - 6/21/2024 Radiation      Plan ID Energy Fractions Dose per Fraction (cGy) Dose Correction (cGy) Total Dose Delivered (cGy) Elapsed Days   SBRT MT 6X-FFF 5 / 5 600 0 3,000 17        1/28/2025 - 7/2/2025 Chemotherapy    Taxol 175 mg/m2 and carboplatin AUC 6 IV every 21 days.     Cycle 4, taxol dose-reduced to 135 mg/m2 and durvalumab 1120 mg IV every 21 days added to plan.  Received a total of 8 cycles of treatment.  Carboplatin allergy with cycle 8.          Review of Systems   Constitutional:  Negative for activity change and unexpected weight change.   HENT: Negative.     Eyes: Negative.    Respiratory: Negative.     Cardiovascular: Negative.    Gastrointestinal:  Negative for abdominal distention and abdominal pain.   Endocrine: Negative.    Genitourinary:  Negative for pelvic pain and vaginal bleeding.   Musculoskeletal: Negative.    Skin: Negative.    Allergic/Immunologic: Negative.    Neurological:  Positive for numbness.   Hematological: Negative.    Psychiatric/Behavioral: Negative.      A complete review of systems is negative other than that noted above in the HPI.  Medications Ordered Prior to Encounter[1]      Objective   /72 (BP Location: Left arm, Patient Position: Sitting, Cuff Size: Large)   Pulse 97   Temp (!) 97.4 °F (36.3 °C) (Temporal)   Wt 92.1 kg (203 lb)   SpO2 95%   BMI 35.97 kg/m²     Body mass index is 35.97 kg/m².  Pain Screening:  Pain Score: 0-No pain  ECOG   0  Physical Exam  Vitals reviewed.   Constitutional:       General: She is not in acute distress.     Appearance: Normal appearance. She is not ill-appearing.   HENT:      Head:  "Normocephalic and atraumatic.      Mouth/Throat:      Mouth: Mucous membranes are moist.     Eyes:      General: No scleral icterus.        Right eye: No discharge.         Left eye: No discharge.      Conjunctiva/sclera: Conjunctivae normal.     Pulmonary:      Effort: Pulmonary effort is normal.     Musculoskeletal:      Right lower leg: No edema.      Left lower leg: No edema.     Skin:     General: Skin is warm and dry.      Coloration: Skin is not jaundiced.      Findings: No rash.     Neurological:      General: No focal deficit present.      Mental Status: She is alert and oriented to person, place, and time.      Cranial Nerves: No cranial nerve deficit.      Sensory: No sensory deficit.      Motor: No weakness.      Gait: Gait normal.     Psychiatric:         Mood and Affect: Mood normal.         Behavior: Behavior normal.         Thought Content: Thought content normal.         Judgment: Judgment normal.       Physical Exam       Results    Labs: I have reviewed pertinent labs. Inhibin A/B:   Lab Results   Component Value Date/Time    Inhibin B 41.7 (H) 06/27/2025 11:34 AM      No results found for: \"\"  Lab Results   Component Value Date/Time    Potassium 3.8 07/11/2025 12:46 PM    Chloride 100 07/11/2025 12:46 PM    CO2 32 07/11/2025 12:46 PM    BUN 16 07/11/2025 12:46 PM    Creatinine 0.50 (L) 07/11/2025 12:46 PM    Calcium 9.5 07/11/2025 12:46 PM    AST 16 07/11/2025 12:46 PM    ALT 15 07/11/2025 12:46 PM    Alkaline Phosphatase 64 07/11/2025 12:46 PM    eGFR 100 07/11/2025 12:46 PM     Lab Results   Component Value Date/Time    WBC 4.04 (L) 07/11/2025 12:46 PM    Hemoglobin 10.9 (L) 07/11/2025 12:46 PM    Hematocrit 31.4 (L) 07/11/2025 12:46 PM    MCV 88 07/11/2025 12:46 PM    Platelets 193 07/11/2025 12:46 PM     Lab Results   Component Value Date/Time    Absolute Neutrophils 2.44 07/11/2025 12:46 PM        Trend:  Lab Results   Component Value Date     42.7 (H) 08/16/2019 "       Radiology Results Review: I personally reviewed the following image studies in PACS and associated radiology reports: CT chest and CT abdomen/pelvis. My interpretation of the radiology images/reports is: Stable to slightly decreased peritoneal disease.  Other Study Results Review : Pathology reports reviewed.           [1]   Current Outpatient Medications on File Prior to Visit   Medication Sig Dispense Refill    amLODIPine (NORVASC) 5 mg tablet Take 5 mg by mouth in the morning.  4    Cholecalciferol (VITAMIN D-3) 1000 units CAPS Take by mouth in the morning.      clobetasol (TEMOVATE) 0.05 % ointment Apply to the affected area 1-3x/week 30 g 0    co-enzyme Q-10 100 mg capsule Take 100 mg by mouth in the morning.      escitalopram (LEXAPRO) 20 mg tablet 20 mg in the morning.  0    meclizine (ANTIVERT) 25 mg tablet Take 1 tablet (25 mg total) by mouth 3 (three) times a day as needed for dizziness 30 tablet 0    metFORMIN (GLUCOPHAGE) 500 mg tablet 500 mg in the morning and 500 mg in the evening. Take with meals.  0    methylprednisolone (MEDROL) 4 mg tablet Medrol dose pack:  take as directed 21 tablet 0    ondansetron (ZOFRAN) 8 mg tablet Take 1 tablet (8 mg total) by mouth every 8 (eight) hours as needed for nausea or vomiting 30 tablet 1    simvastatin (ZOCOR) 20 mg tablet   5    SUPER B COMPLEX/C PO Take by mouth in the morning.      valsartan-hydrochlorothiazide (DIOVAN-HCT) 320-25 MG per tablet in the morning.  0    amoxicillin (AMOXIL) 500 mg capsule TAKE 2 CAPSULES RIGHT AWAY, THEN 1 CAPSULE EVERY 8 HOURS UNTIL FINISHED      EPINEPHrine (EPIPEN) 0.3 mg/0.3 mL SOAJ Inject 0.3 mL (0.3 mg total) into a muscle once for 1 dose 0.6 mL 0    LORazepam (ATIVAN) 1 mg tablet Take 1 tablet (1 mg total) by mouth every 8 (eight) hours as needed (nausea or anxiety) 20 tablet 0    Mounjaro 7.5 MG/0.5ML Inject 7.5 mg under the skin every 7 days      scopolamine (TRANSDERM-SCOP) 1.5 mg/3 days TD 72 hr patch Place 1  patch on the skin every third day 2 patch 1     No current facility-administered medications on file prior to visit.

## 2025-07-18 ENCOUNTER — HOSPITAL ENCOUNTER (OUTPATIENT)
Dept: INFUSION CENTER | Facility: HOSPITAL | Age: 65
End: 2025-07-18
Payer: MEDICARE

## 2025-07-18 DIAGNOSIS — C56.2 MALIGNANT NEOPLASM OF LEFT OVARY (HCC): ICD-10-CM

## 2025-07-18 DIAGNOSIS — Z45.2 ENCOUNTER FOR CENTRAL LINE CARE: Primary | ICD-10-CM

## 2025-07-18 LAB
ALBUMIN SERPL BCG-MCNC: 4.2 G/DL (ref 3.5–5)
ALP SERPL-CCNC: 63 U/L (ref 34–104)
ALT SERPL W P-5'-P-CCNC: 13 U/L (ref 7–52)
AMYLASE SERPL-CCNC: 41 IU/L (ref 29–103)
ANION GAP SERPL CALCULATED.3IONS-SCNC: 7 MMOL/L (ref 4–13)
AST SERPL W P-5'-P-CCNC: 14 U/L (ref 13–39)
BASOPHILS # BLD AUTO: 0.02 THOUSANDS/ÂΜL (ref 0–0.1)
BASOPHILS NFR BLD AUTO: 1 % (ref 0–1)
BILIRUB SERPL-MCNC: 0.39 MG/DL (ref 0.2–1)
BUN SERPL-MCNC: 12 MG/DL (ref 5–25)
CALCIUM SERPL-MCNC: 9.5 MG/DL (ref 8.4–10.2)
CHLORIDE SERPL-SCNC: 100 MMOL/L (ref 96–108)
CO2 SERPL-SCNC: 31 MMOL/L (ref 21–32)
CREAT SERPL-MCNC: 0.54 MG/DL (ref 0.6–1.3)
EOSINOPHIL # BLD AUTO: 0.06 THOUSAND/ÂΜL (ref 0–0.61)
EOSINOPHIL NFR BLD AUTO: 2 % (ref 0–6)
ERYTHROCYTE [DISTWIDTH] IN BLOOD BY AUTOMATED COUNT: 12.8 % (ref 11.6–15.1)
GFR SERPL CREATININE-BSD FRML MDRD: 97 ML/MIN/1.73SQ M
GLUCOSE SERPL-MCNC: 155 MG/DL (ref 65–140)
HCT VFR BLD AUTO: 32.2 % (ref 34.8–46.1)
HGB BLD-MCNC: 11.2 G/DL (ref 11.5–15.4)
IMM GRANULOCYTES # BLD AUTO: 0.02 THOUSAND/UL (ref 0–0.2)
IMM GRANULOCYTES NFR BLD AUTO: 1 % (ref 0–2)
LIPASE SERPL-CCNC: 28 U/L (ref 11–82)
LYMPHOCYTES # BLD AUTO: 1.11 THOUSANDS/ÂΜL (ref 0.6–4.47)
LYMPHOCYTES NFR BLD AUTO: 36 % (ref 14–44)
MAGNESIUM SERPL-MCNC: 1.6 MG/DL (ref 1.9–2.7)
MCH RBC QN AUTO: 31.1 PG (ref 26.8–34.3)
MCHC RBC AUTO-ENTMCNC: 34.8 G/DL (ref 31.4–37.4)
MCV RBC AUTO: 89 FL (ref 82–98)
MONOCYTES # BLD AUTO: 0.34 THOUSAND/ÂΜL (ref 0.17–1.22)
MONOCYTES NFR BLD AUTO: 11 % (ref 4–12)
NEUTROPHILS # BLD AUTO: 1.5 THOUSANDS/ÂΜL (ref 1.85–7.62)
NEUTS SEG NFR BLD AUTO: 49 % (ref 43–75)
NRBC BLD AUTO-RTO: 0 /100 WBCS
PLATELET # BLD AUTO: 249 THOUSANDS/UL (ref 149–390)
PMV BLD AUTO: 8.3 FL (ref 8.9–12.7)
POTASSIUM SERPL-SCNC: 3.5 MMOL/L (ref 3.5–5.3)
PROT SERPL-MCNC: 6.6 G/DL (ref 6.4–8.4)
RBC # BLD AUTO: 3.6 MILLION/UL (ref 3.81–5.12)
SODIUM SERPL-SCNC: 138 MMOL/L (ref 135–147)
TSH SERPL DL<=0.05 MIU/L-ACNC: 2.92 UIU/ML (ref 0.45–4.5)
WBC # BLD AUTO: 3.05 THOUSAND/UL (ref 4.31–10.16)

## 2025-07-18 PROCEDURE — 83520 IMMUNOASSAY QUANT NOS NONAB: CPT

## 2025-07-18 PROCEDURE — 82150 ASSAY OF AMYLASE: CPT

## 2025-07-18 PROCEDURE — 83735 ASSAY OF MAGNESIUM: CPT

## 2025-07-18 PROCEDURE — 83690 ASSAY OF LIPASE: CPT

## 2025-07-18 PROCEDURE — 85025 COMPLETE CBC W/AUTO DIFF WBC: CPT

## 2025-07-18 PROCEDURE — 84443 ASSAY THYROID STIM HORMONE: CPT

## 2025-07-18 PROCEDURE — 80053 COMPREHEN METABOLIC PANEL: CPT

## 2025-07-18 NOTE — PROGRESS NOTES
Eli Cobos  tolerated treatment well with no complications.      Eli Cobos is aware of future appt on 7/22 at 0830.     AVS printed and given to Eli Cobos:  No (Declined by Eli Cobos)

## 2025-07-21 ENCOUNTER — TELEPHONE (OUTPATIENT)
Dept: GYNECOLOGIC ONCOLOGY | Facility: CLINIC | Age: 65
End: 2025-07-21

## 2025-07-21 ENCOUNTER — DOCUMENTATION (OUTPATIENT)
Dept: GYNECOLOGIC ONCOLOGY | Facility: CLINIC | Age: 65
End: 2025-07-21

## 2025-07-21 ENCOUNTER — TELEPHONE (OUTPATIENT)
Dept: HEMATOLOGY ONCOLOGY | Facility: CLINIC | Age: 65
End: 2025-07-21

## 2025-07-21 DIAGNOSIS — C56.2 MALIGNANT NEOPLASM OF LEFT OVARY (HCC): Primary | ICD-10-CM

## 2025-07-21 RX ORDER — SODIUM CHLORIDE 9 MG/ML
20 INJECTION, SOLUTION INTRAVENOUS ONCE
Status: CANCELLED | OUTPATIENT
Start: 2025-07-22

## 2025-07-21 NOTE — PROGRESS NOTES
July 2025 Sunday Monday Tuesday Wednesday Thursday Friday Saturday             1     2    Oncology Treatment   8:00 AM   UB INFUSION CHAIR 13   [facility]    Admission   2:40 PM   Joey Christine MD    [facility]   (Discharge: 7/2/2025) 3     4     5          Cycle 8, Day 1      6     7     8     9     10    CT CHEST ABDOMEN PELVIS W CON  12:30 PM   UB CT 1   [facility] CAT Scan 11    Catheter Maintenance  12:30 PM   UB INFUSION CHAIR 6   [facility] 12                13     14     15     16     17    Office Visit   1:45 PM   Trevin Bonilla MD   [facility] Gynecology Oncology [facility] 18    Catheter Maintenance   1:30 PM   UB INFUSION CHAIR 2   [facility] 19                20     21     22    Oncology Treatment   8:30 AM   UB INFUSION CHAIR 15   [facility] 23     24     25     26         Cycle 9, Day 1       27     28     29 30 31 August 2025 Sunday Monday Tuesday Wednesday Thursday Friday Saturday                            1    Catheter Maintenance  10:30 AM   UB INF QUICK CHAIR   [facility] 2                3     4     5     6     7     8     9                10     11     12     13     14     15    Catheter Maintenance   9:00 AM   UB INF QUICK CHAIR   [facility] 16                17     18     19    Oncology Treatment   1:00 PM   UB INFUSION CHAIR 13   [facility] 20     21     22     23         Cycle 10, Day 1       24     25    Telephone Visit  10:15 AM   Suri Wiseman GC   [facility] [place] 26     27     28     29    Catheter Maintenance  11:00 AM   UB INF QUICK CHAIR   [facility]  [facility] 30                31 September 2025 Sunday Monday Tuesday Wednesday Thursday Friday Saturday        1     2    Cataract surgery 3     4     5     6                7     8     9     10     11     12    Catheter Maintenance   8:00 AM   UB INF QUICK CHAIR   [facility] 13                14     15    Cataract surgery 16    Oncology Treatment   9:00 AM   UB INFUSION CHAIR 8   [facility] 17     18     19     20         Cycle 11, Day 1       21     22     23     24     25     26    Catheter Maintenance   9:00 AM   UB INF QUICK CHAIR   [facility] 27                28 29 30 October 2025 Sunday Monday Tuesday Wednesday Thursday Friday Saturday                  1     2     3     4                5     6     7     8     9     10    Catheter Maintenance   8:00 AM   UB INF QUICK CHAIR   [facility] 11                12     13     14    Oncology Treatment  11:00 AM   UB INFUSION CHAIR 1   [facility] 15     16     17     18         Cycle 12, Day 1       19     20     21     22     23    Office Visit   9:30 AM   Trevin Bonilla MD   [facility] Gynecology Oncology [facility] 24    Catheter Maintenance   8:00 AM   UB INF QUICK CHAIR   [facility] 25                26     27     28     29     30     31

## 2025-07-21 NOTE — TELEPHONE ENCOUNTER
Auth request for olaparib (LYNPARZA) tablet  submitted and currently pending.    (Key: [id number])

## 2025-07-21 NOTE — TELEPHONE ENCOUNTER
Called and spoke with patient about CT scan. Offered 10/16, but patient has another appointment that day.     Called and spoke with patient that CT scan.  Patient is scheduled for 10/17 at 10:30 am in [facility] [address].  Patient does omipaque and know to get there 90 mins before 10:30 am.  Patient confirmed times, date, instructions, and location.

## 2025-07-21 NOTE — TELEPHONE ENCOUNTER
Outcome  Denied today by [employer] 2017  Denied. We are unable to approve your request for this drug. 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 The records your doctor gave us did not show you met (any of) the requirements.  Drug  LYNPARZA 100 mg tablets    Form  [employer] Electronic Prior Authorization Request Form (2017 NCPDP)

## 2025-07-22 ENCOUNTER — DOCUMENTATION (OUTPATIENT)
Dept: HEMATOLOGY ONCOLOGY | Facility: CLINIC | Age: 65
End: 2025-07-22

## 2025-07-22 ENCOUNTER — HOSPITAL ENCOUNTER (OUTPATIENT)
Dept: INFUSION CENTER | Facility: HOSPITAL | Age: 65
Discharge: HOME | End: 2025-07-22
Attending: OBSTETRICS & GYNECOLOGY
Payer: MEDICARE

## 2025-07-22 VITALS
SYSTOLIC BLOOD PRESSURE: 138 MMHG | HEART RATE: 88 BPM | RESPIRATION RATE: 16 BRPM | TEMPERATURE: 97.6 F | OXYGEN SATURATION: 96 % | DIASTOLIC BLOOD PRESSURE: 90 MMHG

## 2025-07-22 DIAGNOSIS — E83.42 HYPOMAGNESEMIA: Primary | ICD-10-CM

## 2025-07-22 DIAGNOSIS — C56.2 MALIGNANT NEOPLASM OF LEFT OVARY (HCC): ICD-10-CM

## 2025-07-22 LAB — INHIBIN B SERPL-MCNC: 56.1 PG/ML (ref 0–16.9)

## 2025-07-22 PROCEDURE — 96367 TX/PROPH/DG ADDL SEQ IV INF: CPT

## 2025-07-22 PROCEDURE — 96413 CHEMO IV INFUSION 1 HR: CPT

## 2025-07-22 RX ORDER — SODIUM CHLORIDE 9 MG/ML
20 INJECTION, SOLUTION INTRAVENOUS ONCE
Status: COMPLETED | OUTPATIENT
Start: 2025-07-22 | End: 2025-07-22

## 2025-07-22 RX ORDER — MAGNESIUM SULFATE HEPTAHYDRATE 40 MG/ML
2 INJECTION, SOLUTION INTRAVENOUS ONCE
Status: COMPLETED | OUTPATIENT
Start: 2025-07-22 | End: 2025-07-22

## 2025-07-22 RX ORDER — MAGNESIUM SULFATE HEPTAHYDRATE 40 MG/ML
2 INJECTION, SOLUTION INTRAVENOUS ONCE
Status: CANCELLED
Start: 2025-07-22

## 2025-07-22 RX ADMIN — MAGNESIUM SULFATE HEPTAHYDRATE 2 G: 40 INJECTION, SOLUTION INTRAVENOUS at 08:41

## 2025-07-22 RX ADMIN — SODIUM CHLORIDE 20 ML/HR: 0.9 INJECTION, SOLUTION INTRAVENOUS at 09:48

## 2025-07-22 RX ADMIN — DURVALUMAB 1500 MG: 500 INJECTION, SOLUTION INTRAVENOUS at 09:49

## 2025-07-22 NOTE — PROGRESS NOTES
Eli Cobos  tolerated Imfinzi and magnesuim well with no complications.      Eli Cobos is aware of future appt on 8/1 at 10:30am.     AVS Declined    Left unit in stable condition.

## 2025-07-22 NOTE — PROGRESS NOTES
Spoke with pt and discussed Lynparza denial and [place] and [place].  Pt.will sign application on 7/25 while in infusion suite for bloodwork. Emailed application to RUIZ Cruz LSW

## 2025-07-24 ENCOUNTER — TELEPHONE (OUTPATIENT)
Age: 65
End: 2025-07-24

## 2025-07-24 ENCOUNTER — DOCUMENTATION (OUTPATIENT)
Dept: HEMATOLOGY ONCOLOGY | Facility: CLINIC | Age: 65
End: 2025-07-24

## 2025-07-24 NOTE — PROGRESS NOTES
Made pt aware that [facility] will not be onsite 7/25/25 but that  has PAP form for her to sign.  ANGELA Tucker

## 2025-07-24 NOTE — TELEPHONE ENCOUNTER
Patient called, asking if Luisana could call her back to discuss her infusion on 9/16. That is the day after her surgery and will not be able to make it. She would like to know if that can be pushed to the following week

## 2025-07-24 NOTE — TELEPHONE ENCOUNTER
Return call placed. Future treatments adjusted by 1 week. Infusion notified.   Script refilled for the Vitamin D and denied for the Lasix as dialysis indicated no longer taking the medication.

## 2025-07-25 ENCOUNTER — HOSPITAL ENCOUNTER (OUTPATIENT)
Dept: INFUSION CENTER | Facility: HOSPITAL | Age: 65
Discharge: HOME | End: 2025-07-25

## 2025-07-31 ENCOUNTER — DOCUMENTATION (OUTPATIENT)
Dept: HEMATOLOGY ONCOLOGY | Facility: CLINIC | Age: 65
End: 2025-07-31

## 2025-08-01 ENCOUNTER — HOSPITAL ENCOUNTER (OUTPATIENT)
Dept: INFUSION CENTER | Facility: HOSPITAL | Age: 65
Discharge: HOME | End: 2025-08-01
Payer: MEDICARE

## 2025-08-01 DIAGNOSIS — Z45.2 ENCOUNTER FOR CENTRAL LINE CARE: Primary | ICD-10-CM

## 2025-08-01 DIAGNOSIS — C56.2 MALIGNANT NEOPLASM OF LEFT OVARY (HCC): ICD-10-CM

## 2025-08-01 LAB
ALBUMIN SERPL BCG-MCNC: 4.3 G/DL (ref 3.5–5)
ALP SERPL-CCNC: 63 U/L (ref 34–104)
ALT SERPL W P-5'-P-CCNC: 11 U/L (ref 7–52)
ANION GAP SERPL CALCULATED.3IONS-SCNC: 8 MMOL/L (ref 4–13)
AST SERPL W P-5'-P-CCNC: 17 U/L (ref 13–39)
BASOPHILS # BLD AUTO: 0.03 THOUSANDS/ÂΜL (ref 0–0.1)
BASOPHILS NFR BLD AUTO: 1 % (ref 0–1)
BILIRUB SERPL-MCNC: 0.4 MG/DL (ref 0.2–1)
BUN SERPL-MCNC: 13 MG/DL (ref 5–25)
CALCIUM SERPL-MCNC: 9.7 MG/DL (ref 8.4–10.2)
CHLORIDE SERPL-SCNC: 101 MMOL/L (ref 96–108)
CO2 SERPL-SCNC: 31 MMOL/L (ref 21–32)
CREAT SERPL-MCNC: 0.54 MG/DL (ref 0.6–1.3)
EOSINOPHIL # BLD AUTO: 0.07 THOUSAND/ÂΜL (ref 0–0.61)
EOSINOPHIL NFR BLD AUTO: 1 % (ref 0–6)
ERYTHROCYTE [DISTWIDTH] IN BLOOD BY AUTOMATED COUNT: 12.6 % (ref 11.6–15.1)
GFR SERPL CREATININE-BSD FRML MDRD: 97 ML/MIN/1.73SQ M
GLUCOSE SERPL-MCNC: 112 MG/DL (ref 65–140)
HCT VFR BLD AUTO: 32.9 % (ref 34.8–46.1)
HGB BLD-MCNC: 11.6 G/DL (ref 11.5–15.4)
IMM GRANULOCYTES # BLD AUTO: 0.05 THOUSAND/UL (ref 0–0.2)
IMM GRANULOCYTES NFR BLD AUTO: 1 % (ref 0–2)
LYMPHOCYTES # BLD AUTO: 1.09 THOUSANDS/ÂΜL (ref 0.6–4.47)
LYMPHOCYTES NFR BLD AUTO: 21 % (ref 14–44)
MAGNESIUM SERPL-MCNC: 1.6 MG/DL (ref 1.9–2.7)
MCH RBC QN AUTO: 31.1 PG (ref 26.8–34.3)
MCHC RBC AUTO-ENTMCNC: 35.3 G/DL (ref 31.4–37.4)
MCV RBC AUTO: 88 FL (ref 82–98)
MONOCYTES # BLD AUTO: 0.41 THOUSAND/ÂΜL (ref 0.17–1.22)
MONOCYTES NFR BLD AUTO: 8 % (ref 4–12)
NEUTROPHILS # BLD AUTO: 3.58 THOUSANDS/ÂΜL (ref 1.85–7.62)
NEUTS SEG NFR BLD AUTO: 68 % (ref 43–75)
NRBC BLD AUTO-RTO: 0 /100 WBCS
PLATELET # BLD AUTO: 206 THOUSANDS/UL (ref 149–390)
PMV BLD AUTO: 8.8 FL (ref 8.9–12.7)
POTASSIUM SERPL-SCNC: 3.7 MMOL/L (ref 3.5–5.3)
PROT SERPL-MCNC: 6.7 G/DL (ref 6.4–8.4)
RBC # BLD AUTO: 3.73 MILLION/UL (ref 3.81–5.12)
SODIUM SERPL-SCNC: 140 MMOL/L (ref 135–147)
WBC # BLD AUTO: 5.23 THOUSAND/UL (ref 4.31–10.16)

## 2025-08-01 PROCEDURE — 80053 COMPREHEN METABOLIC PANEL: CPT

## 2025-08-01 PROCEDURE — 83735 ASSAY OF MAGNESIUM: CPT

## 2025-08-01 PROCEDURE — 85025 COMPLETE CBC W/AUTO DIFF WBC: CPT

## 2025-08-04 ENCOUNTER — TELEPHONE (OUTPATIENT)
Dept: GENETICS | Facility: CLINIC | Age: 65
End: 2025-08-04

## 2025-08-05 ENCOUNTER — DOCUMENTATION (OUTPATIENT)
Dept: HEMATOLOGY ONCOLOGY | Facility: CLINIC | Age: 65
End: 2025-08-05

## 2025-08-06 ENCOUNTER — DOCUMENTATION (OUTPATIENT)
Dept: HEMATOLOGY ONCOLOGY | Facility: CLINIC | Age: 65
End: 2025-08-06

## 2025-08-15 ENCOUNTER — HOSPITAL ENCOUNTER (OUTPATIENT)
Dept: INFUSION CENTER | Facility: HOSPITAL | Age: 65
End: 2025-08-15
Payer: MEDICARE

## 2025-08-18 ENCOUNTER — TELEPHONE (OUTPATIENT)
Age: 65
End: 2025-08-18

## 2025-08-18 RX ORDER — SODIUM CHLORIDE 9 MG/ML
20 INJECTION, SOLUTION INTRAVENOUS ONCE
Status: CANCELLED | OUTPATIENT
Start: 2025-08-19

## 2025-08-19 ENCOUNTER — HOSPITAL ENCOUNTER (OUTPATIENT)
Dept: INFUSION CENTER | Facility: HOSPITAL | Age: 65
Discharge: HOME | End: 2025-08-19
Attending: OBSTETRICS & GYNECOLOGY
Payer: MEDICARE

## 2025-08-19 VITALS
TEMPERATURE: 97.8 F | HEART RATE: 83 BPM | SYSTOLIC BLOOD PRESSURE: 120 MMHG | OXYGEN SATURATION: 96 % | DIASTOLIC BLOOD PRESSURE: 78 MMHG | RESPIRATION RATE: 18 BRPM

## 2025-08-19 DIAGNOSIS — E83.42 HYPOMAGNESEMIA: ICD-10-CM

## 2025-08-19 DIAGNOSIS — C56.2 MALIGNANT NEOPLASM OF LEFT OVARY (HCC): Primary | ICD-10-CM

## 2025-08-19 RX ORDER — SODIUM CHLORIDE 9 MG/ML
20 INJECTION, SOLUTION INTRAVENOUS ONCE
Status: COMPLETED | OUTPATIENT
Start: 2025-08-19 | End: 2025-08-19

## 2025-08-19 RX ADMIN — DURVALUMAB 1500 MG: 500 INJECTION, SOLUTION INTRAVENOUS at 14:16

## 2025-08-19 RX ADMIN — SODIUM CHLORIDE 20 ML/HR: 9 INJECTION, SOLUTION INTRAVENOUS at 13:47

## (undated) DEVICE — TROCAR: Brand: KII FIOS FIRST ENTRY

## (undated) DEVICE — ALL PURPOSE SPONGES,NONWOVEN, 4 PLY: Brand: CURITY

## (undated) DEVICE — SUT VICRYL 0 CT-1 27 IN J260H

## (undated) DEVICE — CHLORHEXIDINE 4PCT 4 OZ

## (undated) DEVICE — ADHESIVE SKIN HIGH VISCOSITY EXOFIN 1ML

## (undated) DEVICE — CATH URETERAL 5FR X 70 CM FLEX TIP POLYUR BARD

## (undated) DEVICE — LIGACLIP MCA MULTIPLE CLIP APPLIERS, 20 LARGE CLIPS: Brand: LIGACLIP

## (undated) DEVICE — TRAY FOLEY 16FR URIMETER SILICONE SURESTEP

## (undated) DEVICE — INTENDED FOR TISSUE SEPARATION, AND OTHER PROCEDURES THAT REQUIRE A SHARP SURGICAL BLADE TO PUNCTURE OR CUT.: Brand: BARD-PARKER SAFETY BLADES SIZE 15, STERILE

## (undated) DEVICE — ENSEAL 20 CM SHAFT, LARGE JAW: Brand: ENSEAL X1

## (undated) DEVICE — SUT MONOCRYL 4-0 RB-1 27 IN Y304H

## (undated) DEVICE — 1820 FOAM BLOCK NEEDLE COUNTER: Brand: DEVON

## (undated) DEVICE — GUIDEWIRE STRGHT TIP 0.035 IN  SOLO PLUS

## (undated) DEVICE — 3000CC GUARDIAN II: Brand: GUARDIAN

## (undated) DEVICE — GAUZE SPONGES,16 PLY: Brand: CURITY

## (undated) DEVICE — KIT, BETHLEHEM ROBOTIC PROST: Brand: CARDINAL HEALTH

## (undated) DEVICE — INSUFFLATION TUBING PRIMFLO

## (undated) DEVICE — PROXIMATE LINEAR CUTTER RELOAD, BLUE, 75MM: Brand: PROXIMATE

## (undated) DEVICE — ADHESIVE SKN CLSR HISTOACRYL FLEX 0.5ML LF

## (undated) DEVICE — GLOVE INDICATOR PI UNDERGLOVE SZ 8 BLUE

## (undated) DEVICE — SUT PDS II 1 XLH 96 IN LOOPED Z881G

## (undated) DEVICE — TELFA NON-ADHERENT ABSORBENT DRESSING: Brand: TELFA

## (undated) DEVICE — GLOVE INDICATOR PI UNDERGLOVE SZ 7 BLUE

## (undated) DEVICE — JP CHAN DRN SIL HUBLESS 15FR W/TRO: Brand: CARDINAL HEALTH

## (undated) DEVICE — PENCIL ELECTROSURG E-Z CLEAN -0035H

## (undated) DEVICE — MAYO STAND COVER: Brand: CONVERTORS

## (undated) DEVICE — LIGACLIP MCA MULTIPLE CLIP APPLIERS, 20 MEDIUM CLIPS: Brand: LIGACLIP

## (undated) DEVICE — ABDOMINAL PAD: Brand: DERMACEA

## (undated) DEVICE — JP PERF DRN SIL FLT 7MM FULL: Brand: CARDINAL HEALTH

## (undated) DEVICE — CHLORAPREP HI-LITE 26ML ORANGE

## (undated) DEVICE — TELFA ADHESIVE ISLAND DRESSING: Brand: TELFA

## (undated) DEVICE — PREMIUM DRY TRAY LF: Brand: MEDLINE INDUSTRIES, INC.

## (undated) DEVICE — SUT MONOCRYL 4-0 PS-2 27 IN Y426H

## (undated) DEVICE — ANTIBACTERIAL VIOLET BRAIDED (POLYGLACTIN 910), SYNTHETIC ABSORBABLE SUTURE: Brand: COATED VICRYL

## (undated) DEVICE — GLOVE PI ULTRA TOUCH SZ.7.5

## (undated) DEVICE — GLOVE SRG BIOGEL ECLIPSE 7.5

## (undated) DEVICE — MEDI-VAC YANK SUCT HNDL W/TPRD BULBOUS TIP: Brand: CARDINAL HEALTH

## (undated) DEVICE — SUT STRATAFIX SPIRAL 2-0 CT-1 30 CM SXPP1B410

## (undated) DEVICE — ANTIBACTERIAL UNDYED BRAIDED (POLYGLACTIN 910), SYNTHETIC ABSORBABLE SUTURE: Brand: COATED VICRYL

## (undated) DEVICE — PACK PBDS STERILE LAP LITHOTOMY RF

## (undated) DEVICE — URIMETER 2500ML

## (undated) DEVICE — PAD GROUNDING ADULT

## (undated) DEVICE — STERILE CYSTO PACK: Brand: CARDINAL HEALTH

## (undated) DEVICE — ELECTRODE BLADE MOD  E-Z CLEAN 6.5IN -0014M

## (undated) DEVICE — GLOVE INDICATOR PI UNDERGLOVE SZ 7.5 BLUE

## (undated) DEVICE — SUT VICRYL 3-0 SH 27 IN J416H

## (undated) DEVICE — SUT VICRYL 0 REEL 54 IN J287G

## (undated) DEVICE — SPECIMEN CONTAINER STERILE PEEL PACK

## (undated) DEVICE — INTENDED FOR TISSUE SEPARATION, AND OTHER PROCEDURES THAT REQUIRE A SHARP SURGICAL BLADE TO PUNCTURE OR CUT.: Brand: BARD-PARKER SAFETY BLADES SIZE 10, STERILE

## (undated) DEVICE — SUT PLAIN 2-0 CTX 27 IN 872H

## (undated) DEVICE — PROXIMATE RELOADABLE LINEAR CUTTER WITH SAFETY LOCK-OUT, 75MM: Brand: PROXIMATE

## (undated) DEVICE — SURGICEL 4 X 8

## (undated) DEVICE — SPONGE STICK WITH PVP-I: Brand: KENDALL

## (undated) DEVICE — SUT STRATAFIX SPIRAL 3-0 PGA/PCL 30 X 30 CM SXMD2B408

## (undated) DEVICE — NEEDLE 25G X 1 1/2

## (undated) DEVICE — SUT MONOCRYL 3-0 RB-1 27 IN Y305H

## (undated) DEVICE — ANTI-FOG SOLUTION WITH FOAM PAD: Brand: DEVON

## (undated) DEVICE — INTENDED FOR TISSUE SEPARATION, AND OTHER PROCEDURES THAT REQUIRE A SHARP SURGICAL BLADE TO PUNCTURE OR CUT.: Brand: BARD-PARKER SAFETY BLADES SIZE 11, STERILE

## (undated) DEVICE — SYRINGE 50ML LL

## (undated) DEVICE — CAUTERY TIP POLISHER: Brand: DEVON

## (undated) DEVICE — TOWEL SET X-RAY

## (undated) DEVICE — URETERAL OLIVE ITIP CATH 5 FR